# Patient Record
Sex: FEMALE | Race: WHITE | Employment: OTHER | URBAN - METROPOLITAN AREA
[De-identification: names, ages, dates, MRNs, and addresses within clinical notes are randomized per-mention and may not be internally consistent; named-entity substitution may affect disease eponyms.]

---

## 2017-05-08 ENCOUNTER — HOSPITAL ENCOUNTER (EMERGENCY)
Facility: HOSPITAL | Age: 67
Discharge: HOME | End: 2017-05-08
Attending: EMERGENCY MEDICINE
Payer: MEDICARE

## 2017-05-08 ENCOUNTER — APPOINTMENT (OUTPATIENT)
Dept: CT IMAGING | Facility: HOSPITAL | Age: 67
End: 2017-05-08
Attending: EMERGENCY MEDICINE
Payer: MEDICARE

## 2017-05-08 VITALS
TEMPERATURE: 98 F | BODY MASS INDEX: 24.27 KG/M2 | HEIGHT: 63 IN | WEIGHT: 137 LBS | HEART RATE: 71 BPM | SYSTOLIC BLOOD PRESSURE: 138 MMHG | OXYGEN SATURATION: 97 % | DIASTOLIC BLOOD PRESSURE: 79 MMHG | RESPIRATION RATE: 20 BRPM

## 2017-05-08 DIAGNOSIS — R10.9 RIGHT FLANK PAIN: ICD-10-CM

## 2017-05-08 DIAGNOSIS — N83.8 OVARIAN MASS, LEFT: Primary | ICD-10-CM

## 2017-05-08 PROCEDURE — 96375 TX/PRO/DX INJ NEW DRUG ADDON: CPT

## 2017-05-08 PROCEDURE — 96374 THER/PROPH/DIAG INJ IV PUSH: CPT

## 2017-05-08 PROCEDURE — 74176 CT ABD & PELVIS W/O CONTRAST: CPT | Performed by: EMERGENCY MEDICINE

## 2017-05-08 PROCEDURE — 99285 EMERGENCY DEPT VISIT HI MDM: CPT

## 2017-05-08 PROCEDURE — 80053 COMPREHEN METABOLIC PANEL: CPT | Performed by: EMERGENCY MEDICINE

## 2017-05-08 PROCEDURE — 81001 URINALYSIS AUTO W/SCOPE: CPT | Performed by: EMERGENCY MEDICINE

## 2017-05-08 PROCEDURE — 99284 EMERGENCY DEPT VISIT MOD MDM: CPT

## 2017-05-08 PROCEDURE — 83690 ASSAY OF LIPASE: CPT | Performed by: EMERGENCY MEDICINE

## 2017-05-08 PROCEDURE — 85025 COMPLETE CBC W/AUTO DIFF WBC: CPT | Performed by: EMERGENCY MEDICINE

## 2017-05-08 PROCEDURE — 96361 HYDRATE IV INFUSION ADD-ON: CPT

## 2017-05-08 RX ORDER — HYDROCODONE BITARTRATE AND ACETAMINOPHEN 5; 325 MG/1; MG/1
1-2 TABLET ORAL EVERY 4 HOURS PRN
Qty: 20 TABLET | Refills: 0 | Status: SHIPPED | OUTPATIENT
Start: 2017-05-08 | End: 2017-05-12 | Stop reason: ALTCHOICE

## 2017-05-08 RX ORDER — HYDROMORPHONE HYDROCHLORIDE 1 MG/ML
0.5 INJECTION, SOLUTION INTRAMUSCULAR; INTRAVENOUS; SUBCUTANEOUS ONCE
Status: COMPLETED | OUTPATIENT
Start: 2017-05-08 | End: 2017-05-08

## 2017-05-08 RX ORDER — SODIUM CHLORIDE 9 MG/ML
INJECTION, SOLUTION INTRAVENOUS CONTINUOUS
Status: DISCONTINUED | OUTPATIENT
Start: 2017-05-08 | End: 2017-05-08

## 2017-05-08 RX ORDER — ONDANSETRON 2 MG/ML
4 INJECTION INTRAMUSCULAR; INTRAVENOUS ONCE
Status: COMPLETED | OUTPATIENT
Start: 2017-05-08 | End: 2017-05-08

## 2017-05-08 NOTE — ED INITIAL ASSESSMENT (HPI)
Pt reports RUQ pain that wraps around rib cage that started today. No n/v/d. No abdominal surgeries. Took khari aspirin PTA.

## 2017-05-08 NOTE — ED PROVIDER NOTES
Patient Seen in: [facility] Emergency Department    History   Patient presents with:  Abdomen/Flank Pain (GI/)    Stated Complaint: right flank pain    HPI  Patient is a 40-year-old female who states that an hour ago she had acute onset of right f (36.9 °C)   Temp src 05/08/17 1635 Temporal   SpO2 05/08/17 1635 94 %   O2 Device 05/08/17 1732 None (Room air)       Current:/79 mmHg  Pulse 71  Temp(Src) 98.4 °F (36.9 °C) (Temporal)  Resp 20  Ht 160 cm (5' 3\")  Wt 62.143 kg  BMI 24.27 kg/m2  SpO2 LAVENDER   RAINBOW DRAW LIGHT GREEN    CT abdomen pelvisCONCLUSION:  6.1 x 6.2 x 6.1 cm left adnexal mass possibly of ovarian origin measuring slightly above fluid attenuation. Pelvic ultrasound is recommended for further evaluation.      MDM   Patient was

## 2017-05-09 NOTE — ED NOTES
Pt asleep, rouses easy. Family at bedside. Pt denies need at this time, call light in reach.  States pain \"OK\"

## 2017-05-10 ENCOUNTER — TELEPHONE (OUTPATIENT)
Dept: INTERNAL MEDICINE CLINIC | Facility: CLINIC | Age: 67
End: 2017-05-10

## 2017-05-12 ENCOUNTER — OFFICE VISIT (OUTPATIENT)
Dept: INTERNAL MEDICINE CLINIC | Facility: CLINIC | Age: 67
End: 2017-05-12

## 2017-05-12 VITALS
DIASTOLIC BLOOD PRESSURE: 84 MMHG | HEIGHT: 63 IN | SYSTOLIC BLOOD PRESSURE: 132 MMHG | WEIGHT: 126 LBS | RESPIRATION RATE: 16 BRPM | HEART RATE: 82 BPM | TEMPERATURE: 98 F | OXYGEN SATURATION: 98 % | BODY MASS INDEX: 22.32 KG/M2

## 2017-05-12 DIAGNOSIS — I77.811 ABDOMINAL AORTIC ECTASIA (HCC): ICD-10-CM

## 2017-05-12 DIAGNOSIS — J84.10 PULMONARY FIBROSIS (HCC): ICD-10-CM

## 2017-05-12 DIAGNOSIS — N83.8 OVARIAN MASS, LEFT: ICD-10-CM

## 2017-05-12 DIAGNOSIS — F32.9 REACTIVE DEPRESSION: ICD-10-CM

## 2017-05-12 DIAGNOSIS — D17.9 LIPOMA, UNSPECIFIED SITE: ICD-10-CM

## 2017-05-12 DIAGNOSIS — R10.9 FLANK PAIN: Primary | ICD-10-CM

## 2017-05-12 DIAGNOSIS — G25.0 BENIGN ESSENTIAL TREMOR: ICD-10-CM

## 2017-05-12 PROCEDURE — 99214 OFFICE O/P EST MOD 30 MIN: CPT | Performed by: FAMILY MEDICINE

## 2017-05-12 RX ORDER — PRIMIDONE 50 MG/1
50 TABLET ORAL NIGHTLY
Qty: 90 TABLET | Refills: 0 | Status: SHIPPED | OUTPATIENT
Start: 2017-05-12 | End: 2017-08-08

## 2017-05-12 RX ORDER — ESCITALOPRAM OXALATE 10 MG/1
10 TABLET ORAL DAILY
Qty: 30 TABLET | Refills: 1 | Status: SHIPPED | OUTPATIENT
Start: 2017-05-12 | End: 2017-07-13

## 2017-05-12 NOTE — PROGRESS NOTES
HPI:    Patient ID: Bay Valentino is a 74year old female.     HPI  4d ago seen in ER for R flank pain   Has been moving things lately that may have caused it   Had a CT and not stones but saw mass in the L ovary   She does feel better but now worried abo (M67.166) Abdominal aortic ectasia (HCC)  Plan: noted on CT   D/w pt     (F32.9) Reactive depression  Plan: discussed options  Will treat w Lexapro   Call w update      (G25.0) Benign essential tremor  Plan: mysoline refilled          Flank pain  (prim

## 2017-05-19 ENCOUNTER — HOSPITAL ENCOUNTER (OUTPATIENT)
Dept: ULTRASOUND IMAGING | Age: 67
Discharge: HOME | End: 2017-05-19
Attending: FAMILY MEDICINE
Payer: MEDICARE

## 2017-05-19 DIAGNOSIS — N83.8 OVARIAN MASS, LEFT: ICD-10-CM

## 2017-05-19 PROCEDURE — 76856 US EXAM PELVIC COMPLETE: CPT | Performed by: FAMILY MEDICINE

## 2017-05-19 PROCEDURE — 76830 TRANSVAGINAL US NON-OB: CPT | Performed by: FAMILY MEDICINE

## 2017-05-23 PROCEDURE — 88175 CYTOPATH C/V AUTO FLUID REDO: CPT | Performed by: OBSTETRICS & GYNECOLOGY

## 2017-05-23 PROCEDURE — 87624 HPV HI-RISK TYP POOLED RSLT: CPT | Performed by: OBSTETRICS & GYNECOLOGY

## 2017-05-23 PROCEDURE — 88305 TISSUE EXAM BY PATHOLOGIST: CPT | Performed by: OBSTETRICS & GYNECOLOGY

## 2017-06-19 RX ORDER — ASPIRIN 81 MG/1
81 TABLET, CHEWABLE ORAL DAILY
COMMUNITY

## 2017-06-22 ENCOUNTER — LAB ENCOUNTER (OUTPATIENT)
Dept: LAB | Facility: HOSPITAL | Age: 67
End: 2017-06-22
Attending: OBSTETRICS & GYNECOLOGY
Payer: MEDICARE

## 2017-06-22 DIAGNOSIS — N83.8 OVARIAN MASS, LEFT: ICD-10-CM

## 2017-06-22 DIAGNOSIS — R93.89 THICKENED ENDOMETRIUM: ICD-10-CM

## 2017-06-22 PROCEDURE — 86850 RBC ANTIBODY SCREEN: CPT

## 2017-06-22 PROCEDURE — 86900 BLOOD TYPING SEROLOGIC ABO: CPT

## 2017-06-22 PROCEDURE — 86901 BLOOD TYPING SEROLOGIC RH(D): CPT

## 2017-06-22 PROCEDURE — 36415 COLL VENOUS BLD VENIPUNCTURE: CPT

## 2017-07-10 NOTE — H&P
[facility]    PATIENT'S NAME: Tip Pires   ATTENDING PHYSICIAN: Willam Hu M.D.    PATIENT ACCOUNT#:   [de-identified]    LOCATION:  OR   [facility]  MEDICAL RECORD #:   [id number]       YOB: 1950  ADMISSION DATE:       07/11/2017    H

## 2017-07-11 ENCOUNTER — SURGERY (OUTPATIENT)
Age: 67
End: 2017-07-11

## 2017-07-11 ENCOUNTER — HOSPITAL ENCOUNTER (OUTPATIENT)
Facility: HOSPITAL | Age: 67
Discharge: HOME | End: 2017-07-12
Attending: OBSTETRICS & GYNECOLOGY | Admitting: OBSTETRICS & GYNECOLOGY
Payer: MEDICARE

## 2017-07-11 DIAGNOSIS — R93.89 THICKENED ENDOMETRIUM: ICD-10-CM

## 2017-07-11 DIAGNOSIS — N83.8 OVARIAN MASS, LEFT: Primary | ICD-10-CM

## 2017-07-11 LAB
ERYTHROCYTE [DISTWIDTH] IN BLOOD BY AUTOMATED COUNT: 14.8 % (ref 11.5–16)
HCT VFR BLD AUTO: 40.8 % (ref 34–50)
HGB BLD-MCNC: 13.1 G/DL (ref 12–16)
MCH RBC QN AUTO: 26.6 PG (ref 27–33.2)
MCHC RBC AUTO-ENTMCNC: 32.1 G/DL (ref 31–37)
MCV RBC AUTO: 82.8 FL (ref 81–100)
PLATELET # BLD AUTO: 219 10(3)UL (ref 150–450)
RBC # BLD AUTO: 4.93 X10(6)UL (ref 3.8–5.1)
RED CELL DISTRIBUTION WIDTH-SD: 45 FL (ref 35.1–46.3)
WBC # BLD AUTO: 5.8 X10(3) UL (ref 4–13)

## 2017-07-11 PROCEDURE — 0UT64ZZ RESECTION OF LEFT FALLOPIAN TUBE, PERCUTANEOUS ENDOSCOPIC APPROACH: ICD-10-PCS | Performed by: OBSTETRICS & GYNECOLOGY

## 2017-07-11 PROCEDURE — 0UT14ZZ RESECTION OF LEFT OVARY, PERCUTANEOUS ENDOSCOPIC APPROACH: ICD-10-PCS | Performed by: OBSTETRICS & GYNECOLOGY

## 2017-07-11 PROCEDURE — 88307 TISSUE EXAM BY PATHOLOGIST: CPT | Performed by: OBSTETRICS & GYNECOLOGY

## 2017-07-11 PROCEDURE — 0UTC4ZZ RESECTION OF CERVIX, PERCUTANEOUS ENDOSCOPIC APPROACH: ICD-10-PCS | Performed by: OBSTETRICS & GYNECOLOGY

## 2017-07-11 PROCEDURE — 0UT94ZZ RESECTION OF UTERUS, PERCUTANEOUS ENDOSCOPIC APPROACH: ICD-10-PCS | Performed by: OBSTETRICS & GYNECOLOGY

## 2017-07-11 PROCEDURE — 88108 CYTOPATH CONCENTRATE TECH: CPT | Performed by: OBSTETRICS & GYNECOLOGY

## 2017-07-11 PROCEDURE — 88332 PATH CONSLTJ SURG EA ADD BLK: CPT | Performed by: OBSTETRICS & GYNECOLOGY

## 2017-07-11 PROCEDURE — 8E0W4CZ ROBOTIC ASSISTED PROCEDURE OF TRUNK REGION, PERCUTANEOUS ENDOSCOPIC APPROACH: ICD-10-PCS | Performed by: OBSTETRICS & GYNECOLOGY

## 2017-07-11 PROCEDURE — 85027 COMPLETE CBC AUTOMATED: CPT

## 2017-07-11 PROCEDURE — 88331 PATH CONSLTJ SURG 1 BLK 1SPC: CPT | Performed by: OBSTETRICS & GYNECOLOGY

## 2017-07-11 RX ORDER — ENOXAPARIN SODIUM 100 MG/ML
40 INJECTION SUBCUTANEOUS DAILY
Status: DISCONTINUED | OUTPATIENT
Start: 2017-07-11 | End: 2017-07-12

## 2017-07-11 RX ORDER — MORPHINE SULFATE 4 MG/ML
1 INJECTION, SOLUTION INTRAMUSCULAR; INTRAVENOUS EVERY 2 HOUR PRN
Status: DISCONTINUED | OUTPATIENT
Start: 2017-07-11 | End: 2017-07-12

## 2017-07-11 RX ORDER — HEPARIN SODIUM 5000 [USP'U]/ML
5000 INJECTION, SOLUTION INTRAVENOUS; SUBCUTANEOUS ONCE
Status: COMPLETED | OUTPATIENT
Start: 2017-07-11 | End: 2017-07-11

## 2017-07-11 RX ORDER — ACETAMINOPHEN 500 MG
1000 TABLET ORAL ONCE AS NEEDED
Status: DISCONTINUED | OUTPATIENT
Start: 2017-07-11 | End: 2017-07-11 | Stop reason: HOSPADM

## 2017-07-11 RX ORDER — DEXTROSE, SODIUM CHLORIDE, AND POTASSIUM CHLORIDE 5; .9; .15 G/100ML; G/100ML; G/100ML
INJECTION INTRAVENOUS CONTINUOUS
Status: DISCONTINUED | OUTPATIENT
Start: 2017-07-11 | End: 2017-07-12

## 2017-07-11 RX ORDER — HYDROCODONE BITARTRATE AND ACETAMINOPHEN 5; 325 MG/1; MG/1
1 TABLET ORAL AS NEEDED
Status: DISCONTINUED | OUTPATIENT
Start: 2017-07-11 | End: 2017-07-11 | Stop reason: HOSPADM

## 2017-07-11 RX ORDER — ONDANSETRON 2 MG/ML
4 INJECTION INTRAMUSCULAR; INTRAVENOUS EVERY 8 HOURS PRN
Status: DISCONTINUED | OUTPATIENT
Start: 2017-07-11 | End: 2017-07-12

## 2017-07-11 RX ORDER — ZOLPIDEM TARTRATE 5 MG/1
5 TABLET ORAL NIGHTLY PRN
Status: DISCONTINUED | OUTPATIENT
Start: 2017-07-11 | End: 2017-07-12

## 2017-07-11 RX ORDER — LABETALOL HYDROCHLORIDE 5 MG/ML
5 INJECTION, SOLUTION INTRAVENOUS EVERY 5 MIN PRN
Status: DISCONTINUED | OUTPATIENT
Start: 2017-07-11 | End: 2017-07-11 | Stop reason: HOSPADM

## 2017-07-11 RX ORDER — HYDROCODONE BITARTRATE AND ACETAMINOPHEN 5; 325 MG/1; MG/1
2 TABLET ORAL EVERY 4 HOURS PRN
Status: DISCONTINUED | OUTPATIENT
Start: 2017-07-11 | End: 2017-07-12

## 2017-07-11 RX ORDER — ACETAMINOPHEN 325 MG/1
650 TABLET ORAL EVERY 4 HOURS PRN
Status: DISCONTINUED | OUTPATIENT
Start: 2017-07-11 | End: 2017-07-12

## 2017-07-11 RX ORDER — CEFAZOLIN SODIUM 1 G/3ML
INJECTION, POWDER, FOR SOLUTION INTRAMUSCULAR; INTRAVENOUS
Status: DISCONTINUED | OUTPATIENT
Start: 2017-07-11 | End: 2017-07-11 | Stop reason: HOSPADM

## 2017-07-11 RX ORDER — HYDROMORPHONE HYDROCHLORIDE 1 MG/ML
0.4 INJECTION, SOLUTION INTRAMUSCULAR; INTRAVENOUS; SUBCUTANEOUS EVERY 5 MIN PRN
Status: DISCONTINUED | OUTPATIENT
Start: 2017-07-11 | End: 2017-07-11 | Stop reason: HOSPADM

## 2017-07-11 RX ORDER — NALOXONE HYDROCHLORIDE 0.4 MG/ML
80 INJECTION, SOLUTION INTRAMUSCULAR; INTRAVENOUS; SUBCUTANEOUS AS NEEDED
Status: DISCONTINUED | OUTPATIENT
Start: 2017-07-11 | End: 2017-07-11 | Stop reason: HOSPADM

## 2017-07-11 RX ORDER — DEXAMETHASONE SODIUM PHOSPHATE 4 MG/ML
4 VIAL (ML) INJECTION AS NEEDED
Status: DISCONTINUED | OUTPATIENT
Start: 2017-07-11 | End: 2017-07-11 | Stop reason: HOSPADM

## 2017-07-11 RX ORDER — MIDAZOLAM HYDROCHLORIDE 1 MG/ML
1 INJECTION INTRAMUSCULAR; INTRAVENOUS EVERY 5 MIN PRN
Status: DISCONTINUED | OUTPATIENT
Start: 2017-07-11 | End: 2017-07-11 | Stop reason: HOSPADM

## 2017-07-11 RX ORDER — DIPHENHYDRAMINE HYDROCHLORIDE 50 MG/ML
12.5 INJECTION INTRAMUSCULAR; INTRAVENOUS EVERY 4 HOURS PRN
Status: DISCONTINUED | OUTPATIENT
Start: 2017-07-11 | End: 2017-07-12

## 2017-07-11 RX ORDER — SODIUM CHLORIDE, SODIUM LACTATE, POTASSIUM CHLORIDE, CALCIUM CHLORIDE 600; 310; 30; 20 MG/100ML; MG/100ML; MG/100ML; MG/100ML
INJECTION, SOLUTION INTRAVENOUS CONTINUOUS
Status: DISCONTINUED | OUTPATIENT
Start: 2017-07-11 | End: 2017-07-12

## 2017-07-11 RX ORDER — PROCHLORPERAZINE 25 MG
25 SUPPOSITORY, RECTAL RECTAL EVERY 12 HOURS PRN
Status: DISCONTINUED | OUTPATIENT
Start: 2017-07-11 | End: 2017-07-12

## 2017-07-11 RX ORDER — HEPARIN SODIUM 5000 [USP'U]/ML
INJECTION, SOLUTION INTRAVENOUS; SUBCUTANEOUS
Status: DISPENSED
Start: 2017-07-11 | End: 2017-07-11

## 2017-07-11 RX ORDER — PROCHLORPERAZINE MALEATE 10 MG
10 TABLET ORAL EVERY 12 HOURS PRN
Status: DISCONTINUED | OUTPATIENT
Start: 2017-07-11 | End: 2017-07-12

## 2017-07-11 RX ORDER — ONDANSETRON 4 MG/1
4 TABLET, FILM COATED ORAL EVERY 8 HOURS PRN
Status: DISCONTINUED | OUTPATIENT
Start: 2017-07-11 | End: 2017-07-12

## 2017-07-11 RX ORDER — ONDANSETRON 2 MG/ML
4 INJECTION INTRAMUSCULAR; INTRAVENOUS AS NEEDED
Status: DISCONTINUED | OUTPATIENT
Start: 2017-07-11 | End: 2017-07-11 | Stop reason: HOSPADM

## 2017-07-11 RX ORDER — MEPERIDINE HYDROCHLORIDE 25 MG/ML
12.5 INJECTION INTRAMUSCULAR; INTRAVENOUS; SUBCUTANEOUS AS NEEDED
Status: DISCONTINUED | OUTPATIENT
Start: 2017-07-11 | End: 2017-07-11 | Stop reason: HOSPADM

## 2017-07-11 RX ORDER — BUPIVACAINE HYDROCHLORIDE 5 MG/ML
INJECTION, SOLUTION EPIDURAL; INTRACAUDAL AS NEEDED
Status: DISCONTINUED | OUTPATIENT
Start: 2017-07-11 | End: 2017-07-11 | Stop reason: HOSPADM

## 2017-07-11 RX ORDER — MORPHINE SULFATE 4 MG/ML
4 INJECTION, SOLUTION INTRAMUSCULAR; INTRAVENOUS EVERY 2 HOUR PRN
Status: DISCONTINUED | OUTPATIENT
Start: 2017-07-11 | End: 2017-07-12

## 2017-07-11 RX ORDER — MORPHINE SULFATE 4 MG/ML
2 INJECTION, SOLUTION INTRAMUSCULAR; INTRAVENOUS EVERY 2 HOUR PRN
Status: DISCONTINUED | OUTPATIENT
Start: 2017-07-11 | End: 2017-07-12

## 2017-07-11 RX ORDER — METOCLOPRAMIDE HYDROCHLORIDE 5 MG/ML
10 INJECTION INTRAMUSCULAR; INTRAVENOUS AS NEEDED
Status: DISCONTINUED | OUTPATIENT
Start: 2017-07-11 | End: 2017-07-11 | Stop reason: HOSPADM

## 2017-07-11 RX ORDER — HEPARIN SODIUM 5000 [USP'U]/ML
5000 INJECTION, SOLUTION INTRAVENOUS; SUBCUTANEOUS EVERY 8 HOURS SCHEDULED
Status: DISCONTINUED | OUTPATIENT
Start: 2017-07-11 | End: 2017-07-11

## 2017-07-11 RX ORDER — KETOROLAC TROMETHAMINE 30 MG/ML
30 INJECTION, SOLUTION INTRAMUSCULAR; INTRAVENOUS EVERY 6 HOURS
Status: DISCONTINUED | OUTPATIENT
Start: 2017-07-11 | End: 2017-07-12

## 2017-07-11 RX ORDER — HYDROCODONE BITARTRATE AND ACETAMINOPHEN 5; 325 MG/1; MG/1
2 TABLET ORAL AS NEEDED
Status: DISCONTINUED | OUTPATIENT
Start: 2017-07-11 | End: 2017-07-11 | Stop reason: HOSPADM

## 2017-07-11 RX ORDER — HYDROCODONE BITARTRATE AND ACETAMINOPHEN 5; 325 MG/1; MG/1
1 TABLET ORAL EVERY 4 HOURS PRN
Status: DISCONTINUED | OUTPATIENT
Start: 2017-07-11 | End: 2017-07-12

## 2017-07-11 NOTE — OPERATIVE REPORT
[facility]    PATIENT'S NAME: Lottie Mcgarry SHANELL   ATTENDING PHYSICIAN: Hayde Grimaldo M.D. OPERATING PHYSICIAN: Hayde Grimaldo M.D.    PATIENT ACCOUNT#:   [de-identified]    LOCATION:  [address] OR [facility] 7 EDWP 10  MEDICAL RECORD #:   [id number] performed of the retroperitoneum to expose the pelvic vessels and the ureter. The infundibulopelvic ligament was isolated and coagulated and cut.   Then, a similar dissection was performed on the left side around the right side by clamping and coagulating

## 2017-07-11 NOTE — PROGRESS NOTES
[facility]  Brief Op Note    Geeta Mathias Location: OR   [facility] [id number] MRN [id number]   Admission Date 7/11/2017 Operation Date 7/11/2017   Attending Physician Luis Tsai MD Operating Physician Saad Barnett MD     Pre-Operative Sruthi Medina

## 2017-07-12 VITALS
TEMPERATURE: 98 F | RESPIRATION RATE: 18 BRPM | HEART RATE: 59 BPM | HEIGHT: 63 IN | DIASTOLIC BLOOD PRESSURE: 67 MMHG | SYSTOLIC BLOOD PRESSURE: 126 MMHG | BODY MASS INDEX: 21.79 KG/M2 | WEIGHT: 123 LBS | OXYGEN SATURATION: 100 %

## 2017-07-12 LAB
ERYTHROCYTE [DISTWIDTH] IN BLOOD BY AUTOMATED COUNT: 14.9 % (ref 11.5–16)
HCT VFR BLD AUTO: 34.8 % (ref 34–50)
HGB BLD-MCNC: 10.8 G/DL (ref 12–16)
MCH RBC QN AUTO: 26.5 PG (ref 27–33.2)
MCHC RBC AUTO-ENTMCNC: 31 G/DL (ref 31–37)
MCV RBC AUTO: 85.5 FL (ref 81–100)
NON GYNE INTERPRETATION: NEGATIVE
PLATELET # BLD AUTO: 190 10(3)UL (ref 150–450)
RBC # BLD AUTO: 4.07 X10(6)UL (ref 3.8–5.1)
RED CELL DISTRIBUTION WIDTH-SD: 46.5 FL (ref 35.1–46.3)
WBC # BLD AUTO: 8.3 X10(3) UL (ref 4–13)

## 2017-07-12 PROCEDURE — 90471 IMMUNIZATION ADMIN: CPT

## 2017-07-12 PROCEDURE — 96375 TX/PRO/DX INJ NEW DRUG ADDON: CPT

## 2017-07-12 PROCEDURE — 36415 COLL VENOUS BLD VENIPUNCTURE: CPT | Performed by: OBSTETRICS & GYNECOLOGY

## 2017-07-12 PROCEDURE — 85027 COMPLETE CBC AUTOMATED: CPT | Performed by: OBSTETRICS & GYNECOLOGY

## 2017-07-12 PROCEDURE — 96376 TX/PRO/DX INJ SAME DRUG ADON: CPT

## 2017-07-12 NOTE — PROGRESS NOTES
NURSING DISCHARGE NOTE    Discharged Home via Wheelchair. Accompanied by Spouse and Support staff  Belongings Taken by patient/family. Instructions given to patietn and patient verbalizes understanding. IV discontinued. Catheter intact.   No redness

## 2017-07-12 NOTE — PAYOR COMM NOTE
--------------  ADMISSION REVIEW     Payor: Elizabeth Reyes  Subscriber #:  [place] Jorge  Authorization Number: N/A    Admit date: 7/11/2017  8:43 AM       Admitting Physician: Lnyne David MD    Primary Care Physician: Pawel Bradshaw MD           H&P - H spleen not palpable. There are no palpable hernias or masses. PELVIC:  External genitalia, vagina, and cervix are clean. There is a palpable mass on the left side. EXTREMITIES:  No edema of feet. No DVT. IMPRESSION:  Left adnexal mass.     PLAN: Intravenous Fara Tejeda RN    7/11/2017 1610 Given 30 mg Intravenous Tracey Aguiar RN      Pneumococcal Vac Polyvalent (PNEUMOVAX) injection 0.5 mL     Date Action Dose Route User    Discharged on 7/12/2017 7/12/2017 1113 Given 0.5 mL

## 2017-07-12 NOTE — CM/SW NOTE
07/12/17 1100   CM/SW Screening   Referral Source Social Work (self-referral)   Information Source Chart review;Nursing rounds   Patient's Mental Status Alert;Oriented   Chart reviewed, and patient dicussed in care rounds. No orders at this time.  MSW to

## 2017-07-12 NOTE — PLAN OF CARE
Ang discontinued per MD's orders, tip intact, patient tolerated well. Writing RN explained to patient to notify staff when she feels the urge to void and the importance of measuring urine output s/p ang removal, patient verbalizes understanding.  Patien

## 2017-07-12 NOTE — DISCHARGE SUMMARY
[facility]  Progress Note    Harriet Waters Patient Status:  Outpatient in a Bed    1950 MRN [id number]   [facility] 3NW-A Attending Jocelin Jones MD   Hosp Day # 0 PCP Ermias Villareal MD       SUBJECTIVE:  Comfortable, min (TORADOL) 30 MG/ML injection 30 mg 30 mg Intravenous Q6H   acetaminophen (TYLENOL) tab 650 mg 650 mg Oral Q4H PRN   Or      HYDROcodone-acetaminophen (NORCO) 5-325 MG per tab 1 tablet 1 tablet Oral Q4H PRN   Or      HYDROcodone-acetaminophen (NORCO) 5-325

## 2017-07-12 NOTE — PLAN OF CARE
GASTROINTESTINAL - ADULT    • Minimal or absence of nausea and vomiting Progressing    • Maintains or returns to baseline bowel function Progressing        GENITOURINARY - ADULT    • Absence of urinary retention Progressing        PAIN - ADULT    • Paulino Al

## 2017-07-13 RX ORDER — ESCITALOPRAM OXALATE 10 MG/1
10 TABLET ORAL DAILY
Qty: 30 TABLET | Refills: 0 | Status: SHIPPED | OUTPATIENT
Start: 2017-07-13 | End: 2017-08-08

## 2017-08-09 RX ORDER — ESCITALOPRAM OXALATE 10 MG/1
TABLET ORAL
Qty: 30 TABLET | Refills: 0 | Status: SHIPPED | OUTPATIENT
Start: 2017-08-09 | End: 2017-09-14

## 2017-08-09 RX ORDER — PRIMIDONE 50 MG/1
50 TABLET ORAL NIGHTLY
Qty: 90 TABLET | Refills: 0 | Status: SHIPPED | OUTPATIENT
Start: 2017-08-09 | End: 2017-10-18

## 2017-09-18 RX ORDER — ESCITALOPRAM OXALATE 10 MG/1
TABLET ORAL
Qty: 30 TABLET | Refills: 2 | Status: SHIPPED | OUTPATIENT
Start: 2017-09-18 | End: 2017-12-11

## 2017-10-18 RX ORDER — PRIMIDONE 50 MG/1
50 TABLET ORAL NIGHTLY
Qty: 90 TABLET | Refills: 1 | Status: SHIPPED | OUTPATIENT
Start: 2017-10-18 | End: 2018-03-07

## 2017-12-11 RX ORDER — ESCITALOPRAM OXALATE 10 MG/1
10 TABLET ORAL
Qty: 30 TABLET | Refills: 0 | Status: SHIPPED | OUTPATIENT
Start: 2017-12-11 | End: 2018-03-07

## 2018-03-07 ENCOUNTER — OFFICE VISIT (OUTPATIENT)
Dept: INTERNAL MEDICINE CLINIC | Facility: CLINIC | Age: 68
End: 2018-03-07

## 2018-03-07 VITALS
WEIGHT: 137 LBS | HEART RATE: 76 BPM | SYSTOLIC BLOOD PRESSURE: 116 MMHG | RESPIRATION RATE: 16 BRPM | BODY MASS INDEX: 25.53 KG/M2 | OXYGEN SATURATION: 98 % | HEIGHT: 61.5 IN | DIASTOLIC BLOOD PRESSURE: 84 MMHG | TEMPERATURE: 98 F

## 2018-03-07 DIAGNOSIS — Z12.31 VISIT FOR SCREENING MAMMOGRAM: ICD-10-CM

## 2018-03-07 DIAGNOSIS — Z13.6 SCREENING FOR CARDIOVASCULAR CONDITION: ICD-10-CM

## 2018-03-07 DIAGNOSIS — E78.00 PURE HYPERCHOLESTEROLEMIA: ICD-10-CM

## 2018-03-07 DIAGNOSIS — I77.811 ABDOMINAL AORTIC ECTASIA (HCC): ICD-10-CM

## 2018-03-07 DIAGNOSIS — J84.10 PULMONARY FIBROSIS (HCC): ICD-10-CM

## 2018-03-07 DIAGNOSIS — I10 ESSENTIAL HYPERTENSION: ICD-10-CM

## 2018-03-07 DIAGNOSIS — Z13.31 DEPRESSION SCREENING: ICD-10-CM

## 2018-03-07 DIAGNOSIS — F41.1 ANXIETY STATE: ICD-10-CM

## 2018-03-07 DIAGNOSIS — Z11.59 NEED FOR HEPATITIS C SCREENING TEST: ICD-10-CM

## 2018-03-07 DIAGNOSIS — Z12.11 COLON CANCER SCREENING: ICD-10-CM

## 2018-03-07 DIAGNOSIS — Z00.00 MEDICARE ANNUAL WELLNESS VISIT, INITIAL: Primary | ICD-10-CM

## 2018-03-07 DIAGNOSIS — G25.0 BENIGN ESSENTIAL TREMOR: ICD-10-CM

## 2018-03-07 DIAGNOSIS — K20.90 ESOPHAGITIS, UNSPECIFIED: ICD-10-CM

## 2018-03-07 DIAGNOSIS — F32.9 REACTIVE DEPRESSION: ICD-10-CM

## 2018-03-07 DIAGNOSIS — Z00.00 ENCOUNTER FOR ANNUAL HEALTH EXAMINATION: ICD-10-CM

## 2018-03-07 DIAGNOSIS — I25.10 ATHEROSCLEROSIS OF NATIVE CORONARY ARTERY OF NATIVE HEART WITHOUT ANGINA PECTORIS: ICD-10-CM

## 2018-03-07 PROCEDURE — 96160 PT-FOCUSED HLTH RISK ASSMT: CPT | Performed by: FAMILY MEDICINE

## 2018-03-07 PROCEDURE — G0439 PPPS, SUBSEQ VISIT: HCPCS | Performed by: FAMILY MEDICINE

## 2018-03-07 RX ORDER — PRIMIDONE 50 MG/1
50 TABLET ORAL 2 TIMES DAILY
Qty: 180 TABLET | Refills: 1 | Status: SHIPPED | OUTPATIENT
Start: 2018-03-07 | End: 2019-11-25

## 2018-03-07 RX ORDER — ESCITALOPRAM OXALATE 10 MG/1
10 TABLET ORAL
Qty: 90 TABLET | Refills: 1 | Status: SHIPPED | OUTPATIENT
Start: 2018-03-07 | End: 2019-11-25

## 2018-03-07 NOTE — PATIENT INSTRUCTIONS
Dillan Casillas's SCREENING SCHEDULE   Tests on this list are recommended by your physician but may not be covered, or covered at this frequency, by your insurer. Please check with your insurance carrier before scheduling to verify coverage.    Patrick Kawasaki if abnormal Colonoscopy,10 Years due on 12/21/1998 Update [facility] if applicable    Flex Sigmoidoscopy Screen  Covered every 5 years No results found for this or any previous visit. No flowsheet data found.      Fecal Occult Blood   Covered Annual orders found for this or any previous visit. Please get once after your 65th birthday    Hepatitis B for Moderate/High Risk       No orders found for this or any previous visit.  Medium/high risk factors:   End-stage renal disease   Hemophiliacs who receive

## 2018-03-07 NOTE — PROGRESS NOTES
HPI:   eKll Quick is a 69year old female who presents for a Medicare Initial Annual Wellness visit (Once after 12 month Medicare anniversary) .       Her last annual assessment has been over 1 year: Annual Physical due on 12/21/1952         Fall/Ris patient and Family/surrogate (if present), and forms available to patient in AVS       She does NOT have a Power of  for [employer] on file in [facility]    Advance care planning including the explanation and discussion of advance directives standard fo (Office Visit) with Aishwarya Washington MD:  escitalopram 10 MG Oral Tab Take 1 tablet (10 mg total) by mouth once daily. PT DUE FOR AN APPT! primidone 50 MG Oral Tab Take 1 tablet (50 mg total) by mouth nightly.    aspirin 81 MG Oral Chew Tab Chew 81 mg by mo following:    Height as of this encounter: 61.5\". Weight as of this encounter: 137 lb.     Medicare Hearing Assessment  (Required for AWV/SWV)    Whispered Voice       Visual Acuity                           General Appearance:  Alert, cooperative, no d Abdominal aortic ectasia (HCC)  Plan: noted on scan    Follow       (J84.10) Pulmonary fibrosis (HCC)  Plan: stable s/s       (I25.10) Atherosclerosis of native coronary artery of native heart without angina pectoris  Plan: no active issues       (G25.0) B Density Screening      Dexascan Every two years No results found for this or any previous visit. No flowsheet data found.     Pap and Pelvic      Pap: Every 3 yrs age 21-65 or Pap+HPV every 5 yrs age 33-67, age 72 and older at high risk There are no prevent Date Value   05/08/2017 0.80    No flowsheet data found. Drug Serum Conc  Annually No results found for: DIGOXIN, DIG, VALP No flowsheet data found.                Template: MIRA BARRIOS MEDICARE ANNUAL ASSESSMENT FEMALE [[id number]]

## 2019-11-25 ENCOUNTER — OFFICE VISIT (OUTPATIENT)
Dept: FAMILY MEDICINE CLINIC | Facility: CLINIC | Age: 69
End: 2019-11-25
Payer: MEDICARE

## 2019-11-25 VITALS
DIASTOLIC BLOOD PRESSURE: 88 MMHG | HEIGHT: 61.5 IN | BODY MASS INDEX: 23.55 KG/M2 | WEIGHT: 126.38 LBS | SYSTOLIC BLOOD PRESSURE: 130 MMHG | RESPIRATION RATE: 12 BRPM | HEART RATE: 80 BPM | TEMPERATURE: 98 F

## 2019-11-25 DIAGNOSIS — E78.00 PURE HYPERCHOLESTEROLEMIA: ICD-10-CM

## 2019-11-25 DIAGNOSIS — Z12.31 VISIT FOR SCREENING MAMMOGRAM: Primary | ICD-10-CM

## 2019-11-25 DIAGNOSIS — Z23 NEED FOR INFLUENZA VACCINATION: ICD-10-CM

## 2019-11-25 DIAGNOSIS — I25.10 ATHEROSCLEROSIS OF NATIVE CORONARY ARTERY OF NATIVE HEART WITHOUT ANGINA PECTORIS: ICD-10-CM

## 2019-11-25 PROCEDURE — G0008 ADMIN INFLUENZA VIRUS VAC: HCPCS | Performed by: FAMILY MEDICINE

## 2019-11-25 PROCEDURE — 90662 IIV NO PRSV INCREASED AG IM: CPT | Performed by: FAMILY MEDICINE

## 2019-11-25 PROCEDURE — 99214 OFFICE O/P EST MOD 30 MIN: CPT | Performed by: FAMILY MEDICINE

## 2019-11-25 RX ORDER — ESCITALOPRAM OXALATE 10 MG/1
10 TABLET ORAL
Qty: 90 TABLET | Refills: 1 | Status: SHIPPED | OUTPATIENT
Start: 2019-11-25 | End: 2020-06-03

## 2019-11-25 RX ORDER — PRIMIDONE 50 MG/1
50 TABLET ORAL 2 TIMES DAILY
Qty: 180 TABLET | Refills: 1 | Status: SHIPPED | OUTPATIENT
Start: 2019-11-25 | End: 2020-06-03

## 2019-11-25 NOTE — PROGRESS NOTES
Marisabel Rodgers is a 77year old female.   Patient presents with:  Medication Follow-Up: inrm5      Chief Complaint Reviewed and Verified  Nursing Notes Reviewed and   Verified  Tobacco Reviewed  Allergies Reviewed  Medications Reviewed    Problem List Re HEALTH: feels well no complaints  SKIN: denies any unusual skin lesions or rashes  RESPIRATORY: denies shortness of breath with exertion  CARDIOVASCULAR: denies chest pain on exertion  GI: denies abdominal pain and denies heartburn  NEURO: denies headaches mg total) by mouth once daily. Fercho Mckay M.D., [facility]      The patient indicates understanding of these issues and agrees to the plan.

## 2020-05-31 NOTE — TELEPHONE ENCOUNTER
Last Office Visit: 11/25/19  Last Refill: 11/25/19  Last Labs: 2017    Call patient Monday, due for follow up and labs.

## 2020-06-01 NOTE — TELEPHONE ENCOUNTER
Future Appointments   Date Time Provider Esme Troy   6/3/2020 10:00 AM Janak Vargas MD [facility] [place]

## 2020-06-03 ENCOUNTER — OFFICE VISIT (OUTPATIENT)
Dept: FAMILY MEDICINE CLINIC | Facility: CLINIC | Age: 70
End: 2020-06-03
Payer: MEDICARE

## 2020-06-03 ENCOUNTER — APPOINTMENT (OUTPATIENT)
Dept: LAB | Age: 70
End: 2020-06-03
Attending: FAMILY MEDICINE
Payer: MEDICARE

## 2020-06-03 VITALS
OXYGEN SATURATION: 98 % | HEART RATE: 73 BPM | WEIGHT: 134 LBS | DIASTOLIC BLOOD PRESSURE: 88 MMHG | TEMPERATURE: 98 F | BODY MASS INDEX: 24.98 KG/M2 | HEIGHT: 61.5 IN | SYSTOLIC BLOOD PRESSURE: 136 MMHG

## 2020-06-03 DIAGNOSIS — Z13.220 SCREENING, LIPID: ICD-10-CM

## 2020-06-03 DIAGNOSIS — Z11.59 NEED FOR HEPATITIS C SCREENING TEST: ICD-10-CM

## 2020-06-03 DIAGNOSIS — Z13.1 SCREENING FOR DIABETES MELLITUS: ICD-10-CM

## 2020-06-03 DIAGNOSIS — E78.00 PURE HYPERCHOLESTEROLEMIA: ICD-10-CM

## 2020-06-03 DIAGNOSIS — Z00.00 ENCOUNTER FOR ANNUAL HEALTH EXAMINATION: Primary | ICD-10-CM

## 2020-06-03 DIAGNOSIS — Z12.11 SCREENING FOR COLON CANCER: ICD-10-CM

## 2020-06-03 DIAGNOSIS — K63.5 POLYP OF COLON, UNSPECIFIED PART OF COLON, UNSPECIFIED TYPE: ICD-10-CM

## 2020-06-03 DIAGNOSIS — Z00.00 LABORATORY EXAMINATION ORDERED AS PART OF A ROUTINE GENERAL MEDICAL EXAMINATION: ICD-10-CM

## 2020-06-03 DIAGNOSIS — I77.811 ABDOMINAL AORTIC ECTASIA (HCC): ICD-10-CM

## 2020-06-03 DIAGNOSIS — F41.1 ANXIETY STATE: ICD-10-CM

## 2020-06-03 DIAGNOSIS — Z12.31 VISIT FOR SCREENING MAMMOGRAM: ICD-10-CM

## 2020-06-03 DIAGNOSIS — Z13.6 SCREENING FOR CARDIOVASCULAR CONDITION: ICD-10-CM

## 2020-06-03 DIAGNOSIS — I25.10 ATHEROSCLEROSIS OF NATIVE CORONARY ARTERY OF NATIVE HEART WITHOUT ANGINA PECTORIS: ICD-10-CM

## 2020-06-03 DIAGNOSIS — G25.0 BENIGN ESSENTIAL TREMOR: ICD-10-CM

## 2020-06-03 DIAGNOSIS — J84.10 PULMONARY FIBROSIS (HCC): ICD-10-CM

## 2020-06-03 PROCEDURE — 86803 HEPATITIS C AB TEST: CPT

## 2020-06-03 PROCEDURE — 99213 OFFICE O/P EST LOW 20 MIN: CPT | Performed by: FAMILY MEDICINE

## 2020-06-03 PROCEDURE — 80053 COMPREHEN METABOLIC PANEL: CPT

## 2020-06-03 PROCEDURE — 36415 COLL VENOUS BLD VENIPUNCTURE: CPT

## 2020-06-03 PROCEDURE — 80061 LIPID PANEL: CPT

## 2020-06-03 PROCEDURE — G0439 PPPS, SUBSEQ VISIT: HCPCS | Performed by: FAMILY MEDICINE

## 2020-06-03 RX ORDER — PRIMIDONE 50 MG/1
TABLET ORAL
Qty: 270 TABLET | Refills: 1 | Status: SHIPPED | OUTPATIENT
Start: 2020-06-03 | End: 2020-09-18

## 2020-06-03 RX ORDER — ESCITALOPRAM OXALATE 10 MG/1
TABLET ORAL
Qty: 90 TABLET | Refills: 0 | OUTPATIENT
Start: 2020-06-03

## 2020-06-03 RX ORDER — GARLIC EXTRACT 500 MG
1 CAPSULE ORAL DAILY
COMMUNITY

## 2020-06-03 RX ORDER — ESCITALOPRAM OXALATE 10 MG/1
10 TABLET ORAL
Qty: 90 TABLET | Refills: 1 | Status: SHIPPED | OUTPATIENT
Start: 2020-06-03 | End: 2020-12-22

## 2020-06-03 RX ORDER — PRIMIDONE 50 MG/1
TABLET ORAL
Qty: 180 TABLET | Refills: 0 | OUTPATIENT
Start: 2020-06-03

## 2020-06-03 NOTE — PROGRESS NOTES
HPI:   Diego Hidalgo is a 68year old female who presents for a Medicare Subsequent Annual Wellness visit (Pt already had Initial Annual Wellness).     Feels well  But also thinks the tremors sl worse and wishes increase medications    See visit below Problem List:     Coronary atherosclerosis     Esophagitis, unspecified     Anxiety state     Pure hypercholesterolemia     Hypertension     Pulmonary fibrosis (HCC)     Abdominal aortic ectasia (HCC)     Reactive depression     Benign essential tremor alcohol or use drugs.      REVIEW OF SYSTEMS:        EXAM:   /88   Pulse 73   Temp 98.2 °F (36.8 °C) (Oral)   Ht 61.5\"   Wt 134 lb (60.8 kg)   SpO2 98%   BMI 24.91 kg/m²  Estimated body mass index is 24.91 kg/m² as calculated from the following:    H for screening mammogram        Relevant Orders    AYLA SCREENING BILAT (CPT=77067)    Need for hepatitis C screening test        Relevant Orders    HCV ANTIBODY    Screening for colon cancer        Relevant Orders    GASTRO - EXTERNAL    Laboratory examinat every 10 years Colonoscopy due on 12/01/2013 Update [facility] if applicable    Flex Sigmoidoscopy Screen every 10 years No results found for this or any previous visit. No flowsheet data found.      Fecal Occult Blood Annually No results found for: Medicare Part B No vaccine history found This may be covered with your pharmacy  prescription benefits      SPECIFIC DISEASE MONITORING Internal Lab or Procedure External Lab or Procedure      Annual Monitoring of Persistent     Medications (ACE/ARB, digox 136/88  11/25/19 : 130/88  03/07/18 : 116/84  07/12/17 : 126/67  05/23/17 : 140/80  05/12/17 : 132/84      Wt Readings from Last 6 Encounters:  06/03/20 : 134 lb (60.8 kg)  11/25/19 : 126 lb 6 oz (57.3 kg)  03/07/18 : 137 lb (62.1 kg)  07/11/17 : 123 lb (5 PANEL (14)    Screening for cardiovascular condition        Relevant Orders    LIPID PANEL          Return in about 3 weeks (around 6/24/2020), or if symptoms worsen or fail to improve, for medication review.       Meds & Refills for this Visit:  Requested

## 2020-06-03 NOTE — PATIENT INSTRUCTIONS
Dillan Casillas's SCREENING SCHEDULE   Tests on this list are recommended by your physician but may not be covered, or covered at this frequency, by your insurer. Please check with your insurance carrier before scheduling to verify coverage.    Lenka Venegas Colonoscopy due on 12/01/2013 Update [facility] if applicable    Flex Sigmoidoscopy Screen  Covered every 5 years No results found for this or any previous visit. No flowsheet data found.      Fecal Occult Blood   Covered Annually No results found f 65 No orders found for this or any previous visit. Please get once after your 65th birthday    Hepatitis B for Moderate/High Risk       No orders found for this or any previous visit.  Medium/high risk factors:   End-stage renal disease   Hemophiliacs who r

## 2020-06-05 DIAGNOSIS — E78.00 ELEVATED CHOLESTEROL: Primary | ICD-10-CM

## 2020-06-05 DIAGNOSIS — Z79.899 ENCOUNTER FOR LONG-TERM (CURRENT) DRUG USE: ICD-10-CM

## 2020-06-05 RX ORDER — ATORVASTATIN CALCIUM 10 MG/1
10 TABLET, FILM COATED ORAL NIGHTLY
Qty: 90 TABLET | Refills: 0 | Status: SHIPPED | OUTPATIENT
Start: 2020-06-05 | End: 2020-08-27

## 2020-08-03 ENCOUNTER — HOSPITAL ENCOUNTER (OUTPATIENT)
Dept: MAMMOGRAPHY | Age: 70
Discharge: HOME | End: 2020-08-03
Attending: FAMILY MEDICINE
Payer: MEDICARE

## 2020-08-03 DIAGNOSIS — Z12.31 VISIT FOR SCREENING MAMMOGRAM: ICD-10-CM

## 2020-08-03 PROCEDURE — 77067 SCR MAMMO BI INCL CAD: CPT | Performed by: FAMILY MEDICINE

## 2020-08-05 ENCOUNTER — HOSPITAL ENCOUNTER (OUTPATIENT)
Dept: MAMMOGRAPHY | Facility: HOSPITAL | Age: 70
Discharge: HOME | End: 2020-08-05
Attending: FAMILY MEDICINE
Payer: MEDICARE

## 2020-08-05 ENCOUNTER — TELEPHONE (OUTPATIENT)
Dept: FAMILY MEDICINE CLINIC | Facility: CLINIC | Age: 70
End: 2020-08-05

## 2020-08-05 DIAGNOSIS — R92.2 INCONCLUSIVE MAMMOGRAM: ICD-10-CM

## 2020-08-05 DIAGNOSIS — R92.8 ABNORMAL MAMMOGRAM OF LEFT BREAST: Primary | ICD-10-CM

## 2020-08-05 PROCEDURE — 77066 DX MAMMO INCL CAD BI: CPT | Performed by: FAMILY MEDICINE

## 2020-08-05 PROCEDURE — 77062 BREAST TOMOSYNTHESIS BI: CPT | Performed by: FAMILY MEDICINE

## 2020-08-05 NOTE — IMAGING NOTE
Assisted Dr Alejandro Rivera with left breast biopsy recommendation, BIRADS 4b. Explained procedure and written instructions given. Pt screened and denies blood thinners, bleeding issues, chemo or liver disease.  Reviewed to eat, take 1000 mg of acetaminophen, and br

## 2020-08-14 ENCOUNTER — HOSPITAL ENCOUNTER (OUTPATIENT)
Dept: MAMMOGRAPHY | Facility: HOSPITAL | Age: 70
Discharge: HOME | End: 2020-08-14
Attending: FAMILY MEDICINE
Payer: MEDICARE

## 2020-08-14 DIAGNOSIS — R92.8 ABNORMAL MAMMOGRAM OF LEFT BREAST: ICD-10-CM

## 2020-08-14 PROCEDURE — 19081 BX BREAST 1ST LESION STRTCTC: CPT | Performed by: FAMILY MEDICINE

## 2020-08-14 PROCEDURE — 88305 TISSUE EXAM BY PATHOLOGIST: CPT | Performed by: FAMILY MEDICINE

## 2020-08-18 ENCOUNTER — TELEPHONE (OUTPATIENT)
Dept: MAMMOGRAPHY | Facility: HOSPITAL | Age: 70
End: 2020-08-18

## 2020-08-18 ENCOUNTER — TELEPHONE (OUTPATIENT)
Dept: FAMILY MEDICINE CLINIC | Facility: CLINIC | Age: 70
End: 2020-08-18

## 2020-08-18 PROBLEM — N60.82 FLAT EPITHELIAL ATYPIA (FEA) OF LEFT BREAST: Status: ACTIVE | Noted: 2020-08-18

## 2020-08-18 PROBLEM — N60.99 DUCTAL HYPERPLASIA, ATYPICAL, BREAST: Status: ACTIVE | Noted: 2020-08-18

## 2020-08-18 NOTE — TELEPHONE ENCOUNTER
I spoke with Charley Oliveira at Dr Matteo Bell office and provided her with the pt's recent breast biopsy results. I explained that she has FEA and ADH which are atypia's that are surgically removed.  She states the pt should f/u with Dr Laly Malcolm for surgical consult and M

## 2020-08-18 NOTE — TELEPHONE ENCOUNTER
I left a msg with Bryn Arrington office to have the provider or RN call back to discuss the pt's recent breast biopsy results.

## 2020-08-18 NOTE — TELEPHONE ENCOUNTER
Pathology report reviewed by Dr Antonio Aguirre and concordant with imaging. Pt notified that breast biopsy results were negative for malignancy however showed atypia (FEA and ADH) and she would need to f/u with a surgeon.  Per Dr Irina Jones she is to see D

## 2020-08-18 NOTE — TELEPHONE ENCOUNTER
Atypical ductal hyperplasia, flat epithelial atypia noted on the biopsy. Yoan Last states this generally requires a surgical consult. Advised Dr. Bucky Astorga, surgeon of choice. She also asks if the breast imaging staff can notify the patient, advised yes. NILO Larson

## 2020-08-27 RX ORDER — ATORVASTATIN CALCIUM 10 MG/1
TABLET, FILM COATED ORAL
Qty: 90 TABLET | Refills: 0 | Status: SHIPPED | OUTPATIENT
Start: 2020-08-27 | End: 2020-11-18

## 2020-08-27 NOTE — TELEPHONE ENCOUNTER
LOV: 6-3-2020    LAST LAB: 6-3-2020    LAST RX:    atorvastatin 10 MG Oral Tab 90 tablet 0refill 6/5/2020    Sig:   Take 1 tablet (10 mg total) by mouth nightly.      Route:   Oral         Next OV:   Future Appointments   Date Time Provider Department Annabelle

## 2020-09-01 ENCOUNTER — OFFICE VISIT (OUTPATIENT)
Dept: SURGERY | Facility: CLINIC | Age: 70
End: 2020-09-01
Payer: MEDICARE

## 2020-09-01 VITALS — WEIGHT: 134 LBS | BODY MASS INDEX: 24.98 KG/M2 | TEMPERATURE: 98 F | HEIGHT: 61.5 IN

## 2020-09-01 DIAGNOSIS — N60.92 ATYPICAL DUCTAL HYPERPLASIA OF LEFT BREAST: Primary | ICD-10-CM

## 2020-09-01 PROCEDURE — 99204 OFFICE O/P NEW MOD 45 MIN: CPT | Performed by: SURGERY

## 2020-09-01 NOTE — H&P
Dillan Rosales is a 68year old female  Patient presents with:  Breast Problem: New patient referred by Dr. Connie Macedo for breast consult. pt had left breast bx done at [facility]. c/o left breast tenderness.        REFERRED BY    Patient presents with mammographic Disp: , Rfl:     No current facility-administered medications on file prior to visit.      Ike  Family History   Problem Relation Age of Onset   • High Cholesterol Other         siblings   • Heart Disease Father         Heart disease   • High Blood Pressu wheezing  CARDIOVASCULAR: RRR, murmur negative  ABDOMEN: normal active BS, soft, nondistended  no HSM, no masses or hernias  LYMPHATIC: no axillary , supraclavicular or inguinal lymphadenopathy  EXTREMITIES: no cyanosis, clubbing or edema, no atrophy, full of not obtaining the clip and necessity to return for repeat needle localization also discussed risks of bleeding and infection.       Meds & Refills for this Visit:  Requested Prescriptions     Pending Prescriptions Disp Refills   • Na Sulfate-K Sulfate-Mg

## 2020-09-14 ENCOUNTER — MED REC SCAN ONLY (OUTPATIENT)
Dept: FAMILY MEDICINE CLINIC | Facility: CLINIC | Age: 70
End: 2020-09-14

## 2020-09-18 RX ORDER — MULTIVITAMIN
1 TABLET ORAL DAILY
COMMUNITY

## 2020-09-18 RX ORDER — PRIMIDONE 50 MG/1
50 TABLET ORAL 2 TIMES DAILY
COMMUNITY
End: 2020-12-22

## 2020-09-23 NOTE — IMAGING NOTE
Spoke with pt and explanation given re wire loc done prior to surgery of left lumpectomy. Told pt will start in preop and will travel to [facility] via wheelchair for procedure to be done by radiologist. Will return to preop after loc.  Verbalized understanding of

## 2020-09-25 ENCOUNTER — LAB ENCOUNTER (OUTPATIENT)
Dept: LAB | Facility: HOSPITAL | Age: 70
End: 2020-09-25
Attending: SURGERY
Payer: MEDICARE

## 2020-09-25 DIAGNOSIS — N60.99 DUCTAL HYPERPLASIA, ATYPICAL, BREAST: ICD-10-CM

## 2020-09-25 DIAGNOSIS — Z79.899 ENCOUNTER FOR LONG-TERM (CURRENT) DRUG USE: ICD-10-CM

## 2020-09-25 DIAGNOSIS — E78.00 ELEVATED CHOLESTEROL: ICD-10-CM

## 2020-09-25 LAB
ALBUMIN SERPL-MCNC: 3.7 G/DL (ref 3.4–5)
ALBUMIN/GLOB SERPL: 0.9 {RATIO} (ref 1–2)
ALP LIVER SERPL-CCNC: 77 U/L
ALT SERPL-CCNC: 24 U/L
ANION GAP SERPL CALC-SCNC: 4 MMOL/L (ref 0–18)
AST SERPL-CCNC: 17 U/L (ref 15–37)
BILIRUB SERPL-MCNC: 0.4 MG/DL (ref 0.1–2)
BUN BLD-MCNC: 10 MG/DL (ref 7–18)
BUN/CREAT SERPL: 10.9 (ref 10–20)
CALCIUM BLD-MCNC: 9.1 MG/DL (ref 8.5–10.1)
CHLORIDE SERPL-SCNC: 107 MMOL/L (ref 98–112)
CHOLEST SMN-MCNC: 214 MG/DL (ref ?–200)
CO2 SERPL-SCNC: 29 MMOL/L (ref 21–32)
CREAT BLD-MCNC: 0.92 MG/DL
GLOBULIN PLAS-MCNC: 4.3 G/DL (ref 2.8–4.4)
GLUCOSE BLD-MCNC: 90 MG/DL (ref 70–99)
HDLC SERPL-MCNC: 66 MG/DL (ref 40–59)
LDLC SERPL CALC-MCNC: 101 MG/DL (ref ?–100)
M PROTEIN MFR SERPL ELPH: 8 G/DL (ref 6.4–8.2)
NONHDLC SERPL-MCNC: 148 MG/DL (ref ?–130)
OSMOLALITY SERPL CALC.SUM OF ELEC: 289 MOSM/KG (ref 275–295)
PATIENT FASTING Y/N/NP: NO
PATIENT FASTING Y/N/NP: NO
POTASSIUM SERPL-SCNC: 4 MMOL/L (ref 3.5–5.1)
SODIUM SERPL-SCNC: 140 MMOL/L (ref 136–145)
TRIGL SERPL-MCNC: 234 MG/DL (ref 30–149)
VLDLC SERPL CALC-MCNC: 47 MG/DL (ref 0–30)

## 2020-09-25 PROCEDURE — 80053 COMPREHEN METABOLIC PANEL: CPT

## 2020-09-25 PROCEDURE — 36415 COLL VENOUS BLD VENIPUNCTURE: CPT

## 2020-09-25 PROCEDURE — 80061 LIPID PANEL: CPT

## 2020-09-26 LAB — SARS-COV-2 RNA RESP QL NAA+PROBE: NOT DETECTED

## 2020-09-27 ENCOUNTER — ANESTHESIA EVENT (OUTPATIENT)
Dept: SURGERY | Facility: HOSPITAL | Age: 70
End: 2020-09-27
Payer: MEDICARE

## 2020-09-28 ENCOUNTER — HOSPITAL ENCOUNTER (OUTPATIENT)
Facility: HOSPITAL | Age: 70
Setting detail: HOSPITAL OUTPATIENT SURGERY
Discharge: HOME | End: 2020-09-28
Attending: SURGERY | Admitting: SURGERY
Payer: MEDICARE

## 2020-09-28 ENCOUNTER — HOSPITAL ENCOUNTER (OUTPATIENT)
Dept: MAMMOGRAPHY | Facility: HOSPITAL | Age: 70
Discharge: HOME | End: 2020-09-28
Attending: SURGERY
Payer: MEDICARE

## 2020-09-28 ENCOUNTER — ANESTHESIA (OUTPATIENT)
Dept: SURGERY | Facility: HOSPITAL | Age: 70
End: 2020-09-28
Payer: MEDICARE

## 2020-09-28 VITALS
RESPIRATION RATE: 18 BRPM | SYSTOLIC BLOOD PRESSURE: 148 MMHG | OXYGEN SATURATION: 94 % | HEIGHT: 61.5 IN | HEART RATE: 72 BPM | DIASTOLIC BLOOD PRESSURE: 68 MMHG | BODY MASS INDEX: 25.02 KG/M2 | TEMPERATURE: 98 F | WEIGHT: 134.25 LBS

## 2020-09-28 DIAGNOSIS — N60.92 ATYPICAL DUCTAL HYPERPLASIA OF LEFT BREAST: ICD-10-CM

## 2020-09-28 DIAGNOSIS — N60.99 DUCTAL HYPERPLASIA, ATYPICAL, BREAST: Primary | ICD-10-CM

## 2020-09-28 PROCEDURE — 88305 TISSUE EXAM BY PATHOLOGIST: CPT | Performed by: SURGERY

## 2020-09-28 PROCEDURE — 76098 X-RAY EXAM SURGICAL SPECIMEN: CPT | Performed by: SURGERY

## 2020-09-28 PROCEDURE — 0HBU0ZX EXCISION OF LEFT BREAST, OPEN APPROACH, DIAGNOSTIC: ICD-10-PCS | Performed by: SURGERY

## 2020-09-28 PROCEDURE — 19281 PERQ DEVICE BREAST 1ST IMAG: CPT | Performed by: SURGERY

## 2020-09-28 RX ORDER — LIDOCAINE HYDROCHLORIDE 10 MG/ML
INJECTION, SOLUTION EPIDURAL; INFILTRATION; INTRACAUDAL; PERINEURAL AS NEEDED
Status: DISCONTINUED | OUTPATIENT
Start: 2020-09-28 | End: 2020-09-28 | Stop reason: SURG

## 2020-09-28 RX ORDER — DEXAMETHASONE SODIUM PHOSPHATE 4 MG/ML
VIAL (ML) INJECTION AS NEEDED
Status: DISCONTINUED | OUTPATIENT
Start: 2020-09-28 | End: 2020-09-28 | Stop reason: SURG

## 2020-09-28 RX ORDER — BUPIVACAINE HYDROCHLORIDE 5 MG/ML
INJECTION, SOLUTION EPIDURAL; INTRACAUDAL AS NEEDED
Status: DISCONTINUED | OUTPATIENT
Start: 2020-09-28 | End: 2020-09-28 | Stop reason: HOSPADM

## 2020-09-28 RX ORDER — EPHEDRINE SULFATE 50 MG/ML
INJECTION, SOLUTION INTRAVENOUS AS NEEDED
Status: DISCONTINUED | OUTPATIENT
Start: 2020-09-28 | End: 2020-09-28 | Stop reason: SURG

## 2020-09-28 RX ORDER — DEXAMETHASONE SODIUM PHOSPHATE 4 MG/ML
8 VIAL (ML) INJECTION AS NEEDED
Status: CANCELLED | OUTPATIENT
Start: 2020-09-28 | End: 2020-09-28

## 2020-09-28 RX ORDER — HYDROCODONE BITARTRATE AND ACETAMINOPHEN 5; 325 MG/1; MG/1
1-2 TABLET ORAL EVERY 4 HOURS PRN
Qty: 8 TABLET | Refills: 0 | Status: SHIPPED | OUTPATIENT
Start: 2020-09-28 | End: 2020-10-13

## 2020-09-28 RX ORDER — NALOXONE HYDROCHLORIDE 0.4 MG/ML
80 INJECTION, SOLUTION INTRAMUSCULAR; INTRAVENOUS; SUBCUTANEOUS AS NEEDED
Status: CANCELLED | OUTPATIENT
Start: 2020-09-28 | End: 2020-09-28

## 2020-09-28 RX ORDER — METOCLOPRAMIDE HYDROCHLORIDE 5 MG/ML
10 INJECTION INTRAMUSCULAR; INTRAVENOUS AS NEEDED
Status: CANCELLED | OUTPATIENT
Start: 2020-09-28 | End: 2020-09-28

## 2020-09-28 RX ORDER — ONDANSETRON 2 MG/ML
INJECTION INTRAMUSCULAR; INTRAVENOUS AS NEEDED
Status: DISCONTINUED | OUTPATIENT
Start: 2020-09-28 | End: 2020-09-28 | Stop reason: SURG

## 2020-09-28 RX ORDER — SODIUM CHLORIDE, SODIUM LACTATE, POTASSIUM CHLORIDE, CALCIUM CHLORIDE 600; 310; 30; 20 MG/100ML; MG/100ML; MG/100ML; MG/100ML
INJECTION, SOLUTION INTRAVENOUS CONTINUOUS
Status: CANCELLED | OUTPATIENT
Start: 2020-09-28

## 2020-09-28 RX ORDER — CEFAZOLIN SODIUM/WATER 2 G/20 ML
SYRINGE (ML) INTRAVENOUS
Status: DISCONTINUED
Start: 2020-09-28 | End: 2020-09-28

## 2020-09-28 RX ORDER — MIDAZOLAM HYDROCHLORIDE 1 MG/ML
1 INJECTION INTRAMUSCULAR; INTRAVENOUS EVERY 5 MIN PRN
Status: CANCELLED | OUTPATIENT
Start: 2020-09-28 | End: 2020-09-28

## 2020-09-28 RX ORDER — CEFAZOLIN SODIUM/WATER 2 G/20 ML
2 SYRINGE (ML) INTRAVENOUS ONCE
Status: COMPLETED | OUTPATIENT
Start: 2020-09-28 | End: 2020-09-28

## 2020-09-28 RX ORDER — MEPERIDINE HYDROCHLORIDE 25 MG/ML
12.5 INJECTION INTRAMUSCULAR; INTRAVENOUS; SUBCUTANEOUS AS NEEDED
Status: CANCELLED | OUTPATIENT
Start: 2020-09-28

## 2020-09-28 RX ORDER — SODIUM CHLORIDE, SODIUM LACTATE, POTASSIUM CHLORIDE, CALCIUM CHLORIDE 600; 310; 30; 20 MG/100ML; MG/100ML; MG/100ML; MG/100ML
INJECTION, SOLUTION INTRAVENOUS CONTINUOUS
Status: DISCONTINUED | OUTPATIENT
Start: 2020-09-28 | End: 2020-09-28

## 2020-09-28 RX ORDER — DIAZEPAM 5 MG/1
5 TABLET ORAL AS NEEDED
Status: DISCONTINUED | OUTPATIENT
Start: 2020-09-28 | End: 2020-09-28 | Stop reason: HOSPADM

## 2020-09-28 RX ORDER — HYDROMORPHONE HYDROCHLORIDE 1 MG/ML
0.4 INJECTION, SOLUTION INTRAMUSCULAR; INTRAVENOUS; SUBCUTANEOUS EVERY 5 MIN PRN
Status: CANCELLED | OUTPATIENT
Start: 2020-09-28 | End: 2020-09-28

## 2020-09-28 RX ORDER — ACETAMINOPHEN 500 MG
1000 TABLET ORAL ONCE
Status: DISCONTINUED | OUTPATIENT
Start: 2020-09-28 | End: 2020-09-28

## 2020-09-28 RX ORDER — HYDROCODONE BITARTRATE AND ACETAMINOPHEN 5; 325 MG/1; MG/1
2 TABLET ORAL AS NEEDED
Status: CANCELLED | OUTPATIENT
Start: 2020-09-28

## 2020-09-28 RX ORDER — ONDANSETRON 2 MG/ML
4 INJECTION INTRAMUSCULAR; INTRAVENOUS AS NEEDED
Status: CANCELLED | OUTPATIENT
Start: 2020-09-28 | End: 2020-09-28

## 2020-09-28 RX ORDER — HYDROCODONE BITARTRATE AND ACETAMINOPHEN 5; 325 MG/1; MG/1
1 TABLET ORAL AS NEEDED
Status: CANCELLED | OUTPATIENT
Start: 2020-09-28

## 2020-09-28 RX ADMIN — CEFAZOLIN SODIUM/WATER 2 G: 2 G/20 ML SYRINGE (ML) INTRAVENOUS at 10:13:00

## 2020-09-28 RX ADMIN — SODIUM CHLORIDE, SODIUM LACTATE, POTASSIUM CHLORIDE, CALCIUM CHLORIDE: 600; 310; 30; 20 INJECTION, SOLUTION INTRAVENOUS at 10:55:00

## 2020-09-28 RX ADMIN — DEXAMETHASONE SODIUM PHOSPHATE 8 MG: 4 MG/ML VIAL (ML) INJECTION at 10:25:00

## 2020-09-28 RX ADMIN — EPHEDRINE SULFATE 10 MG: 50 INJECTION, SOLUTION INTRAVENOUS at 10:20:00

## 2020-09-28 RX ADMIN — ONDANSETRON 4 MG: 2 INJECTION INTRAMUSCULAR; INTRAVENOUS at 10:05:00

## 2020-09-28 RX ADMIN — LIDOCAINE HYDROCHLORIDE 50 MG: 10 INJECTION, SOLUTION EPIDURAL; INFILTRATION; INTRACAUDAL; PERINEURAL at 10:08:00

## 2020-09-28 NOTE — ANESTHESIA PROCEDURE NOTES
Airway  Urgency: elective    Airway not difficult    General Information and Staff    Patient location during procedure: OR  Anesthesiologist: Jermaine Strauss MD  Performed: anesthesiologist     Indications and Patient Condition  Indications for airway manage

## 2020-09-28 NOTE — ANESTHESIA PREPROCEDURE EVALUATION
PRE-OP EVALUATION    Patient Name: Shasta Gloria    Pre-op Diagnosis: Atypical ductal hyperplasia of left breast [N60.92]    Procedure(s):  LEFT BREAST BIOPSY WITH X-RAY LOCALIZATION    Surgeon(s) and Role:     Scotty Conn, DO - Primary    Pre-op left breast   • COLONOSCOPY & POLYPECTOMY  2003   • HYSTERECTOMY  2017   • AYLA BIOPSY STEREO NODULE 1 SITE LEFT (CPT=19081)      benign    •       x2   • OTHER SURGICAL HISTORY  10/1/2008    cardiac cath post-procedure date (mild LAD)

## 2020-09-28 NOTE — IMAGING NOTE
Pt arrived from [facility] in [place] [address]. Assisted Dr. Srini Malone with left breast wire loc. Questions answered and emotional support given. Wire secure with dressing. Pt assisted to [place] [address] and awaits transport back to [facility].

## 2020-09-28 NOTE — H&P
Patient presents with mammographic abnormality with subsequent core needle biopsy demonstrating atypical ductal hyperplasia of the left breast.  Patient denies family history of breast cancer  Denies family history of ovarian cancer  Denies personal family siblings   • Heart Disease Father           Heart disease   • High Blood Pressure Father     • Cancer Sister           kidney   • Cancer Sister           leukemia   • Other (als) Sister        Social History    Tobacco Use      Smoking status: Former [facility] inguinal lymphadenopathy  EXTREMITIES: no cyanosis, clubbing or edema, no atrophy, full ROM  Breast exam demonstrates significant ecchymosis to the left breast.  No masses however biopsy site trauma obscures examination on the left breast.  Right breast no

## 2020-09-28 NOTE — ANESTHESIA POSTPROCEDURE EVALUATION
[address] Patient Status:  Hospital Outpatient Surgery   Age/Gender 68year old female MRN [id number]   [facility] SURGERY Attending Lucia Vu, [address] [facility] Day # 0 PCP Marjorie Jacobson MD       Anesthesia Post-op Not to transport cart without assistance. Wide awake. Report to Phase II RN Alon Edmondson.     Dental Exam: Unchanged from Preop

## 2020-09-29 NOTE — OPERATIVE REPORT
[address]    PATIENT'S NAME: Toby Orlando SHANELL   ATTENDING PHYSICIAN: Jennifer Choi D.O.   OPERATING PHYSICIAN: Jennifer Choi D.O.   PATIENT ACCOUNT#:   [de-identified]    LOCATION:  [address] [place] [address] [id number] #:   [id number] along and around the shaft of the needle with a 1.5 cm radius around the needle. Dissection was carried down for a distance of approximately 5 cm, and dissection continued. The prior biopsy cavity was identified and encountered.   This was included with t

## 2020-10-01 DIAGNOSIS — Z79.899 ENCOUNTER FOR LONG-TERM (CURRENT) DRUG USE: ICD-10-CM

## 2020-10-01 DIAGNOSIS — I10 ESSENTIAL HYPERTENSION: ICD-10-CM

## 2020-10-01 DIAGNOSIS — E78.00 PURE HYPERCHOLESTEROLEMIA: Primary | ICD-10-CM

## 2020-10-13 ENCOUNTER — OFFICE VISIT (OUTPATIENT)
Dept: SURGERY | Facility: CLINIC | Age: 70
End: 2020-10-13

## 2020-10-13 VITALS — HEIGHT: 61.5 IN | WEIGHT: 134 LBS | TEMPERATURE: 98 F | BODY MASS INDEX: 24.98 KG/M2

## 2020-10-13 DIAGNOSIS — N60.92 ATYPICAL DUCTAL HYPERPLASIA OF LEFT BREAST: Primary | ICD-10-CM

## 2020-10-13 PROCEDURE — 99024 POSTOP FOLLOW-UP VISIT: CPT | Performed by: SURGERY

## 2020-10-13 NOTE — PROGRESS NOTES
Patient status post left breast biopsy for atypical ductal hyperplasia. Wound is healing nicely. Patient still has a spot on the right breast for which six-month follow-up is recommended.   She was advised of this follow-up recommendation states she will

## 2020-10-23 ENCOUNTER — IMMUNIZATION (OUTPATIENT)
Dept: FAMILY MEDICINE CLINIC | Facility: CLINIC | Age: 70
End: 2020-10-23
Payer: MEDICARE

## 2020-10-23 DIAGNOSIS — Z23 NEED FOR VACCINATION: ICD-10-CM

## 2020-10-23 PROCEDURE — 90662 IIV NO PRSV INCREASED AG IM: CPT | Performed by: FAMILY MEDICINE

## 2020-10-23 PROCEDURE — G0008 ADMIN INFLUENZA VIRUS VAC: HCPCS | Performed by: FAMILY MEDICINE

## 2020-11-18 RX ORDER — ATORVASTATIN CALCIUM 10 MG/1
TABLET, FILM COATED ORAL
Qty: 90 TABLET | Refills: 1 | Status: SHIPPED | OUTPATIENT
Start: 2020-11-18 | End: 2021-05-11

## 2020-11-18 NOTE — TELEPHONE ENCOUNTER
Last refill #90 on 8/27/2020  Last office visit on 6/3/2020  No future appointments.   Labs current until 4/1/2021  Refilled per protocol

## 2020-12-22 DIAGNOSIS — F41.1 ANXIETY STATE: ICD-10-CM

## 2020-12-22 DIAGNOSIS — G25.0 ESSENTIAL TREMOR: ICD-10-CM

## 2020-12-22 RX ORDER — ESCITALOPRAM OXALATE 10 MG/1
TABLET ORAL
Qty: 90 TABLET | Refills: 1 | Status: SHIPPED | OUTPATIENT
Start: 2020-12-22 | End: 2021-06-14

## 2020-12-22 RX ORDER — PRIMIDONE 50 MG/1
TABLET ORAL
Qty: 270 TABLET | Refills: 1 | Status: SHIPPED | OUTPATIENT
Start: 2020-12-22 | End: 2021-06-16

## 2020-12-22 NOTE — TELEPHONE ENCOUNTER
LOV: 10-    LAST LAB: 9-    LAST RX:     Medication Quantity Refills Start End   escitalopram 10 MG Oral Tab 90 tablet 1 6/3/2020    Sig:   Take 1 tablet (10 mg total) by mouth once daily       Medication Quantity Refills Start End   primidon

## 2021-01-20 ENCOUNTER — PATIENT MESSAGE (OUTPATIENT)
Dept: FAMILY MEDICINE CLINIC | Facility: CLINIC | Age: 71
End: 2021-01-20

## 2021-01-21 NOTE — TELEPHONE ENCOUNTER
From: Denia Pierce  To: Shirlene Sam MD  Sent: 1/20/2021 7:10 PM CST  Subject: Other    Hi Dr. oSsa Ring,    I am 65years old and wondering when and where I can get my Covid vaccine? Appreciate any info you can give me.     Thanks, Candace Aleman

## 2021-03-01 DIAGNOSIS — R92.8 ABNORMAL MAMMOGRAM: Primary | ICD-10-CM

## 2021-03-01 DIAGNOSIS — N60.92 ATYPICAL DUCTAL HYPERPLASIA OF LEFT BREAST: ICD-10-CM

## 2021-03-05 DIAGNOSIS — Z23 NEED FOR VACCINATION: ICD-10-CM

## 2021-03-09 ENCOUNTER — IMMUNIZATION (OUTPATIENT)
Dept: LAB | Age: 71
End: 2021-03-09
Attending: HOSPITALIST
Payer: MEDICARE

## 2021-03-09 DIAGNOSIS — Z23 NEED FOR VACCINATION: Primary | ICD-10-CM

## 2021-03-09 PROCEDURE — 0001A SARSCOV2 VAC 30MCG/0.3ML IM: CPT

## 2021-03-12 ENCOUNTER — TELEPHONE (OUTPATIENT)
Dept: FAMILY MEDICINE CLINIC | Facility: CLINIC | Age: 71
End: 2021-03-12

## 2021-03-12 NOTE — TELEPHONE ENCOUNTER
Called and spoke with Savana Brizuela about setting up her [phone] she is due anytime after 6/1/2021, Savana Brizuela said that she will call back and set this up when it comes closer to that time.

## 2021-03-18 ENCOUNTER — TELEPHONE (OUTPATIENT)
Dept: INTERNAL MEDICINE CLINIC | Facility: CLINIC | Age: 71
End: 2021-03-18

## 2021-03-30 ENCOUNTER — IMMUNIZATION (OUTPATIENT)
Dept: LAB | Age: 71
End: 2021-03-30
Attending: HOSPITALIST
Payer: MEDICARE

## 2021-03-30 DIAGNOSIS — Z23 NEED FOR VACCINATION: Primary | ICD-10-CM

## 2021-03-30 PROCEDURE — 0002A SARSCOV2 VAC 30MCG/0.3ML IM: CPT

## 2021-04-08 ENCOUNTER — TELEPHONE (OUTPATIENT)
Dept: FAMILY MEDICINE CLINIC | Facility: CLINIC | Age: 71
End: 2021-04-08

## 2021-04-08 NOTE — TELEPHONE ENCOUNTER
CALLED TO SCHEDULE PTS. MAW DUE IN 6-2021. VERIFY WITH PT. HER INSURANCE AS SHE IS COMING UP ON SUPERVISIT LIST [employer] [place] PLAN BUT HER REG. IS VERIFYING STRAIGHT MEDICARE.

## 2021-05-11 RX ORDER — ATORVASTATIN CALCIUM 10 MG/1
TABLET, FILM COATED ORAL
Qty: 90 TABLET | Refills: 1 | Status: SHIPPED | OUTPATIENT
Start: 2021-05-11 | End: 2021-11-01

## 2021-05-11 NOTE — TELEPHONE ENCOUNTER
LOV: 6-3-2020    LAST LAB: 9-  LAST RX:  Medication Quantity Refills Start End   ATORVASTATIN 10 MG Oral Tab 90 tablet 1 11/18/2020    Sig:   TAKE 1 TABLET BY MOUTH EVERY DAY AT NIGHT           Next OV:   Future Appointments   Date Time Provider Dep

## 2021-06-14 DIAGNOSIS — F41.1 ANXIETY STATE: ICD-10-CM

## 2021-06-14 RX ORDER — ESCITALOPRAM OXALATE 10 MG/1
TABLET ORAL
Qty: 90 TABLET | Refills: 1 | Status: SHIPPED | OUTPATIENT
Start: 2021-06-14 | End: 2021-12-07

## 2021-06-14 NOTE — TELEPHONE ENCOUNTER
Last refill #90 x 1 on 12/22/2020  Last office visit pertaining to refill on 6/3/2020 -  cpx  Future Appointments   Date Time Provider Esme Troy   6/15/2021  1:30 PM Catherine Parada MD [facility] [place]

## 2021-06-15 ENCOUNTER — OFFICE VISIT (OUTPATIENT)
Dept: FAMILY MEDICINE CLINIC | Facility: CLINIC | Age: 71
End: 2021-06-15
Payer: MEDICARE

## 2021-06-15 VITALS
BODY MASS INDEX: 25.05 KG/M2 | HEIGHT: 61.5 IN | OXYGEN SATURATION: 97 % | RESPIRATION RATE: 18 BRPM | SYSTOLIC BLOOD PRESSURE: 120 MMHG | TEMPERATURE: 100 F | DIASTOLIC BLOOD PRESSURE: 82 MMHG | WEIGHT: 134.38 LBS | HEART RATE: 67 BPM

## 2021-06-15 DIAGNOSIS — G25.0 BENIGN ESSENTIAL TREMOR: ICD-10-CM

## 2021-06-15 DIAGNOSIS — N60.99 DUCTAL HYPERPLASIA, ATYPICAL, BREAST: ICD-10-CM

## 2021-06-15 DIAGNOSIS — F41.1 ANXIETY STATE: ICD-10-CM

## 2021-06-15 DIAGNOSIS — J84.10 PULMONARY FIBROSIS (HCC): ICD-10-CM

## 2021-06-15 DIAGNOSIS — E78.00 PURE HYPERCHOLESTEROLEMIA: ICD-10-CM

## 2021-06-15 DIAGNOSIS — I25.10 ATHEROSCLEROSIS OF NATIVE CORONARY ARTERY OF NATIVE HEART WITHOUT ANGINA PECTORIS: ICD-10-CM

## 2021-06-15 DIAGNOSIS — F32.9 REACTIVE DEPRESSION: ICD-10-CM

## 2021-06-15 DIAGNOSIS — I10 ESSENTIAL HYPERTENSION: ICD-10-CM

## 2021-06-15 DIAGNOSIS — Z12.11 SCREENING FOR COLON CANCER: ICD-10-CM

## 2021-06-15 DIAGNOSIS — I77.811 ABDOMINAL AORTIC ECTASIA (HCC): ICD-10-CM

## 2021-06-15 DIAGNOSIS — N60.82 FLAT EPITHELIAL ATYPIA (FEA) OF LEFT BREAST: ICD-10-CM

## 2021-06-15 DIAGNOSIS — Z00.00 ENCOUNTER FOR MEDICARE ANNUAL WELLNESS EXAM: Primary | ICD-10-CM

## 2021-06-15 PROCEDURE — G0439 PPPS, SUBSEQ VISIT: HCPCS | Performed by: FAMILY MEDICINE

## 2021-06-15 NOTE — PROGRESS NOTES
HPI:   Marj Fulton is a 65year old female who presents for a Medicare Subsequent Annual Wellness visit (Pt already had Initial Annual Wellness).     Feels well  Seeing specialist for macular degeneration  No topic due editable text        Fall/Risk List:     Coronary atherosclerosis     Esophagitis, unspecified     Anxiety state     Pure hypercholesterolemia     Hypertension     Pulmonary fibrosis (HCC)     Abdominal aortic ectasia (HCC)     Reactive depression     Benign essential tremor     Flat ep family history includes Cancer in her sister and sister; Heart Disease in her father; High Blood Pressure in her father; High Cholesterol in an other family member; Neurological Disorder in her sister; als in her sister.    SOCIAL HISTORY:   She  reports th COMP METABOLIC PANEL (14);  Future    Anxiety state    Essential hypertension    Abdominal aortic ectasia (HCC)    Flat epithelial atypia (FEA) of left breast         Diet assessment: good     PLAN:  The patient indicates understanding of these issues an Screening    Fasting Blood Sugar /  Glucose    One screening every 12 months if never tested or if previously tested but not diagnosed with pre-diabetes   One screening every 6 months if diagnosed with pre-diabetes Lab Results   Component Value Date    GLU mammogram covered for patients aged 35-39 09/28/2020    Mammogram due on 09/28/2021    Immunizations    Influenza Covered once per flu season  Please get every year 10/23/2020  No recommendations at this time    Pneumococcal Each vaccine ([id number] & Pneum

## 2021-06-15 NOTE — PATIENT INSTRUCTIONS
Cecelia Casillas's SCREENING SCHEDULE   Tests on this list are recommended by your physician but may not be covered, or covered at this frequency, by your insurer. Please check with your insurance carrier before scheduling to verify coverage.    PREVEN Never done   Pap and Pelvic    Pap   Covered every 2 years for women at normal risk;  Annually if at high risk -  No recommendations at this time    Chlamydia Annually if high risk -  No recommendations at this time   Screening Mammogram    Mammogram     Re This site has a lot of good information including definitions of the different types of Advance Directives.  It also has the State forms available on it's website for anyone to review and print using their home computer and printer. (the forms are also avai

## 2021-06-16 DIAGNOSIS — G25.0 ESSENTIAL TREMOR: ICD-10-CM

## 2021-06-16 RX ORDER — PRIMIDONE 50 MG/1
TABLET ORAL
Qty: 270 TABLET | Refills: 1 | Status: SHIPPED | OUTPATIENT
Start: 2021-06-16 | End: 2021-12-07

## 2021-06-16 NOTE — TELEPHONE ENCOUNTER
Last office visit: 6/15/21  Last refill: 12/22/20  Labs Due: 6/15/21  No future appointments.    Name from pharmacy: PRIMIDONE 50 [address] [facility] [place]         Will file in chart as: PRIMIDONE 50 MG Oral Tab    Sig: TAKE 2 TABLETS (100 MG TOTAL) BY MOUTH EVERY MORNING

## 2021-09-30 ENCOUNTER — PATIENT MESSAGE (OUTPATIENT)
Dept: FAMILY MEDICINE CLINIC | Facility: CLINIC | Age: 71
End: 2021-09-30

## 2021-09-30 DIAGNOSIS — Z12.31 VISIT FOR SCREENING MAMMOGRAM: Primary | ICD-10-CM

## 2021-10-01 NOTE — TELEPHONE ENCOUNTER
From: Vaishali Welch  To: Viet Dave MD  Sent: 9/30/2021 4:56 PM CDT  Subject: mammogram referral    Hi Dr. Maricel Swanson, I just called [facility] to make an appointment for my mammogram and was told I need to get a referral from you.  Could you pl

## 2021-10-04 ENCOUNTER — IMMUNIZATION (OUTPATIENT)
Dept: LAB | Facility: HOSPITAL | Age: 71
End: 2021-10-04
Attending: EMERGENCY MEDICINE
Payer: MEDICARE

## 2021-10-04 DIAGNOSIS — Z23 NEED FOR VACCINATION: Primary | ICD-10-CM

## 2021-10-04 PROCEDURE — 0003A SARSCOV2 VAC 30MCG/0.3ML IM: CPT

## 2021-11-01 RX ORDER — ATORVASTATIN CALCIUM 10 MG/1
TABLET, FILM COATED ORAL
Qty: 90 TABLET | Refills: 1 | Status: ON HOLD | OUTPATIENT
Start: 2021-11-01 | End: 2022-01-21

## 2021-11-01 NOTE — TELEPHONE ENCOUNTER
Last refill: 05/11/21  Qty: 90  W/ 1 refills  Last ov: 06/15/21    Requested Prescriptions     Pending Prescriptions Disp Refills   • ATORVASTATIN 10 MG Oral Tab [Pharmacy Med Name: ATORVASTATIN 10 MG TABLET] 90 tablet 1     Sig: TAKE 1 TABLET BY MOUTH DEBBIE

## 2021-11-08 ENCOUNTER — HOSPITAL ENCOUNTER (OUTPATIENT)
Dept: MAMMOGRAPHY | Facility: HOSPITAL | Age: 71
Discharge: HOME | End: 2021-11-08
Attending: FAMILY MEDICINE
Payer: MEDICARE

## 2021-11-08 ENCOUNTER — HOSPITAL ENCOUNTER (OUTPATIENT)
Dept: MAMMOGRAPHY | Facility: HOSPITAL | Age: 71
Discharge: HOME | End: 2021-11-08
Attending: SURGERY
Payer: MEDICARE

## 2021-11-08 DIAGNOSIS — N60.92 ATYPICAL DUCTAL HYPERPLASIA OF LEFT BREAST: ICD-10-CM

## 2021-11-08 DIAGNOSIS — R92.8 ABNORMAL MAMMOGRAM: ICD-10-CM

## 2021-11-08 DIAGNOSIS — Z12.31 VISIT FOR SCREENING MAMMOGRAM: ICD-10-CM

## 2021-11-08 PROCEDURE — 77062 BREAST TOMOSYNTHESIS BI: CPT | Performed by: SURGERY

## 2021-11-08 PROCEDURE — 77066 DX MAMMO INCL CAD BI: CPT | Performed by: SURGERY

## 2021-11-08 PROCEDURE — 76642 ULTRASOUND BREAST LIMITED: CPT | Performed by: SURGERY

## 2021-11-08 NOTE — IMAGING NOTE
Etta Franco is recommended for an ultrasound guided biopsy of the right breast x 1 site by .      History obtained:  Atypical ductal hyperplasia of left breast R92.8 Abnormal mammogram     FINDINGS:         Mammography:  Focal asymmetry within kizzy Georges provided with written educational material.     [facility] instructed to take 1000 mg of acetaminophen on the day of the biopsy, eat a light meal, and bring or wear a sport bra.   Post biopsy care and instruction reviewed: includ

## 2021-11-17 ENCOUNTER — HOSPITAL ENCOUNTER (OUTPATIENT)
Dept: MAMMOGRAPHY | Age: 71
Discharge: HOME | End: 2021-11-17
Attending: SURGERY
Payer: MEDICARE

## 2021-11-17 ENCOUNTER — HOSPITAL ENCOUNTER (OUTPATIENT)
Dept: ULTRASOUND IMAGING | Age: 71
Discharge: HOME | End: 2021-11-17
Attending: SURGERY
Payer: MEDICARE

## 2021-11-17 DIAGNOSIS — R92.8 ABNORMAL MAMMOGRAM: ICD-10-CM

## 2021-11-17 PROCEDURE — 88342 IMHCHEM/IMCYTCHM 1ST ANTB: CPT | Performed by: SURGERY

## 2021-11-17 PROCEDURE — 19083 BX BREAST 1ST LESION US IMAG: CPT | Performed by: SURGERY

## 2021-11-17 PROCEDURE — 88360 TUMOR IMMUNOHISTOCHEM/MANUAL: CPT | Performed by: SURGERY

## 2021-11-17 PROCEDURE — 88305 TISSUE EXAM BY PATHOLOGIST: CPT | Performed by: SURGERY

## 2021-11-17 PROCEDURE — 77065 DX MAMMO INCL CAD UNI: CPT | Performed by: SURGERY

## 2021-11-17 NOTE — IMAGING NOTE
1015: Zara Gillespie arrived in the Radiology Department. Identified with spelling of name and date of birth. Medications and allergies reviewed. NKDA reported.     History[de-identified]   INDICATIONS:  Atypical ductal hyperplasia of left breast  Abnormal mammogr Dr. Maryann Avelar with ultrasound guided biopsy of the right breast.  9347: Right breast core specimens obtained x 4   1041: Biopsy site marker-coil- placed-right breast.  Zia Gatito Casillas tolerated procedure well.     [id number]: Pressure applied to biopsy site x

## 2021-11-24 ENCOUNTER — TELEPHONE (OUTPATIENT)
Dept: SURGERY | Facility: CLINIC | Age: 71
End: 2021-11-24

## 2021-11-24 DIAGNOSIS — C50.211 MALIGNANT NEOPLASM OF UPPER-INNER QUADRANT OF RIGHT FEMALE BREAST, UNSPECIFIED ESTROGEN RECEPTOR STATUS (HCC): Primary | ICD-10-CM

## 2021-11-24 NOTE — TELEPHONE ENCOUNTER
MICHELLE called and spoke with writer. States pt needs breast MRI ordered, an office appt with Dr Cj Live (or earliest appt), and an appt with MICHELLE on 12/7/2021 in [place] for new dx breast CA.      Orders Placed This Encounter      MRI BREAST (W+WO) W/CAD BILAT (C

## 2021-11-24 NOTE — TELEPHONE ENCOUNTER
Page sent to Dr. Laly Malcolm. He will contact patient today with results. Called patient and informed. She v/u.

## 2021-11-30 ENCOUNTER — OFFICE VISIT (OUTPATIENT)
Dept: SURGERY | Facility: CLINIC | Age: 71
End: 2021-11-30
Payer: MEDICARE

## 2021-11-30 VITALS — HEIGHT: 61.5 IN | BODY MASS INDEX: 24.6 KG/M2 | HEART RATE: 99 BPM | TEMPERATURE: 99 F | WEIGHT: 132 LBS

## 2021-11-30 DIAGNOSIS — C50.211 MALIGNANT NEOPLASM OF UPPER-INNER QUADRANT OF RIGHT FEMALE BREAST, UNSPECIFIED ESTROGEN RECEPTOR STATUS (HCC): Primary | ICD-10-CM

## 2021-11-30 PROCEDURE — 99214 OFFICE O/P EST MOD 30 MIN: CPT | Performed by: SURGERY

## 2021-11-30 NOTE — H&P
Julio Felipe is a 65year old female  Patient presents with:  Breast Problem: follow up after right breast biopsy. pt scheduled for Breast MRI on 12/5/2021.        REFERRED BY    Patient presents with biopsy positive invasive lobular carcinoma of the r aspirin 81 MG Oral Chew Tab, Chew 81 mg by mouth daily. , Disp: , Rfl:     No current facility-administered medications on file prior to visit.     @ALL@  Family History   Problem Relation Age of Onset   • High Cholesterol Other         siblings   • Heart moist, pharynx w/o erythema  NECK: supple, no JVD, no adenopathy, no thyromegaly  RESPIRATORY: clear to auscultation, no rales , rhonchi or wheezing  CARDIOVASCULAR: RRR, murmur negative  ABDOMEN: normal active BS, soft, nondistended  no HSM, no masses or ASSESSMENT   Imp: Infiltrating lobular carcinoma of the right breast HER-2 negative no clinical lymphadenopathy  PLan: Lengthy discussion with patient offered patient lumpectomy with sentinel lymph node biopsy possible axillary lymph node dissection

## 2021-12-05 ENCOUNTER — HOSPITAL ENCOUNTER (OUTPATIENT)
Dept: MRI IMAGING | Age: 71
Discharge: HOME | End: 2021-12-05
Attending: SURGERY
Payer: MEDICARE

## 2021-12-05 DIAGNOSIS — C50.211 MALIGNANT NEOPLASM OF UPPER-INNER QUADRANT OF RIGHT FEMALE BREAST, UNSPECIFIED ESTROGEN RECEPTOR STATUS (HCC): ICD-10-CM

## 2021-12-05 PROCEDURE — A9575 INJ GADOTERATE MEGLUMI 0.1ML: HCPCS | Performed by: SURGERY

## 2021-12-05 PROCEDURE — 77049 MRI BREAST C-+ W/CAD BI: CPT | Performed by: SURGERY

## 2021-12-07 ENCOUNTER — OFFICE VISIT (OUTPATIENT)
Dept: HEMATOLOGY/ONCOLOGY | Facility: HOSPITAL | Age: 71
End: 2021-12-07
Attending: INTERNAL MEDICINE
Payer: MEDICARE

## 2021-12-07 ENCOUNTER — NURSE NAVIGATOR ENCOUNTER (OUTPATIENT)
Dept: HEMATOLOGY/ONCOLOGY | Facility: HOSPITAL | Age: 71
End: 2021-12-07

## 2021-12-07 ENCOUNTER — TELEPHONE (OUTPATIENT)
Dept: GENERAL RADIOLOGY | Facility: HOSPITAL | Age: 71
End: 2021-12-07

## 2021-12-07 VITALS
DIASTOLIC BLOOD PRESSURE: 69 MMHG | SYSTOLIC BLOOD PRESSURE: 187 MMHG | BODY MASS INDEX: 25.18 KG/M2 | TEMPERATURE: 98 F | HEART RATE: 84 BPM | OXYGEN SATURATION: 93 % | RESPIRATION RATE: 16 BRPM | HEIGHT: 60.63 IN | WEIGHT: 131.63 LBS

## 2021-12-07 DIAGNOSIS — F41.1 ANXIETY STATE: ICD-10-CM

## 2021-12-07 DIAGNOSIS — Z17.0 MALIGNANT NEOPLASM OF OVERLAPPING SITES OF RIGHT BREAST IN FEMALE, ESTROGEN RECEPTOR POSITIVE (HCC): Primary | ICD-10-CM

## 2021-12-07 DIAGNOSIS — N60.99 DUCTAL HYPERPLASIA, ATYPICAL, BREAST: ICD-10-CM

## 2021-12-07 DIAGNOSIS — R92.8 ABNORMAL FINDINGS ON DIAGNOSTIC IMAGING OF BREAST: Primary | ICD-10-CM

## 2021-12-07 DIAGNOSIS — C50.811 MALIGNANT NEOPLASM OF OVERLAPPING SITES OF RIGHT BREAST IN FEMALE, ESTROGEN RECEPTOR POSITIVE (HCC): Primary | ICD-10-CM

## 2021-12-07 DIAGNOSIS — G25.0 ESSENTIAL TREMOR: ICD-10-CM

## 2021-12-07 PROCEDURE — 99205 OFFICE O/P NEW HI 60 MIN: CPT | Performed by: INTERNAL MEDICINE

## 2021-12-07 RX ORDER — PRIMIDONE 50 MG/1
TABLET ORAL
Qty: 270 TABLET | Refills: 0 | Status: SHIPPED | OUTPATIENT
Start: 2021-12-07

## 2021-12-07 RX ORDER — ESCITALOPRAM OXALATE 10 MG/1
TABLET ORAL
Qty: 90 TABLET | Refills: 0 | Status: ON HOLD | OUTPATIENT
Start: 2021-12-07 | End: 2022-01-21

## 2021-12-07 NOTE — PROGRESS NOTES
Met with patient and her  in clinic. Introduced myself as the breast navigator nurse and explained the role of the breast nurse navigator and coordination of care.  Explained the role of all of the physicians involved in her care including the matias

## 2021-12-07 NOTE — IMAGING NOTE
Maira Barksdale is recommended for a magnetic resonance image guided biopsy of bilateral breasts by - see below report. Magnetic resonance image guided left breast biopsy x 2 sites ordered by Dr. Laly Malcolm.   1620: Spoke with Ms. Son Ramires at this tiara enhancing foci may represent background parenchymal enhancement. There is no axillary or internal mammary lymphadenopathy.         Dictated by (CST): Lizy Tang MD on 12/06/2021 at 4:31 PM       Finalized by (CST): Lizy Tang MD on 12/06/2021 at Biopsy-proven invasive lobular carcinoma of the right breast at 12:30 5 cm from the nipple, measuring 2.8 x 2.0 x 2.2 cm on MRI. Tilmon Sandeep are 2 suspected satellite masses located directly anterior and posterior to the dominant mass.  If the suspected   sat MG TOTAL) BY MOUTH EVERY MORNING AND 1 TABLET (50 MG TOTAL) NIGHTLY. 270 tablet 0   • ATORVASTATIN 10 MG Oral Tab TAKE 1 TABLET BY MOUTH EVERY DAY AT NIGHT 90 tablet 1   • Multiple Vitamin Oral Tab Take 1 tablet by mouth daily.      • Acidophilus/Pectin [place]

## 2021-12-07 NOTE — CONSULTS
Cancer Center Report of Consultation    Patient Name: Denia Pierce   YOB: 1950   Medical Record Number: [id number]   CSN: [id number]   Consulting Physician: Wolf Pacheco MD  Referring Physician(s): No ref.  provider found  Date of Consu kidney   • Cancer Sister 34        leukemia   • Neurological Disorder Sister    • Other (als) Sister    • Cancer Paternal Uncle 79        breast       Gyne History:  OB History     T2    L2    SAB0  IAB0  Worthy Hurry  Live Births Medications:   •  ESCITALOPRAM 10 MG Oral Tab, TAKE 1 TABLET BY MOUTH EVERY DAY, Disp: 90 tablet, Rfl: 0  •  PRIMIDONE 50 MG Oral Tab, TAKE 2 TABLETS (100 MG TOTAL) BY MOUTH EVERY MORNING AND 1 TABLET (50 MG TOTAL) NIGHTLY., Disp: 270 tablet, Rfl: 0  •  AT distress. HEENT:  Oropharynx is clear. Neck is supple. Eyes: Anicteric sclera. Pink conjunctiva. Respiratory: Clear to auscultation and percussion. No rales. No wheezes. Cardiovascular: Regular rate and rhythm.    Breast: The right breast has a superio to the nipple, spanning a distance up to 2.3 cm (series 1207/399, series 1215/366).   The area of non mass enhancement is    associated with moderate initial and delayed plateau kinetics.  An MRI guided biopsy is recommended of the most suspicious accessibl measures up to 5.2 cm in AP dimension.       There is a 0.4 cm irregular enhancing focus in the lower inner breast at 5 o'clock located 2.1 cm posterior and 3.1 cm inferior to the nipple (series 1207/403, series 1214/238).   It demonstrates moderate initial indicated.       The findings and recommendations for biopsy were communicated to the referring provider Dr. Rosita Kwong at 10:06 AM on 12/06/2021.       BI-RADS CATEGORY:   DIAGNOSTIC CATEGORY 4c-HIGH SUSPICION, BUT NOT CLASSIC FOR MALIGNANCY:         RECOMMEND and MRI results. She has additional areas of concern on the MRI. We reviewed her case this morning at breast conference with radiologist, surgery (dr. Shelagh Bernheim), radiation oncology, pathology.  She will discuss with Dr. Leticia Solano about either additional biopsy if

## 2021-12-07 NOTE — PROGRESS NOTES
Patient is here today for Consult with Dr. Olive Dunlap  for Breast Cancer. Patient denies pain. Medication list and medical history were reviewed and updated.      Education Record    Learner:  Patient and spouse    Disease / Diagnosis: Consult / Breast Clinic

## 2021-12-07 NOTE — TELEPHONE ENCOUNTER
LOV 06/15/2021    LAST LAB 09/25/2020    LAST RX  Primidone #270 R1 06/16/2021  escitalopram #90 R1 06/14/2021    Next OV   Future Appointments   Date Time Provider Esme Troy   12/7/2021  9:00 AM Jacklyn Garsia  [facility] [place]

## 2021-12-08 ENCOUNTER — NURSE NAVIGATOR ENCOUNTER (OUTPATIENT)
Dept: HEMATOLOGY/ONCOLOGY | Facility: HOSPITAL | Age: 71
End: 2021-12-08

## 2021-12-08 NOTE — PROGRESS NOTES
Spoke with patient regarding genetic counseling appointment, scheduled for 12/15 at [facility]. Pt will be scheduled for 2 site MRI guided biopsy and we will determine surgical plan following the results.

## 2021-12-09 ENCOUNTER — LAB ENCOUNTER (OUTPATIENT)
Dept: LAB | Age: 71
End: 2021-12-09
Attending: INTERNAL MEDICINE
Payer: MEDICARE

## 2021-12-09 ENCOUNTER — HOSPITAL ENCOUNTER (OUTPATIENT)
Dept: GENERAL RADIOLOGY | Age: 71
Discharge: HOME | End: 2021-12-09
Attending: INTERNAL MEDICINE
Payer: MEDICARE

## 2021-12-09 DIAGNOSIS — C50.811 MALIGNANT NEOPLASM OF OVERLAPPING SITES OF RIGHT BREAST IN FEMALE, ESTROGEN RECEPTOR POSITIVE (HCC): ICD-10-CM

## 2021-12-09 DIAGNOSIS — Z17.0 MALIGNANT NEOPLASM OF OVERLAPPING SITES OF RIGHT BREAST IN FEMALE, ESTROGEN RECEPTOR POSITIVE (HCC): ICD-10-CM

## 2021-12-09 PROCEDURE — 85025 COMPLETE CBC W/AUTO DIFF WBC: CPT

## 2021-12-09 PROCEDURE — 36415 COLL VENOUS BLD VENIPUNCTURE: CPT

## 2021-12-09 PROCEDURE — 80053 COMPREHEN METABOLIC PANEL: CPT

## 2021-12-09 PROCEDURE — 71046 X-RAY EXAM CHEST 2 VIEWS: CPT | Performed by: INTERNAL MEDICINE

## 2021-12-10 ENCOUNTER — TELEPHONE (OUTPATIENT)
Dept: SURGERY | Facility: CLINIC | Age: 71
End: 2021-12-10

## 2021-12-10 NOTE — TELEPHONE ENCOUNTER
Discussed with patient. She is opting for bilateral mastectomy without reconstruction. She will need sentinel lymph node biopsy on the right with possible axillary lymph node dissection.   She has 2 suspicious lesions at the lower inner aspect of the left

## 2021-12-15 ENCOUNTER — TELEPHONE (OUTPATIENT)
Dept: SURGERY | Facility: CLINIC | Age: 71
End: 2021-12-15

## 2021-12-15 ENCOUNTER — GENETICS ENCOUNTER (OUTPATIENT)
Dept: GENETICS | Age: 71
End: 2021-12-15
Attending: GENETIC COUNSELOR, MS
Payer: MEDICARE

## 2021-12-15 DIAGNOSIS — Z17.0 MALIGNANT NEOPLASM OF RIGHT BREAST IN FEMALE, ESTROGEN RECEPTOR POSITIVE, UNSPECIFIED SITE OF BREAST (HCC): Primary | ICD-10-CM

## 2021-12-15 DIAGNOSIS — C50.911 MALIGNANT NEOPLASM OF RIGHT FEMALE BREAST, UNSPECIFIED ESTROGEN RECEPTOR STATUS, UNSPECIFIED SITE OF BREAST (HCC): Primary | ICD-10-CM

## 2021-12-15 DIAGNOSIS — N60.99 DUCTAL HYPERPLASIA, ATYPICAL, BREAST: ICD-10-CM

## 2021-12-15 DIAGNOSIS — C50.911 MALIGNANT NEOPLASM OF RIGHT BREAST IN FEMALE, ESTROGEN RECEPTOR POSITIVE, UNSPECIFIED SITE OF BREAST (HCC): Primary | ICD-10-CM

## 2021-12-15 PROCEDURE — 36415 COLL VENOUS BLD VENIPUNCTURE: CPT

## 2021-12-15 PROCEDURE — 96040 HC GENETIC COUNSELING EA 30 MIN: CPT | Performed by: GENETIC COUNSELOR, MS

## 2021-12-15 NOTE — PROGRESS NOTES
Referring Provider:  Sariah Mendez MD    Additional Provider(s):  Coley Eisenmenger, DO John Gleason, MD    Reason for Referral:  Kemal Limon Lashonda Mckeon was referred for genetic counseling because of a new diagnosis of breast cancer.   Ms. Maikol Juarez is a 65 year see the pedigree for additional family history information. Counseling: The following information was discussed with Ms. Apolinar Hoover and her . Genetics of Cancer:   The majority of cancers are sporadic whereas approximately 10-30% of cases of ca was not detected by the genetic test, or that the family is dealing with a hereditary cancer syndrome involving a different gene. It is also possible that Ms. Casillas’s relatives have a pathogenic variant in one of these genes that Ms. Anila Yee did not inhe lifetime risk for a contralateral breast cancer after an initial breast cancer diagnosis. There is also up to a 20-50% lifetime risk of ovarian cancer in BRCA1/2 mutation carriers.   Other cancer risks in carriers include an increased risk for prostate can

## 2021-12-27 ENCOUNTER — NURSE NAVIGATOR ENCOUNTER (OUTPATIENT)
Dept: HEMATOLOGY/ONCOLOGY | Facility: HOSPITAL | Age: 71
End: 2021-12-27

## 2021-12-27 NOTE — PROGRESS NOTES
Received phone call from patient in regards to her questions about her upcoming MRI-guided breast biopsy. Confirmed with patient upon reading Dr. Max Maharaj notes that patient will proceed with the biopsy.  Patient is scheduled for bilateral mastectomy surgery

## 2021-12-30 ENCOUNTER — HOSPITAL ENCOUNTER (OUTPATIENT)
Dept: MRI IMAGING | Facility: HOSPITAL | Age: 71
Discharge: HOME | End: 2021-12-30
Attending: SURGERY
Payer: MEDICARE

## 2021-12-30 ENCOUNTER — HOSPITAL ENCOUNTER (OUTPATIENT)
Dept: MAMMOGRAPHY | Facility: HOSPITAL | Age: 71
Discharge: HOME | End: 2021-12-30
Attending: SURGERY
Payer: MEDICARE

## 2021-12-30 DIAGNOSIS — R92.8 ABNORMAL FINDINGS ON DIAGNOSTIC IMAGING OF BREAST: ICD-10-CM

## 2021-12-30 PROCEDURE — 88342 IMHCHEM/IMCYTCHM 1ST ANTB: CPT | Performed by: SURGERY

## 2021-12-30 PROCEDURE — 19085 BX BREAST 1ST LESION MR IMAG: CPT | Performed by: SURGERY

## 2021-12-30 PROCEDURE — 88305 TISSUE EXAM BY PATHOLOGIST: CPT | Performed by: SURGERY

## 2021-12-30 PROCEDURE — 77065 DX MAMMO INCL CAD UNI: CPT | Performed by: SURGERY

## 2021-12-30 PROCEDURE — 19086 BX BREAST ADD LESION MR IMAG: CPT | Performed by: SURGERY

## 2021-12-30 PROCEDURE — A9575 INJ GADOTERATE MEGLUMI 0.1ML: HCPCS | Performed by: SURGERY

## 2021-12-30 PROCEDURE — 88360 TUMOR IMMUNOHISTOCHEM/MANUAL: CPT | Performed by: SURGERY

## 2021-12-30 PROCEDURE — 88341 IMHCHEM/IMCYTCHM EA ADD ANTB: CPT | Performed by: SURGERY

## 2021-12-30 NOTE — IMAGING NOTE
Procedure explained throughout and all questions answered. Consent and discharge paperwork signed by Talita Yoon. Left  breast positioned in compression. Assisted Dr. Sunny Saldaña  with MRI guided biopsy. Patient tolerated well.  Pressure held on biopsy

## 2022-01-04 ENCOUNTER — NURSE NAVIGATOR ENCOUNTER (OUTPATIENT)
Dept: HEMATOLOGY/ONCOLOGY | Facility: HOSPITAL | Age: 72
End: 2022-01-04

## 2022-01-04 ENCOUNTER — TELEPHONE (OUTPATIENT)
Dept: SURGERY | Facility: CLINIC | Age: 72
End: 2022-01-04

## 2022-01-04 DIAGNOSIS — C50.911 MALIGNANT NEOPLASM OF RIGHT FEMALE BREAST, UNSPECIFIED ESTROGEN RECEPTOR STATUS, UNSPECIFIED SITE OF BREAST (HCC): Primary | ICD-10-CM

## 2022-01-04 NOTE — PROGRESS NOTES
Called patient back in regards to her VM she left. Discussed with her about the pre-op mastectomy class and what it entails. Scheduled patient for tomorrow, Wednesday January 5, 2022 at 1100 in the [facility].  Patient thanked me for the phone

## 2022-01-04 NOTE — PROGRESS NOTES
LMOVMTCB in regards to offering the pre-op mastectomy class that we offer on Wednesday by the breast nurse navigators at the [facility] in [facility]. Awaiting phone call back from the patient.

## 2022-01-05 ENCOUNTER — APPOINTMENT (OUTPATIENT)
Dept: HEMATOLOGY/ONCOLOGY | Facility: HOSPITAL | Age: 72
End: 2022-01-05
Attending: GENETIC COUNSELOR, MS
Payer: MEDICARE

## 2022-01-13 ENCOUNTER — GENETICS ENCOUNTER (OUTPATIENT)
Dept: HEMATOLOGY/ONCOLOGY | Facility: HOSPITAL | Age: 72
End: 2022-01-13

## 2022-01-13 ENCOUNTER — TELEPHONE (OUTPATIENT)
Dept: MAMMOGRAPHY | Facility: HOSPITAL | Age: 72
End: 2022-01-13

## 2022-01-13 NOTE — TELEPHONE ENCOUNTER
Attempted to call patient re: sentinel lymph node mapping education.  Message left for patient to call back

## 2022-01-13 NOTE — TELEPHONE ENCOUNTER
Spoke with Talita Yoon regarding [place] Lymph Node mapping which will be done in nuclear medicine department before mastectomy surgery scheduled for 1-17-22. Procedure explained and all questions answered. Verbal education about lymphedema given.

## 2022-01-13 NOTE — PROGRESS NOTES
Referring Provider:                    Jermaine Kilpatrick MD     Additional Provider(s):              DO Antonio Quintanilla MD    Reason for Referral:  Lakeshia Benitez had genetic testing performed on

## 2022-01-14 ENCOUNTER — LAB ENCOUNTER (OUTPATIENT)
Dept: LAB | Age: 72
End: 2022-01-14
Attending: SURGERY
Payer: MEDICARE

## 2022-01-14 DIAGNOSIS — C50.911 MALIGNANT NEOPLASM OF RIGHT FEMALE BREAST, UNSPECIFIED ESTROGEN RECEPTOR STATUS, UNSPECIFIED SITE OF BREAST (HCC): ICD-10-CM

## 2022-01-16 LAB — SARS-COV-2 RNA RESP QL NAA+PROBE: NOT DETECTED

## 2022-01-17 ENCOUNTER — TELEPHONE (OUTPATIENT)
Dept: SURGERY | Facility: CLINIC | Age: 72
End: 2022-01-17

## 2022-01-17 DIAGNOSIS — C50.911 MALIGNANT NEOPLASM OF RIGHT FEMALE BREAST, UNSPECIFIED ESTROGEN RECEPTOR STATUS, UNSPECIFIED SITE OF BREAST (HCC): Primary | ICD-10-CM

## 2022-01-17 RX ORDER — ACETAMINOPHEN 500 MG
1000 TABLET ORAL ONCE
Status: CANCELLED | OUTPATIENT
Start: 2022-01-17 | End: 2022-01-17

## 2022-01-19 ENCOUNTER — LAB ENCOUNTER (OUTPATIENT)
Dept: LAB | Age: 72
DRG: 583 | End: 2022-01-19
Attending: SURGERY
Payer: MEDICARE

## 2022-01-19 DIAGNOSIS — C50.911 MALIGNANT NEOPLASM OF RIGHT FEMALE BREAST (HCC): ICD-10-CM

## 2022-01-19 LAB — SARS-COV-2 RNA RESP QL NAA+PROBE: NOT DETECTED

## 2022-01-21 ENCOUNTER — HOSPITAL ENCOUNTER (OUTPATIENT)
Dept: NUCLEAR MEDICINE | Facility: HOSPITAL | Age: 72
Discharge: HOME | DRG: 583 | End: 2022-01-21
Attending: SURGERY
Payer: MEDICARE

## 2022-01-21 ENCOUNTER — HOSPITAL ENCOUNTER (INPATIENT)
Facility: HOSPITAL | Age: 72
LOS: 1 days | Discharge: HOME | DRG: 583 | End: 2022-01-22
Attending: SURGERY | Admitting: SURGERY
Payer: MEDICARE

## 2022-01-21 ENCOUNTER — ANESTHESIA (OUTPATIENT)
Dept: SURGERY | Facility: HOSPITAL | Age: 72
DRG: 583 | End: 2022-01-21
Payer: MEDICARE

## 2022-01-21 ENCOUNTER — ANESTHESIA EVENT (OUTPATIENT)
Dept: SURGERY | Facility: HOSPITAL | Age: 72
DRG: 583 | End: 2022-01-21
Payer: MEDICARE

## 2022-01-21 DIAGNOSIS — C50.911 MALIGNANT NEOPLASM OF RIGHT FEMALE BREAST, UNSPECIFIED ESTROGEN RECEPTOR STATUS, UNSPECIFIED SITE OF BREAST (HCC): ICD-10-CM

## 2022-01-21 DIAGNOSIS — C50.911 MALIGNANT NEOPLASM OF RIGHT FEMALE BREAST (HCC): Primary | ICD-10-CM

## 2022-01-21 PROCEDURE — 88331 PATH CONSLTJ SURG 1 BLK 1SPC: CPT | Performed by: SURGERY

## 2022-01-21 PROCEDURE — 07T60ZZ RESECTION OF LEFT AXILLARY LYMPHATIC, OPEN APPROACH: ICD-10-PCS | Performed by: SURGERY

## 2022-01-21 PROCEDURE — 88307 TISSUE EXAM BY PATHOLOGIST: CPT | Performed by: SURGERY

## 2022-01-21 PROCEDURE — 88342 IMHCHEM/IMCYTCHM 1ST ANTB: CPT | Performed by: SURGERY

## 2022-01-21 PROCEDURE — 0HTV0ZZ RESECTION OF BILATERAL BREAST, OPEN APPROACH: ICD-10-PCS | Performed by: SURGERY

## 2022-01-21 PROCEDURE — 76942 ECHO GUIDE FOR BIOPSY: CPT | Performed by: STUDENT IN AN ORGANIZED HEALTH CARE EDUCATION/TRAINING PROGRAM

## 2022-01-21 PROCEDURE — 07T50ZZ RESECTION OF RIGHT AXILLARY LYMPHATIC, OPEN APPROACH: ICD-10-PCS | Performed by: SURGERY

## 2022-01-21 PROCEDURE — 78195 LYMPH SYSTEM IMAGING: CPT | Performed by: SURGERY

## 2022-01-21 RX ORDER — ESCITALOPRAM OXALATE 10 MG/1
10 TABLET ORAL DAILY
Status: DISCONTINUED | OUTPATIENT
Start: 2022-01-21 | End: 2022-01-22

## 2022-01-21 RX ORDER — LABETALOL HYDROCHLORIDE 5 MG/ML
INJECTION, SOLUTION INTRAVENOUS AS NEEDED
Status: DISCONTINUED | OUTPATIENT
Start: 2022-01-21 | End: 2022-01-21 | Stop reason: SURG

## 2022-01-21 RX ORDER — METOCLOPRAMIDE HYDROCHLORIDE 5 MG/ML
10 INJECTION INTRAMUSCULAR; INTRAVENOUS EVERY 6 HOURS PRN
Status: DISCONTINUED | OUTPATIENT
Start: 2022-01-21 | End: 2022-01-22

## 2022-01-21 RX ORDER — GLYCOPYRROLATE 0.2 MG/ML
INJECTION, SOLUTION INTRAMUSCULAR; INTRAVENOUS AS NEEDED
Status: DISCONTINUED | OUTPATIENT
Start: 2022-01-21 | End: 2022-01-21 | Stop reason: SURG

## 2022-01-21 RX ORDER — DEXAMETHASONE SODIUM PHOSPHATE 10 MG/ML
INJECTION, SOLUTION INTRAMUSCULAR; INTRAVENOUS AS NEEDED
Status: DISCONTINUED | OUTPATIENT
Start: 2022-01-21 | End: 2022-01-21 | Stop reason: SURG

## 2022-01-21 RX ORDER — HYDROMORPHONE HYDROCHLORIDE 1 MG/ML
0.4 INJECTION, SOLUTION INTRAMUSCULAR; INTRAVENOUS; SUBCUTANEOUS EVERY 2 HOUR PRN
Status: DISCONTINUED | OUTPATIENT
Start: 2022-01-21 | End: 2022-01-22

## 2022-01-21 RX ORDER — ACETAMINOPHEN 10 MG/ML
INJECTION, SOLUTION INTRAVENOUS AS NEEDED
Status: DISCONTINUED | OUTPATIENT
Start: 2022-01-21 | End: 2022-01-21 | Stop reason: SURG

## 2022-01-21 RX ORDER — OXYCODONE HYDROCHLORIDE 5 MG/1
5 TABLET ORAL ONCE AS NEEDED
Status: DISCONTINUED | OUTPATIENT
Start: 2022-01-21 | End: 2022-01-21 | Stop reason: HOSPADM

## 2022-01-21 RX ORDER — DEXAMETHASONE SODIUM PHOSPHATE 4 MG/ML
VIAL (ML) INJECTION AS NEEDED
Status: DISCONTINUED | OUTPATIENT
Start: 2022-01-21 | End: 2022-01-21 | Stop reason: SURG

## 2022-01-21 RX ORDER — EPHEDRINE SULFATE 50 MG/ML
INJECTION INTRAVENOUS AS NEEDED
Status: DISCONTINUED | OUTPATIENT
Start: 2022-01-21 | End: 2022-01-21 | Stop reason: SURG

## 2022-01-21 RX ORDER — NALOXONE HYDROCHLORIDE 0.4 MG/ML
80 INJECTION, SOLUTION INTRAMUSCULAR; INTRAVENOUS; SUBCUTANEOUS AS NEEDED
Status: DISCONTINUED | OUTPATIENT
Start: 2022-01-21 | End: 2022-01-21 | Stop reason: HOSPADM

## 2022-01-21 RX ORDER — MIDAZOLAM HYDROCHLORIDE 1 MG/ML
1 INJECTION INTRAMUSCULAR; INTRAVENOUS EVERY 5 MIN PRN
Status: DISCONTINUED | OUTPATIENT
Start: 2022-01-21 | End: 2022-01-21 | Stop reason: HOSPADM

## 2022-01-21 RX ORDER — CEFAZOLIN SODIUM/WATER 2 G/20 ML
2 SYRINGE (ML) INTRAVENOUS ONCE
Status: COMPLETED | OUTPATIENT
Start: 2022-01-21 | End: 2022-01-21

## 2022-01-21 RX ORDER — ATORVASTATIN CALCIUM 10 MG/1
10 TABLET, FILM COATED ORAL NIGHTLY
COMMUNITY

## 2022-01-21 RX ORDER — POLYETHYLENE GLYCOL 3350 17 G/17G
17 POWDER, FOR SOLUTION ORAL DAILY PRN
Status: DISCONTINUED | OUTPATIENT
Start: 2022-01-21 | End: 2022-01-22

## 2022-01-21 RX ORDER — SODIUM PHOSPHATE, DIBASIC AND SODIUM PHOSPHATE, MONOBASIC 7; 19 G/133ML; G/133ML
1 ENEMA RECTAL ONCE AS NEEDED
Status: DISCONTINUED | OUTPATIENT
Start: 2022-01-21 | End: 2022-01-22

## 2022-01-21 RX ORDER — ONDANSETRON 2 MG/ML
4 INJECTION INTRAMUSCULAR; INTRAVENOUS EVERY 6 HOURS PRN
Status: DISCONTINUED | OUTPATIENT
Start: 2022-01-21 | End: 2022-01-22

## 2022-01-21 RX ORDER — OXYCODONE HYDROCHLORIDE 5 MG/1
2.5 TABLET ORAL EVERY 4 HOURS PRN
Status: DISCONTINUED | OUTPATIENT
Start: 2022-01-21 | End: 2022-01-22

## 2022-01-21 RX ORDER — ONDANSETRON 2 MG/ML
INJECTION INTRAMUSCULAR; INTRAVENOUS AS NEEDED
Status: DISCONTINUED | OUTPATIENT
Start: 2022-01-21 | End: 2022-01-21 | Stop reason: SURG

## 2022-01-21 RX ORDER — SODIUM CHLORIDE, SODIUM LACTATE, POTASSIUM CHLORIDE, CALCIUM CHLORIDE 600; 310; 30; 20 MG/100ML; MG/100ML; MG/100ML; MG/100ML
INJECTION, SOLUTION INTRAVENOUS CONTINUOUS
Status: DISCONTINUED | OUTPATIENT
Start: 2022-01-21 | End: 2022-01-21 | Stop reason: HOSPADM

## 2022-01-21 RX ORDER — ACETAMINOPHEN 325 MG/1
650 TABLET ORAL
Status: DISCONTINUED | OUTPATIENT
Start: 2022-01-21 | End: 2022-01-22

## 2022-01-21 RX ORDER — ESCITALOPRAM OXALATE 10 MG/1
10 TABLET ORAL DAILY
COMMUNITY

## 2022-01-21 RX ORDER — HYDROMORPHONE HYDROCHLORIDE 1 MG/ML
0.4 INJECTION, SOLUTION INTRAMUSCULAR; INTRAVENOUS; SUBCUTANEOUS EVERY 5 MIN PRN
Status: DISCONTINUED | OUTPATIENT
Start: 2022-01-21 | End: 2022-01-21 | Stop reason: HOSPADM

## 2022-01-21 RX ORDER — DIPHENHYDRAMINE HYDROCHLORIDE 50 MG/ML
12.5 INJECTION INTRAMUSCULAR; INTRAVENOUS AS NEEDED
Status: DISCONTINUED | OUTPATIENT
Start: 2022-01-21 | End: 2022-01-21 | Stop reason: HOSPADM

## 2022-01-21 RX ORDER — LIDOCAINE AND PRILOCAINE 25; 25 MG/G; MG/G
CREAM TOPICAL ONCE
Status: COMPLETED | OUTPATIENT
Start: 2022-01-21 | End: 2022-01-21

## 2022-01-21 RX ORDER — MEPERIDINE HYDROCHLORIDE 25 MG/ML
12.5 INJECTION INTRAMUSCULAR; INTRAVENOUS; SUBCUTANEOUS AS NEEDED
Status: DISCONTINUED | OUTPATIENT
Start: 2022-01-21 | End: 2022-01-21 | Stop reason: HOSPADM

## 2022-01-21 RX ORDER — LIDOCAINE HYDROCHLORIDE 10 MG/ML
INJECTION, SOLUTION EPIDURAL; INFILTRATION; INTRACAUDAL; PERINEURAL AS NEEDED
Status: DISCONTINUED | OUTPATIENT
Start: 2022-01-21 | End: 2022-01-21 | Stop reason: SURG

## 2022-01-21 RX ORDER — PHENYLEPHRINE HCL 10 MG/ML
VIAL (ML) INJECTION AS NEEDED
Status: DISCONTINUED | OUTPATIENT
Start: 2022-01-21 | End: 2022-01-21 | Stop reason: SURG

## 2022-01-21 RX ORDER — DIAZEPAM 5 MG/1
5 TABLET ORAL AS NEEDED
Status: DISCONTINUED | OUTPATIENT
Start: 2022-01-21 | End: 2022-01-21 | Stop reason: HOSPADM

## 2022-01-21 RX ORDER — SODIUM CHLORIDE 9 MG/ML
INJECTION, SOLUTION INTRAVENOUS CONTINUOUS
Status: DISCONTINUED | OUTPATIENT
Start: 2022-01-21 | End: 2022-01-22

## 2022-01-21 RX ORDER — METOCLOPRAMIDE HYDROCHLORIDE 5 MG/ML
10 INJECTION INTRAMUSCULAR; INTRAVENOUS AS NEEDED
Status: DISCONTINUED | OUTPATIENT
Start: 2022-01-21 | End: 2022-01-21 | Stop reason: HOSPADM

## 2022-01-21 RX ORDER — HYDROMORPHONE HYDROCHLORIDE 1 MG/ML
0.2 INJECTION, SOLUTION INTRAMUSCULAR; INTRAVENOUS; SUBCUTANEOUS EVERY 2 HOUR PRN
Status: DISCONTINUED | OUTPATIENT
Start: 2022-01-21 | End: 2022-01-22

## 2022-01-21 RX ORDER — SENNOSIDES 8.6 MG
17.2 TABLET ORAL NIGHTLY PRN
Status: DISCONTINUED | OUTPATIENT
Start: 2022-01-21 | End: 2022-01-22

## 2022-01-21 RX ORDER — PRIMIDONE 50 MG/1
50 TABLET ORAL NIGHTLY
Status: DISCONTINUED | OUTPATIENT
Start: 2022-01-21 | End: 2022-01-22

## 2022-01-21 RX ORDER — ATORVASTATIN CALCIUM 10 MG/1
10 TABLET, FILM COATED ORAL NIGHTLY
Status: DISCONTINUED | OUTPATIENT
Start: 2022-01-21 | End: 2022-01-22

## 2022-01-21 RX ORDER — NEOSTIGMINE METHYLSULFATE 1 MG/ML
INJECTION INTRAVENOUS AS NEEDED
Status: DISCONTINUED | OUTPATIENT
Start: 2022-01-21 | End: 2022-01-21 | Stop reason: SURG

## 2022-01-21 RX ORDER — ROCURONIUM BROMIDE 10 MG/ML
INJECTION, SOLUTION INTRAVENOUS AS NEEDED
Status: DISCONTINUED | OUTPATIENT
Start: 2022-01-21 | End: 2022-01-21 | Stop reason: SURG

## 2022-01-21 RX ORDER — SODIUM CHLORIDE, SODIUM LACTATE, POTASSIUM CHLORIDE, CALCIUM CHLORIDE 600; 310; 30; 20 MG/100ML; MG/100ML; MG/100ML; MG/100ML
INJECTION, SOLUTION INTRAVENOUS CONTINUOUS
Status: DISCONTINUED | OUTPATIENT
Start: 2022-01-21 | End: 2022-01-21

## 2022-01-21 RX ORDER — ISOSULFAN BLUE 50 MG/5ML
INJECTION, SOLUTION SUBCUTANEOUS AS NEEDED
Status: DISCONTINUED | OUTPATIENT
Start: 2022-01-21 | End: 2022-01-21 | Stop reason: HOSPADM

## 2022-01-21 RX ORDER — OXYCODONE HYDROCHLORIDE 5 MG/1
10 TABLET ORAL ONCE AS NEEDED
Status: DISCONTINUED | OUTPATIENT
Start: 2022-01-21 | End: 2022-01-21 | Stop reason: HOSPADM

## 2022-01-21 RX ORDER — LIDOCAINE HYDROCHLORIDE 40 MG/ML
INJECTION, SOLUTION RETROBULBAR; TOPICAL AS NEEDED
Status: DISCONTINUED | OUTPATIENT
Start: 2022-01-21 | End: 2022-01-21 | Stop reason: SURG

## 2022-01-21 RX ORDER — PRIMIDONE 50 MG/1
100 TABLET ORAL EVERY MORNING
Status: DISCONTINUED | OUTPATIENT
Start: 2022-01-21 | End: 2022-01-22

## 2022-01-21 RX ORDER — ONDANSETRON 2 MG/ML
4 INJECTION INTRAMUSCULAR; INTRAVENOUS AS NEEDED
Status: DISCONTINUED | OUTPATIENT
Start: 2022-01-21 | End: 2022-01-21 | Stop reason: HOSPADM

## 2022-01-21 RX ORDER — OXYCODONE HYDROCHLORIDE 5 MG/1
5 TABLET ORAL EVERY 4 HOURS PRN
Status: DISCONTINUED | OUTPATIENT
Start: 2022-01-21 | End: 2022-01-22

## 2022-01-21 RX ORDER — BISACODYL 10 MG
10 SUPPOSITORY, RECTAL RECTAL
Status: DISCONTINUED | OUTPATIENT
Start: 2022-01-21 | End: 2022-01-22

## 2022-01-21 RX ORDER — ACETAMINOPHEN 500 MG
1000 TABLET ORAL EVERY 6 HOURS PRN
COMMUNITY

## 2022-01-21 RX ORDER — BUPIVACAINE HYDROCHLORIDE 5 MG/ML
INJECTION, SOLUTION EPIDURAL; INTRACAUDAL AS NEEDED
Status: DISCONTINUED | OUTPATIENT
Start: 2022-01-21 | End: 2022-01-21 | Stop reason: HOSPADM

## 2022-01-21 RX ORDER — HEPARIN SODIUM 5000 [USP'U]/ML
5000 INJECTION, SOLUTION INTRAVENOUS; SUBCUTANEOUS EVERY 12 HOURS SCHEDULED
Status: DISCONTINUED | OUTPATIENT
Start: 2022-01-21 | End: 2022-01-22

## 2022-01-21 RX ADMIN — ACETAMINOPHEN 1000 MG: 10 INJECTION, SOLUTION INTRAVENOUS at 13:31:00

## 2022-01-21 RX ADMIN — SODIUM CHLORIDE, SODIUM LACTATE, POTASSIUM CHLORIDE, CALCIUM CHLORIDE: 600; 310; 30; 20 INJECTION, SOLUTION INTRAVENOUS at 10:53:00

## 2022-01-21 RX ADMIN — LIDOCAINE HYDROCHLORIDE 50 MG: 10 INJECTION, SOLUTION EPIDURAL; INFILTRATION; INTRACAUDAL; PERINEURAL at 10:56:00

## 2022-01-21 RX ADMIN — LIDOCAINE HYDROCHLORIDE 3 ML: 40 INJECTION, SOLUTION RETROBULBAR; TOPICAL at 10:57:00

## 2022-01-21 RX ADMIN — ONDANSETRON 4 MG: 2 INJECTION INTRAMUSCULAR; INTRAVENOUS at 13:31:00

## 2022-01-21 RX ADMIN — DEXAMETHASONE SODIUM PHOSPHATE 8 MG: 4 MG/ML VIAL (ML) INJECTION at 11:06:00

## 2022-01-21 RX ADMIN — EPHEDRINE SULFATE 10 MG: 50 INJECTION INTRAVENOUS at 11:31:00

## 2022-01-21 RX ADMIN — EPHEDRINE SULFATE 10 MG: 50 INJECTION INTRAVENOUS at 12:32:00

## 2022-01-21 RX ADMIN — PHENYLEPHRINE HCL 100 MCG: 10 MG/ML VIAL (ML) INJECTION at 11:11:00

## 2022-01-21 RX ADMIN — EPHEDRINE SULFATE 10 MG: 50 INJECTION INTRAVENOUS at 12:43:00

## 2022-01-21 RX ADMIN — GLYCOPYRROLATE 0.4 MG: 0.2 INJECTION, SOLUTION INTRAMUSCULAR; INTRAVENOUS at 13:50:00

## 2022-01-21 RX ADMIN — NEOSTIGMINE METHYLSULFATE 3.5 MG: 1 INJECTION INTRAVENOUS at 13:50:00

## 2022-01-21 RX ADMIN — ROCURONIUM BROMIDE 30 MG: 10 INJECTION, SOLUTION INTRAVENOUS at 10:56:00

## 2022-01-21 RX ADMIN — DEXAMETHASONE SODIUM PHOSPHATE 4 MG: 10 INJECTION, SOLUTION INTRAMUSCULAR; INTRAVENOUS at 11:07:00

## 2022-01-21 RX ADMIN — CEFAZOLIN SODIUM/WATER 2 G: 2 G/20 ML SYRINGE (ML) INTRAVENOUS at 11:12:00

## 2022-01-21 RX ADMIN — LABETALOL HYDROCHLORIDE 10 MG: 5 INJECTION, SOLUTION INTRAVENOUS at 12:05:00

## 2022-01-21 RX ADMIN — SODIUM CHLORIDE, SODIUM LACTATE, POTASSIUM CHLORIDE, CALCIUM CHLORIDE: 600; 310; 30; 20 INJECTION, SOLUTION INTRAVENOUS at 13:54:00

## 2022-01-21 NOTE — ANESTHESIA PROCEDURE NOTES
Airway  Date/Time: 1/21/2022 10:58 AM  Urgency: elective      General Information and Staff    Patient location during procedure: OR  Anesthesiologist: Jesika Waters MD  Resident/CRNA: Emilia Rodriguez CRNA  Performed: CRNA     Indications and Julio Setters

## 2022-01-21 NOTE — H&P
Patient presents with biopsy positive invasive lobular carcinoma of the right breast 2.3 cm radiographically ER positive HER-2 negative found on routine imaging.   Patient had a prior left breast biopsy by me in 2020 which demonstrated atypical ductal hyper visit.     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  She had a prior history of atypical ductal hyperplasia in the left breast 2 years ago.   Patient is opting for bilateral mastectomy without reconstruction this is reasonable we w

## 2022-01-21 NOTE — PLAN OF CARE
Pt is A/Ox4, in bed resting after transfer from PACU to 2SWU. Pt has 2 PALMER drains with very little in each. Pt denies pain and nausea. Pt has tolerated clear liquids so far and will continue to monintor pain and diet toleration.

## 2022-01-21 NOTE — CM/SW NOTE
Sw spoke to pt's  and [facility]- plan is for [address] at [place].     Maryann Hopper LCSW  /Discharge Planner  [phone]

## 2022-01-21 NOTE — ANESTHESIA POSTPROCEDURE EVALUATION
[address] Patient Status:  Inpatient   Age/Gender 66year old female MRN [id number]   [facility] SURGERY Attending Cathy Ca, [address] Day # 0 PCP Cornell Harris MD       Anesthesia Post-op Note    BILATERAL MAS

## 2022-01-21 NOTE — ANESTHESIA PROCEDURE NOTES
[facility]  Performed by: Timothy Pacheco CRNA  Authorized by: Anusha Zapata MD       General Information and Staff    Start Time:  1/21/2022 11:00 AM  End Time:  1/21/2022 11:08 AM  Anesthesiologist:  Anusha Zapata MD  CRNA:  Idalia Campo

## 2022-01-21 NOTE — ANESTHESIA PREPROCEDURE EVALUATION
PRE-OP EVALUATION    Patient Name: Katerine     Admit Diagnosis: Malignant neoplasm of right female breast, unspecified estrogen receptor status, unspecified site of breast [facility] [C50.911]    Pre-op Diagnosis: Malignant neoplasm of right female timothy 1/7/2022        Allergies: Patient has no known allergies. Anesthesia Evaluation    Patient summary reviewed. Anesthetic Complications  (-) history of anesthetic complications         GI/Hepatic/Renal    Negative GI/hepatic/renal ROS. opening: >3 FB  TM distance: > 6 cm  Neck ROM: full Cardiovascular    Cardiovascular exam normal.  Rhythm: regular  Rate: normal     Dental    No notable dental history.          Pulmonary    Pulmonary exam normal.  Breath sounds clear to auscultation bilat

## 2022-01-22 VITALS
WEIGHT: 126 LBS | RESPIRATION RATE: 18 BRPM | HEIGHT: 61 IN | OXYGEN SATURATION: 98 % | SYSTOLIC BLOOD PRESSURE: 120 MMHG | DIASTOLIC BLOOD PRESSURE: 60 MMHG | TEMPERATURE: 99 F | BODY MASS INDEX: 23.79 KG/M2 | HEART RATE: 88 BPM

## 2022-01-22 RX ORDER — HYDROCODONE BITARTRATE AND ACETAMINOPHEN 5; 325 MG/1; MG/1
1 TABLET ORAL EVERY 4 HOURS PRN
Qty: 15 TABLET | Refills: 0 | Status: SHIPPED | OUTPATIENT
Start: 2022-01-22 | End: 2022-02-01

## 2022-01-22 NOTE — CM/SW NOTE
Patient is anticipated to [facility] today with [address]: [phone]  F: [phone]. [place] [id number] agency was notified.   Information placed on AVS.    Demarcus Blood, [address]

## 2022-01-22 NOTE — PROGRESS NOTES
[facility]  Progress Note    [place] Patient Status:  Inpatient    1950 MRN [id number]   [facility] 2SW-S Attending Kamar Sauceda, [address] Day # 1 PCP Lakhwinder Vargas MD     Subjective:   The patient is resting comforta discussed with Dr. Mushtaq Abarca, and is in agreement with the assessment and plan. My total face time with this patient was 22 minutes. Greater than half of the visit was spent in counseling the patient on the above listed diagnoses and treatment options.

## 2022-01-22 NOTE — PLAN OF CARE
Patient alert and oriented x4. Patient reports minimal pain at this time. Ambulating to bathroom without difficulties. Advancing diet as tolerated.

## 2022-01-24 NOTE — OPERATIVE REPORT
[facility]    PATIENT'S NAME: Kayce REECE   ATTENDING PHYSICIAN: Juan Jose Falcon D.O.   OPERATING PHYSICIAN: Juan Jose Falcon D.O.   PATIENT ACCOUNT#:   [de-identified]    LOCATION:  PACU [facility] PACU 11 EDWP 10  MEDICAL RECORD #:   [id number]       DATE OF B of the lymph node. It was grasped and circumferentially dissected using handheld electrocautery. This had an in vivo 10 second count of 1100. This was sent as sentinel lymph node #1. A second hot lymph node was identified.   It was circumferentially exc incisions were made with a #10 scalpel blade, then deepened with a handheld electrocautery. One cm flaps were raised to the level of the clavicle superiorly, medially to the sternum, inferiorly to the insertion of the rectus musculature.   The lateral aspe approximated using 4-0 Monocryl in a running subdermal fashion. Overlying Mastisol and Steri-Strips were applied. The drain was sutured into place using 2-0 silk x1 at each drain location.   Kerlix dressing as well as a 6-inch Ace wrap were utilized at th

## 2022-01-26 ENCOUNTER — APPOINTMENT (OUTPATIENT)
Dept: GENETICS | Age: 72
End: 2022-01-26
Attending: GENETIC COUNSELOR, MS
Payer: MEDICARE

## 2022-01-26 ENCOUNTER — TELEPHONE (OUTPATIENT)
Dept: SURGERY | Facility: CLINIC | Age: 72
End: 2022-01-26

## 2022-01-26 NOTE — TELEPHONE ENCOUNTER
This pt had BILATERAL MASTECTOMY, BILATERAL SENTINEL LYMPH NODE BIOPSY WITH TOTAL AXILLARY LYMPH NODE DISSECTION, BILATERAL INJECTION OF BLUE DYE 1/21/22 with RWM.  Would like a refill of [place]. Pain 6/10, pt is taking about 3 times a day and alternating wi

## 2022-01-31 NOTE — H&P
Patient presents with biopsy positive invasive lobular carcinoma of the right breast 2.3 cm radiographically ER positive HER-2 negative found on routine imaging.  Patient had a prior left breast biopsy by me in 2020 which demonstrated atypical ductal hyper visit.     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  She had a prior history of atypical ductal hyperplasia in the left breast 2 years ago.   Patient is opting for bilateral mastectomy without reconstruction th

## 2022-02-01 ENCOUNTER — NURSE NAVIGATOR ENCOUNTER (OUTPATIENT)
Dept: HEMATOLOGY/ONCOLOGY | Facility: HOSPITAL | Age: 72
End: 2022-02-01

## 2022-02-01 ENCOUNTER — OFFICE VISIT (OUTPATIENT)
Dept: SURGERY | Facility: CLINIC | Age: 72
End: 2022-02-01

## 2022-02-01 VITALS — HEART RATE: 94 BPM | HEIGHT: 61 IN | TEMPERATURE: 98 F | BODY MASS INDEX: 24 KG/M2

## 2022-02-01 DIAGNOSIS — C50.911 MALIGNANT NEOPLASM OF RIGHT FEMALE BREAST, UNSPECIFIED ESTROGEN RECEPTOR STATUS, UNSPECIFIED SITE OF BREAST (HCC): Primary | ICD-10-CM

## 2022-02-01 PROCEDURE — 1111F DSCHRG MED/CURRENT MED MERGE: CPT | Performed by: SURGERY

## 2022-02-01 PROCEDURE — 99024 POSTOP FOLLOW-UP VISIT: CPT | Performed by: SURGERY

## 2022-02-01 NOTE — PROGRESS NOTES
Called patient in regards to notifying her about about ordering Oncotype test, as requested and ordered by Dr. Myriam Richards. Briefly discussed what Oncotype test entailed and that the results typically take 2 weeks to result. Answered patient's questions. Scheduled patient with Dr. Myriam Richards because patient stated she has an appointment with Dr. Wolf Reese today and stated that he recommended she followed up with Dr. Myriam Richards as well for Thursday February 17, 2022 at 1000 in the [facility]. Patient thanked me for the phone call and assistance. Pt was provided with breast nurse navigator contact information and was encouraged to phone with any other questions or concerns.

## 2022-02-02 ENCOUNTER — NURSE NAVIGATOR ENCOUNTER (OUTPATIENT)
Dept: HEMATOLOGY/ONCOLOGY | Facility: HOSPITAL | Age: 72
End: 2022-02-02

## 2022-02-02 NOTE — PROGRESS NOTES
Called patient in regards to updating her on the time change with her appointment with Dr. Jacqui Lester for February 17, 2022 at 1200 instead of 1000. Patient confirmed the date and time of her appointment and thanked me for calling her for the updated appointment time. Pt was provided with breast nurse navigator contact information and was encouraged to phone with any other questions or concerns.

## 2022-02-08 ENCOUNTER — OFFICE VISIT (OUTPATIENT)
Dept: SURGERY | Facility: CLINIC | Age: 72
End: 2022-02-08

## 2022-02-08 VITALS — TEMPERATURE: 99 F | BODY MASS INDEX: 23.79 KG/M2 | WEIGHT: 126 LBS | HEIGHT: 61 IN

## 2022-02-08 DIAGNOSIS — C50.911 MALIGNANT NEOPLASM OF RIGHT FEMALE BREAST, UNSPECIFIED ESTROGEN RECEPTOR STATUS, UNSPECIFIED SITE OF BREAST (HCC): Primary | ICD-10-CM

## 2022-02-08 PROCEDURE — 99024 POSTOP FOLLOW-UP VISIT: CPT | Performed by: SURGERY

## 2022-02-11 ENCOUNTER — NURSE NAVIGATOR ENCOUNTER (OUTPATIENT)
Dept: HEMATOLOGY/ONCOLOGY | Facility: HOSPITAL | Age: 72
End: 2022-02-11

## 2022-02-11 NOTE — PROGRESS NOTES
Patient called back and verified her full name and  and we discussed her Oncotype score of 26 and that Dr. Mary Ross will discuss her options for her adjuvant treatment with her at her upcoming appointment on  in the [facility]. Patient thanked me for the phone call and the information. Pt was provided with breast nurse navigator contact information and was encouraged to phone with any other questions or concerns.

## 2022-02-11 NOTE — PROGRESS NOTES
LMOVMTCB in regards to notifying her that her Oncotype score has resulted and that Dr. Regan Floyd will review her score in full detail at her appointment on February 17, 2022. Awaiting phone call back from the patient.

## 2022-02-13 NOTE — PROGRESS NOTES
Patient seen and examined. Drainage tube removed. Wounds are healing nicely advised to follow-up with Dr. Hailey Davis and follow-up with me in 1 years time.

## 2022-02-17 ENCOUNTER — OFFICE VISIT (OUTPATIENT)
Dept: HEMATOLOGY/ONCOLOGY | Facility: HOSPITAL | Age: 72
End: 2022-02-17
Attending: GENETIC COUNSELOR, MS
Payer: MEDICARE

## 2022-02-17 VITALS
RESPIRATION RATE: 16 BRPM | HEIGHT: 60.63 IN | DIASTOLIC BLOOD PRESSURE: 81 MMHG | SYSTOLIC BLOOD PRESSURE: 164 MMHG | OXYGEN SATURATION: 97 % | TEMPERATURE: 97 F | BODY MASS INDEX: 22.57 KG/M2 | WEIGHT: 118 LBS | HEART RATE: 86 BPM

## 2022-02-17 DIAGNOSIS — Z17.0 MALIGNANT NEOPLASM OF OVERLAPPING SITES OF RIGHT BREAST IN FEMALE, ESTROGEN RECEPTOR POSITIVE (HCC): Primary | ICD-10-CM

## 2022-02-17 DIAGNOSIS — Z78.0 ASYMPTOMATIC MENOPAUSE: ICD-10-CM

## 2022-02-17 DIAGNOSIS — C50.811 MALIGNANT NEOPLASM OF OVERLAPPING SITES OF RIGHT BREAST IN FEMALE, ESTROGEN RECEPTOR POSITIVE (HCC): Primary | ICD-10-CM

## 2022-02-17 PROCEDURE — 99215 OFFICE O/P EST HI 40 MIN: CPT | Performed by: INTERNAL MEDICINE

## 2022-02-17 RX ORDER — ANASTROZOLE 1 MG/1
1 TABLET ORAL DAILY
Qty: 90 TABLET | Refills: 3 | Status: SHIPPED | OUTPATIENT
Start: 2022-02-17

## 2022-02-17 NOTE — PROGRESS NOTES
Patient is here for follow up for breast cancer and to review oncotype and treatment plan. She has seen her surgeon and is recovering well from surgery.      Education Record    Learner:  Patient and spouse    Disease / Diagnosis: breast cancer    Barriers / Limitations:  None   Comments:    Method:  Discussion   Comments:    General Topics:  Treatment plan    Comments:    Outcome:  Shows understanding   Comments:

## 2022-02-21 ENCOUNTER — MED REC SCAN ONLY (OUTPATIENT)
Dept: FAMILY MEDICINE CLINIC | Facility: CLINIC | Age: 72
End: 2022-02-21

## 2022-03-01 RX ORDER — PRIMIDONE 50 MG/1
TABLET ORAL
Qty: 270 TABLET | Refills: 0 | Status: SHIPPED | OUTPATIENT
Start: 2022-03-01

## 2022-03-01 RX ORDER — ESCITALOPRAM OXALATE 10 MG/1
TABLET ORAL
Qty: 90 TABLET | Refills: 0 | Status: SHIPPED | OUTPATIENT
Start: 2022-03-01

## 2022-04-14 ENCOUNTER — IMMUNIZATION (OUTPATIENT)
Dept: LAB | Age: 72
End: 2022-04-14
Attending: EMERGENCY MEDICINE
Payer: MEDICARE

## 2022-04-14 DIAGNOSIS — Z23 NEED FOR VACCINATION: Primary | ICD-10-CM

## 2022-04-14 PROCEDURE — 0054A SARSCOV2 VAC 30MCG TRS SUCR: CPT

## 2022-04-27 RX ORDER — ATORVASTATIN CALCIUM 10 MG/1
TABLET, FILM COATED ORAL
Qty: 90 TABLET | Refills: 1 | Status: SHIPPED | OUTPATIENT
Start: 2022-04-27

## 2022-04-27 NOTE — TELEPHONE ENCOUNTER
LOV 06/15/2021    LAST LAB Comp 12/09/2021    LAST RX  Atorvastatin #90 R0 01/27/2022    Next OV   Future Appointments   Date Time Provider Esme Troy   5/5/2022  9:30 AM [facility] RM1 OS [facility] [place]   6/1/2022  1:00 PM Krystyna Vargas MD [facility] [place]       PROTOCOL   Cholesterol Medication Protocol Failed 04/27/2022 12:30 AM   Protocol Details  Lipid panel within past 12 months    ALT < 80    ALT resulted within past year    Appointment within past 12 or next 3 months

## 2022-05-05 ENCOUNTER — HOSPITAL ENCOUNTER (OUTPATIENT)
Dept: BONE DENSITY | Age: 72
Discharge: HOME | End: 2022-05-05
Attending: INTERNAL MEDICINE
Payer: MEDICARE

## 2022-05-05 DIAGNOSIS — Z78.0 ASYMPTOMATIC MENOPAUSE: ICD-10-CM

## 2022-05-05 PROCEDURE — 77080 DXA BONE DENSITY AXIAL: CPT | Performed by: INTERNAL MEDICINE

## 2022-05-06 ENCOUNTER — TELEPHONE (OUTPATIENT)
Dept: HEMATOLOGY/ONCOLOGY | Facility: HOSPITAL | Age: 72
End: 2022-05-06

## 2022-05-06 NOTE — TELEPHONE ENCOUNTER
Left a voicemail message for Vic Salinas with information to contact [facility] group endocrinology regarding the dexa scan results and management of ostoeporosis. Requested that she call back with any questions.

## 2022-05-23 DIAGNOSIS — G25.0 ESSENTIAL TREMOR: ICD-10-CM

## 2022-05-23 RX ORDER — PRIMIDONE 50 MG/1
TABLET ORAL
Qty: 270 TABLET | Refills: 0 | Status: SHIPPED | OUTPATIENT
Start: 2022-05-23

## 2022-05-23 NOTE — TELEPHONE ENCOUNTER
No protocol for medication.     Last office visit:  06/15/21  Last refill:  03/01/22  #270, no refills    Future Appointments   Date Time Provider Esme Sydni   5/26/2022 11:00 AM Hardik Heart MD [facility] [place]   6/1/2022  1:00 PM Rhea Roach MD [facility] [place]

## 2022-05-26 ENCOUNTER — OFFICE VISIT (OUTPATIENT)
Dept: ENDOCRINOLOGY CLINIC | Facility: CLINIC | Age: 72
End: 2022-05-26
Payer: MEDICARE

## 2022-05-26 VITALS
WEIGHT: 122.81 LBS | SYSTOLIC BLOOD PRESSURE: 162 MMHG | HEART RATE: 76 BPM | DIASTOLIC BLOOD PRESSURE: 90 MMHG | BODY MASS INDEX: 23 KG/M2

## 2022-05-26 DIAGNOSIS — M81.0 AGE-RELATED OSTEOPOROSIS WITHOUT CURRENT PATHOLOGICAL FRACTURE: Primary | ICD-10-CM

## 2022-05-26 PROCEDURE — 99205 OFFICE O/P NEW HI 60 MIN: CPT | Performed by: STUDENT IN AN ORGANIZED HEALTH CARE EDUCATION/TRAINING PROGRAM

## 2022-05-26 NOTE — PATIENT INSTRUCTIONS
Anti-resorptive medication for osteoporosis:  Reclast (annual infusion) - need dental clearance prior    Anabolic medication for osteoporosis: stronger for bone building but also more potential side effects  Forteo (daily injection) - increased risk of osteosarcoma (bone cancer)  Evenity (monthly injection) - black box warning for increased risk of heart attack and stroke    Labs to be done 1 month prior to starting treatment    I would recommend starting with an anabolic agent first to build up some bone before switching over to anti-resorptive medication

## 2022-05-27 ENCOUNTER — TELEPHONE (OUTPATIENT)
Dept: ENDOCRINOLOGY CLINIC | Facility: CLINIC | Age: 72
End: 2022-05-27

## 2022-05-27 DIAGNOSIS — F41.1 GENERALIZED ANXIETY DISORDER: ICD-10-CM

## 2022-05-27 RX ORDER — ESCITALOPRAM OXALATE 10 MG/1
TABLET ORAL
Qty: 90 TABLET | Refills: 0 | Status: SHIPPED | OUTPATIENT
Start: 2022-05-27

## 2022-05-27 NOTE — TELEPHONE ENCOUNTER
Last refill - 3/1/22 - #90   Last office visit - 6/15/21  [facility] sent - due for annual wellness

## 2022-05-27 NOTE — TELEPHONE ENCOUNTER
Patient was seen in office on 5/26. At that time patient had discussion with Dr. Manolo Clemons regarding different medications used for Osteoporosis. Patient was to think and decide which path she wanted to move forward with. Phoned patient to see if she has made decision. Per patient she has not yet decided. States she has an appointment with her PCP on Tuesday 5/31. Would like to discuss with PCP and have her labs drawn and then do some more research. Reviewed can f/u with our office with any more questions, let us know when she has made decision. Verbalized understanding.

## 2022-06-01 ENCOUNTER — LABORATORY ENCOUNTER (OUTPATIENT)
Dept: LAB | Age: 72
End: 2022-06-01
Attending: FAMILY MEDICINE
Payer: MEDICARE

## 2022-06-01 ENCOUNTER — OFFICE VISIT (OUTPATIENT)
Dept: FAMILY MEDICINE CLINIC | Facility: CLINIC | Age: 72
End: 2022-06-01
Payer: MEDICARE

## 2022-06-01 VITALS
SYSTOLIC BLOOD PRESSURE: 130 MMHG | BODY MASS INDEX: 23.55 KG/M2 | WEIGHT: 123.13 LBS | DIASTOLIC BLOOD PRESSURE: 82 MMHG | HEART RATE: 99 BPM | TEMPERATURE: 98 F | HEIGHT: 60.5 IN | RESPIRATION RATE: 12 BRPM | OXYGEN SATURATION: 96 %

## 2022-06-01 DIAGNOSIS — Z00.00 ENCOUNTER FOR MEDICARE ANNUAL WELLNESS EXAM: Primary | ICD-10-CM

## 2022-06-01 DIAGNOSIS — I25.10 ATHEROSCLEROSIS OF NATIVE CORONARY ARTERY OF NATIVE HEART WITHOUT ANGINA PECTORIS: ICD-10-CM

## 2022-06-01 DIAGNOSIS — C50.911: ICD-10-CM

## 2022-06-01 DIAGNOSIS — I77.811 ABDOMINAL AORTIC ECTASIA (HCC): ICD-10-CM

## 2022-06-01 DIAGNOSIS — M81.0 AGE-RELATED OSTEOPOROSIS WITHOUT CURRENT PATHOLOGICAL FRACTURE: ICD-10-CM

## 2022-06-01 DIAGNOSIS — N60.82 FLAT EPITHELIAL ATYPIA (FEA) OF LEFT BREAST: ICD-10-CM

## 2022-06-01 DIAGNOSIS — Z13.220 SCREENING, LIPID: ICD-10-CM

## 2022-06-01 DIAGNOSIS — E78.00 PURE HYPERCHOLESTEROLEMIA: ICD-10-CM

## 2022-06-01 DIAGNOSIS — F41.1 ANXIETY STATE: ICD-10-CM

## 2022-06-01 DIAGNOSIS — Z13.1 SCREENING FOR DIABETES MELLITUS: ICD-10-CM

## 2022-06-01 DIAGNOSIS — I10 PRIMARY HYPERTENSION: ICD-10-CM

## 2022-06-01 DIAGNOSIS — R73.9 HYPERGLYCEMIA: ICD-10-CM

## 2022-06-01 DIAGNOSIS — Z12.11 COLON CANCER SCREENING: ICD-10-CM

## 2022-06-01 DIAGNOSIS — Z86.010 HISTORY OF COLON POLYPS: ICD-10-CM

## 2022-06-01 DIAGNOSIS — Z90.13 STATUS POST BILATERAL MASTECTOMY: ICD-10-CM

## 2022-06-01 DIAGNOSIS — N60.99 DUCTAL HYPERPLASIA, ATYPICAL, BREAST: ICD-10-CM

## 2022-06-01 DIAGNOSIS — J84.10 PULMONARY FIBROSIS (HCC): ICD-10-CM

## 2022-06-01 DIAGNOSIS — H35.3221 EXUDATIVE AGE-RELATED MACULAR DEGENERATION OF LEFT EYE WITH ACTIVE CHOROIDAL NEOVASCULARIZATION (HCC): ICD-10-CM

## 2022-06-01 DIAGNOSIS — G25.0 BENIGN ESSENTIAL TREMOR: ICD-10-CM

## 2022-06-01 LAB
ALBUMIN SERPL-MCNC: 3.7 G/DL (ref 3.4–5)
ALBUMIN/GLOB SERPL: 0.9 {RATIO} (ref 1–2)
ALP LIVER SERPL-CCNC: 81 U/L
ALT SERPL-CCNC: 27 U/L
ANION GAP SERPL CALC-SCNC: 5 MMOL/L (ref 0–18)
AST SERPL-CCNC: 21 U/L (ref 15–37)
BILIRUB SERPL-MCNC: 0.4 MG/DL (ref 0.1–2)
BUN BLD-MCNC: 11 MG/DL (ref 7–18)
CALCIUM BLD-MCNC: 9 MG/DL (ref 8.5–10.1)
CHLORIDE SERPL-SCNC: 107 MMOL/L (ref 98–112)
CHOLEST SERPL-MCNC: 198 MG/DL (ref ?–200)
CO2 SERPL-SCNC: 27 MMOL/L (ref 21–32)
CREAT BLD-MCNC: 0.82 MG/DL
EST. AVERAGE GLUCOSE BLD GHB EST-MCNC: 117 MG/DL (ref 68–126)
FASTING PATIENT LIPID ANSWER: YES
FASTING STATUS PATIENT QL REPORTED: YES
GLOBULIN PLAS-MCNC: 4.2 G/DL (ref 2.8–4.4)
GLUCOSE BLD-MCNC: 88 MG/DL (ref 70–99)
HBA1C MFR BLD: 5.7 % (ref ?–5.7)
HDLC SERPL-MCNC: 72 MG/DL (ref 40–59)
LDLC SERPL CALC-MCNC: 96 MG/DL (ref ?–100)
NONHDLC SERPL-MCNC: 126 MG/DL (ref ?–130)
OSMOLALITY SERPL CALC.SUM OF ELEC: 287 MOSM/KG (ref 275–295)
POTASSIUM SERPL-SCNC: 3.8 MMOL/L (ref 3.5–5.1)
PROT SERPL-MCNC: 7.9 G/DL (ref 6.4–8.2)
PTH-INTACT SERPL-MCNC: 48.6 PG/ML (ref 18.5–88)
SODIUM SERPL-SCNC: 139 MMOL/L (ref 136–145)
TRIGL SERPL-MCNC: 181 MG/DL (ref 30–149)
TSI SER-ACNC: 1.66 MIU/ML (ref 0.36–3.74)
VIT D+METAB SERPL-MCNC: 35 NG/ML (ref 30–100)
VLDLC SERPL CALC-MCNC: 30 MG/DL (ref 0–30)

## 2022-06-01 PROCEDURE — 36415 COLL VENOUS BLD VENIPUNCTURE: CPT

## 2022-06-01 PROCEDURE — 80053 COMPREHEN METABOLIC PANEL: CPT

## 2022-06-01 PROCEDURE — 82523 COLLAGEN CROSSLINKS: CPT

## 2022-06-01 PROCEDURE — 84443 ASSAY THYROID STIM HORMONE: CPT

## 2022-06-01 PROCEDURE — 83036 HEMOGLOBIN GLYCOSYLATED A1C: CPT

## 2022-06-01 PROCEDURE — 84080 ASSAY ALKALINE PHOSPHATASES: CPT

## 2022-06-01 PROCEDURE — 80061 LIPID PANEL: CPT

## 2022-06-01 PROCEDURE — G0439 PPPS, SUBSEQ VISIT: HCPCS | Performed by: FAMILY MEDICINE

## 2022-06-01 PROCEDURE — 82306 VITAMIN D 25 HYDROXY: CPT

## 2022-06-01 PROCEDURE — 83970 ASSAY OF PARATHORMONE: CPT

## 2022-06-01 RX ORDER — TRIAMCINOLONE ACETONIDE 5 MG/G
CREAM TOPICAL
Qty: 30 G | Refills: 0 | Status: SHIPPED | OUTPATIENT
Start: 2022-06-01

## 2022-06-03 LAB — BONE SPECIFIC ALKALINE PHOSPHATASE: 14.3 UG/L

## 2022-06-04 LAB — C-TELOPEPTIDE, BETA-CROSS-LINK: 1010 PG/ML

## 2022-06-06 ENCOUNTER — TELEPHONE (OUTPATIENT)
Dept: ENDOCRINOLOGY CLINIC | Facility: CLINIC | Age: 72
End: 2022-06-06

## 2022-06-06 DIAGNOSIS — Z13.220 SCREENING, LIPID: Primary | ICD-10-CM

## 2022-06-06 NOTE — PROGRESS NOTES
Ca, vit D, Cr, TSH, PTH - all in the normal range. C-telopeptide (a marker of bone breakdown) is elevated, consistent with the severe osteoporosis seen on her DXA. Saw she saw PCP - did she discuss the medications with PCP? We should start on tx soon.

## 2022-06-17 ENCOUNTER — OFFICE VISIT (OUTPATIENT)
Dept: HEMATOLOGY/ONCOLOGY | Age: 72
End: 2022-06-17
Attending: INTERNAL MEDICINE
Payer: MEDICARE

## 2022-06-17 VITALS
OXYGEN SATURATION: 98 % | RESPIRATION RATE: 18 BRPM | WEIGHT: 126.81 LBS | SYSTOLIC BLOOD PRESSURE: 201 MMHG | TEMPERATURE: 99 F | BODY MASS INDEX: 24 KG/M2 | DIASTOLIC BLOOD PRESSURE: 95 MMHG | HEART RATE: 75 BPM

## 2022-06-17 DIAGNOSIS — C50.811 MALIGNANT NEOPLASM OF OVERLAPPING SITES OF RIGHT BREAST IN FEMALE, ESTROGEN RECEPTOR POSITIVE (HCC): Primary | ICD-10-CM

## 2022-06-17 DIAGNOSIS — Z17.0 MALIGNANT NEOPLASM OF OVERLAPPING SITES OF RIGHT BREAST IN FEMALE, ESTROGEN RECEPTOR POSITIVE (HCC): Primary | ICD-10-CM

## 2022-06-17 PROCEDURE — 99214 OFFICE O/P EST MOD 30 MIN: CPT | Performed by: INTERNAL MEDICINE

## 2022-06-17 NOTE — PROGRESS NOTES
Patient is here for follow up for breast cancer on anastrozole for 3 months. She reports that she is tolerating this well. She has some hot flashes but they are tolerable. They do not keep her awake at night. She denies any joint pains. Her appetite and energy are good.      Education Record    Learner:  Patient and Spouse    Disease / Diagnosis: Breast cancer    Barriers / Limitations:  None   Comments:    Method:  Discussion   Comments:    General Topics:  Side effects and symptom management   Comments:    Outcome:  Shows understanding   Comments:

## 2022-08-13 DIAGNOSIS — G25.0 ESSENTIAL TREMOR: ICD-10-CM

## 2022-08-13 RX ORDER — PRIMIDONE 50 MG/1
TABLET ORAL
Qty: 270 TABLET | Refills: 1 | Status: SHIPPED | OUTPATIENT
Start: 2022-08-13

## 2022-08-21 DIAGNOSIS — F41.1 GENERALIZED ANXIETY DISORDER: ICD-10-CM

## 2022-08-22 RX ORDER — ESCITALOPRAM OXALATE 10 MG/1
TABLET ORAL
Qty: 90 TABLET | Refills: 0 | Status: SHIPPED | OUTPATIENT
Start: 2022-08-22

## 2022-09-22 ENCOUNTER — TELEPHONE (OUTPATIENT)
Dept: FAMILY MEDICINE CLINIC | Facility: CLINIC | Age: 72
End: 2022-09-22

## 2022-10-21 RX ORDER — ATORVASTATIN CALCIUM 10 MG/1
TABLET, FILM COATED ORAL
Qty: 90 TABLET | Refills: 0 | Status: SHIPPED | OUTPATIENT
Start: 2022-10-21

## 2022-10-21 NOTE — TELEPHONE ENCOUNTER
Medication refilled per protocol.    Stas GILMORE MA, 10/21/22, 4:53 AM Consent 1/Introductory Paragraph: The rationale for Mohs was explained to the patient and consent was obtained. The risks, benefits and alternatives to therapy were discussed in detail. Specifically, the risks of infection, scarring, bleeding, prolonged wound healing, incomplete removal, allergy to anesthesia, nerve injury and recurrence were addressed. Prior to the procedure, the treatment site was clearly identified and confirmed by the patient. All components of Universal Protocol/PAUSE Rule completed.

## 2022-10-27 ENCOUNTER — TELEPHONE (OUTPATIENT)
Dept: ENDOCRINOLOGY CLINIC | Facility: CLINIC | Age: 72
End: 2022-10-27

## 2022-10-27 NOTE — TELEPHONE ENCOUNTER
10/27/22-Received via fax patient's [facility] Clearance letter from her dentist.  Clearance has been denied. Given to RN to review. Placed in RN's in basket.

## 2022-10-27 NOTE — TELEPHONE ENCOUNTER
LVM and sent [facility] message regarding dental denial and what oral option the pt would like to try in the interim until after inplant procedure and healing time of 3-4 months. Dental clearance form sent to scan.

## 2022-10-28 NOTE — TELEPHONE ENCOUNTER
Spoke with pt via phone. Verified name and . Pt has decided to try fosamax in the interim. Possible side effects were discussed. Informed pt that Dr. Elicia Elizondo is back in the office and place an order for one of the fosamx meds. Also wants to know if she should cancel her  appt. Will route to Dr. Elicia Elizondo and call pt back on Monday with the plan.

## 2022-10-31 RX ORDER — ALENDRONATE SODIUM 70 MG/1
70 TABLET ORAL
Qty: 12 TABLET | Refills: 1 | Status: SHIPPED | OUTPATIENT
Start: 2022-10-31

## 2022-10-31 NOTE — PROGRESS NOTES
Pt with initial consult for osteoporosis, also with hx of breast CA now on AI. Discussed IV Reclast as a good first-line option. Pt needs to wait 3-4 more months for dental clearance.   In this case, Ok to bridge over with fosamax 70mg PO weekl  Rx written    John Ramirez MD

## 2022-11-15 ENCOUNTER — PATIENT MESSAGE (OUTPATIENT)
Dept: ENDOCRINOLOGY CLINIC | Facility: CLINIC | Age: 72
End: 2022-11-15

## 2022-11-15 ENCOUNTER — TELEMEDICINE (OUTPATIENT)
Dept: ENDOCRINOLOGY CLINIC | Facility: CLINIC | Age: 72
End: 2022-11-15
Payer: MEDICARE

## 2022-11-15 DIAGNOSIS — M81.0 AGE-RELATED OSTEOPOROSIS WITHOUT CURRENT PATHOLOGICAL FRACTURE: Primary | ICD-10-CM

## 2022-11-15 PROCEDURE — 99214 OFFICE O/P EST MOD 30 MIN: CPT | Performed by: STUDENT IN AN ORGANIZED HEALTH CARE EDUCATION/TRAINING PROGRAM

## 2022-11-15 NOTE — TELEPHONE ENCOUNTER
From: [facility]  To: Marshal Paige MD  Sent: 11/15/2022 3:42 PM CST  Subject: Elnoria Batter [facility]Mercy consult    Hi Dr. Collette Rast, it was nice to get all of my concerns and questions answered today, thank you. My Dr. at [facility] is:     Dr. Rachael Tristan   [phone]    Thank you for taking the time to call her to get a better explanation of my ongoing treatment.  Benjamen General (and Kostas Luevano) [place]

## 2022-11-16 NOTE — TELEPHONE ENCOUNTER
Patient had not yet seen Dr. Danny Clarkt response. Phoned patient, reviewed Dr. Danny Gray message, verbalized understanding, reviewed message also available in [address]

## 2022-11-17 DIAGNOSIS — F41.1 GENERALIZED ANXIETY DISORDER: ICD-10-CM

## 2022-11-17 RX ORDER — ESCITALOPRAM OXALATE 10 MG/1
TABLET ORAL
Qty: 90 TABLET | Refills: 1 | Status: SHIPPED | OUTPATIENT
Start: 2022-11-17

## 2022-12-07 ENCOUNTER — TELEPHONE (OUTPATIENT)
Dept: FAMILY MEDICINE CLINIC | Facility: CLINIC | Age: 72
End: 2022-12-07

## 2022-12-07 DIAGNOSIS — N60.99 DUCTAL HYPERPLASIA, ATYPICAL, BREAST: Primary | ICD-10-CM

## 2022-12-07 DIAGNOSIS — C50.911: ICD-10-CM

## 2022-12-07 DIAGNOSIS — Z90.13 STATUS POST BILATERAL MASTECTOMY: ICD-10-CM

## 2022-12-07 NOTE — TELEPHONE ENCOUNTER
Need orders faxed for:   Breast prosthesis left and right bra's (plural)  NPI order has to be on the script by docs name  Dr. Donavon Jay NPI [id number]  DX should be on the script  FAX: [phone]

## 2022-12-08 ENCOUNTER — IMMUNIZATION (OUTPATIENT)
Dept: FAMILY MEDICINE CLINIC | Facility: CLINIC | Age: 72
End: 2022-12-08
Payer: MEDICARE

## 2022-12-08 DIAGNOSIS — Z23 NEED FOR VACCINATION: Primary | ICD-10-CM

## 2022-12-08 PROCEDURE — G0008 ADMIN INFLUENZA VIRUS VAC: HCPCS | Performed by: FAMILY MEDICINE

## 2022-12-08 PROCEDURE — 90662 IIV NO PRSV INCREASED AG IM: CPT | Performed by: FAMILY MEDICINE

## 2022-12-16 ENCOUNTER — OFFICE VISIT (OUTPATIENT)
Dept: HEMATOLOGY/ONCOLOGY | Age: 72
End: 2022-12-16
Attending: INTERNAL MEDICINE
Payer: MEDICARE

## 2022-12-16 VITALS
BODY MASS INDEX: 24 KG/M2 | SYSTOLIC BLOOD PRESSURE: 191 MMHG | HEART RATE: 85 BPM | WEIGHT: 126.63 LBS | OXYGEN SATURATION: 98 % | TEMPERATURE: 98 F | DIASTOLIC BLOOD PRESSURE: 108 MMHG

## 2022-12-16 DIAGNOSIS — C50.811 MALIGNANT NEOPLASM OF OVERLAPPING SITES OF RIGHT BREAST IN FEMALE, ESTROGEN RECEPTOR POSITIVE (HCC): Primary | ICD-10-CM

## 2022-12-16 DIAGNOSIS — Z17.0 MALIGNANT NEOPLASM OF OVERLAPPING SITES OF RIGHT BREAST IN FEMALE, ESTROGEN RECEPTOR POSITIVE (HCC): Primary | ICD-10-CM

## 2022-12-16 PROCEDURE — 99214 OFFICE O/P EST MOD 30 MIN: CPT | Performed by: INTERNAL MEDICINE

## 2022-12-16 NOTE — PROGRESS NOTES
Patient is here for follow up for breast cancer on anastrozole. She is feeling well. She denies hot flashes. She had to have dental implants and so she never started alendronate. She is seeing Dr. Daphney Kocher in May for follow up about osteopenia. She denies fever, cough or shortness of breath. Her blood pressure has been elevated here at the clinic but she says that it is normal when she checks it at home. She will check this again as soon as she gets home. She is eating well and her energy is good.      Education Record    Learner:  Patient and Spouse    Disease / Diagnosis: Breast cancer    Barriers / Limitations:  None   Comments:    Method:  Discussion   Comments:    General Topics:  Side effects and symptom management   Comments:    Outcome:  Shows understanding   Comments:

## 2023-01-18 RX ORDER — ATORVASTATIN CALCIUM 10 MG/1
TABLET, FILM COATED ORAL
Qty: 90 TABLET | Refills: 0 | Status: SHIPPED | OUTPATIENT
Start: 2023-01-18

## 2023-01-18 NOTE — TELEPHONE ENCOUNTER
Cholesterol Medication Protocol Passed 01/18/2023 12:32 AM   Protocol Details  ALT < 80    ALT resulted within past year    Lipid panel within past 12 months    Appointment within past 12 or next 3 months        Last office visit:  06/01/22  Last refill:  10/21/22  #90, no refills  Last lipid:  06/01/22    Future Appointments   No future office visits with pcp

## 2023-01-23 ENCOUNTER — TELEPHONE (OUTPATIENT)
Dept: ENDOCRINOLOGY CLINIC | Facility: CLINIC | Age: 73
End: 2023-01-23

## 2023-01-23 NOTE — TELEPHONE ENCOUNTER
Spoke with patient on telephone. States she just picked up rx from [facility] for Fosamax, but she thought this was discontinued. Per 11/15 patient message- patient having dental work, Dr. Caryle Jesus spoke with Dr. Yves Zimmer, patient to hold all osteoporotic medications until after seeing Dr. Yves Zimmer in February, after clearance can start with Evenity or Reclast.    Fosamax discontinued from our med list on 12/16. Reviewed this may have been mistakenly filled by pharmacy, patient should not be taking. Patient verbalized understanding- has not been taking and will continue to hold. Will close this encounter.

## 2023-01-24 ENCOUNTER — IMMUNIZATION (OUTPATIENT)
Dept: LAB | Age: 73
End: 2023-01-24
Attending: EMERGENCY MEDICINE
Payer: MEDICARE

## 2023-01-24 DIAGNOSIS — Z23 NEED FOR VACCINATION: Primary | ICD-10-CM

## 2023-01-24 PROCEDURE — 0124A SARSCOV2 VAC BVL 30MCG/0.3ML: CPT

## 2023-01-31 ENCOUNTER — TELEPHONE (OUTPATIENT)
Dept: FAMILY MEDICINE CLINIC | Facility: CLINIC | Age: 73
End: 2023-01-31

## 2023-02-04 DIAGNOSIS — G25.0 ESSENTIAL TREMOR: ICD-10-CM

## 2023-02-06 RX ORDER — PRIMIDONE 50 MG/1
TABLET ORAL
Qty: 270 TABLET | Refills: 1 | Status: SHIPPED | OUTPATIENT
Start: 2023-02-06

## 2023-03-09 ENCOUNTER — TELEPHONE (OUTPATIENT)
Dept: ENDOCRINOLOGY CLINIC | Facility: CLINIC | Age: 73
End: 2023-03-09

## 2023-03-09 NOTE — TELEPHONE ENCOUNTER
Received phone call from patient's Dentist, Dr. Bela Dozier. States she is now giving dental clearance for patient to begin biphosphonate if needed.     Best call back number if any questions:    Dr. Bela Dozier  [phone]

## 2023-03-10 NOTE — TELEPHONE ENCOUNTER
Patient returned call. States she would like to schedule video visit to discuss further with Dr. Bernie Rubio. Appointment scheduled for Monday 3/13.

## 2023-03-13 ENCOUNTER — TELEMEDICINE (OUTPATIENT)
Facility: CLINIC | Age: 73
End: 2023-03-13
Payer: MEDICARE

## 2023-03-13 DIAGNOSIS — M81.0 AGE-RELATED OSTEOPOROSIS WITHOUT CURRENT PATHOLOGICAL FRACTURE: Primary | ICD-10-CM

## 2023-04-17 RX ORDER — ATORVASTATIN CALCIUM 10 MG/1
TABLET, FILM COATED ORAL
Qty: 90 TABLET | Refills: 0 | Status: SHIPPED | OUTPATIENT
Start: 2023-04-17

## 2023-04-17 NOTE — TELEPHONE ENCOUNTER
Atorvastatin    LOV  6-1-22    LAST LAB 6-1-22 Chem Profile ALT 27                   6-1-22 Lipids    LAST RX  1-18-23  #90    Next OV   Future Appointments   Date Time Provider Esme Sydni   5/16/2023  1:00 PM Janak Soto MD [facility] [place]   6/7/2023  9:20 AM Deb Sharma MD [facility] [place]   6/16/2023 11:45 AM Lynsey Garsia MD [facility] [place]         PROTOCOL  Cholesterol Medication Protocol Passed 04/16/2023 07:55 AM   Protocol Details  ALT < 80    ALT resulted within past year    Lipid panel within past 12 months    Appointment within past 12 or next 3 months

## 2023-05-10 DIAGNOSIS — F41.1 GENERALIZED ANXIETY DISORDER: ICD-10-CM

## 2023-05-10 RX ORDER — ESCITALOPRAM OXALATE 10 MG/1
TABLET ORAL
Qty: 90 TABLET | Refills: 1 | Status: SHIPPED | OUTPATIENT
Start: 2023-05-10

## 2023-05-10 NOTE — TELEPHONE ENCOUNTER
Last office visit: 6/1/22  Last refill: 11/17/22  Labs Due: 12/6/22  Future Appointments   Date Time Provider Esme Razai   5/16/2023  1:00 PM Jacek Salmon MD [facility] [place]   6/7/2023  9:20 AM Roni Farmer MD [facility] [place]   6/16/2023 11:45 AM Karan Garsia MD [facility] [place]

## 2023-05-16 ENCOUNTER — TELEMEDICINE (OUTPATIENT)
Facility: CLINIC | Age: 73
End: 2023-05-16
Payer: MEDICARE

## 2023-05-16 DIAGNOSIS — E55.9 VITAMIN D DEFICIENCY, UNSPECIFIED: ICD-10-CM

## 2023-05-16 DIAGNOSIS — M81.0 AGE-RELATED OSTEOPOROSIS WITHOUT CURRENT PATHOLOGICAL FRACTURE: Primary | ICD-10-CM

## 2023-05-16 PROCEDURE — 99214 OFFICE O/P EST MOD 30 MIN: CPT | Performed by: STUDENT IN AN ORGANIZED HEALTH CARE EDUCATION/TRAINING PROGRAM

## 2023-05-17 ENCOUNTER — TELEPHONE (OUTPATIENT)
Facility: CLINIC | Age: 73
End: 2023-05-17

## 2023-05-17 NOTE — TELEPHONE ENCOUNTER
Per Dr. Dong Navarro, patient moving forward with Reclast treatment. Phoned patient's insurance, Medicare, at [phone], to discuss need for PA. Instructed to call: [phone]. Attempt, long hold times, will need to try again. Reclast form printed and filled out, placed in Dr. Dong Navarro in-box for signature, then to be faxed.

## 2023-05-17 NOTE — TELEPHONE ENCOUNTER
Reclast order form signed off, but per Dr. Jenifer Espinoza, patient also in need of Dental Clearance form. Will wait to send signed order until Dental Clearance received. Faxed to [address] [place] [address], Attention: Dr. Zenaida Barnard, at [phone]. Fax confirmation received.

## 2023-05-18 NOTE — TELEPHONE ENCOUNTER
Received fax failure. Fax resent to [facility] at [phone]. Waiting for confirmation.

## 2023-05-18 NOTE — TELEPHONE ENCOUNTER
Received call that Dr. Robert Pena is out of town until 06/05/23. Informed that Dr Kimberly Gates is covering for Dr. Robert Pena so information will be forwarded for review.

## 2023-05-18 NOTE — TELEPHONE ENCOUNTER
Received call from Dr. Florinda Crawley office. Spoke with Sosa Sommers in regards to pts clearance letter that we requested. She stated that the dr [place] Officer Dr. Yves Muro will not fill out the dental clearance form since he has never seen pt and that we would have to wait until Dr. Yves Muro is back from vacation on June 5th. Call back number [phone].

## 2023-05-19 NOTE — TELEPHONE ENCOUNTER
Pt called and stated she was calling to set up reclast.  Informed pt that we need dental clearance and we faxed to the office but Dr is on vacation and the covering dr would not sign off the form. So we must wait at this time. Pt verbalized understanding and had no further questions at this time. Routing to Dr Hermelinda Cook as a FYI to inform of status/delay.

## 2023-06-01 NOTE — TELEPHONE ENCOUNTER
Phoned Medicare at [phone] spoke with [address] [place] [address] code: . No prior authorization is required. Were not able to give call reference number. Said documentation would be under date/time of call with patient account.

## 2023-06-02 NOTE — TELEPHONE ENCOUNTER
Pt (IDx2) phoned clinic requesting to speak to [facility]. Informed pt that RN was not in the office today. Pt said that she was calling to get an update on her Reclast and wanted to schedule her appointment. I informed pt that I would relay the message to RN on Monday when she is in the clinic. Pt verbalized understanding and stated that she will call back on Monday as well. Will route to RN.

## 2023-06-06 NOTE — TELEPHONE ENCOUNTER
Received signed dental clearance from Dr. Kristopher Mcginnis office. Sent to scanning. Faxed signed Reclast order to [facility] at [phone]. Phoned patient and updated, verbalized understanding. States she is going to have labs drawn for another provider tomorrow, asking about getting pre-Reclast labs done at same time, reviewed she can do- just need to schedule within 1 month. Verbalized understanding.

## 2023-06-06 NOTE — TELEPHONE ENCOUNTER
Have still not received dental clearance. Phoned Dr. Isis Mayfield office. She is back in town and in the office today, they will try to have her fill out the form today and fax to our office. Phoned patient to update on status, no answer, left detailed message per YAW.

## 2023-06-07 ENCOUNTER — LABORATORY ENCOUNTER (OUTPATIENT)
Dept: LAB | Age: 73
End: 2023-06-07
Attending: FAMILY MEDICINE
Payer: MEDICARE

## 2023-06-07 ENCOUNTER — OFFICE VISIT (OUTPATIENT)
Dept: FAMILY MEDICINE CLINIC | Facility: CLINIC | Age: 73
End: 2023-06-07
Payer: MEDICARE

## 2023-06-07 ENCOUNTER — MED REC SCAN ONLY (OUTPATIENT)
Facility: CLINIC | Age: 73
End: 2023-06-07

## 2023-06-07 VITALS
DIASTOLIC BLOOD PRESSURE: 80 MMHG | BODY MASS INDEX: 25.16 KG/M2 | RESPIRATION RATE: 12 BRPM | HEIGHT: 61 IN | HEART RATE: 105 BPM | SYSTOLIC BLOOD PRESSURE: 132 MMHG | TEMPERATURE: 98 F | WEIGHT: 133.25 LBS

## 2023-06-07 DIAGNOSIS — J84.10 PULMONARY FIBROSIS (HCC): ICD-10-CM

## 2023-06-07 DIAGNOSIS — Z12.11 COLON CANCER SCREENING: ICD-10-CM

## 2023-06-07 DIAGNOSIS — Z90.13 STATUS POST BILATERAL MASTECTOMY: ICD-10-CM

## 2023-06-07 DIAGNOSIS — Z79.899 ENCOUNTER FOR LONG-TERM (CURRENT) USE OF MEDICATIONS: Primary | ICD-10-CM

## 2023-06-07 DIAGNOSIS — E55.9 VITAMIN D DEFICIENCY, UNSPECIFIED: ICD-10-CM

## 2023-06-07 DIAGNOSIS — F41.1 ANXIETY STATE: ICD-10-CM

## 2023-06-07 DIAGNOSIS — M81.0 AGE-RELATED OSTEOPOROSIS WITHOUT CURRENT PATHOLOGICAL FRACTURE: ICD-10-CM

## 2023-06-07 DIAGNOSIS — H35.3221 EXUDATIVE AGE-RELATED MACULAR DEGENERATION OF LEFT EYE WITH ACTIVE CHOROIDAL NEOVASCULARIZATION (HCC): ICD-10-CM

## 2023-06-07 DIAGNOSIS — C50.911: ICD-10-CM

## 2023-06-07 DIAGNOSIS — Z00.00 MEDICARE ANNUAL WELLNESS VISIT, SUBSEQUENT: ICD-10-CM

## 2023-06-07 DIAGNOSIS — E78.00 PURE HYPERCHOLESTEROLEMIA: ICD-10-CM

## 2023-06-07 DIAGNOSIS — I25.10 ATHEROSCLEROSIS OF NATIVE CORONARY ARTERY OF NATIVE HEART WITHOUT ANGINA PECTORIS: ICD-10-CM

## 2023-06-07 DIAGNOSIS — I10 PRIMARY HYPERTENSION: ICD-10-CM

## 2023-06-07 DIAGNOSIS — G25.0 BENIGN ESSENTIAL TREMOR: ICD-10-CM

## 2023-06-07 DIAGNOSIS — I77.811 ABDOMINAL AORTIC ECTASIA (HCC): ICD-10-CM

## 2023-06-07 PROBLEM — M85.80 OSTEOPENIA, SENILE: Status: ACTIVE | Noted: 2023-06-07

## 2023-06-07 LAB
ALBUMIN SERPL-MCNC: 3.7 G/DL (ref 3.4–5)
ALP LIVER SERPL-CCNC: 84 U/L
ALT SERPL-CCNC: 23 U/L
ANION GAP SERPL CALC-SCNC: 3 MMOL/L (ref 0–18)
AST SERPL-CCNC: 22 U/L (ref 15–37)
BILIRUB DIRECT SERPL-MCNC: <0.1 MG/DL (ref 0–0.2)
BILIRUB SERPL-MCNC: 0.3 MG/DL (ref 0.1–2)
BUN BLD-MCNC: 10 MG/DL (ref 7–18)
CALCIUM BLD-MCNC: 9.7 MG/DL (ref 8.5–10.1)
CHLORIDE SERPL-SCNC: 107 MMOL/L (ref 98–112)
CHOLEST SERPL-MCNC: 216 MG/DL (ref ?–200)
CO2 SERPL-SCNC: 27 MMOL/L (ref 21–32)
CREAT BLD-MCNC: 0.98 MG/DL
FASTING PATIENT LIPID ANSWER: YES
FASTING STATUS PATIENT QL REPORTED: YES
GFR SERPLBLD BASED ON 1.73 SQ M-ARVRAT: 61 ML/MIN/1.73M2 (ref 60–?)
GLUCOSE BLD-MCNC: 104 MG/DL (ref 70–99)
HDLC SERPL-MCNC: 65 MG/DL (ref 40–59)
LDLC SERPL CALC-MCNC: 113 MG/DL (ref ?–100)
NONHDLC SERPL-MCNC: 151 MG/DL (ref ?–130)
OSMOLALITY SERPL CALC.SUM OF ELEC: 283 MOSM/KG (ref 275–295)
POTASSIUM SERPL-SCNC: 4.2 MMOL/L (ref 3.5–5.1)
PROT SERPL-MCNC: 8.2 G/DL (ref 6.4–8.2)
SODIUM SERPL-SCNC: 137 MMOL/L (ref 136–145)
TRIGL SERPL-MCNC: 224 MG/DL (ref 30–149)
VIT D+METAB SERPL-MCNC: 35.3 NG/ML (ref 30–100)
VLDLC SERPL CALC-MCNC: 39 MG/DL (ref 0–30)

## 2023-06-07 PROCEDURE — G0439 PPPS, SUBSEQ VISIT: HCPCS | Performed by: FAMILY MEDICINE

## 2023-06-07 PROCEDURE — 82306 VITAMIN D 25 HYDROXY: CPT

## 2023-06-07 PROCEDURE — 36415 COLL VENOUS BLD VENIPUNCTURE: CPT

## 2023-06-07 PROCEDURE — G0009 ADMIN PNEUMOCOCCAL VACCINE: HCPCS | Performed by: FAMILY MEDICINE

## 2023-06-07 PROCEDURE — 99214 OFFICE O/P EST MOD 30 MIN: CPT | Performed by: FAMILY MEDICINE

## 2023-06-07 PROCEDURE — 80076 HEPATIC FUNCTION PANEL: CPT

## 2023-06-07 PROCEDURE — 80048 BASIC METABOLIC PNL TOTAL CA: CPT

## 2023-06-07 PROCEDURE — 90677 PCV20 VACCINE IM: CPT | Performed by: FAMILY MEDICINE

## 2023-06-07 PROCEDURE — 80061 LIPID PANEL: CPT

## 2023-06-07 PROCEDURE — 1126F AMNT PAIN NOTED NONE PRSNT: CPT | Performed by: FAMILY MEDICINE

## 2023-06-09 DIAGNOSIS — E78.00 PURE HYPERCHOLESTEROLEMIA: Primary | ICD-10-CM

## 2023-06-16 ENCOUNTER — OFFICE VISIT (OUTPATIENT)
Dept: HEMATOLOGY/ONCOLOGY | Age: 73
End: 2023-06-16
Attending: INTERNAL MEDICINE
Payer: MEDICARE

## 2023-06-16 ENCOUNTER — OFFICE VISIT (OUTPATIENT)
Dept: HEMATOLOGY/ONCOLOGY | Age: 73
End: 2023-06-16
Attending: STUDENT IN AN ORGANIZED HEALTH CARE EDUCATION/TRAINING PROGRAM
Payer: MEDICARE

## 2023-06-16 VITALS
SYSTOLIC BLOOD PRESSURE: 190 MMHG | RESPIRATION RATE: 18 BRPM | BODY MASS INDEX: 25.24 KG/M2 | DIASTOLIC BLOOD PRESSURE: 87 MMHG | OXYGEN SATURATION: 96 % | TEMPERATURE: 99 F | HEIGHT: 60.98 IN | HEART RATE: 72 BPM | WEIGHT: 133.69 LBS

## 2023-06-16 DIAGNOSIS — M85.80 OSTEOPENIA, SENILE: ICD-10-CM

## 2023-06-16 DIAGNOSIS — C50.811 MALIGNANT NEOPLASM OF OVERLAPPING SITES OF RIGHT BREAST IN FEMALE, ESTROGEN RECEPTOR POSITIVE (HCC): Primary | ICD-10-CM

## 2023-06-16 DIAGNOSIS — M85.80 OSTEOPENIA, SENILE: Primary | ICD-10-CM

## 2023-06-16 DIAGNOSIS — Z17.0 MALIGNANT NEOPLASM OF OVERLAPPING SITES OF RIGHT BREAST IN FEMALE, ESTROGEN RECEPTOR POSITIVE (HCC): Primary | ICD-10-CM

## 2023-06-16 PROCEDURE — 99214 OFFICE O/P EST MOD 30 MIN: CPT | Performed by: INTERNAL MEDICINE

## 2023-06-16 PROCEDURE — 96365 THER/PROPH/DIAG IV INF INIT: CPT

## 2023-06-16 RX ORDER — DIPHENHYDRAMINE HYDROCHLORIDE 50 MG/ML
25 INJECTION INTRAMUSCULAR; INTRAVENOUS ONCE
OUTPATIENT
Start: 2023-06-16

## 2023-06-16 RX ORDER — METHYLPREDNISOLONE SODIUM SUCCINATE 125 MG/2ML
125 INJECTION, POWDER, LYOPHILIZED, FOR SOLUTION INTRAMUSCULAR; INTRAVENOUS ONCE
OUTPATIENT
Start: 2023-06-16

## 2023-06-16 RX ORDER — ZOLEDRONIC ACID 5 MG/100ML
5 INJECTION, SOLUTION INTRAVENOUS ONCE
Status: COMPLETED | OUTPATIENT
Start: 2023-06-16 | End: 2023-06-16

## 2023-06-16 RX ORDER — FAMOTIDINE 10 MG/ML
20 INJECTION, SOLUTION INTRAVENOUS ONCE
OUTPATIENT
Start: 2023-06-16

## 2023-06-16 RX ORDER — ZOLEDRONIC ACID 5 MG/100ML
5 INJECTION, SOLUTION INTRAVENOUS ONCE
Status: CANCELLED | OUTPATIENT
Start: 2023-06-16

## 2023-06-16 RX ORDER — METHYLPREDNISOLONE SODIUM SUCCINATE 40 MG/ML
40 INJECTION, POWDER, LYOPHILIZED, FOR SOLUTION INTRAMUSCULAR; INTRAVENOUS ONCE
OUTPATIENT
Start: 2023-06-16

## 2023-06-16 RX ORDER — MEPERIDINE HYDROCHLORIDE 25 MG/ML
25 INJECTION INTRAMUSCULAR; INTRAVENOUS; SUBCUTANEOUS ONCE
OUTPATIENT
Start: 2023-06-16

## 2023-06-16 RX ADMIN — ZOLEDRONIC ACID 5 MG: 5 INJECTION, SOLUTION INTRAVENOUS at 13:10:00

## 2023-06-16 NOTE — PROGRESS NOTES
Patient is here for follow up for breast cancer on anastrozole. She denies any hot flashes or joint pains. She is eating well and says that her energy is good. She denies any fever, cough or shortness of breath. She is scheduled to begin reclast today.       Education Record    Learner:  Patient and Spouse    Disease / Diagnosis: Breast cancer    Barriers / Limitations:  None   Comments:    Method:  Discussion   Comments:    General Topics:  Side effects and symptom management   Comments:    Outcome:  Shows understanding   Comments:

## 2023-06-16 NOTE — PROGRESS NOTES
Pt tolerated reclast infusion well today without incident or complaint.      Education Record    Learner:  Patient    Disease / Diagnosis: senile osteopenia    Barriers / Limitations:  None   Comments:    Method:  Discussion   Comments:    General Topics:  Plan of care reviewed   Comments:    Outcome:  Shows understanding   Comments:

## 2023-07-14 RX ORDER — ATORVASTATIN CALCIUM 10 MG/1
TABLET, FILM COATED ORAL
Qty: 90 TABLET | Refills: 0 | Status: SHIPPED | OUTPATIENT
Start: 2023-07-14

## 2023-07-14 NOTE — TELEPHONE ENCOUNTER
ATORVASTATIN 10 MG Oral Tab     LOV  6-7-23    LAST LAB  6-7-23 Hepatic Function ALT 23                      6-7-23 Lipids    LAST RX  4-17-23 #90    Next OV    Future Appointments   Date Time Provider Esme Troy   5/16/2024  2:20 PM Jaki Gómez MD [facility] [place]         PROTOCOL  Cholesterol Medication Protocol Fuflte1807/14/2023 01:04 AM   Protocol Details ALT < 80    ALT resulted within past year    Lipid panel within past 12 months    Appointment within past 12 or next 3 months

## 2023-08-02 DIAGNOSIS — G25.0 ESSENTIAL TREMOR: ICD-10-CM

## 2023-08-02 RX ORDER — PRIMIDONE 50 MG/1
50 TABLET ORAL 2 TIMES DAILY
Qty: 180 TABLET | Refills: 1 | Status: SHIPPED | OUTPATIENT
Start: 2023-08-02 | End: 2024-01-29

## 2023-08-02 NOTE — TELEPHONE ENCOUNTER
Last OV 06/07  Last refill 02/06 #270 +1  Requested Prescriptions     Pending Prescriptions Disp Refills    PRIMIDONE 50 MG Oral Tab [Pharmacy Med Name: PRIMIDONE 50 MG TABLET] 270 tablet 1     Sig: TAKE 2 TABLETS (100 MG TOTAL) BY MOUTH EVERY MORNING AND 1 TABLET (50 MG TOTAL) NIGHTLY.      Future Appointments   Date Time Provider Esme Troy   5/16/2024  2:20 PM Teri Diaz MD [facility] ELISSA Saleh

## 2023-09-19 ENCOUNTER — OFFICE VISIT (OUTPATIENT)
Dept: FAMILY MEDICINE CLINIC | Facility: CLINIC | Age: 73
End: 2023-09-19
Payer: MEDICARE

## 2023-09-19 VITALS
WEIGHT: 129.13 LBS | SYSTOLIC BLOOD PRESSURE: 150 MMHG | RESPIRATION RATE: 12 BRPM | HEIGHT: 61 IN | BODY MASS INDEX: 24.38 KG/M2 | TEMPERATURE: 97 F | HEART RATE: 73 BPM | OXYGEN SATURATION: 98 % | DIASTOLIC BLOOD PRESSURE: 82 MMHG

## 2023-09-19 DIAGNOSIS — L98.9 SKIN LESION OF LEFT ARM: Primary | ICD-10-CM

## 2023-09-19 PROCEDURE — 99213 OFFICE O/P EST LOW 20 MIN: CPT | Performed by: FAMILY MEDICINE

## 2023-09-19 PROCEDURE — 1126F AMNT PAIN NOTED NONE PRSNT: CPT | Performed by: FAMILY MEDICINE

## 2023-10-09 RX ORDER — ATORVASTATIN CALCIUM 10 MG/1
TABLET, FILM COATED ORAL
Qty: 90 TABLET | Refills: 0 | Status: SHIPPED | OUTPATIENT
Start: 2023-10-09

## 2023-10-09 NOTE — TELEPHONE ENCOUNTER
Atorvastatin 10 MG oral tab  Cholesterol Medication Protocol Tofurl41/08/2023 08:00 AM   Protocol Details ALT < 80    ALT resulted within past year    Lipid panel within past 12 months    Appointment within past 12 or next 3 months     Last office visit:  6/7/23     Future Appointments   Date Time Provider Esme Troy   10/18/2023  2:15 PM Cathy Ca [facility]   5/16/2024  2:20 PM Hayde Link MD [facility] [place]   Last filled:  7/14/23  #90 with 0 refills   Last labs:  6/7/23  ALT:  22

## 2023-10-18 ENCOUNTER — OFFICE VISIT (OUTPATIENT)
Facility: LOCATION | Age: 73
End: 2023-10-18
Payer: MEDICARE

## 2023-10-18 VITALS — TEMPERATURE: 97 F

## 2023-10-18 DIAGNOSIS — R22.32 MASS OF ARM, LEFT: Primary | ICD-10-CM

## 2023-10-18 PROCEDURE — 99213 OFFICE O/P EST LOW 20 MIN: CPT | Performed by: SURGERY

## 2023-11-01 DIAGNOSIS — F41.1 GENERALIZED ANXIETY DISORDER: ICD-10-CM

## 2023-11-01 RX ORDER — ESCITALOPRAM OXALATE 10 MG/1
10 TABLET ORAL DAILY
Qty: 90 TABLET | Refills: 1 | Status: SHIPPED | OUTPATIENT
Start: 2023-11-01

## 2023-11-01 NOTE — TELEPHONE ENCOUNTER
OV 09/19  REFILL 05/10 #90 +1RF    Requested Prescriptions     Pending Prescriptions Disp Refills    ESCITALOPRAM 10 MG Oral Tab [Pharmacy Med Name: ESCITALOPRAM 10 MG TABLET] 90 tablet 1     Sig: TAKE 1 TABLET BY MOUTH EVERY DAY     Future Appointments   Date Time Provider Esme Troy   5/16/2024  2:20 PM Misbah Morales MD [facility] ELISSA Saleh

## 2023-11-13 ENCOUNTER — ANESTHESIA (OUTPATIENT)
Dept: SURGERY | Facility: HOSPITAL | Age: 73
End: 2023-11-13
Payer: MEDICARE

## 2023-11-13 ENCOUNTER — ANESTHESIA EVENT (OUTPATIENT)
Dept: SURGERY | Facility: HOSPITAL | Age: 73
End: 2023-11-13
Payer: MEDICARE

## 2023-11-13 ENCOUNTER — HOSPITAL ENCOUNTER (OUTPATIENT)
Facility: HOSPITAL | Age: 73
Setting detail: HOSPITAL OUTPATIENT SURGERY
Discharge: HOME | End: 2023-11-13
Attending: SURGERY | Admitting: SURGERY
Payer: MEDICARE

## 2023-11-13 VITALS
TEMPERATURE: 98 F | OXYGEN SATURATION: 94 % | RESPIRATION RATE: 16 BRPM | DIASTOLIC BLOOD PRESSURE: 66 MMHG | BODY MASS INDEX: 23.07 KG/M2 | HEIGHT: 61 IN | SYSTOLIC BLOOD PRESSURE: 129 MMHG | WEIGHT: 122.19 LBS | HEART RATE: 65 BPM

## 2023-11-13 DIAGNOSIS — R22.32 MASS OF ARM, LEFT: ICD-10-CM

## 2023-11-13 PROCEDURE — 88342 IMHCHEM/IMCYTCHM 1ST ANTB: CPT | Performed by: SURGERY

## 2023-11-13 PROCEDURE — 88304 TISSUE EXAM BY PATHOLOGIST: CPT | Performed by: SURGERY

## 2023-11-13 PROCEDURE — 88341 IMHCHEM/IMCYTCHM EA ADD ANTB: CPT | Performed by: SURGERY

## 2023-11-13 PROCEDURE — 0JBF0ZX EXCISION OF LEFT UPPER ARM SUBCUTANEOUS TISSUE AND FASCIA, OPEN APPROACH, DIAGNOSTIC: ICD-10-PCS | Performed by: SURGERY

## 2023-11-13 RX ORDER — HYDROMORPHONE HYDROCHLORIDE 1 MG/ML
0.4 INJECTION, SOLUTION INTRAMUSCULAR; INTRAVENOUS; SUBCUTANEOUS EVERY 5 MIN PRN
Status: DISCONTINUED | OUTPATIENT
Start: 2023-11-13 | End: 2023-11-13

## 2023-11-13 RX ORDER — HYDROCODONE BITARTRATE AND ACETAMINOPHEN 5; 325 MG/1; MG/1
1 TABLET ORAL EVERY 6 HOURS PRN
Qty: 5 TABLET | Refills: 0 | Status: SHIPPED | OUTPATIENT
Start: 2023-11-13

## 2023-11-13 RX ORDER — HEPARIN SODIUM 5000 [USP'U]/ML
5000 INJECTION, SOLUTION INTRAVENOUS; SUBCUTANEOUS ONCE
Status: COMPLETED | OUTPATIENT
Start: 2023-11-13 | End: 2023-11-13

## 2023-11-13 RX ORDER — CEFAZOLIN SODIUM/WATER 2 G/20 ML
2 SYRINGE (ML) INTRAVENOUS ONCE
Status: COMPLETED | OUTPATIENT
Start: 2023-11-13 | End: 2023-11-13

## 2023-11-13 RX ORDER — LIDOCAINE HYDROCHLORIDE AND EPINEPHRINE 10; 10 MG/ML; UG/ML
INJECTION, SOLUTION INFILTRATION; PERINEURAL AS NEEDED
Status: DISCONTINUED | OUTPATIENT
Start: 2023-11-13 | End: 2023-11-13 | Stop reason: HOSPADM

## 2023-11-13 RX ORDER — PHENYLEPHRINE HCL 10 MG/ML
VIAL (ML) INJECTION AS NEEDED
Status: DISCONTINUED | OUTPATIENT
Start: 2023-11-13 | End: 2023-11-13 | Stop reason: SURG

## 2023-11-13 RX ORDER — HYDROCODONE BITARTRATE AND ACETAMINOPHEN 5; 325 MG/1; MG/1
1 TABLET ORAL ONCE AS NEEDED
Status: DISCONTINUED | OUTPATIENT
Start: 2023-11-13 | End: 2023-11-13

## 2023-11-13 RX ORDER — ASPIRIN 81 MG/1
81 TABLET, CHEWABLE ORAL DAILY
Status: SHIPPED | COMMUNITY
Start: 2023-11-14

## 2023-11-13 RX ORDER — DEXAMETHASONE SODIUM PHOSPHATE 4 MG/ML
VIAL (ML) INJECTION AS NEEDED
Status: DISCONTINUED | OUTPATIENT
Start: 2023-11-13 | End: 2023-11-13 | Stop reason: SURG

## 2023-11-13 RX ORDER — SODIUM CHLORIDE, SODIUM LACTATE, POTASSIUM CHLORIDE, CALCIUM CHLORIDE 600; 310; 30; 20 MG/100ML; MG/100ML; MG/100ML; MG/100ML
INJECTION, SOLUTION INTRAVENOUS CONTINUOUS
Status: DISCONTINUED | OUTPATIENT
Start: 2023-11-13 | End: 2023-11-13

## 2023-11-13 RX ORDER — KETOROLAC TROMETHAMINE 30 MG/ML
INJECTION, SOLUTION INTRAMUSCULAR; INTRAVENOUS AS NEEDED
Status: DISCONTINUED | OUTPATIENT
Start: 2023-11-13 | End: 2023-11-13 | Stop reason: SURG

## 2023-11-13 RX ORDER — METOCLOPRAMIDE HYDROCHLORIDE 5 MG/ML
10 INJECTION INTRAMUSCULAR; INTRAVENOUS EVERY 8 HOURS PRN
Status: DISCONTINUED | OUTPATIENT
Start: 2023-11-13 | End: 2023-11-13

## 2023-11-13 RX ORDER — HYDROMORPHONE HYDROCHLORIDE 1 MG/ML
0.2 INJECTION, SOLUTION INTRAMUSCULAR; INTRAVENOUS; SUBCUTANEOUS EVERY 5 MIN PRN
Status: DISCONTINUED | OUTPATIENT
Start: 2023-11-13 | End: 2023-11-13

## 2023-11-13 RX ORDER — GLYCOPYRROLATE 0.2 MG/ML
INJECTION, SOLUTION INTRAMUSCULAR; INTRAVENOUS AS NEEDED
Status: DISCONTINUED | OUTPATIENT
Start: 2023-11-13 | End: 2023-11-13 | Stop reason: SURG

## 2023-11-13 RX ORDER — ONDANSETRON 2 MG/ML
INJECTION INTRAMUSCULAR; INTRAVENOUS AS NEEDED
Status: DISCONTINUED | OUTPATIENT
Start: 2023-11-13 | End: 2023-11-13 | Stop reason: SURG

## 2023-11-13 RX ORDER — ACETAMINOPHEN 500 MG
1000 TABLET ORAL ONCE AS NEEDED
Status: DISCONTINUED | OUTPATIENT
Start: 2023-11-13 | End: 2023-11-13

## 2023-11-13 RX ORDER — NALOXONE HYDROCHLORIDE 0.4 MG/ML
0.08 INJECTION, SOLUTION INTRAMUSCULAR; INTRAVENOUS; SUBCUTANEOUS AS NEEDED
Status: DISCONTINUED | OUTPATIENT
Start: 2023-11-13 | End: 2023-11-13

## 2023-11-13 RX ORDER — ACETAMINOPHEN 500 MG
1000 TABLET ORAL ONCE
Status: DISCONTINUED | OUTPATIENT
Start: 2023-11-13 | End: 2023-11-13 | Stop reason: HOSPADM

## 2023-11-13 RX ORDER — HYDROMORPHONE HYDROCHLORIDE 1 MG/ML
0.6 INJECTION, SOLUTION INTRAMUSCULAR; INTRAVENOUS; SUBCUTANEOUS EVERY 5 MIN PRN
Status: DISCONTINUED | OUTPATIENT
Start: 2023-11-13 | End: 2023-11-13

## 2023-11-13 RX ORDER — ONDANSETRON 2 MG/ML
4 INJECTION INTRAMUSCULAR; INTRAVENOUS EVERY 6 HOURS PRN
Status: DISCONTINUED | OUTPATIENT
Start: 2023-11-13 | End: 2023-11-13

## 2023-11-13 RX ORDER — HYDROCODONE BITARTRATE AND ACETAMINOPHEN 5; 325 MG/1; MG/1
2 TABLET ORAL ONCE AS NEEDED
Status: DISCONTINUED | OUTPATIENT
Start: 2023-11-13 | End: 2023-11-13

## 2023-11-13 RX ADMIN — PHENYLEPHRINE HCL 150 MCG: 10 MG/ML VIAL (ML) INJECTION at 15:43:00

## 2023-11-13 RX ADMIN — PHENYLEPHRINE HCL 150 MCG: 10 MG/ML VIAL (ML) INJECTION at 15:55:00

## 2023-11-13 RX ADMIN — CEFAZOLIN SODIUM/WATER 2 G: 2 G/20 ML SYRINGE (ML) INTRAVENOUS at 15:39:00

## 2023-11-13 RX ADMIN — SODIUM CHLORIDE, SODIUM LACTATE, POTASSIUM CHLORIDE, CALCIUM CHLORIDE: 600; 310; 30; 20 INJECTION, SOLUTION INTRAVENOUS at 15:18:00

## 2023-11-13 RX ADMIN — PHENYLEPHRINE HCL 150 MCG: 10 MG/ML VIAL (ML) INJECTION at 16:02:00

## 2023-11-13 RX ADMIN — DEXAMETHASONE SODIUM PHOSPHATE 4 MG: 4 MG/ML VIAL (ML) INJECTION at 15:35:00

## 2023-11-13 RX ADMIN — GLYCOPYRROLATE 0.2 MG: 0.2 INJECTION, SOLUTION INTRAMUSCULAR; INTRAVENOUS at 15:25:00

## 2023-11-13 RX ADMIN — ONDANSETRON 4 MG: 2 INJECTION INTRAMUSCULAR; INTRAVENOUS at 15:35:00

## 2023-11-13 RX ADMIN — KETOROLAC TROMETHAMINE 15 MG: 30 INJECTION, SOLUTION INTRAMUSCULAR; INTRAVENOUS at 15:54:00

## 2023-11-13 RX ADMIN — PHENYLEPHRINE HCL 200 MCG: 10 MG/ML VIAL (ML) INJECTION at 15:48:00

## 2023-11-13 RX ADMIN — SODIUM CHLORIDE, SODIUM LACTATE, POTASSIUM CHLORIDE, CALCIUM CHLORIDE: 600; 310; 30; 20 INJECTION, SOLUTION INTRAVENOUS at 16:19:00

## 2023-11-13 NOTE — BRIEF OP NOTE
Pre-Operative Diagnosis: Mass of arm, left [R22.32]     Post-Operative Diagnosis: Mass of arm, left [R22.32]      Procedure Performed:   EXCISION MASS OF LEFT ARM    Surgeon(s) and Role:     Letty Vidal DO - Primary    Assistant(s):        Surgical Findings:      Specimen:      Estimated Blood Loss: Blood Output: 2 mL (11/13/2023  4:07 PM)      Dictation Number:      Rebecca Palomino DO  11/13/2023  4:10 PM

## 2023-11-13 NOTE — ANESTHESIA POSTPROCEDURE EVALUATION
[address] Patient Status:  Hospital Outpatient Surgery   Age/Gender 74year old female MRN [id number]   [facility] SURGERY Attending Debbie Galvan, [facility] Day # 0 PCP Rj Vargas MD       Anesthesia Post-op Note    EXCISION MASS OF LEFT ARM    Procedure Summary       Date: 11/13/23 Room / Location: [facility] MAIN OR 03 / [facility] OR    Anesthesia Start: 8748 Anesthesia Stop: 2276    Procedure: EXCISION MASS OF LEFT ARM (Left: Upper Arm) Diagnosis:       Mass of arm, left      (Mass of arm, left [R22.32])    Surgeons: Dbebie Galvan DO Anesthesiologist: Abraham Merritt MD    Anesthesia Type: MAC ASA Status: 2            Anesthesia Type: MAC    Vitals Value Taken Time   /70 11/13/23 1619   Temp 97 11/13/23 1619   Pulse 85 11/13/23 1619   Resp 14 11/13/23 1619   SpO2 97 11/13/23 1619       Patient Location: Same Day Surgery    Anesthesia Type: MAC    Airway Patency: patent    Postop Pain Control: adequate    Mental Status: preanesthetic baseline    Nausea/Vomiting: none    Cardiopulmonary/Hydration status: stable euvolemic    Complications: no apparent anesthesia related complications    Postop vital signs: stable    Dental Exam: Unchanged from Preop    Patient to be discharged from PACU when criteria met.

## 2023-11-13 NOTE — DISCHARGE INSTRUCTIONS
Home Care Instructions  Minor Surgery  Dr. La Lower    MEDICATIONS  For post-operative pain control the mediations are usually over the counter preparations such as Advil and Tylenol. For severe pain the patient may overlap Advil with Tylenol. The patient can do this by taking two Tylenol, then three hours later taking two Advil, then three hours later taking Tylenol again. Next Advil dose on or after 10pm. For breakthrough pain, the paint may use Norco, a narcotic medication. These are best taken by starting with one tablet and repeating every four to six hours as needed. If the patient feels they do not need the narcotics they should not take them. If a minor supplement is necessary in addition to the narcotics do not overlap with Tylenol (any product with acetaminophen) as both Norco contains tylenol. Please supplement with ibuprofen. Please ask your surgeon before resuming blood thinners such as aspirin, Plavix or Coumadin. All other home medications may be resumed as scheduled. Generally aspirin is avoided for ten days. DIET  The patient may resume a general diet immediately. There should be no alcohol consumption in the immediate recover time period. If the patient was sedated for the procedure the first meal should be light. WOUND CARE  The top dressing may be removed the day after surgery. This includes the gauze, tape and band-aids if they are present. Wash incision daily with soap and water and place overlying clean dry gauze. The doctor or nurse will remove any visible sutures, or staples, in the office in 10 days to 2 weeks call for appointment    ACTIVITY  The patient may ride in a car but should not drive the car for at least overnight if sedation was used. If no sedation was used the patient may drive immediately. The patient may return to work the next day. Avoid any activity that could lead to the wound getting elbowed or bumped.   Avoid bending, pushing, pulling and lifting anything heavier than 25 pounds for two weeks. Patients should seek further activity limits at the time of their appointment. No extreme sports for two weeks. APPOINTMENT  Please call our office today for an appointment within five to ten days of discharge Any fever greater than 100.5, chills, nausea, vomiting, or severe diarrhea please call our office. If the wound turns red, hot, swollen, becomes increasingly painful, or drains pus call us immediately at [phone]. For life threatening emergencies call 911. For non-emergent care please call our office after 8:30 a.m. Monday through Friday. The number listed above is our office number. Our phone automatically switches to out answering service if we are not there. Please do not use the emergency room for non-urgent care. Thank you for entrusting us with your care.   [facility]--General Surgery

## 2023-11-13 NOTE — BRIEF OP NOTE
Pre-Operative Diagnosis: Mass of arm, left [R22.32]     Post-Operative Diagnosis: Mass of arm, left [R22.32]      Procedure Performed:   EXCISION MASS OF LEFT ARM    Surgeon(s) and Role:     Onalee Lesch, DO - Primary    Assistant(s):        Surgical Findings:      Specimen:      Estimated Blood Loss: Blood Output: 2 mL (11/13/2023  4:07 PM)      Dictation Number:      Chandan Mora DO  11/13/2023  4:20 PM

## 2023-11-14 NOTE — OPERATIVE REPORT
[facility]    PATIENT'S NAME: Mariana REECE   ATTENDING PHYSICIAN: Ernesto Tavarez D.O.   OPERATING PHYSICIAN: Ernesto Tavarez D.O.   PATIENT ACCOUNT#:   [de-identified]    LOCATION:  [address] [place] [address] [address] #:   [id number]       YOB: 1950  ADMISSION DATE:       11/13/2023      OPERATION DATE:  11/13/2023    OPERATIVE REPORT      PREOPERATIVE DIAGNOSIS:  Exophytic soft tissue mass, left upper arm. POSTOPERATIVE DIAGNOSIS:  Exophytic soft tissue mass, left upper arm. PROCEDURE:  Excision of exophytic soft tissue mass, with a 3.1 cm width excision and an 8.5 cm length excision, with intermediate layered closure. ASSISTANT:  MORGAN Marshall.    ANESTHESIA:  MAC. OPERATIVE TECHNIQUE:  An informed consent was previously obtained. The patient was taken to the operative suite and placed in the supine position. IV sedation administered by the anesthesia department. The left arm was prepped and draped in usual sterile fashion. A bi-elliptical skin incision was made directly over the exophytic soft tissue mass. The subcutaneous tissue was excised using handheld electrocautery and the lesion was excised intact for pathological evaluation. Copious irrigation was carried out within the wound. Hemostasis secured using handheld electrocautery. Subcutaneous tissues were approximated using interrupted 3-0 Vicryl suture, and skin edges approximated using 3-0 nylon in a running locking fashion. Overlying 4 x 4 gauze and Kerlix wrap were applied to the upper arm. It should be mentioned that the area was infiltrated with 1% Xylocaine with epinephrine. The patient was transported from the operative suite to day surgery in stable condition.     Dictated By Ernesto Tavarez D.O.  d: 11/13/2023 16:18:09  t: 11/13/2023 19:30:58  Job [id number]  /    cc: Jose Angel Nugent D.O.

## 2023-11-17 ENCOUNTER — MOBILE ENCOUNTER (OUTPATIENT)
Facility: LOCATION | Age: 73
End: 2023-11-17

## 2023-11-17 PROCEDURE — 99024 POSTOP FOLLOW-UP VISIT: CPT | Performed by: SURGERY

## 2023-11-17 NOTE — PROGRESS NOTES
Called path results regarding melanoma to patient will see her in the office this week. Will require further excision.

## 2023-11-21 ENCOUNTER — OFFICE VISIT (OUTPATIENT)
Facility: LOCATION | Age: 73
End: 2023-11-21
Payer: MEDICARE

## 2023-11-21 DIAGNOSIS — C43.59 MALIGNANT MELANOMA OF TORSO EXCLUDING BREAST (HCC): Primary | ICD-10-CM

## 2023-11-21 PROCEDURE — 99024 POSTOP FOLLOW-UP VISIT: CPT | Performed by: SURGERY

## 2023-11-21 NOTE — PROGRESS NOTES
[facility]  Progress Note    [facility] Patient Status:  No patient class for patient encounter    1950 MRN [id number]   Location [facility], [address] Attending No att. providers found   [facility] Day # 0 PCP Jose Vargas MD     Subjective:  Patient is status post excision of a malignant melanoma of the left upper arm. Patient denies complaints    Objective/Physical Exam:    [unfilled]    General: Alert, orientated x3. Cooperative. No apparent distress. Vital Signs: There were no vitals taken for this visit. HEENT: Exam is unremarkable. Without scleral icterus. Mucous membranes are moist. Oropharynx is clear. Neck: No tenderness to palpitation. No JVD. Supple. Lungs: Clear to auscultation bilaterally. Cardiac: Regular rate and rhythm. No murmur. Abdomen:  Soft, non-distended, non-tender, with no rebound or guarding. No peritoneal signs. No ascites. Liver is within normal limits. Spleen is not palpable   Extremities:  No lower extremity edema noted. Without clubbing or cyanosis. Healing incision left upper arm melanoma site. Labs:             Assessment/Plan:  Patient Active Problem List   Diagnosis    Coronary atherosclerosis    Anxiety state    Pure hypercholesterolemia    Hypertension    Pulmonary fibrosis (HCC)    Abdominal aortic ectasia (HCC)    Reactive depression    Benign essential tremor    Flat epithelial atypia (FEA) of left breast    Ductal hyperplasia, atypical, breast    Exudative age-related macular degeneration of left eye with active choroidal neovascularization (HCC)    Status post bilateral mastectomy    Invasive lobular carcinoma of breast, stage 2, right (HCC)    Osteopenia, senile       Impression: 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 Patient will require excision with wide margins and sentinel lymph node biopsy Pillard will refer to surgical oncology. Sutures removed Steri-Strips applied.   I spent 33  minutes face to face with the patient. More than 50% of that time was spent counseling the patient and/or on coordination of care. The diagnosis, prognosis, and general treatment was explained to the patient and the family.         DO ELISSA Ring General Surgery  11/21/2023  5:19 PM

## 2023-12-03 PROBLEM — C43.62 MALIGNANT MELANOMA OF ARM, LEFT (HCC): Status: ACTIVE | Noted: 2023-12-03

## 2023-12-04 ENCOUNTER — OFFICE VISIT (OUTPATIENT)
Dept: SURGERY | Facility: CLINIC | Age: 73
End: 2023-12-04
Payer: MEDICARE

## 2023-12-04 VITALS
DIASTOLIC BLOOD PRESSURE: 81 MMHG | SYSTOLIC BLOOD PRESSURE: 166 MMHG | WEIGHT: 122 LBS | BODY MASS INDEX: 22.45 KG/M2 | OXYGEN SATURATION: 94 % | HEART RATE: 80 BPM | HEIGHT: 62 IN

## 2023-12-04 DIAGNOSIS — C43.62 MALIGNANT MELANOMA OF ARM, LEFT (HCC): Primary | ICD-10-CM

## 2023-12-04 DIAGNOSIS — C50.911: ICD-10-CM

## 2023-12-04 PROCEDURE — 99205 OFFICE O/P NEW HI 60 MIN: CPT | Performed by: SURGERY

## 2023-12-06 ENCOUNTER — TELEPHONE (OUTPATIENT)
Dept: SURGERY | Facility: CLINIC | Age: 73
End: 2023-12-06

## 2023-12-06 NOTE — TELEPHONE ENCOUNTER
Called patient to update on tumor board  Informed that recommendations were to proceed with wide excision with sentinel lymph node biopsy  Patient expressed understanding  Informed her to continue with the PET scan on 12/8   Informed we will find a date for surgery once she is seen by Dr Noreen Zhao in 12/13  Patient expressed after the holidays works best for her.     WHITNEY Hernandez

## 2023-12-08 ENCOUNTER — HOSPITAL ENCOUNTER (OUTPATIENT)
Dept: NUCLEAR MEDICINE | Facility: HOSPITAL | Age: 73
Discharge: HOME | End: 2023-12-08
Payer: MEDICARE

## 2023-12-08 DIAGNOSIS — C43.62 MALIGNANT MELANOMA OF ARM, LEFT (HCC): ICD-10-CM

## 2023-12-08 LAB — GLUCOSE BLD-MCNC: 84 MG/DL (ref 70–99)

## 2023-12-08 PROCEDURE — 78816 PET IMAGE W/CT FULL BODY: CPT

## 2023-12-08 PROCEDURE — 82962 GLUCOSE BLOOD TEST: CPT

## 2023-12-11 ENCOUNTER — TELEPHONE (OUTPATIENT)
Dept: SURGERY | Facility: CLINIC | Age: 73
End: 2023-12-11

## 2023-12-11 NOTE — TELEPHONE ENCOUNTER
Reviewed PET scan with patient. Informed to follow with Dr. Jennifer Miller on 12/20 so we can establish a surgery date.      WHITNEY Santiago

## 2023-12-13 ENCOUNTER — APPOINTMENT (OUTPATIENT)
Dept: CT IMAGING | Facility: HOSPITAL | Age: 73
End: 2023-12-13
Attending: EMERGENCY MEDICINE
Payer: MEDICARE

## 2023-12-13 ENCOUNTER — HOSPITAL ENCOUNTER (INPATIENT)
Facility: HOSPITAL | Age: 73
LOS: 1 days | Discharge: HOME | End: 2023-12-15
Attending: EMERGENCY MEDICINE | Admitting: STUDENT IN AN ORGANIZED HEALTH CARE EDUCATION/TRAINING PROGRAM
Payer: MEDICARE

## 2023-12-13 ENCOUNTER — ANESTHESIA EVENT (OUTPATIENT)
Dept: SURGERY | Facility: HOSPITAL | Age: 73
End: 2023-12-13
Payer: MEDICARE

## 2023-12-13 ENCOUNTER — ANESTHESIA (OUTPATIENT)
Dept: SURGERY | Facility: HOSPITAL | Age: 73
End: 2023-12-13
Payer: MEDICARE

## 2023-12-13 DIAGNOSIS — K35.30 ACUTE APPENDICITIS WITH LOCALIZED PERITONITIS, WITHOUT PERFORATION, ABSCESS, OR GANGRENE: Primary | ICD-10-CM

## 2023-12-13 DIAGNOSIS — K37 APPENDICITIS: ICD-10-CM

## 2023-12-13 LAB
ALBUMIN SERPL-MCNC: 3.5 G/DL (ref 3.4–5)
ALBUMIN/GLOB SERPL: 0.8 {RATIO} (ref 1–2)
ALP LIVER SERPL-CCNC: 59 U/L
ALT SERPL-CCNC: 16 U/L
ANION GAP SERPL CALC-SCNC: 5 MMOL/L (ref 0–18)
AST SERPL-CCNC: 26 U/L (ref 15–37)
BASOPHILS # BLD AUTO: 0.04 X10(3) UL (ref 0–0.2)
BASOPHILS NFR BLD AUTO: 0.5 %
BILIRUB SERPL-MCNC: 0.4 MG/DL (ref 0.1–2)
BILIRUB UR QL STRIP.AUTO: NEGATIVE
BUN BLD-MCNC: 10 MG/DL (ref 9–23)
CALCIUM BLD-MCNC: 9.2 MG/DL (ref 8.5–10.1)
CHLORIDE SERPL-SCNC: 110 MMOL/L (ref 98–112)
CLARITY UR REFRACT.AUTO: CLEAR
CO2 SERPL-SCNC: 28 MMOL/L (ref 21–32)
COLOR UR AUTO: COLORLESS
CREAT BLD-MCNC: 0.95 MG/DL
EGFRCR SERPLBLD CKD-EPI 2021: 63 ML/MIN/1.73M2 (ref 60–?)
EOSINOPHIL # BLD AUTO: 0.1 X10(3) UL (ref 0–0.7)
EOSINOPHIL NFR BLD AUTO: 1.2 %
ERYTHROCYTE [DISTWIDTH] IN BLOOD BY AUTOMATED COUNT: 14.6 %
GLOBULIN PLAS-MCNC: 4.3 G/DL (ref 2.8–4.4)
GLUCOSE BLD-MCNC: 99 MG/DL (ref 70–99)
GLUCOSE UR STRIP.AUTO-MCNC: NORMAL MG/DL
HCT VFR BLD AUTO: 42 %
HGB BLD-MCNC: 13.4 G/DL
IMM GRANULOCYTES # BLD AUTO: 0.04 X10(3) UL (ref 0–1)
IMM GRANULOCYTES NFR BLD: 0.5 %
KETONES UR STRIP.AUTO-MCNC: NEGATIVE MG/DL
LEUKOCYTE ESTERASE UR QL STRIP.AUTO: NEGATIVE
LIPASE SERPL-CCNC: 21 U/L (ref 13–75)
LYMPHOCYTES # BLD AUTO: 1.29 X10(3) UL (ref 1–4)
LYMPHOCYTES NFR BLD AUTO: 15.1 %
MCH RBC QN AUTO: 27.3 PG (ref 26–34)
MCHC RBC AUTO-ENTMCNC: 31.9 G/DL (ref 31–37)
MCV RBC AUTO: 85.7 FL
MONOCYTES # BLD AUTO: 0.73 X10(3) UL (ref 0.1–1)
MONOCYTES NFR BLD AUTO: 8.5 %
NEUTROPHILS # BLD AUTO: 6.35 X10 (3) UL (ref 1.5–7.7)
NEUTROPHILS # BLD AUTO: 6.35 X10(3) UL (ref 1.5–7.7)
NEUTROPHILS NFR BLD AUTO: 74.2 %
NITRITE UR QL STRIP.AUTO: NEGATIVE
OSMOLALITY SERPL CALC.SUM OF ELEC: 295 MOSM/KG (ref 275–295)
PH UR STRIP.AUTO: 7 [PH] (ref 5–8)
PLATELET # BLD AUTO: 246 10(3)UL (ref 150–450)
POTASSIUM SERPL-SCNC: 4.3 MMOL/L (ref 3.5–5.1)
PROT SERPL-MCNC: 7.8 G/DL (ref 6.4–8.2)
PROT UR STRIP.AUTO-MCNC: NEGATIVE MG/DL
RBC # BLD AUTO: 4.9 X10(6)UL
RBC UR QL AUTO: NEGATIVE
SODIUM SERPL-SCNC: 143 MMOL/L (ref 136–145)
SP GR UR STRIP.AUTO: 1.01 (ref 1–1.03)
UROBILINOGEN UR STRIP.AUTO-MCNC: NORMAL MG/DL
WBC # BLD AUTO: 8.6 X10(3) UL (ref 4–11)

## 2023-12-13 PROCEDURE — 99222 1ST HOSP IP/OBS MODERATE 55: CPT | Performed by: STUDENT IN AN ORGANIZED HEALTH CARE EDUCATION/TRAINING PROGRAM

## 2023-12-13 PROCEDURE — 74177 CT ABD & PELVIS W/CONTRAST: CPT | Performed by: EMERGENCY MEDICINE

## 2023-12-13 RX ORDER — MORPHINE SULFATE 4 MG/ML
4 INJECTION, SOLUTION INTRAMUSCULAR; INTRAVENOUS ONCE
Status: COMPLETED | OUTPATIENT
Start: 2023-12-13 | End: 2023-12-13

## 2023-12-13 RX ORDER — ONDANSETRON 2 MG/ML
4 INJECTION INTRAMUSCULAR; INTRAVENOUS ONCE
Status: COMPLETED | OUTPATIENT
Start: 2023-12-13 | End: 2023-12-13

## 2023-12-13 RX ORDER — CEFOXITIN 1 G/1
INJECTION, POWDER, FOR SOLUTION INTRAVENOUS
Status: DISPENSED
Start: 2023-12-13 | End: 2023-12-14

## 2023-12-13 RX ORDER — SODIUM CHLORIDE 9 MG/ML
INJECTION, SOLUTION INTRAVENOUS CONTINUOUS
Status: DISCONTINUED | OUTPATIENT
Start: 2023-12-13 | End: 2023-12-15

## 2023-12-13 NOTE — ED INITIAL ASSESSMENT (HPI)
Patient here for RLQ since 0600 today. Pain is constant with jabbing pains that come and go. Pt denies n/v/d or fevers. Tender to palpation, no rebound tenderness.

## 2023-12-14 PROCEDURE — 0DTJ4ZZ RESECTION OF APPENDIX, PERCUTANEOUS ENDOSCOPIC APPROACH: ICD-10-PCS | Performed by: STUDENT IN AN ORGANIZED HEALTH CARE EDUCATION/TRAINING PROGRAM

## 2023-12-14 PROCEDURE — 44970 LAPAROSCOPY APPENDECTOMY: CPT | Performed by: STUDENT IN AN ORGANIZED HEALTH CARE EDUCATION/TRAINING PROGRAM

## 2023-12-14 RX ORDER — PANTOPRAZOLE SODIUM 40 MG/1
40 TABLET, DELAYED RELEASE ORAL
Status: DISCONTINUED | OUTPATIENT
Start: 2023-12-14 | End: 2023-12-15

## 2023-12-14 RX ORDER — ROCURONIUM BROMIDE 10 MG/ML
INJECTION, SOLUTION INTRAVENOUS AS NEEDED
Status: DISCONTINUED | OUTPATIENT
Start: 2023-12-14 | End: 2023-12-14 | Stop reason: SURG

## 2023-12-14 RX ORDER — SODIUM CHLORIDE 9 MG/ML
INJECTION, SOLUTION INTRAVENOUS CONTINUOUS
Status: DISCONTINUED | OUTPATIENT
Start: 2023-12-14 | End: 2023-12-15

## 2023-12-14 RX ORDER — ONDANSETRON 2 MG/ML
4 INJECTION INTRAMUSCULAR; INTRAVENOUS EVERY 6 HOURS PRN
Status: DISCONTINUED | OUTPATIENT
Start: 2023-12-14 | End: 2023-12-14 | Stop reason: HOSPADM

## 2023-12-14 RX ORDER — CALCIUM CARBONATE 500 MG/1
500 TABLET, CHEWABLE ORAL EVERY 6 HOURS PRN
Status: DISCONTINUED | OUTPATIENT
Start: 2023-12-14 | End: 2023-12-15

## 2023-12-14 RX ORDER — HYDROCODONE BITARTRATE AND ACETAMINOPHEN 5; 325 MG/1; MG/1
1 TABLET ORAL EVERY 4 HOURS PRN
Status: DISCONTINUED | OUTPATIENT
Start: 2023-12-14 | End: 2023-12-15

## 2023-12-14 RX ORDER — PROCHLORPERAZINE EDISYLATE 5 MG/ML
10 INJECTION INTRAMUSCULAR; INTRAVENOUS EVERY 6 HOURS PRN
Status: DISCONTINUED | OUTPATIENT
Start: 2023-12-14 | End: 2023-12-15

## 2023-12-14 RX ORDER — DEXAMETHASONE SODIUM PHOSPHATE 4 MG/ML
VIAL (ML) INJECTION AS NEEDED
Status: DISCONTINUED | OUTPATIENT
Start: 2023-12-14 | End: 2023-12-14 | Stop reason: SURG

## 2023-12-14 RX ORDER — METOCLOPRAMIDE HYDROCHLORIDE 5 MG/ML
10 INJECTION INTRAMUSCULAR; INTRAVENOUS EVERY 8 HOURS PRN
Status: DISCONTINUED | OUTPATIENT
Start: 2023-12-14 | End: 2023-12-14 | Stop reason: HOSPADM

## 2023-12-14 RX ORDER — PRIMIDONE 50 MG/1
50 TABLET ORAL 2 TIMES DAILY
Status: DISCONTINUED | OUTPATIENT
Start: 2023-12-14 | End: 2023-12-15

## 2023-12-14 RX ORDER — HYDROMORPHONE HYDROCHLORIDE 1 MG/ML
0.6 INJECTION, SOLUTION INTRAMUSCULAR; INTRAVENOUS; SUBCUTANEOUS EVERY 5 MIN PRN
Status: DISCONTINUED | OUTPATIENT
Start: 2023-12-14 | End: 2023-12-14 | Stop reason: HOSPADM

## 2023-12-14 RX ORDER — HYDROCODONE BITARTRATE AND ACETAMINOPHEN 5; 325 MG/1; MG/1
2 TABLET ORAL ONCE AS NEEDED
Status: DISCONTINUED | OUTPATIENT
Start: 2023-12-14 | End: 2023-12-14 | Stop reason: HOSPADM

## 2023-12-14 RX ORDER — NALOXONE HYDROCHLORIDE 0.4 MG/ML
0.08 INJECTION, SOLUTION INTRAMUSCULAR; INTRAVENOUS; SUBCUTANEOUS AS NEEDED
Status: DISCONTINUED | OUTPATIENT
Start: 2023-12-14 | End: 2023-12-14 | Stop reason: HOSPADM

## 2023-12-14 RX ORDER — LIDOCAINE HYDROCHLORIDE 10 MG/ML
INJECTION, SOLUTION EPIDURAL; INFILTRATION; INTRACAUDAL; PERINEURAL AS NEEDED
Status: DISCONTINUED | OUTPATIENT
Start: 2023-12-14 | End: 2023-12-14 | Stop reason: SURG

## 2023-12-14 RX ORDER — ONDANSETRON 2 MG/ML
INJECTION INTRAMUSCULAR; INTRAVENOUS AS NEEDED
Status: DISCONTINUED | OUTPATIENT
Start: 2023-12-14 | End: 2023-12-14 | Stop reason: SURG

## 2023-12-14 RX ORDER — ANASTROZOLE 1 MG/1
1 TABLET ORAL NIGHTLY
Status: DISCONTINUED | OUTPATIENT
Start: 2023-12-14 | End: 2023-12-15

## 2023-12-14 RX ORDER — HEPARIN SODIUM 5000 [USP'U]/ML
5000 INJECTION, SOLUTION INTRAVENOUS; SUBCUTANEOUS EVERY 8 HOURS SCHEDULED
Status: DISCONTINUED | OUTPATIENT
Start: 2023-12-14 | End: 2023-12-15

## 2023-12-14 RX ORDER — HYDROMORPHONE HYDROCHLORIDE 1 MG/ML
0.4 INJECTION, SOLUTION INTRAMUSCULAR; INTRAVENOUS; SUBCUTANEOUS EVERY 5 MIN PRN
Status: DISCONTINUED | OUTPATIENT
Start: 2023-12-14 | End: 2023-12-14 | Stop reason: HOSPADM

## 2023-12-14 RX ORDER — BUPIVACAINE HYDROCHLORIDE 2.5 MG/ML
INJECTION, SOLUTION EPIDURAL; INFILTRATION; INTRACAUDAL AS NEEDED
Status: DISCONTINUED | OUTPATIENT
Start: 2023-12-14 | End: 2023-12-14 | Stop reason: HOSPADM

## 2023-12-14 RX ORDER — HYDROCODONE BITARTRATE AND ACETAMINOPHEN 5; 325 MG/1; MG/1
1 TABLET ORAL ONCE AS NEEDED
Status: DISCONTINUED | OUTPATIENT
Start: 2023-12-14 | End: 2023-12-14 | Stop reason: HOSPADM

## 2023-12-14 RX ORDER — ONDANSETRON 2 MG/ML
4 INJECTION INTRAMUSCULAR; INTRAVENOUS EVERY 6 HOURS PRN
Status: DISCONTINUED | OUTPATIENT
Start: 2023-12-14 | End: 2023-12-15

## 2023-12-14 RX ORDER — HYDROMORPHONE HYDROCHLORIDE 1 MG/ML
0.2 INJECTION, SOLUTION INTRAMUSCULAR; INTRAVENOUS; SUBCUTANEOUS EVERY 5 MIN PRN
Status: DISCONTINUED | OUTPATIENT
Start: 2023-12-14 | End: 2023-12-14 | Stop reason: HOSPADM

## 2023-12-14 RX ORDER — ACETAMINOPHEN 500 MG
1000 TABLET ORAL EVERY 6 HOURS
Qty: 24 TABLET | Refills: 0 | Status: DISCONTINUED | OUTPATIENT
Start: 2023-12-14 | End: 2023-12-15

## 2023-12-14 RX ORDER — PROCHLORPERAZINE MALEATE 5 MG/1
5 TABLET ORAL EVERY 6 HOURS PRN
Status: DISCONTINUED | OUTPATIENT
Start: 2023-12-14 | End: 2023-12-14

## 2023-12-14 RX ORDER — OXYCODONE HYDROCHLORIDE 5 MG/1
5 TABLET ORAL EVERY 6 HOURS PRN
Status: DISCONTINUED | OUTPATIENT
Start: 2023-12-14 | End: 2023-12-14

## 2023-12-14 RX ORDER — ATORVASTATIN CALCIUM 10 MG/1
10 TABLET, FILM COATED ORAL NIGHTLY
Status: DISCONTINUED | OUTPATIENT
Start: 2023-12-14 | End: 2023-12-15

## 2023-12-14 RX ORDER — ACETAMINOPHEN 500 MG
1000 TABLET ORAL EVERY 6 HOURS SCHEDULED
Qty: 24 TABLET | Refills: 0 | Status: DISCONTINUED | OUTPATIENT
Start: 2023-12-14 | End: 2023-12-14

## 2023-12-14 RX ORDER — METRONIDAZOLE 500 MG/100ML
INJECTION, SOLUTION INTRAVENOUS AS NEEDED
Status: DISCONTINUED | OUTPATIENT
Start: 2023-12-14 | End: 2023-12-14 | Stop reason: SURG

## 2023-12-14 RX ORDER — HYDROMORPHONE HYDROCHLORIDE 1 MG/ML
0.2 INJECTION, SOLUTION INTRAMUSCULAR; INTRAVENOUS; SUBCUTANEOUS EVERY 2 HOUR PRN
Status: DISCONTINUED | OUTPATIENT
Start: 2023-12-14 | End: 2023-12-15

## 2023-12-14 RX ORDER — CEFAZOLIN SODIUM 1 G/3ML
INJECTION, POWDER, FOR SOLUTION INTRAMUSCULAR; INTRAVENOUS AS NEEDED
Status: DISCONTINUED | OUTPATIENT
Start: 2023-12-14 | End: 2023-12-14 | Stop reason: SURG

## 2023-12-14 RX ORDER — ACETAMINOPHEN 500 MG
1000 TABLET ORAL ONCE AS NEEDED
Status: DISCONTINUED | OUTPATIENT
Start: 2023-12-14 | End: 2023-12-14 | Stop reason: HOSPADM

## 2023-12-14 RX ORDER — SODIUM CHLORIDE, SODIUM LACTATE, POTASSIUM CHLORIDE, CALCIUM CHLORIDE 600; 310; 30; 20 MG/100ML; MG/100ML; MG/100ML; MG/100ML
INJECTION, SOLUTION INTRAVENOUS CONTINUOUS
Status: DISCONTINUED | OUTPATIENT
Start: 2023-12-14 | End: 2023-12-14 | Stop reason: HOSPADM

## 2023-12-14 RX ADMIN — ROCURONIUM BROMIDE 30 MG: 10 INJECTION, SOLUTION INTRAVENOUS at 00:37:00

## 2023-12-14 RX ADMIN — ONDANSETRON 4 MG: 2 INJECTION INTRAMUSCULAR; INTRAVENOUS at 00:43:00

## 2023-12-14 RX ADMIN — METRONIDAZOLE 500 MG: 500 INJECTION, SOLUTION INTRAVENOUS at 00:42:00

## 2023-12-14 RX ADMIN — LIDOCAINE HYDROCHLORIDE 50 MG: 10 INJECTION, SOLUTION EPIDURAL; INFILTRATION; INTRACAUDAL; PERINEURAL at 00:37:00

## 2023-12-14 RX ADMIN — SODIUM CHLORIDE: 9 INJECTION, SOLUTION INTRAVENOUS at 01:33:00

## 2023-12-14 RX ADMIN — CEFAZOLIN SODIUM 2 G: 1 INJECTION, POWDER, FOR SOLUTION INTRAMUSCULAR; INTRAVENOUS at 00:53:00

## 2023-12-14 RX ADMIN — DEXAMETHASONE SODIUM PHOSPHATE 4 MG: 4 MG/ML VIAL (ML) INJECTION at 00:43:00

## 2023-12-14 NOTE — ANESTHESIA POSTPROCEDURE EVALUATION
[address] Patient Status:  Emergency   Age/Gender 74year old female MRN [id number]   Location [facility] Attending Julieta Xiong MD   [facility] Day # 0 PCP Markos Todd MD       Anesthesia Post-op Note    LAPAROSCOPIC APPENDECTOMY    Procedure Summary       Date: 12/14/23 Room / Location: [facility] MAIN OR 08 / [facility] MAIN OR    Anesthesia Start: [address] Anesthesia Stop: 4239    Procedure: LAPAROSCOPIC APPENDECTOMY (Abdomen) Diagnosis:       Appendicitis      (Appendicitis Sophy Plump)    Surgeons: Julieta Xiong MD Anesthesiologist: Pedro Carr DO    Anesthesia Type: general ASA Status: 2 - Emergent            Anesthesia Type: general    Vitals Value Taken Time   /74 12/14/23 0133   Temp 98.2 12/14/23 0133   Pulse 76 12/14/23 0133   Resp 14 12/14/23 0133   SpO2 95 12/14/23 0133       Patient Location: PACU    Anesthesia Type: general    Airway Patency: patent and extubated    Postop Pain Control: adequate    Mental Status: preanesthetic baseline    Nausea/Vomiting: none    Cardiopulmonary/Hydration status: stable euvolemic    Complications: no apparent anesthesia related complications    Postop vital signs: stable    Dental Exam: Unchanged from Preop    Patient to be discharged from PACU when criteria met.

## 2023-12-14 NOTE — ED QUICK NOTES
Rounding Completed    Plan of Care reviewed. Waiting for OR to take patient. Spoke with OR charge who is putting in for transport. Elimination needs assessed. Patient ambulatory to bathroom with steady gait. Patient has been NPO since yesterday. Provided warm blankets. Bed is locked and in lowest position. Call light within reach.

## 2023-12-14 NOTE — PROGRESS NOTES
Pt vomitted again. Dr. Shaista Markham notified. Zofran order given, RN to try norco instead of oxy.   DC delayed pending pt tolerance of food and norco.

## 2023-12-14 NOTE — ANESTHESIA PROCEDURE NOTES
Airway  Date/Time: 12/14/2023 12:39 AM  Urgency: elective    Airway not difficult    General Information and Staff    Patient location during procedure: OR  Anesthesiologist: Shayan Miller DO  Performed: anesthesiologist   Performed by: Shayan Miller DO  Authorized by: Shayan Miller DO      Indications and Patient Condition  Indications for airway management: anesthesia  Sedation level: deep  Preoxygenated: yes  Patient position: sniffing  Mask difficulty assessment: 1 - vent by mask    Final Airway Details  Final airway type: endotracheal airway      Successful airway: ETT  Cuffed: yes   Successful intubation technique: direct laryngoscopy  Endotracheal tube insertion site: oral  Blade: Scarlet  Blade size: #3  ETT size (mm): 7.0    Cormack-Lehane Classification: grade I - full view of glottis  Placement verified by: capnometry   Measured from: lips  ETT to lips (cm): 20  Number of attempts at approach: 1  Number of other approaches attempted: 0

## 2023-12-14 NOTE — OPERATIVE REPORT
[facility]  Operative Note    Abi Maloney Location: OR   [facility] [id number] MRN [id number]    1950 Age 74year old   Admission Date 2023 Operation Date 2023   Attending Physician Rohith Kelley MD Operating Physician Tyra Cain MD   PCP Paula Vagras MD        Preoperative Diagnosis: Appendicitis [K37]  Postoperative Diagnosis: Same as pre-op diagnosis. Procedure: Laparoscopic appendectomy,     Anesthesia: General    Anesthesiologist: Anesthesiologist.: Gera Walters DO  Primary Surgeon: Tyra Cain MD  Assistant: none   Specimen: Appendix  Estimated Blood Loss: 5 mL  Complications: None  Drains: None  Operative Findings: Acutely inflamed none perforated appendix  Indication for Surgery:  Abi Maloney is a 74year old female who presents with 1 day of generalized abdominal pain that is now localized to the right lower quadrant. The patient is tender to palpation over McBurney's point. Diagnostic imaging is consistent with acute appendicitis without perforation or abscess. Description of Procedure: The patient was transported to the operating room and transferred to the OR table and placed in supine position. General endotracheal anesthesia was administered. A Milner catheter was placed. The abdomen was prepped and draped in sterile fashion. Pre-operative antibiotics were given. A time-out was performed. A stab incision was made in the left upper quadrant at Hall's point. A Veress needle was passed into the peritoneal cavity and pneumoperitoneum was established to a pressure of 15 mmHg. A supraumbilical incision was made. A 5-mm trocar was placed through the incision and a laparoscope was inserted into the abdomen. Initial diagnostic survey revealed no injury secondary to our entry nor unexpected intraabdominal abnormality. Under direct visualization a 5-mm trocar was placed in the suprapubic area and a 12-mm trocar was placed in the left lower quadrant.  Patient is positioned in trendelenburg and right side up. Attention was turned to the right lower quadrant. The intestines and omentum were gently manipulated to expose the appendix. The appendix was grasped, elevated and retracted. A combination of  blunt dissection and cautery were used to separate the appendix from its adhesions to the pelvic sidewall. The LigaSure device was used to divide the mesoappendix and clear the base of the appendix. An EndoGIA stapler with a blue cartridge was used to divide the base of the appendix. The appendix was placed into a retrival sac and removed from the left lower quadrant port site under direct visualization. The staple line and mesoappendix were inspected. There was no bleeding or drainage. Hemostasis was excellent. The right lower quadrant was judiciously irrigated until the effluent fluid was clear. The pelvis was also irrigated. All fluid was suctioned from the abdomen. The 12-mm trocar was removed the incision was reapproximated using 1 PDS suture with an Endo Close device. The 5-mm trocar was removed under direct visualization. Pneumoperitoneum was aspirated and released. The 5-mm camera and trocar were removed from the umbilical position. All wounds were cleansed and irrigated and injected with local anesthetic. All skin incisions were reapproximated using  4-0 Monocryl suture. Skin glue was applied to all incisions. The drapes were removed, the ang catheter was removed, and the patient was awakened from anesthesia and brought to the recovery room in good condition. The patient tolerated the procedure without apparent complication. Needle, sponge, and instrument counts were correct at the end of the procedure.      Fabian Loredo MD  12/14/2023  1:26 AM

## 2023-12-14 NOTE — PLAN OF CARE
Received patient from pacu A/O VSS on room air. Abdominal op sites x3 with derma-bond c/d/I. Up with SB assist. Voiding without difficulty. Oxy given for pain with no relief. Notified Dr Ramona Thomas and order received for dilaudid IV. Gave 0.2mg with good relief per patient. Plan of care reviewed. Call light within reach.

## 2023-12-14 NOTE — ED QUICK NOTES
Orders for admission, patient is aware of plan and ready to go upstairs. Any questions, please call ED RN Jhon Smith at extension [phone]. Patient Covid vaccination status: Fully vaccinated and immunocompromised     COVID Test Ordered in ED: None    COVID Suspicion at Admission: N/A    Running Infusions:  abx    Mental Status/LOC at time of transport: x4    Other pertinent information: Pt's CT scan showed acute appendicitis with peritonitis, no perforation abscess or gangrene. Surgery checklist performed, pt started on abx in ED, given morphine and zofran for pain management.    CIWA score: N/A   NIH score:  N/A

## 2023-12-14 NOTE — PLAN OF CARE
Pt A & O x4, oN RA.  /IS. SCDs. Heparin subcutaneous. Pt vomited this morning after clear liquids, c/o heartburn, tums given. Last BM 12/12. Pt states she feels better now. Lap sites x 3 glued, CDI. Pt on oxy PRN. IVF.  at bedside.

## 2023-12-14 NOTE — PROGRESS NOTES
Pt vomited again, notified Dr. Mary Sebastian, awaiting response. Requesting other antiemetics. Pt not discharging home tonight. Encouraged just clear liquids for tonight.

## 2023-12-14 NOTE — ED QUICK NOTES
Rounding Completed    Plan of Care reviewed. Waiting for surgery or inpatient room. Pt updated the surgery ETA is closer to 2300, per EDMD and OR charge, pt can go to inpatient room if ready before surgery. Elimination needs assessed. Provided repositioning. Pt states pain to now 1/10, denies any other needs at this time.  at bedside. Bed is locked and in lowest position. Call light within reach.

## 2023-12-15 VITALS
OXYGEN SATURATION: 93 % | HEART RATE: 63 BPM | SYSTOLIC BLOOD PRESSURE: 131 MMHG | BODY MASS INDEX: 22.45 KG/M2 | RESPIRATION RATE: 18 BRPM | WEIGHT: 122 LBS | HEIGHT: 62 IN | DIASTOLIC BLOOD PRESSURE: 61 MMHG | TEMPERATURE: 98 F

## 2023-12-15 LAB
ANION GAP SERPL CALC-SCNC: 5 MMOL/L (ref 0–18)
BUN BLD-MCNC: 12 MG/DL (ref 9–23)
CALCIUM BLD-MCNC: 8 MG/DL (ref 8.5–10.1)
CHLORIDE SERPL-SCNC: 114 MMOL/L (ref 98–112)
CO2 SERPL-SCNC: 25 MMOL/L (ref 21–32)
CREAT BLD-MCNC: 0.61 MG/DL
EGFRCR SERPLBLD CKD-EPI 2021: 94 ML/MIN/1.73M2 (ref 60–?)
ERYTHROCYTE [DISTWIDTH] IN BLOOD BY AUTOMATED COUNT: 14.6 %
GLUCOSE BLD-MCNC: 91 MG/DL (ref 70–99)
HCT VFR BLD AUTO: 34.7 %
HGB BLD-MCNC: 10.8 G/DL
MCH RBC QN AUTO: 27.3 PG (ref 26–34)
MCHC RBC AUTO-ENTMCNC: 31.1 G/DL (ref 31–37)
MCV RBC AUTO: 87.8 FL
OSMOLALITY SERPL CALC.SUM OF ELEC: 297 MOSM/KG (ref 275–295)
PLATELET # BLD AUTO: 188 10(3)UL (ref 150–450)
POTASSIUM SERPL-SCNC: 3.5 MMOL/L (ref 3.5–5.1)
RBC # BLD AUTO: 3.95 X10(6)UL
SODIUM SERPL-SCNC: 144 MMOL/L (ref 136–145)
WBC # BLD AUTO: 7.2 X10(3) UL (ref 4–11)

## 2023-12-15 RX ORDER — HYDROCODONE BITARTRATE AND ACETAMINOPHEN 5; 325 MG/1; MG/1
1 TABLET ORAL EVERY 6 HOURS PRN
Qty: 8 TABLET | Refills: 0 | Status: SHIPPED | OUTPATIENT
Start: 2023-12-15

## 2023-12-15 NOTE — PLAN OF CARE
Pt a&O. On room air. Tolerating diet. Nausea resolved. Pt belching but not passing flatus yet. Encouraged ambulation. Last BM 12/13/23. Lap sites x3 C/D/I. Pt ready for discharge home. Pt's spouse, Nikos Adams, at bedside and will be taking pt home.

## 2023-12-15 NOTE — DISCHARGE SUMMARY
[facility]  Discharge Summary    Harman Mccartney Patient Status:  Inpatient    1950 MRN [id number]   [facility] 3SW-A Attending No att. providers found   [facility] Day # 1 PCP iGsela Evans MD     Date of Admission: 2023    Date of Discharge: 12/15/2023    Admitting Diagnosis: Acute appendicitis with localized peritonitis, without perforation, abscess, or gangrene [K35.30]    Discharge Diagnosis: acute appendicitis       Patient Active Problem List   Diagnosis    Coronary atherosclerosis    Anxiety state    Pure hypercholesterolemia    Hypertension    Pulmonary fibrosis ([facility] [address]    Abdominal aortic ectasia (HCC)    Reactive depression    Benign essential tremor    Flat epithelial atypia (FEA) of left breast    Ductal hyperplasia, atypical, breast    Exudative age-related macular degeneration of left eye with active choroidal neovascularization ([facility] [address]    Status post bilateral mastectomy    Invasive lobular carcinoma of breast, stage 2, right (HCC)    Osteopenia, senile    Malignant melanoma of arm, left ([address]    Acute appendicitis with localized peritonitis, without perforation, abscess, or gangrene       Reason for Admission:  Acute appendicitis with localized peritonitis, without perforation, abscess, or gangrene [K35.30]     Procedures: LAPAROSCOPIC APPENDECTOMY    History of Present Illness: Acute appendicitis with localized peritonitis, without perforation, abscess, or gangrene [K35.30]    Harman Mccartney is a 74year old female who presents with one day history of abdominal pain. Pain started this morning in the right lower quadrant. She reports nausea and vomiting. Denies fevers or chills. She denies history of melena or blood per rectum. She had a colonoscopy 15 years ago and has not had a recent evaluation. Hospital Course: The patient tolerated the above listed procedure without complication. She was nauseas after surgery.  The patient had relief from her nausea and her diet was advanced. The patient is being discharged with the following physical exam.      Physical Exam: Abdomen soft, non-tender, good bowel sounds. Incisions clean, dry, intact, no erythema. Consultations: none    Complications: none     Disposition: Home to self care   Discharge Condition: Good    Discharge Medications:   Discharge Medication List as of 12/15/2023 10:19 AM        CONTINUE these medications which have CHANGED    Details   HYDROcodone-acetaminophen (NORCO) 5-325 MG Oral Tab Take 1 tablet by mouth every 6 (six) hours as needed for Pain., Normal, Disp-8 tablet, R-0           CONTINUE these medications which have NOT CHANGED    Details   aspirin 81 MG Oral Chew Tab Chew 1 tablet (81 mg total) by mouth daily. , Historical      escitalopram 10 MG Oral Tab Take 1 tablet (10 mg total) by mouth daily. , Normal, Disp-90 tablet, R-1      ATORVASTATIN 10 MG Oral Tab TAKE 1 TABLET BY MOUTH EVERY DAY AT NIGHT, Normal, Disp-90 tablet, R-0      primidone 50 MG Oral Tab Take 1 tablet (50 mg total) by mouth in the morning and 1 tablet (50 mg total) before bedtime., Normal, Disp-180 tablet, R-1      ANASTROZOLE 1 MG Oral Tab tab TAKE 1 TABLET BY MOUTH EVERY DAY, Normal, Disp-90 tablet, R-3      Multiple Vitamin Oral Tab Take 1 tablet by mouth daily. , Historical      Acidophilus/Pectin Oral Cap Take 1 capsule by mouth daily. , Historical             Follow up Visits: Follow-up with [facility] in approximately the next 14 days.      Other Discharge Instructions:    General diet  No lifting, no driving, the patient may shower  Oxycodone for pain    Debra Pierre PA-C  12/15/2023  4:16 PM

## 2023-12-15 NOTE — PLAN OF CARE
Patient A/O x4, VSS on RA, c/o mild-mod pain. , SCDs. Voiding freely via toilet, last BM 12/12. Nausea improving, advanced back to CLD. Plan to manage nausea and dc home tomorrow. Safety measures in place.

## 2023-12-18 ENCOUNTER — PATIENT OUTREACH (OUTPATIENT)
Dept: CASE MANAGEMENT | Age: 73
End: 2023-12-18

## 2023-12-20 ENCOUNTER — OFFICE VISIT (OUTPATIENT)
Dept: SURGERY | Facility: CLINIC | Age: 73
End: 2023-12-20
Payer: MEDICARE

## 2023-12-20 DIAGNOSIS — C43.62 MALIGNANT MELANOMA OF ARM, LEFT (HCC): Primary | ICD-10-CM

## 2023-12-20 PROCEDURE — 99203 OFFICE O/P NEW LOW 30 MIN: CPT | Performed by: SURGERY

## 2023-12-20 PROCEDURE — 1111F DSCHRG MED/CURRENT MED MERGE: CPT | Performed by: SURGERY

## 2023-12-20 NOTE — PATIENT INSTRUCTIONS
Surgeon:              Dr. Pipo Naqvi, PhD                                          Tel:         [phone]                                  Fax:        [phone]      Surgery/Procedure: Left upper arm reconstruction with local flap, possible skin graft, possible integra  1.5 hours for Dr. Edgardo Sol portion, general anesthesia, outpatient, joint with Dr. Ronnie Belcher  Left upper arm reconstruction with skin graft  1.25 hours, general anesthesia, outpatient, 3-4 weeks after first procedure    Dx Code:  C43.62    Hospital:  [facility]: [address] [place] [address], [place], [address]           [phone]  [facility]: [address], [facility], [facility]               [phone]    1. Someone will need to drive you to and from the hospital if your procedure is outpatient. 2.Do not drink alcohol or smoke 24 hours prior to your procedure. 3. Bring a picture ID and your insurance card. 4. You will be contacted by the hospital the day before to confirm the procedure time and location. 5. Do not take any herbal supplements or blood thinners at least one week before your procedure/surgery. This includes NSAID's (aspirin, baby aspirin, Motrin, Ibuprofen, Aleve, Advil, Naproxen, etc), Plavix, fish oil, vitamin E, turmeric, CoQ10, or green tea supplements, etc. *TYLENOL or acetaminophen is ok to take*    6. PRE-OPERATIVE TESTING: History and physical with medical clearance is REQUIRED within 30 days of the surgery date and is mandatory per Dr. Dale Naqvi. *If this is not done, your surgery will be postponed*  MEDICAL CLEARANCE WITH DR. Vargas  CBC  CMP  EKG (within 90 days)    7. Please inform us if you start or change any medications at least one week before surgery (ex: blood thinners, weight loss medications, diabetic medications, herbal supplements, etc)    8. Does patient have diagnosis of sleep apnea?     [   ] Yes     [  X ]  No    Consent obtained   Photos taken on 12/20/2023

## 2023-12-20 NOTE — CONSULTS
New Patient Consultation    Chief Complaint: malignant melanoma left upper arm    History of Present Illness:   Austin Duong is a 74year old female referred by Dr. Jacques Goel for a malignant melanoma left upper arm. Patient saw Dr. Aparna Medina for a mass of the left arm on 10/18/2023. The lesion had been present for a year and reportedly increasing in size. Dr. Aparna Medina was concern for pyogenic granuloma. She underwent excision mass left arm on 2023. Pathology revealed invasive nodular melanoma with ulceration (10 mm thickness). She met with Dr. Jacques Goel who recommended wide excision with 2 cm margin and sentinel lymph node biopsy. She had a PET scan on 2023. She has medical history significant for invasive lobular carcinoma of the right breast and DCIS of the left breast.  She is status post bilateral mastectomy with bilateral sentinel lymph node biopsy with Dr. Aparna Medina. She is here today to discuss reconstructive options following wide excision.     Past Medical History:      Past Medical History:   Diagnosis Date    Anxiety     Depression     Ductal hyperplasia, atypical, breast 2020    High blood pressure     High cholesterol     Personal history of colonic polyps     Tremors of nervous system     Visual impairment     glasses         Past Surgical History:  Past Surgical History:   Procedure Laterality Date    COLONOSCOPY & POLYPECTOMY  2003    HYSTERECTOMY  2017    and oophorectomy    AYLA BIOPSY STEREO NODULE 1 SITE LEFT (CPT=19081)  2020    ADH/FEA    AYLA LOCALIZATION WIRE 1 SITE LEFT (CPT=19281)  2020    Benign (core was ADH/FEA)    NEEDLE BIOPSY LEFT      Benign          x2    OTHER SURGICAL HISTORY  10/1/2008    cardiac cath post-procedure date (mild LAD)                         Medications:    No outpatient medications have been marked as taking for the 23 encounter (Office Visit) with Eliazar Sanders MD.         Allergies:    No Known Allergies      Family History: Family History   Problem Relation Age of Onset    High Cholesterol Other         siblings    Heart Disease Father         Heart disease    High Blood Pressure Father     Cancer Sister 59        kidney    Cancer Sister 34        leukemia    Neurological Disorder Sister     Other (als) Sister     Cancer Paternal Uncle 79        breast         Social History:  History   Alcohol Use No       History   Smoking Status    Former    Packs/day: 1.00    Years: 40.00    Types: Cigarettes    Quit date: 5/1/2013   Smokeless Tobacco    Never       History   Drug Use No       Review of Systems:    A 13 point review of systems was performed on the intake sheet. The patient reports   General:   The patient denies, fever, chills, night sweats, fatigue, generalized weakness, change in appetite, weight loss, or weight gain. Endocrine: There is no history of poor/slow wound healing, weight loss/gain, fertility or hormone problems, cold intolerance, heat intolerance, excessive thirst, or thyroid disease. Allergic/Immunologic:  There is no history of hives, hay fever, angioedema or anaphylaxis. HEENT:    The patient denies ear pain, ear drainage, hearing loss, change in vision, double vision, cataracts, glaucoma, nasal congestion, nosebleed, hoarseness, sore throat, or swollen glands  Respiratory:   The patient denies shortness of breath, cough, bloody cough, phlegm, asthma, or wheezing  Cardiovascular: The patient denies chest pain/pressure, palpitations, irregular heartbeat, high blood pressure, stroke, or leg swelling  Breasts:  Patient denies breast masses, pain, change in the breast skin, skin dimpling, nipple discharge, or rash  Gastrointestinal:   There is no history of difficulty or pain with swallowing, reflux symptoms, nausea, vomiting, dark/ bloody stools, diarrhea, constipation,  change in bowel habits, or abdominal pain.    Genitourinary:  The patient denies frequent urination, needing to get up at night to urinate, urinary hesitancy or retaining urine, painful urination, urinary incontinence, decreased urine stream, blood in the urine, or vaginal/penile discharge. Skin:   The patient denies rash, itching, skin lesions, dry skin, change in skin color or change in moles, sunburns, or sunburns with blistering. Hematologic/Lymphatic:  The patient denies easily bruising or bleeding, persistent swollen glands or lymph nodes, bleeding disorders, blood clots, or pulmonary embolism. Gynecologic:  The patient denies irregular menses, pelvic pain, pain with intercourse, painful menses, or pregnancy  Musculoskeletal:  The patient denies muscle aches/pain, joint pain, stiff joints, neck pain, back pain or bone pain. Neurologic:  There is no history of migraines or severe headaches, seizure/epilepsy, speech problems, coordination problems, trembling/tremors, fainting/black outs, dizziness, memory problems, loss of sensation/numbness, problems walking, weakness, tingling or burning in hands/feet. Psychiatric:  There is no history of abusive relationship, bipolar disorder, sleep disturbance, anxiety, depression or feeling of despair. Physical Exam:    There were no vitals taken for this visit. Constitutional: The patient is an alert, oriented and well-developed. Neurologic: Speech patterns and movements are normal.     Psychiatric: Affect is appropriate. Eyes: Conjunctiva are clear, non-icteric. PERRL    ENT: no obvious abnormality, no ear drainage, mucous membranes moist and pink    Integument/Skin: left upper arm with 6 cm well healed transverse scar    Respiratory: Normal respiratory effort. Cardiovascular: no cyanosis, no edema    Musculoskeletal: see skin exam    Impression:   Carlotta Michelle  is a 74year old with invasive melanoma left upper arm    Discussion and Plan:  The patient was counseled on the different treatment options. We reviewed options for reconstruction following wide excision.   We we will plan for left upper arm reconstruction with local flap, possible skin graft, possible Integra. We discussed the possible role for second stage reconstruction. We discussed the procedure and expected postoperative course in detail today. The different treatment options were discussed with the patient. The procedures and the postoperative care was discussed in detail. Potential risks complications benefits and alternatives were discussed. Risks and complications including but not limited to infection, bleeding, scarring, damage to surrounding structures, such as nerves, blood vessels, muscles,  tendons, risk of hematoma, seroma, foreign body reaction, allergic reaction, wound dehiscences, delayed wound healing, flap failure, graft failure, need for further surgery. Patient understands and wishes to proceed with the procedure, questions were answered. 45 minutes were spent with the patient, from which 35 minutes were spent counseling the patient and coordinating care.

## 2023-12-22 ENCOUNTER — TELEPHONE (OUTPATIENT)
Dept: SURGERY | Facility: CLINIC | Age: 73
End: 2023-12-22

## 2023-12-22 NOTE — TELEPHONE ENCOUNTER
LVM letting the patient know that I have a surgery date for her with Dr. Terri Guerrero and Dr. Eid July and to please give me a call back.

## 2023-12-27 ENCOUNTER — LABORATORY ENCOUNTER (OUTPATIENT)
Dept: LAB | Age: 73
End: 2023-12-27
Attending: SURGERY
Payer: MEDICARE

## 2023-12-27 DIAGNOSIS — Z01.818 PRE-OP TESTING: ICD-10-CM

## 2023-12-27 DIAGNOSIS — E78.00 PURE HYPERCHOLESTEROLEMIA: ICD-10-CM

## 2023-12-27 LAB
ALBUMIN SERPL-MCNC: 3.3 G/DL (ref 3.4–5)
ALBUMIN/GLOB SERPL: 0.8 {RATIO} (ref 1–2)
ALP LIVER SERPL-CCNC: 55 U/L
ALT SERPL-CCNC: 12 U/L
ANION GAP SERPL CALC-SCNC: 3 MMOL/L (ref 0–18)
AST SERPL-CCNC: 15 U/L (ref 15–37)
BASOPHILS # BLD AUTO: 0.08 X10(3) UL (ref 0–0.2)
BASOPHILS NFR BLD AUTO: 1.3 %
BILIRUB SERPL-MCNC: 0.3 MG/DL (ref 0.1–2)
BUN BLD-MCNC: 8 MG/DL (ref 9–23)
CALCIUM BLD-MCNC: 8.5 MG/DL (ref 8.5–10.1)
CHLORIDE SERPL-SCNC: 109 MMOL/L (ref 98–112)
CHOLEST SERPL-MCNC: 184 MG/DL (ref ?–200)
CO2 SERPL-SCNC: 29 MMOL/L (ref 21–32)
CREAT BLD-MCNC: 1.32 MG/DL
EGFRCR SERPLBLD CKD-EPI 2021: 42 ML/MIN/1.73M2 (ref 60–?)
EOSINOPHIL # BLD AUTO: 0.19 X10(3) UL (ref 0–0.7)
EOSINOPHIL NFR BLD AUTO: 3 %
ERYTHROCYTE [DISTWIDTH] IN BLOOD BY AUTOMATED COUNT: 14.9 %
FASTING PATIENT LIPID ANSWER: YES
FASTING STATUS PATIENT QL REPORTED: YES
GLOBULIN PLAS-MCNC: 4.1 G/DL (ref 2.8–4.4)
GLUCOSE BLD-MCNC: 101 MG/DL (ref 70–99)
HCT VFR BLD AUTO: 40.1 %
HDLC SERPL-MCNC: 64 MG/DL (ref 40–59)
HGB BLD-MCNC: 12.8 G/DL
IMM GRANULOCYTES # BLD AUTO: 0.03 X10(3) UL (ref 0–1)
IMM GRANULOCYTES NFR BLD: 0.5 %
LDLC SERPL CALC-MCNC: 84 MG/DL (ref ?–100)
LYMPHOCYTES # BLD AUTO: 1.97 X10(3) UL (ref 1–4)
LYMPHOCYTES NFR BLD AUTO: 30.9 %
MCH RBC QN AUTO: 27.3 PG (ref 26–34)
MCHC RBC AUTO-ENTMCNC: 31.9 G/DL (ref 31–37)
MCV RBC AUTO: 85.5 FL
MONOCYTES # BLD AUTO: 0.6 X10(3) UL (ref 0.1–1)
MONOCYTES NFR BLD AUTO: 9.4 %
NEUTROPHILS # BLD AUTO: 3.5 X10 (3) UL (ref 1.5–7.7)
NEUTROPHILS # BLD AUTO: 3.5 X10(3) UL (ref 1.5–7.7)
NEUTROPHILS NFR BLD AUTO: 54.9 %
NONHDLC SERPL-MCNC: 120 MG/DL (ref ?–130)
OSMOLALITY SERPL CALC.SUM OF ELEC: 290 MOSM/KG (ref 275–295)
PLATELET # BLD AUTO: 322 10(3)UL (ref 150–450)
POTASSIUM SERPL-SCNC: 3.6 MMOL/L (ref 3.5–5.1)
PROT SERPL-MCNC: 7.4 G/DL (ref 6.4–8.2)
RBC # BLD AUTO: 4.69 X10(6)UL
SODIUM SERPL-SCNC: 141 MMOL/L (ref 136–145)
TRIGL SERPL-MCNC: 221 MG/DL (ref 30–149)
VLDLC SERPL CALC-MCNC: 35 MG/DL (ref 0–30)
WBC # BLD AUTO: 6.4 X10(3) UL (ref 4–11)

## 2023-12-27 PROCEDURE — 80061 LIPID PANEL: CPT

## 2023-12-27 PROCEDURE — 36415 COLL VENOUS BLD VENIPUNCTURE: CPT

## 2023-12-27 PROCEDURE — 80053 COMPREHEN METABOLIC PANEL: CPT

## 2023-12-27 PROCEDURE — 85025 COMPLETE CBC W/AUTO DIFF WBC: CPT

## 2023-12-28 ENCOUNTER — TELEPHONE (OUTPATIENT)
Dept: SURGERY | Facility: CLINIC | Age: 73
End: 2023-12-28

## 2023-12-28 DIAGNOSIS — C43.62 MALIGNANT MELANOMA OF ARM, LEFT (HCC): Primary | ICD-10-CM

## 2023-12-28 NOTE — TELEPHONE ENCOUNTER
Calling pt in regards to scheduling surgery. Informed pt that I have 1/9/24 available at [facility] with Dr. Pushpa Kumari and Dr. Roslyn Reed. Pt verbalized understanding and in agreement with date and location. All questions answered. Encouraged pt to call or [facility] message office with any other questions or concerns.

## 2023-12-29 ENCOUNTER — EKG ENCOUNTER (OUTPATIENT)
Dept: LAB | Facility: HOSPITAL | Age: 73
End: 2023-12-29
Attending: SURGERY
Payer: MEDICARE

## 2023-12-29 ENCOUNTER — OFFICE VISIT (OUTPATIENT)
Facility: LOCATION | Age: 73
End: 2023-12-29
Payer: MEDICARE

## 2023-12-29 VITALS — TEMPERATURE: 98 F | HEART RATE: 80 BPM

## 2023-12-29 DIAGNOSIS — Z01.818 PRE-OP TESTING: ICD-10-CM

## 2023-12-29 DIAGNOSIS — C43.62 MALIGNANT MELANOMA OF ARM, LEFT (HCC): Primary | ICD-10-CM

## 2023-12-29 DIAGNOSIS — Z98.890 POST-OPERATIVE STATE: Primary | ICD-10-CM

## 2023-12-29 LAB
ATRIAL RATE: 68 BPM
P AXIS: 61 DEGREES
P-R INTERVAL: 148 MS
Q-T INTERVAL: 416 MS
QRS DURATION: 86 MS
QTC CALCULATION (BEZET): 442 MS
R AXIS: 15 DEGREES
T AXIS: 50 DEGREES
VENTRICULAR RATE: 68 BPM

## 2023-12-29 PROCEDURE — 93005 ELECTROCARDIOGRAM TRACING: CPT

## 2023-12-29 PROCEDURE — 93010 ELECTROCARDIOGRAM REPORT: CPT | Performed by: INTERNAL MEDICINE

## 2024-01-02 DIAGNOSIS — E78.00 PURE HYPERCHOLESTEROLEMIA: Primary | ICD-10-CM

## 2024-01-02 NOTE — PROGRESS NOTES
Multiple attempts to reach pt and messages left with no return call.  Past TCM timeframe.  Encounter closing.

## 2024-01-03 RX ORDER — ATORVASTATIN CALCIUM 10 MG/1
TABLET, FILM COATED ORAL
Qty: 90 TABLET | Refills: 0 | Status: SHIPPED | OUTPATIENT
Start: 2024-01-03

## 2024-01-03 NOTE — TELEPHONE ENCOUNTER
Cholesterol Medication Protocol Ntxxgh0101/03/2024 12:33 AM   Protocol Details ALT < 80    ALT resulted within past year    Lipid panel within past 12 months    Appointment within past 12 or next 3 months     Last refill - 10/9/23 - #90   Last ALT - 12/27/23 - 12  Last lipid panel - 12/27/23  Last office visit - 9/19/23   Refilled per protocol

## 2024-01-04 ENCOUNTER — TELEPHONE (OUTPATIENT)
Dept: SURGERY | Facility: CLINIC | Age: 74
End: 2024-01-04

## 2024-01-04 ENCOUNTER — TELEPHONE (OUTPATIENT)
Dept: FAMILY MEDICINE CLINIC | Facility: CLINIC | Age: 74
End: 2024-01-04

## 2024-01-04 NOTE — TELEPHONE ENCOUNTER
Received a call from faviola at Dr. Piedra's office stating that patient is scheduled for surgery on 1/9/23 and needs an H&P done. She has already had labs and EKG done and they have those results. Dr. Vargas states to work patient in with him on Friday at 11:50. This nurse spoke with patient and she verbalized understanding. Dr. Vargas will need to document that he reviewed labs and EKG and she is medically cleared for surgery. Results need to be faxed to [phone]. If any questions, call faviola at [phone].

## 2024-01-04 NOTE — TELEPHONE ENCOUNTER
Called and spoke to AIDA Arroyo at Dr. Vargas's office regarding medical clearance for patient.  I informed Dina that patient already had her EKG and Labs drawn now we just need Dr. Vargas to review and provide us a letter that the patient is medically cleared for her upcoming surgery on 1/9/24.  AIDA Arroyo stated that Dr. Vargas would need to see Roxanne in the office in order to provide us clearance and stated that she would call patient to get her in tomorrow.  Patient confirmed appointment with Dr. Vargas for Friday 1/5/24 at 11:50.  AIDA Arroyo was provided with our fax number to send over the clearance after patients appointment.

## 2024-01-05 ENCOUNTER — OFFICE VISIT (OUTPATIENT)
Dept: FAMILY MEDICINE CLINIC | Facility: CLINIC | Age: 74
End: 2024-01-05
Payer: MEDICARE

## 2024-01-05 VITALS
HEIGHT: 62 IN | BODY MASS INDEX: 22.63 KG/M2 | OXYGEN SATURATION: 98 % | WEIGHT: 123 LBS | TEMPERATURE: 98 F | HEART RATE: 78 BPM | DIASTOLIC BLOOD PRESSURE: 68 MMHG | RESPIRATION RATE: 19 BRPM | SYSTOLIC BLOOD PRESSURE: 120 MMHG

## 2024-01-05 DIAGNOSIS — N60.99 DUCTAL HYPERPLASIA, ATYPICAL, BREAST: ICD-10-CM

## 2024-01-05 DIAGNOSIS — Z90.13 STATUS POST BILATERAL MASTECTOMY: ICD-10-CM

## 2024-01-05 DIAGNOSIS — C50.911: ICD-10-CM

## 2024-01-05 DIAGNOSIS — Z01.818 PRE-PROCEDURAL EXAMINATION: Primary | ICD-10-CM

## 2024-01-05 DIAGNOSIS — C43.62 MALIGNANT MELANOMA OF ARM, LEFT (HCC): ICD-10-CM

## 2024-01-05 PROCEDURE — 1111F DSCHRG MED/CURRENT MED MERGE: CPT | Performed by: FAMILY MEDICINE

## 2024-01-05 PROCEDURE — 99215 OFFICE O/P EST HI 40 MIN: CPT | Performed by: FAMILY MEDICINE

## 2024-01-05 NOTE — TELEPHONE ENCOUNTER
Pre op paperwork faxed as requested. Message left for Kae on established voicemail that H&P was faxed and to contact office if any further questions or concerns.

## 2024-01-05 NOTE — H&P
Dillan Casillas is a 73 year old female.  Chief Complaint   Patient presents with    Pre-Op     Here for pre op left upper arm with sentinel lymph node biopsy on 24       HPI:   PREOP EXAM      PRE-OP Physical  What is the full name of procedure/ surgery?  Wide excision melanoma left upper arm with sentinel lymph node biopsy.  Date being surgery or procedure is being done?  2024  What is the doctor’s full name  that is doing the surgery?  Dr. Zurdo Montesinos  What hospital or facility will it  be done at?  CBC, CMP, EKG, and medical clearance.  (Testing is already completed.)         Patient has had melanoma removed  Healing well  Needs further wide excision  Notes a suture protruding and I cut the end  Also recovering from laparoscopic appendectomy  Doing very well post op and eating  No complications  Laparoscopic access areas healing well    MEDICALLY CLEAR FOR SURGERY      ALLERGIES:  No Known Allergies       has a past surgical history that includes colonoscopy & polypectomy (2003); other surgical history (10/01/2008); ; soniya localization wire 1 site left (cpt=19281) (2020); soniya biopsy stereo nodule 1 site left (cpt=19081) (2020); needle biopsy left (); hysterectomy (); mastectomy left; mastectomy right; appendectomy (2023); angioplasty (coronary); colonoscopy; and tubal ligation.   has a past medical history of Anxiety, Cancer (HCC) (), Cancer (HCC) (), Depression, Ductal hyperplasia, atypical, breast (2020), High blood pressure, High cholesterol, Hyperlipidemia, Personal history of colonic polyps, PONV (postoperative nausea and vomiting), Tremors of nervous system, and Visual impairment.    She has no past medical history of Anesthesia complication, Difficult intubation, Family history of malignant hyperthermia, Family history of pseudocholinesterase deficiency, History of adverse reaction to anesthesia, motion sickness, Malignant hyperthermia, or  Pseudocholinesterase deficiency.  family history includes Cancer (age of onset: 29) in her sister; Cancer (age of onset: 64) in her sister; Cancer (age of onset: 70) in her paternal uncle; Heart Disease in her father; High Blood Pressure in her father; High Cholesterol in an other family member; Neurological Disorder in her sister; als in her sister.     has a current medication list which includes the following prescription(s): atorvastatin, aspirin, escitalopram, primidone, anastrozole, multiple vitamin, and acidophilus-pectin.        Social History:  Social History     Socioeconomic History    Marital status:     Number of children: 2   Tobacco Use    Smoking status: Former     Packs/day: 1.00     Years: 40.00     Additional pack years: 0.00     Total pack years: 40.00     Types: Cigarettes     Quit date: 5/1/2013     Years since quitting: 10.6    Smokeless tobacco: Never    Tobacco comments:     PPD   Vaping Use    Vaping Use: Never used   Substance and Sexual Activity    Alcohol use: No    Drug use: No    Sexual activity: Yes     Partners: Male   Other Topics Concern    Caffeine Concern No    Stress Concern Yes    Weight Concern No    Special Diet No    Exercise Yes    Seat Belt Yes     Social Determinants of Health     Food Insecurity: No Food Insecurity (12/14/2023)    Food Insecurity     Food Insecurity: Never true   Transportation Needs: No Transportation Needs (12/14/2023)    Transportation Needs     Lack of Transportation: No   Housing Stability: Low Risk  (12/14/2023)    Housing Stability     Housing Instability: No          REVIEW OF SYSTEMS:   GENERAL HEALTH: feels well no complaints  Recovering from recent appendectomy.  SKIN: denies any unusual skin lesions or rashes  Healing well from prior melanoma excision, has suture that is protruding.    RESPIRATORY: denies shortness of breath with exertion  CARDIOVASCULAR: denies chest pain on exertion  GI: denies abdominal pain and denies  heartburn  NEURO: denies headaches    EXAM:   /68   Pulse 78   Temp 98.2 °F (36.8 °C) (Temporal)   Resp 19   Ht 5' 2\" (1.575 m)   Wt 123 lb (55.8 kg)   SpO2 98%   BMI 22.50 kg/m²  Body mass index is 22.5 kg/m².      GENERAL: well developed, well nourished,in no apparent distress  SKIN: no rashes,no suspicious lesions  No signs symptoms of cellulitis  Small protrusion of end of suture in the wound to the line.  There is no sign or symptoms of cellulitis, this is removed easily with iris scissors.  HEENT: atraumatic, normocephalic,ears and throat are clear  NECK: supple,no adenopathy,no bruits  LUNGS: clear to auscultation  CARDIO: RRR without murmur  GI: good BS's,no masses, HSM or tenderness  3 small laparoscopic access wounds are noted and they are very well-healed without signs of infection  EXTREMITIES: no cyanosis, clubbing or edema  NEURO  mild tremor   noted     ASSESSMENT AND PLAN:     1. Pre-procedural examination (Primary)  Comments:  medically clear for surgery  2. Malignant melanoma of arm, left (HCC)  Comments:  this is the indicated diagnosis for 1/9/2024:  for wide excision and node bx w dr rothman      Hypertension well-controlled.         Other diagnoses not related to January 9 surgery.  3. Ductal hyperplasia, atypical, breast---history of /s/p mastectomy  Comments:  needs prostheses and bra ordered  Orders:  -     DME - External  4. Invasive lobular carcinoma of breast, stage 2, right (HCC)--hx of s/p mastectomy  Comments:  needs prostheses and bra ordered  Overview:  Sees Dr Garsia onco  On anastrazole    Orders:  -     DME - External  5. Status post bilateral mastectomy  Comments:  needs prostheses and bra ordered  Orders:  -     DME - External          EKG  INTERPRETED BY DR MCKENZIE       EKG 12 Lead    Dx: Pre-op testing    0 Result Notes      Component  Ref Range & Units 7 d ago   Ventricular rate  BPM 68   Atrial rate  BPM 68   P-R Interval  ms 148   QRS Duration  ms 86   Q-T  Interval  ms 416   QTC Calculation (Bezet)  ms 442   P Axis  degrees 61   R Axis  degrees 15   T Axis  degrees 50   Resulting Agency [facility]              Narrative  Performed by: [facility]  Normal sinus rhythm  Marked baseline artifact  Otherwise normal ECG  When compared with ECG of 23-DEC-2008 16:03,  No significant change was found  Confirmed by KEVIN KENNEDY ULRICH ([id number]) on 12/29/2023 8:47:24 PM      Specimen Collected: 12/29/23  1:49 PM Last Resulted: 12/29/23  8:47 PM             Reviewed and appropriate    Reviewed cbc and chem profile  And these are appropriate for surgery   no contraindication  MEDICALLY CLEAR FOR SURGERY

## 2024-01-08 ENCOUNTER — ANESTHESIA EVENT (OUTPATIENT)
Dept: SURGERY | Facility: HOSPITAL | Age: 74
End: 2024-01-08
Payer: MEDICARE

## 2024-01-08 ENCOUNTER — HOSPITAL ENCOUNTER (OUTPATIENT)
Dept: NUCLEAR MEDICINE | Facility: HOSPITAL | Age: 74
Discharge: HOME | End: 2024-01-08
Attending: SURGERY
Payer: MEDICARE

## 2024-01-08 DIAGNOSIS — C43.62 MALIGNANT MELANOMA OF ARM, LEFT (HCC): ICD-10-CM

## 2024-01-08 PROCEDURE — 78195 LYMPH SYSTEM IMAGING: CPT | Performed by: SURGERY

## 2024-01-08 RX ORDER — LIDOCAINE AND PRILOCAINE 25; 25 MG/G; MG/G
CREAM TOPICAL
Status: DISPENSED
Start: 2024-01-08 | End: 2024-01-08

## 2024-01-09 ENCOUNTER — ANESTHESIA (OUTPATIENT)
Dept: SURGERY | Facility: HOSPITAL | Age: 74
End: 2024-01-09
Payer: MEDICARE

## 2024-01-09 ENCOUNTER — HOSPITAL ENCOUNTER (OUTPATIENT)
Facility: HOSPITAL | Age: 74
Setting detail: HOSPITAL OUTPATIENT SURGERY
Discharge: HOME | End: 2024-01-09
Attending: SURGERY | Admitting: SURGERY
Payer: MEDICARE

## 2024-01-09 VITALS
RESPIRATION RATE: 16 BRPM | TEMPERATURE: 98 F | SYSTOLIC BLOOD PRESSURE: 157 MMHG | OXYGEN SATURATION: 96 % | DIASTOLIC BLOOD PRESSURE: 86 MMHG | HEART RATE: 72 BPM | BODY MASS INDEX: 22.08 KG/M2 | HEIGHT: 62 IN | WEIGHT: 120 LBS

## 2024-01-09 DIAGNOSIS — C43.62 MALIGNANT MELANOMA OF ARM, LEFT (HCC): ICD-10-CM

## 2024-01-09 PROCEDURE — 07B60ZZ EXCISION OF LEFT AXILLARY LYMPHATIC, OPEN APPROACH: ICD-10-PCS | Performed by: SURGERY

## 2024-01-09 PROCEDURE — 88305 TISSUE EXAM BY PATHOLOGIST: CPT | Performed by: SURGERY

## 2024-01-09 PROCEDURE — 0HBCXZZ EXCISION OF LEFT UPPER ARM SKIN, EXTERNAL APPROACH: ICD-10-PCS | Performed by: SURGERY

## 2024-01-09 PROCEDURE — 88341 IMHCHEM/IMCYTCHM EA ADD ANTB: CPT | Performed by: SURGERY

## 2024-01-09 PROCEDURE — 88342 IMHCHEM/IMCYTCHM 1ST ANTB: CPT | Performed by: SURGERY

## 2024-01-09 PROCEDURE — 88381 MICRODISSECTION MANUAL: CPT | Performed by: SURGERY

## 2024-01-09 PROCEDURE — 88307 TISSUE EXAM BY PATHOLOGIST: CPT | Performed by: SURGERY

## 2024-01-09 PROCEDURE — 0JUF37Z SUPPLEMENT OF LEFT UPPER ARM SUBCUTANEOUS TISSUE AND FASCIA WITH AUTOLOGOUS TISSUE SUBSTITUTE, PERCUTANEOUS APPROACH: ICD-10-PCS | Performed by: SURGERY

## 2024-01-09 PROCEDURE — S0028 INJECTION, FAMOTIDINE, 20 MG: HCPCS | Performed by: NURSE ANESTHETIST, CERTIFIED REGISTERED

## 2024-01-09 PROCEDURE — 0HXCXZZ TRANSFER LEFT UPPER ARM SKIN, EXTERNAL APPROACH: ICD-10-PCS | Performed by: SURGERY

## 2024-01-09 PROCEDURE — 81210 BRAF GENE: CPT | Performed by: SURGERY

## 2024-01-09 RX ORDER — SODIUM CHLORIDE, SODIUM LACTATE, POTASSIUM CHLORIDE, CALCIUM CHLORIDE 600; 310; 30; 20 MG/100ML; MG/100ML; MG/100ML; MG/100ML
INJECTION, SOLUTION INTRAVENOUS CONTINUOUS
Status: DISCONTINUED | OUTPATIENT
Start: 2024-01-09 | End: 2024-01-09

## 2024-01-09 RX ORDER — NEOSTIGMINE METHYLSULFATE 1 MG/ML
INJECTION, SOLUTION INTRAVENOUS AS NEEDED
Status: DISCONTINUED | OUTPATIENT
Start: 2024-01-09 | End: 2024-01-09 | Stop reason: SURG

## 2024-01-09 RX ORDER — LIDOCAINE HYDROCHLORIDE 40 MG/ML
SOLUTION TOPICAL AS NEEDED
Status: DISCONTINUED | OUTPATIENT
Start: 2024-01-09 | End: 2024-01-09 | Stop reason: SURG

## 2024-01-09 RX ORDER — ACETAMINOPHEN 500 MG
1000 TABLET ORAL ONCE AS NEEDED
Status: COMPLETED | OUTPATIENT
Start: 2024-01-09 | End: 2024-01-09

## 2024-01-09 RX ORDER — ONDANSETRON 2 MG/ML
4 INJECTION INTRAMUSCULAR; INTRAVENOUS EVERY 6 HOURS PRN
Status: DISCONTINUED | OUTPATIENT
Start: 2024-01-09 | End: 2024-01-09

## 2024-01-09 RX ORDER — DEXAMETHASONE SODIUM PHOSPHATE 4 MG/ML
VIAL (ML) INJECTION AS NEEDED
Status: DISCONTINUED | OUTPATIENT
Start: 2024-01-09 | End: 2024-01-09 | Stop reason: SURG

## 2024-01-09 RX ORDER — HYDROMORPHONE HYDROCHLORIDE 1 MG/ML
0.2 INJECTION, SOLUTION INTRAMUSCULAR; INTRAVENOUS; SUBCUTANEOUS EVERY 5 MIN PRN
Status: DISCONTINUED | OUTPATIENT
Start: 2024-01-09 | End: 2024-01-09

## 2024-01-09 RX ORDER — METOCLOPRAMIDE HYDROCHLORIDE 5 MG/ML
10 INJECTION INTRAMUSCULAR; INTRAVENOUS EVERY 8 HOURS PRN
Status: DISCONTINUED | OUTPATIENT
Start: 2024-01-09 | End: 2024-01-09

## 2024-01-09 RX ORDER — HYDROCODONE BITARTRATE AND ACETAMINOPHEN 5; 325 MG/1; MG/1
1 TABLET ORAL EVERY 6 HOURS PRN
Qty: 20 TABLET | Refills: 0 | Status: SHIPPED | OUTPATIENT
Start: 2024-01-09

## 2024-01-09 RX ORDER — FAMOTIDINE 10 MG/ML
INJECTION, SOLUTION INTRAVENOUS AS NEEDED
Status: DISCONTINUED | OUTPATIENT
Start: 2024-01-09 | End: 2024-01-09 | Stop reason: SURG

## 2024-01-09 RX ORDER — ACETAMINOPHEN 500 MG
1000 TABLET ORAL ONCE
Status: DISCONTINUED | OUTPATIENT
Start: 2024-01-09 | End: 2024-01-09 | Stop reason: HOSPADM

## 2024-01-09 RX ORDER — METOCLOPRAMIDE HYDROCHLORIDE 5 MG/ML
INJECTION INTRAMUSCULAR; INTRAVENOUS AS NEEDED
Status: DISCONTINUED | OUTPATIENT
Start: 2024-01-09 | End: 2024-01-09 | Stop reason: SURG

## 2024-01-09 RX ORDER — GLYCOPYRROLATE 0.2 MG/ML
INJECTION, SOLUTION INTRAMUSCULAR; INTRAVENOUS AS NEEDED
Status: DISCONTINUED | OUTPATIENT
Start: 2024-01-09 | End: 2024-01-09 | Stop reason: SURG

## 2024-01-09 RX ORDER — PHENYLEPHRINE HCL 10 MG/ML
VIAL (ML) INJECTION AS NEEDED
Status: DISCONTINUED | OUTPATIENT
Start: 2024-01-09 | End: 2024-01-09 | Stop reason: SURG

## 2024-01-09 RX ORDER — CEFAZOLIN SODIUM/WATER 2 G/20 ML
2 SYRINGE (ML) INTRAVENOUS ONCE
Status: COMPLETED | OUTPATIENT
Start: 2024-01-09 | End: 2024-01-09

## 2024-01-09 RX ORDER — LIDOCAINE HYDROCHLORIDE 10 MG/ML
INJECTION, SOLUTION EPIDURAL; INFILTRATION; INTRACAUDAL; PERINEURAL AS NEEDED
Status: DISCONTINUED | OUTPATIENT
Start: 2024-01-09 | End: 2024-01-09 | Stop reason: SURG

## 2024-01-09 RX ORDER — ONDANSETRON 2 MG/ML
INJECTION INTRAMUSCULAR; INTRAVENOUS AS NEEDED
Status: DISCONTINUED | OUTPATIENT
Start: 2024-01-09 | End: 2024-01-09 | Stop reason: SURG

## 2024-01-09 RX ORDER — ROCURONIUM BROMIDE 10 MG/ML
INJECTION, SOLUTION INTRAVENOUS AS NEEDED
Status: DISCONTINUED | OUTPATIENT
Start: 2024-01-09 | End: 2024-01-09 | Stop reason: SURG

## 2024-01-09 RX ORDER — NALOXONE HYDROCHLORIDE 0.4 MG/ML
0.08 INJECTION, SOLUTION INTRAMUSCULAR; INTRAVENOUS; SUBCUTANEOUS AS NEEDED
Status: DISCONTINUED | OUTPATIENT
Start: 2024-01-09 | End: 2024-01-09

## 2024-01-09 RX ORDER — BUPIVACAINE HYDROCHLORIDE 2.5 MG/ML
INJECTION, SOLUTION EPIDURAL; INFILTRATION; INTRACAUDAL AS NEEDED
Status: DISCONTINUED | OUTPATIENT
Start: 2024-01-09 | End: 2024-01-09 | Stop reason: HOSPADM

## 2024-01-09 RX ORDER — HYDROCODONE BITARTRATE AND ACETAMINOPHEN 5; 325 MG/1; MG/1
1 TABLET ORAL ONCE AS NEEDED
Status: COMPLETED | OUTPATIENT
Start: 2024-01-09 | End: 2024-01-09

## 2024-01-09 RX ORDER — HYDROMORPHONE HYDROCHLORIDE 1 MG/ML
INJECTION, SOLUTION INTRAMUSCULAR; INTRAVENOUS; SUBCUTANEOUS
Status: COMPLETED
Start: 2024-01-09 | End: 2024-01-09

## 2024-01-09 RX ORDER — HYDROCODONE BITARTRATE AND ACETAMINOPHEN 5; 325 MG/1; MG/1
2 TABLET ORAL ONCE AS NEEDED
Status: COMPLETED | OUTPATIENT
Start: 2024-01-09 | End: 2024-01-09

## 2024-01-09 RX ORDER — HYDROMORPHONE HYDROCHLORIDE 1 MG/ML
0.4 INJECTION, SOLUTION INTRAMUSCULAR; INTRAVENOUS; SUBCUTANEOUS EVERY 5 MIN PRN
Status: DISCONTINUED | OUTPATIENT
Start: 2024-01-09 | End: 2024-01-09

## 2024-01-09 RX ORDER — HYDROMORPHONE HYDROCHLORIDE 1 MG/ML
0.6 INJECTION, SOLUTION INTRAMUSCULAR; INTRAVENOUS; SUBCUTANEOUS EVERY 5 MIN PRN
Status: DISCONTINUED | OUTPATIENT
Start: 2024-01-09 | End: 2024-01-09

## 2024-01-09 RX ADMIN — SODIUM CHLORIDE, SODIUM LACTATE, POTASSIUM CHLORIDE, CALCIUM CHLORIDE: 600; 310; 30; 20 INJECTION, SOLUTION INTRAVENOUS at 08:25:00

## 2024-01-09 RX ADMIN — PHENYLEPHRINE HCL 100 MCG: 10 MG/ML VIAL (ML) INJECTION at 08:08:00

## 2024-01-09 RX ADMIN — CEFAZOLIN SODIUM/WATER 2 G: 2 G/20 ML SYRINGE (ML) INTRAVENOUS at 07:40:00

## 2024-01-09 RX ADMIN — DEXAMETHASONE SODIUM PHOSPHATE 8 MG: 4 MG/ML VIAL (ML) INJECTION at 07:45:00

## 2024-01-09 RX ADMIN — PHENYLEPHRINE HCL 100 MCG: 10 MG/ML VIAL (ML) INJECTION at 07:47:00

## 2024-01-09 RX ADMIN — SODIUM CHLORIDE, SODIUM LACTATE, POTASSIUM CHLORIDE, CALCIUM CHLORIDE: 600; 310; 30; 20 INJECTION, SOLUTION INTRAVENOUS at 10:30:00

## 2024-01-09 RX ADMIN — GLYCOPYRROLATE 0.4 MG: 0.2 INJECTION, SOLUTION INTRAMUSCULAR; INTRAVENOUS at 10:28:00

## 2024-01-09 RX ADMIN — ONDANSETRON 4 MG: 2 INJECTION INTRAMUSCULAR; INTRAVENOUS at 10:26:00

## 2024-01-09 RX ADMIN — NEOSTIGMINE METHYLSULFATE 3 MG: 1 INJECTION, SOLUTION INTRAVENOUS at 10:28:00

## 2024-01-09 RX ADMIN — ROCURONIUM BROMIDE 50 MG: 10 INJECTION, SOLUTION INTRAVENOUS at 07:38:00

## 2024-01-09 RX ADMIN — FAMOTIDINE 20 MG: 10 INJECTION, SOLUTION INTRAVENOUS at 07:45:00

## 2024-01-09 RX ADMIN — METOCLOPRAMIDE HYDROCHLORIDE 10 MG: 5 INJECTION INTRAMUSCULAR; INTRAVENOUS at 07:45:00

## 2024-01-09 RX ADMIN — LIDOCAINE HYDROCHLORIDE 4 ML: 40 SOLUTION TOPICAL at 07:40:00

## 2024-01-09 RX ADMIN — SODIUM CHLORIDE, SODIUM LACTATE, POTASSIUM CHLORIDE, CALCIUM CHLORIDE: 600; 310; 30; 20 INJECTION, SOLUTION INTRAVENOUS at 07:30:00

## 2024-01-09 RX ADMIN — ROCURONIUM BROMIDE 20 MG: 10 INJECTION, SOLUTION INTRAVENOUS at 09:33:00

## 2024-01-09 RX ADMIN — LIDOCAINE HYDROCHLORIDE 50 MG: 10 INJECTION, SOLUTION EPIDURAL; INFILTRATION; INTRACAUDAL; PERINEURAL at 07:38:00

## 2024-01-09 RX ADMIN — PHENYLEPHRINE HCL 100 MCG: 10 MG/ML VIAL (ML) INJECTION at 07:54:00

## 2024-01-09 NOTE — ANESTHESIA PREPROCEDURE EVALUATION
PRE-OP EVALUATION    Patient Name: Dillan Casillas    Admit Diagnosis: Malignant melanoma of arm, left (HCC) [C43.62]    Pre-op Diagnosis: Malignant melanoma of arm, left (HCC) [C43.62]    Wide excision melanoma left upper arm with sentinel lymph node biopsy (Salti) Left upper arm reconstruction with local flap, possible skin graft, possible integra (Dorothea)    Anesthesia Procedure: Wide excision melanoma left upper arm with sentinel lymph node biopsy (Salti) Left upper arm reconstruction with local flap, possible skin graft, possible integra (Dorothea) (Left)  . (Left)    Surgeon(s) and Role:  Panel 1:     * Zurdo Montesinos MD - Primary  Panel 2:     * Anila Piedra MD - Primary    Pre-op vitals reviewed.        Body mass index is 21.95 kg/m².    Current medications reviewed.  Hospital Medications:   acetaminophen (Tylenol Extra Strength) tab 1,000 mg  1,000 mg Oral Once    lactated ringers infusion   Intravenous Continuous    ceFAZolin (Ancef) 2 g in 20mL IV syringe premix  2 g Intravenous Once       Outpatient Medications:     Medications Prior to Admission   Medication Sig Dispense Refill Last Dose    aspirin 81 MG Oral Chew Tab Chew 1 tablet (81 mg total) by mouth daily.       escitalopram 10 MG Oral Tab Take 1 tablet (10 mg total) by mouth daily. 90 tablet 1     primidone 50 MG Oral Tab Take 1 tablet (50 mg total) by mouth in the morning and 1 tablet (50 mg total) before bedtime. 180 tablet 1     ANASTROZOLE 1 MG Oral Tab tab TAKE 1 TABLET BY MOUTH EVERY DAY (Patient taking differently: at bedtime.) 90 tablet 3     Multiple Vitamin Oral Tab Take 1 tablet by mouth daily.       Acidophilus/Pectin Oral Cap Take 1 capsule by mouth daily.       ATORVASTATIN 10 MG Oral Tab TAKE 1 TABLET BY MOUTH EVERY DAY AT NIGHT 90 tablet 0        Allergies: Patient has no known allergies.      Anesthesia Evaluation    Patient summary reviewed.    Anesthetic Complications  (+) history of anesthetic complications  History of:  PONV       GI/Hepatic/Renal    Negative GI/hepatic/renal ROS.                             Cardiovascular  Comment: CAD h/o PCI                (+) hypertension     (+) CAD    (+) CABG/stent                            Endo/Other  Comment: Left upper arm melanoma for wide excision, sentinel node biopsy and reconstruction  H/o bilateral mastectomy                                Pulmonary    Negative pulmonary ROS.                       Neuro/Psych      (+) depression                              Past Surgical History:   Procedure Laterality Date    ANGIOPLASTY (CORONARY)      APPENDECTOMY  2023    COLONOSCOPY      COLONOSCOPY & POLYPECTOMY  2003    HYSTERECTOMY  2017    and oophorectomy    AYLA BIOPSY STEREO NODULE 1 SITE LEFT (CPT=19081)  2020    ADH/FEA    AYLA LOCALIZATION WIRE 1 SITE LEFT (CPT=19281)  2020    Benign (core was ADH/FEA)    MASTECTOMY LEFT      MASTECTOMY RIGHT      NEEDLE BIOPSY LEFT      Benign          x2    OTHER SURGICAL HISTORY  10/01/2008    cardiac cath post-procedure date (mild LAD)                    TUBAL LIGATION       Social History     Socioeconomic History    Marital status:     Number of children: 2   Tobacco Use    Smoking status: Former     Packs/day: 1.00     Years: 40.00     Additional pack years: 0.00     Total pack years: 40.00     Types: Cigarettes     Quit date: 2013     Years since quitting: 10.6    Smokeless tobacco: Never    Tobacco comments:     PPD   Vaping Use    Vaping Use: Never used   Substance and Sexual Activity    Alcohol use: No    Drug use: No    Sexual activity: Yes     Partners: Male   Other Topics Concern    Caffeine Concern No    Stress Concern Yes    Weight Concern No    Special Diet No    Exercise Yes    Seat Belt Yes     History   Drug Use No     Available pre-op labs reviewed.  Lab Results   Component Value Date    WBC 6.4 2023    RBC 4.69 2023    HGB 12.8 2023    HCT 40.1 2023    MCV 85.5  12/27/2023    MCH 27.3 12/27/2023    MCHC 31.9 12/27/2023    RDW 14.9 12/27/2023    .0 12/27/2023     Lab Results   Component Value Date     12/27/2023    K 3.6 12/27/2023     12/27/2023    CO2 29.0 12/27/2023    BUN 8 (L) 12/27/2023    CREATSERUM 1.32 (H) 12/27/2023     (H) 12/27/2023    CA 8.5 12/27/2023            Airway      Mallampati: II  Mouth opening: >3 FB  TM distance: > 6 cm  Neck ROM: full Cardiovascular    Cardiovascular exam normal.  Rhythm: regular  Rate: normal  (-) murmur   Dental  Comment: Full upper and lower implants    Dental appliance(s): lower dentures and upper dentures       Pulmonary    Pulmonary exam normal.  Breath sounds clear to auscultation bilaterally.               Other findings        ASA: 2   Plan: general  NPO status verified and patient meets guidelines.        Comment: GA with OETT/LMA explained to patient. Risks/benefits explained including but not limited to sore throat, hoarse voice, nausea, dental trauma, eye injury,pulmonary complication and other complications as discussed in anesthesia consent form. Patient agrees to proceed. Anesthesia consent signed.     Plan/risks discussed with: patient              Present on Admission:  **None**

## 2024-01-09 NOTE — BRIEF OP NOTE
Pre-Operative Diagnosis: Malignant melanoma of arm, left (HCC) [C43.62]     Post-Operative Diagnosis: * No post-op diagnosis entered *      Procedure Performed:   Left upper arm reconstruction with local flap and dermal fat graft (Dorothea)    Surgeon(s) and Role:     * Anila Piedra MD - Primary    Assistant(s):  PA: Ailin Shaikh PA; Charu Stern PA     Surgical Findings: see dictation     Specimen: see pathology     Estimated Blood Loss: Blood Output: 10 mL (1/9/2024  9:00 AM)    WHITNEY Rizzo  1/9/2024  10:45 AM

## 2024-01-09 NOTE — H&P
[facility]        Patient Name:  Dillan Casillas   YOB: 1950   Gender:  Female   Appt Date:  1/9/2024   Provider:  WHITNEY Foley     PATIENT PROVIDERS  Referring Provider: Pierce Uribe DO    Primary Care Provider:Antonio Vargas MD   Address: [unfilled]   Phone #: [phone]       CHIEF COMPLAINT  Surgical Management       PROBLEMS  Reviewed   Patient Active Problem List   Diagnosis    Coronary atherosclerosis    Anxiety state    Pure hypercholesterolemia    Hypertension    Pulmonary fibrosis (HCC)    Abdominal aortic ectasia (HCC)    Reactive depression    Benign essential tremor    Flat epithelial atypia (FEA) of left breast    Ductal hyperplasia, atypical, breast    Exudative age-related macular degeneration of left eye with active choroidal neovascularization (HCC)    Status post bilateral mastectomy    Invasive lobular carcinoma of breast, stage 2, right (HCC)    Osteopenia, senile    Malignant melanoma of arm, left (HCC)    Acute appendicitis with localized peritonitis, without perforation, abscess, or gangrene        History of Present Illness:  Patient is a 72 year old woman who is currently being referred to our clinic for surgical oncology recommendations in regard to a newly diagnosed melanoma of the left arm. Patient has history significant for invasive lobular carcinoma of right breast and DCIS left breast. She is s/p bilateral mastectomy with bilateral SLNB.      7/11/2017: robotic assisted hysterectomy, left salpingo-oophorectomy and frozen section. (Javi) --> benign serous cystadenofibroma, 7.5 cm     9/28/2020: needle localized left breast lumpectomy for atypical ductal hyperplasia (Rony)     1/21/2022: s/p bilateral mastectomy with SLNB for invasive lobular carcinoma of right breast and DCIS of left breast. Single lymph node with rare isolated tumor cells (N0). (Rony)     10/18/2023: Initially the lesion appeared to just be a mound, no color or  pigment, some flaking noted. Seen by Dr. Uribe for mass of the left arm that had been present for year. Per chart review, appeared to be increasing in size over the past 2 months. Confirmed easy bleeding once scraped. Dr Uribe concerned for pyogenic granuloma.      11/13/2023: s/p excision mass of left arm --> invasive nodular melanoma with ulceration (10 mm thickness)     No changes since last visit. She is here for surgical management.         Vital Signs:  /79 (BP Location: Left arm)   Pulse 68   Temp 98.6 °F (37 °C) (Oral)   Resp 16   Ht 1.575 m (5' 2\")   Wt 54.4 kg (120 lb)   SpO2 96%   BMI 21.95 kg/m²      Medications Reviewed:  No current facility-administered medications on file prior to encounter.     Current Outpatient Medications on File Prior to Encounter   Medication Sig Dispense Refill    aspirin 81 MG Oral Chew Tab Chew 1 tablet (81 mg total) by mouth daily.      escitalopram 10 MG Oral Tab Take 1 tablet (10 mg total) by mouth daily. 90 tablet 1    primidone 50 MG Oral Tab Take 1 tablet (50 mg total) by mouth in the morning and 1 tablet (50 mg total) before bedtime. 180 tablet 1    ANASTROZOLE 1 MG Oral Tab tab TAKE 1 TABLET BY MOUTH EVERY DAY (Patient taking differently: at bedtime.) 90 tablet 3    Multiple Vitamin Oral Tab Take 1 tablet by mouth daily.      Acidophilus/Pectin Oral Cap Take 1 capsule by mouth daily.            Allergies Reviewed:  No Known Allergies     History:  Reviewed:  Past Medical History:   Diagnosis Date    Anxiety     Cancer (HCC) 2022    breast cancer- right breast    Cancer (HCC) 2023    left arm melanoma    Depression     Ductal hyperplasia, atypical, breast 08/18/2020    High blood pressure     not on any medication    High cholesterol     Hyperlipidemia     Personal history of colonic polyps     PONV (postoperative nausea and vomiting)     Tremors of nervous system     Visual impairment     glasses      Reviewed:  Past Surgical History:   Procedure  Laterality Date    Angioplasty (coronary)      Appendectomy  2023    Colonoscopy      Colonoscopy & polypectomy  2003    Hysterectomy  2017    and oophorectomy    Ajit biopsy stereo nodule 1 site left (cpt=19081)  2020    ADH/FEA    Aijt localization wire 1 site left (cpt=19281)  2020    Benign (core was ADH/FEA)    Mastectomy left      Mastectomy right      Needle biopsy left      Benign          x2    Other surgical history  10/01/2008    cardiac cath post-procedure date (mild LAD)                    Tubal ligation        Reviewed Social History:  Social History     Socioeconomic History    Marital status:     Number of children: 2   Tobacco Use    Smoking status: Former     Packs/day: 1.00     Years: 40.00     Additional pack years: 0.00     Total pack years: 40.00     Types: Cigarettes     Quit date: 2013     Years since quitting: 10.6    Smokeless tobacco: Never    Tobacco comments:     PPD   Vaping Use    Vaping Use: Never used   Substance and Sexual Activity    Alcohol use: No    Drug use: No    Sexual activity: Yes     Partners: Male   Other Topics Concern    Caffeine Concern No    Stress Concern Yes    Weight Concern No    Special Diet No    Exercise Yes    Seat Belt Yes     Social Determinants of Health     Food Insecurity: No Food Insecurity (2023)    Food Insecurity     Food Insecurity: Never true   Transportation Needs: No Transportation Needs (2023)    Transportation Needs     Lack of Transportation: No   Housing Stability: Low Risk  (2023)    Housing Stability     Housing Instability: No      Reviewed:  Family History   Problem Relation Age of Onset    High Cholesterol Other         siblings    Heart Disease Father         Heart disease    High Blood Pressure Father     Cancer Sister 64        leukemia    Cancer Sister 29        Kidney cancer    Neurological Disorder Sister     Other (als) Sister     Cancer Paternal Uncle 70        breast       Review of Systems:  GENERAL HEALTH: feels well, no fatigue.   SKIN: no change in mole.   HEENT: denies pain  RESPIRATORY: denies shortness of breath, wheezing or cough   CARDIOVASCULAR: denies chest pain, SOB, edema,orthopnea, no palpitations   GI: denies nausea, vomiting, constipation, diarrhea; no rectal bleeding  GENITAL/: no blood in urine  MUSCULOSKELETAL: no joint complaints, no back pain  NEURO: no tingling, numbness, weakness  ENDOCRINE: denies weight loss/gain  PSYCH: no mood changes       Physical Examination:  Constitutional: NAD.   Eyes: Sclera: non-icteric.   Neck: Neck: supple.   Lymph Nodes: Lymph Nodes no cervical LAD, supraclavicular LAD, axillary LAD, or inguinal LAD.   Lungs: Auscultation: breath sounds normal.   Cardiovascular: Heart Auscultation: RRR.   Abdomen: Inspection and Palpation: no masses, tenderness (no guarding, no rebound), or CVA tenderness and soft and non-distended. Liver: no hepatomegaly. Spleen: no splenomegaly. Hernia: none palpable.   Musculoskeletal: Extremities: no edema.   Skin: Biopsy site left upper arm oriented transversely measuring about 6 cm is well-healed.  No satellite lesions.  No in-transit metastasis.  Examination of the scalp and remainder of the skin does not show any evidence for suspicious lesions.     Assessment / Plan:  Malignant melanoma of arm, left   (pT4b cN0)    A/P  Diagnosis: Malignant melanoma of left arm  Surgical Procedure: Wide excision melanoma left upper arm with sentinel lymph node biopsy      There has been no significant change since Dr Montesinos saw patient as documented in [facility].   Surgery revisted.   To proceed as planned.       Patient seen and examined with Dr Montesinos  Electronically Signed by: WHITNEY Foley

## 2024-01-09 NOTE — ANESTHESIA POSTPROCEDURE EVALUATION
[facility]    Dillan Casillas Patient Status:  Hospital Outpatient Surgery   Age/Gender 73 year old female MRN [id number]   Location [facility] SURGERY Attending Zurdo Montesinos MD   Hosp Day # 0 PCP Antonio Vargas MD       Anesthesia Post-op Note    Wide excision melanoma left upper arm with sentinel lymph node biopsy (Salti) Left upper arm reconstruction with local flap (Dorothea)    Procedure Summary       Date: 01/09/24 Room / Location:  MAIN OR 10 / [facility] MAIN OR    Anesthesia Start: 0730 Anesthesia Stop: 1040    Procedures:       Wide excision melanoma left upper arm with sentinel lymph node biopsy (Salti) Left upper arm reconstruction with local flap (Dorothea) (Left)      . (Left) Diagnosis:       Malignant melanoma of arm, left (HCC)      (Malignant melanoma of arm, left (HCC) [C43.62])    Surgeons: Zurdo Montesinos MD; Anila Piedra MD Anesthesiologist: Sebas Reynolds MD    Anesthesia Type: general ASA Status: 2            Anesthesia Type: general    Vitals Value Taken Time   /68 01/09/24 1040   Temp 97.1 01/09/24 1040   Pulse 78 01/09/24 1040   Resp 12 01/09/24 1040   SpO2 98 % 01/09/24 1039   Vitals shown include unfiled device data.    Patient Location: PACU    Anesthesia Type: general    Airway Patency: patent and extubated    Postop Pain Control: adequate    Mental Status: preanesthetic baseline    Nausea/Vomiting: none    Cardiopulmonary/Hydration status: stable euvolemic    Complications: no apparent anesthesia related complications    Postop vital signs: stable    Dental Exam: Unchanged from Preop    Patient to be discharged from PACU when criteria met.

## 2024-01-09 NOTE — DISCHARGE INSTRUCTIONS
Wide Excision of Melanoma Discharge Instructions  Thank you for choosing the Surgical Oncology team at [facility].        Managing Your Pain  Follow the guidelines below to help manage your pain at home:  Take your medications as directed and as needed. You may also take 1000 mg of acetaminophen (Tylenol®) every 6 hours as needed for pain. There is also 325 mg of acetaminophen in Norco. Do not take more than 4,000 mg of acetaminophen in a 24 hour period.   Call our office if the medication prescribed for you does not ease your pain.  Don't drive or drink alcohol while you're taking prescription pain medications.  Keep track of when you take your pain medication. It works best 30 to 45 minutes after you take it.   Caring for Your Incision  It's normal for the skin below your incision to feel numb. This happens because some of the nerves were cut during your surgery. The numbness will go away over time.  Wear ace wrap at all times as tolerated. You can adjust this as needed.    Your surgeon used dissolvable sutures (stitches) underneath your skin, and they will not need to be removed.   The Steri-Strips (small white adhesive strips) on your incision will loosen and fall off by themselves. If they haven't fallen off within 14 days, you can take them off.  If the area around your incision is red, puffy, or if you have any drainage from your incision, contact our office.  Leave drain dressings in place. If water gets underneath these, change them with a new drain dressing.  Wear the sling for comfort. You do not need to wear this at all times.   Showering  You may shower 48 hours after surgery. Before shower, remove ace wrap and gauze wrap. Leave steri-strips in place. After you shower, pat the area dry with a clean towel. Don't rub over your incision. Replace gauze roll and ace wrap after shower.   Avoid tub baths or submerging underwater until your surgeon says it's okay.  Activity  After the procedure,  take it easy for the rest of the day.  If you had general anesthesia, don't use machinery or power tools, drink alcohol, or make any major decisions for at least the first 24 hours.  Avoid lifting more than 5 pounds with your affected arm for at least 4 weeks  Avoid rigorous movement with L arm for at least 4 weeks     When to Call:  Contact our office if you experience the following symptoms:  Fever of 100.4° F (38°C) or higher  Cloudy or smelly drainage from the incision site  The skin around your incision is warm/red/swollen  Sudden increase in pain or new pain  Shaking chills  Fast pulse  Shortness of breath or chest pain  Nausea or vomiting   Diarrhea  Constipation that isn't relieved in 2 days  Any new or unexplained symptoms    We will call you to set up a follow-up appointment.    Please arrive at the following location:  [facility]: [address] [address]  [facility]: [address] [place] [address]  Please call us at [phone] if you are unable to make it to your appointment or if you have any questions.               HOW TO EMPTY YOUR DRAIN    Wash hands with soap and water  Hold drain so plug is upright and release it  Turn drain upside down into measuring container and squeeze drain until all the liquid is removed  While squeezing the drain, replace the plug into drain  Measure the liquid and record the amount in your diary twice a day  Be sure to bring your diary to your next visit with the doctor        You have been given a prescription for Norco 5/325  Norco was Given to you at: 12:15 PM  Next dose due: 6:15 PM    Take this medication as directed  This medication contains Tylenol (acetaminophen)  Do not take additional Tylenol while taking Norco    Norco is a Narcotic and can be constipating or upset your stomach  Don't take Norco on an empty stomach  Drink plenty of water  Alcoholic beverages should be avoided  while taking narcotics

## 2024-01-09 NOTE — BRIEF OP NOTE
Pre-Operative Diagnosis: Malignant melanoma of arm, left (HCC) [C43.62]     Post-Operative Diagnosis: Malignant melanoma of arm, left (HCC) [C43.62]     Procedure Performed:   Wide excision melanoma left upper arm with sentinel lymph node biopsy, left axillary superficial node dissection    Surgeon(s) and Role:  Panel 1:     * Zurdo Montesinos MD - Primary    Assistant(s):  PA: Ailin Shaikh PA     Surgical Findings: See full operative note     Specimen: Yes     Estimated Blood Loss: Blood Output: 10 mL (1/9/2024  9:00 AM)    WHITNEY Foley  1/9/2024  10:09 AM

## 2024-01-10 NOTE — OPERATIVE REPORT
[facility]    PATIENT'S NAME: LUCINDA KAN   ATTENDING PHYSICIAN: Zurdo Montesinos MD   OPERATING PHYSICIAN: Zurdo Montesinos MD   PATIENT ACCOUNT#:   [id number]    LOCATION:  [facility] 23 [facility] 10  MEDICAL RECORD #:   [id number]       YOB: 1950  ADMISSION DATE:       01/09/2024      OPERATION DATE:  01/09/2024    OPERATIVE REPORT      PREOPERATIVE DIAGNOSIS:  Malignant melanoma, left upper arm.    POSTOPERATIVE DIAGNOSIS:  Malignant melanoma, left upper arm.    PROCEDURE:    1.   Wide excision malignant melanoma, left upper arm (4.2 x 10 cm).  2.   [place] lymph node biopsy, left axilla.    3.   Superficial lymph node dissection, left axilla.     ASSISTANT:  Ailin Shaikh PA-C.    ANESTHESIA:  General.    INDICATIONS:  Patient is a 73-year-old woman who was diagnosed with a 10 mm ulcerated melanoma of the left upper arm via an excision.  She presents today for surgical management which had been discussed with her including risks at length.    OPERATIVE TECHNIQUE:  On the day prior to surgery, patient underwent injection of radiolabeled colloid, followed by lymphoscintigraphy showing uptake within the left axilla.  Patient is clinically node negative.    Patient was brought to the operating room and was placed in supine position.  She was given general anesthesia by the anesthesiology service.  Sequential compression boots were placed.  The patient received preoperative intravenous antibiotic prophylaxis.  She was prepped and draped in normal sterile fashion.  A left axillary incision overlying hot transcutaneous site was made and deepened.  We encountered 5 sentinel lymph nodes with increased uptake by the gamma probe.  These were dissected free of their surrounding tissue and sent separately to Pathology for permanent section evaluation.  In the process and in the vicinity, multiple hard but less than 1 cm lymph nodes were encountered, and these were resected as  superficial nodes and sent separately to Pathology for permanent section evaluation.  [place] lymph node #1 had an in vivo count of 613/10 seconds with an ex vivo count of 70/10 seconds.  [place] lymph node #2 had an in vivo count of 1498/10 seconds with an ex vivo count of 1402/10 seconds.  [place] lymph node #3 had an in vivo count of 2910/10 seconds with an ex vivo count of 574/10 seconds.  [place] lymph node #4 had an ex vivo count of 4199/10 seconds.  [place] lymph node #5 had an in vivo count of 678/10 seconds and an ex vivo count of 1749/10 seconds.  Given the dissection, a round small drain was placed within the left axillary space and stitched in place using 3-0 nylon suture.  The deep subcutaneous tissue was reapproximated using 3-0 Vicryl sutures.  Subcutaneous tissue was reapproximated using 3-0 Vicryl suture.  The skin was reapproximated using 4-0 Vicryl suture.  Steri-Strips with Telfa and Tegaderm dressings applied.    Next, a 2 cm margin was marked around the transversely oriented biopsy site of the left upper arm, and an incision measuring 4.2 x 10 cm was made and deepened to underlying muscle fascia.  The specimen was excised in toto, oriented, and sent to Pathology for permanent section evaluation.  Hemostasis was achieved and maintained.    At this point, Dr. Piedra presented for wound closure, and her portion of the dictation should be noted elsewhere.    Patient tolerated my portion of the procedure well without immediate complications.    Wide Local Excision for Primary Cutaneous Melanoma - Excision 1 (Upper Arm - Left)  Operation performed with curative intent Yes   Original [place] thickness of the lesion  10 mm (to the tenth of a millimeter)   Clinical margin width  (measured from the edge of the lesion or the prior excision scar) 2 cm   Depth of excision Full-thickness skin/subcutaneous tissue down to fascia (melanoma)       Dictated By Zurdo Montesinos MD  d: 01/09/2024  17:12:39  t: 01/10/2024 00:29:28  Job [id number]  [facility]/

## 2024-01-10 NOTE — OPERATIVE REPORT
[facility]    PATIENT'S NAME: LUCINDA KAN   ATTENDING PHYSICIAN: Zurdo Montesinos MD   OPERATING PHYSICIAN: Anila Piedra MD   PATIENT ACCOUNT#:   [id number]    LOCATION:  [facility] 23 [facility] 10  MEDICAL RECORD #:   [id number]       YOB: 1950  ADMISSION DATE:       01/09/2024      OPERATION DATE:  01/09/2024    OPERATIVE REPORT    PREOPERATIVE DIAGNOSIS:  Melanoma, left upper arm; open wound, left upper arm, 7 x 9.5 cm.    POSTOPERATIVE DIAGNOSIS:  Melanoma, left upper arm; open wound, left upper arm, 7 x 9.5 cm.   PROCEDURE:  Left upper arm reconstruction with local rotation advancement flap (160 sq cm), dermal fat graft.    ASSISTANT:  Charu Stern PA-C.    ANESTHESIA:  General endotracheal anesthesia.      COMPLICATIONS:  None.    BLOOD LOSS:  Minimal.    INDICATIONS:  This is a 73-year-old female with a left upper arm melanoma who was seen in the office by Dr. Montesinos to discuss wide local excision and sentinel lymph node biopsy and saw me to discuss reconstructive options.  We discussed local flap options as well as Integra skin graft options or combination of all.  Potential risks, complications, benefits, and alternatives were discussed.  Risks and complications including but not limited to infection, bleeding, scarring, damage to surrounding structures, flap failure, graft failure, numbness of the area, need for further surgery.  The patient understands and wishes to proceed.  Questions were answered.    OPERATIVE TECHNIQUE:  The patient was identified in the preoperative area by Dr. Montesinos and taken to the operating room.  I was called into the room after he had completed his portion.  At that time, the patient was under general anesthesia, in a stable condition, sterilely prepped and draped, and the left upper arm showed a defect on the lateral aspect of 9.5 x 7 cm.  Then, a rotation advancement flap (160 sq cm) was designed in the posterolateral area, was marked,  and 0.25% Sensorcaine was injected surrounding the incision site, a total of 10 mL.  Then, a 15-blade was used to make the incision, and the incision was curved along the posterior aspect towards the posterior axillary area.  Then, the back-cut was performed, and then the flap was elevated in the subcutaneous plane above the muscle fascia.  Small perforators were preserved.  The flap was then inset into the defect over a 15-round PALMER suction drain.  The inset was performed using 3-0 Vicryl sutures for the deep dermal layer and 4-0 Monocryl subcuticular sutures for the skin.  Excess skin was excised from the posterior rotation advancement area, and subcutaneous dermal fat graft from that area was used for the central indentation at the flap inset.  This was placed underneath to improve the indentation.  In addition to that, excess skin from the 3 o'clock tip as well as the 9 o'clock position were excised and sent for permanent evaluation.  Steri-Strips were applied.  Kerlix and an Ace bandage were applied.  The patient tolerated the procedure well and awoke from anesthesia without difficulty.  All sponge, instrument, and needle counts were correct at the end of the case.    Dictated By Anila Piedra MD  d: 01/09/2024 17:31:10  t: 01/10/2024 07:58:37  Job [id number]  [facility]/

## 2024-01-11 ENCOUNTER — TELEPHONE (OUTPATIENT)
Dept: SURGERY | Facility: CLINIC | Age: 74
End: 2024-01-11

## 2024-01-11 NOTE — TELEPHONE ENCOUNTER
Called to check on patient after hospital procedure.  Pt stated she is doing well with no concerns.  No fevers, nausea or vomiting.  Surgical dressings intact with no drainage.  Confirmed post op appointments for Monday.

## 2024-01-12 ENCOUNTER — TELEPHONE (OUTPATIENT)
Dept: SURGERY | Facility: CLINIC | Age: 74
End: 2024-01-12

## 2024-01-12 NOTE — TELEPHONE ENCOUNTER
Addendum reviewed.  2 positive sentinel lymph nodes.  Discussed with patient over the phone.  We are seeing her next week in surgical follow-up.

## 2024-01-15 ENCOUNTER — OFFICE VISIT (OUTPATIENT)
Dept: SURGERY | Facility: CLINIC | Age: 74
End: 2024-01-15
Payer: MEDICARE

## 2024-01-15 DIAGNOSIS — C77.9 MALIGNANT MELANOMA METASTATIC TO LYMPH NODE (HCC): ICD-10-CM

## 2024-01-15 DIAGNOSIS — C43.62 MALIGNANT MELANOMA OF ARM, LEFT (HCC): Primary | ICD-10-CM

## 2024-01-15 PROCEDURE — 99024 POSTOP FOLLOW-UP VISIT: CPT

## 2024-01-15 NOTE — PROGRESS NOTES
Dillan Casillas is a 73 year old female who presents today for a follow-up after wide excision malignant melanoma, left upper arm (4.2 x 10 cm), sentinel lymph node biopsy, left axilla, superficial lymph node dissection, left axilla with Dr. Montesinos and Left upper arm reconstruction with local rotation advancement flap (160 sq cm), dermal fat graft with Dr. Piedra on 1/9/2024.    She denies fever and chills. She denies nausea, vomiting, diarrhea or constipation.   Her pain is controlled.      Physical Exam     Left Upper Arm: Incision is clean, dry, intact.  There is no evidence of hematoma or seroma.  Flap appears to be well-perfused.  Flap site is without erythema.  Drain site is clean, dry, intact.  Serosanguineous fluid present.    There were no vitals filed for this visit.      Assessment and Plan     Dillan Casillas is doing well s/p wide excision malignant melanoma, left upper arm (4.2 x 10 cm), sentinel lymph node biopsy, left axilla, superficial lymph node dissection, left axilla with Dr. Montesinos and Left upper arm reconstruction with local rotation advancement flap (160 sq cm), dermal fat graft with Dr. Piedra on 1/9/2024.    Patient is here today for wound check.  The left upper arm incision was redressed with new Steri-Strips.  The left upper arm drain was left in place today.  A new dressing of a Biopatch and Tegaderm was placed. Drain care and parameters were reviewed with the patient and her .  They were instructed to contact the office when the drain is ready for removal.    The left upper extremity was wrapped in an Ace bandage from the elbow to the shoulder.  Compression and activity guidelines were reviewed with the patient and her .  She is following up with Dr. Piedra on 1-24.  She was encouraged to contact the office with any questions or concerns.    Questions were answered. Patient understands.     ARLIN Archuleta  1/15/2024  1:32 PM

## 2024-01-15 NOTE — PROGRESS NOTES
[facility]        Patient Name:  Dillan Casillas   YOB: 1950   Gender:  Female   Appt Date:  1/15/2024   Provider:  WHITNEY Foley     PATIENT PROVIDERS  Referring Provider: Pierce Uribe DO    Primary Care Provider:Antonio Vargas MD   Address: [address] [place] [id number]   Phone #: [phone]       CHIEF COMPLAINT  Chief Complaint   Patient presents with    Post-Op     Wide excision melanoma left arm with SLNB        PROBLEMS  Reviewed   Patient Active Problem List   Diagnosis    Coronary atherosclerosis    Anxiety state    Pure hypercholesterolemia    Hypertension    Pulmonary fibrosis (HCC)    Abdominal aortic ectasia (HCC)    Reactive depression    Benign essential tremor    Flat epithelial atypia (FEA) of left breast    Ductal hyperplasia, atypical, breast    Exudative age-related macular degeneration of left eye with active choroidal neovascularization (HCC)    Status post bilateral mastectomy    Invasive lobular carcinoma of breast, stage 2, right (HCC)    Osteopenia, senile    Malignant melanoma of arm, left (HCC)    Acute appendicitis with localized peritonitis, without perforation, abscess, or gangrene        History of Present Illness:  Patient is a 72 year old woman who was referred to our clinic for surgical oncology recommendations in regard to a newly diagnosed melanoma of the left arm. Patient has history significant for invasive lobular carcinoma of right breast and DCIS left breast. She is s/p bilateral mastectomy with bilateral SLNB.     7/11/2017: robotic assisted hysterectomy, left salpingo-oophorectomy and frozen section. ([place]) --> benign serous cystadenofibroma, 7.5 cm     9/28/2020: needle localized left breast lumpectomy for atypical ductal hyperplasia ([place])     1/21/2022: s/p bilateral mastectomy with SLNB for invasive lobular carcinoma of right breast and DCIS of left breast. Single lymph node with rare isolated tumor  cells (N0). ([place])     10/18/2023: Initially the lesion appeared to just be a mound, no color or pigment, some flaking noted. Seen by Dr. Uribe for mass of the left arm that had been present for year. Per chart review, appeared to be increasing in size over the past 2 months. Confirmed easy bleeding once scraped. Dr Uribe concerned for pyogenic granuloma.      11/13/2023: s/p excision mass of left arm --> invasive nodular melanoma with ulceration (10 mm thickness)    1/9/2024: s/p wide excision melanoma left upper arm with left SLNB and reconstruction by Dr Piedra --> margins negative, no residual melanoma, 2/6 LN positive for metastatic melanoma.     No redness or drainage from incision site. Her pain is controlled on tylenol. No fevers or chills. Drain output has been serous and < 1 mL for 3 days.        Medications Reviewed:    Current Outpatient Medications:     HYDROcodone-acetaminophen 5-325 MG Oral Tab, Take 1 tablet by mouth every 6 (six) hours as needed for Pain., Disp: 20 tablet, Rfl: 0    ATORVASTATIN 10 MG Oral Tab, TAKE 1 TABLET BY MOUTH EVERY DAY AT NIGHT, Disp: 90 tablet, Rfl: 0    aspirin 81 MG Oral Chew Tab, Chew 1 tablet (81 mg total) by mouth daily., Disp: , Rfl:     escitalopram 10 MG Oral Tab, Take 1 tablet (10 mg total) by mouth daily., Disp: 90 tablet, Rfl: 1    primidone 50 MG Oral Tab, Take 1 tablet (50 mg total) by mouth in the morning and 1 tablet (50 mg total) before bedtime., Disp: 180 tablet, Rfl: 1    ANASTROZOLE 1 MG Oral Tab tab, TAKE 1 TABLET BY MOUTH EVERY DAY (Patient taking differently: at bedtime.), Disp: 90 tablet, Rfl: 3    Multiple Vitamin Oral Tab, Take 1 tablet by mouth daily., Disp: , Rfl:     Acidophilus/Pectin Oral Cap, Take 1 capsule by mouth daily., Disp: , Rfl:      Allergies Reviewed:  No Known Allergies     History:  Reviewed:  Past Medical History:   Diagnosis Date    Anxiety     Cancer (HCC) 2022    breast cancer- right breast    Cancer (HCC) 2023    left arm  melanoma    Depression     Ductal hyperplasia, atypical, breast 2020    High blood pressure     not on any medication    High cholesterol     Hyperlipidemia     Personal history of colonic polyps     PONV (postoperative nausea and vomiting)     Tremors of nervous system     Visual impairment     glasses      Reviewed:  Past Surgical History:   Procedure Laterality Date    Angioplasty (coronary)      Appendectomy  2023    Colonoscopy      Colonoscopy & polypectomy  2003    Hysterectomy  2017    and oophorectomy    Ajit biopsy stereo nodule 1 site left (cpt=19081)  2020    ADH/FEA    Ajit localization wire 1 site left (cpt=19281)  2020    Benign (core was ADH/FEA)    Mastectomy left      Mastectomy right      Needle biopsy left      Benign          x2    Other surgical history  10/01/2008    cardiac cath post-procedure date (mild LAD)                    Tubal ligation        Reviewed Social History:  Social History     Socioeconomic History    Marital status:     Number of children: 2   Tobacco Use    Smoking status: Former     Packs/day: 1.00     Years: 40.00     Additional pack years: 0.00     Total pack years: 40.00     Types: Cigarettes     Quit date: 2013     Years since quitting: 10.7    Smokeless tobacco: Never    Tobacco comments:     PPD   Vaping Use    Vaping Use: Never used   Substance and Sexual Activity    Alcohol use: No    Drug use: No    Sexual activity: Yes     Partners: Male   Other Topics Concern    Caffeine Concern No    Stress Concern Yes    Weight Concern No    Special Diet No    Exercise Yes    Seat Belt Yes     Social Determinants of Health     Food Insecurity: No Food Insecurity (2023)    Food Insecurity     Food Insecurity: Never true   Transportation Needs: No Transportation Needs (2023)    Transportation Needs     Lack of Transportation: No   Housing Stability: Low Risk  (2023)    Housing Stability     Housing Instability: No       Reviewed:  Family History   Problem Relation Age of Onset    High Cholesterol Other         siblings    Heart Disease Father         Heart disease    High Blood Pressure Father     Cancer Sister 64        leukemia    Cancer Sister 29        Kidney cancer    Neurological Disorder Sister     Other (als) Sister     Cancer Paternal Uncle 70        breast      Review of Systems:  Doing well post-operatively  Denies fever or chills  Denies chest pain or SOB  Denies abdominal pain or N/V  Denies dysuria or hematuria  Denies warmth, erythema, or drainage at incision       Physical Examination:  Constitutional: NAD  Eyes: Sclera: non-icteric.   Neck: Neck: supple.   Lymph Nodes: Drain in place with serous output. Incision site healing well with no signs of erythema or drainage.   Lungs: No increased work of breathing.   Abdomen: Non-distended  Skin: Left arm incision site healing well with no erythema or drainage. Drain in place with SS output.      Document Review:  Final Diagnosis:   A.  Left axilla sentinel lymph node #1, excision:  -One lymph node, negative for metastatic melanoma (0/1).  -See comment.     B.  Left axilla sentinel lymph node #2, excision:  -Metastatic melanoma involving 1 lymph node (1/1).  -Largest metastatic deposit measures 1.5 mm in greatest dimension.  -No evidence of extracapsular extension.  -See comment.     C.  Left axilla sentinel lymph node #3, excision:  -One lymph node, negative for metastatic melanoma (0/1).  -See comment.     D.  Left axilla sentinel lymph node #4, excision:  -Metastatic melanoma involving 1 lymph node (1/1).  -Largest metastatic deposit measures 3 mm in greatest dimension.  -No evidence of extracapsular extension.  -See comment.     E.  Left axilla sentinel lymph node #5, excision:  -One lymph node, negative for metastatic melanoma (0/1).  -See comment.     F.  Left axilla non-sentinel lymph node superficial, excision:  -One lymph node, negative for metastatic melanoma  (0/1).     G.  Skin, left upper arm, re-excision:  -Scar, consistent with prior procedure, completely excised.  -No residual malignancy.     H.  Skin, left upper arm 3:00, excision:  -Skin with no evidence of malignancy.     I.  Skin, left upper arm 8:00-10:00, excision:  -Skin with no evidence of malignancy.        Procedure(s):  Left Axillary Drain Removal     Assessment / Plan:    ICD-10-CM    1. Malignant melanoma of arm, left (HCC)  C43.62       - Patient doing well post operatively  - No acute surgical issues  - Pathology reviewed with patient and . She expressed understanding. Informed that her case would be presented at our tumor board this Wednesday. Informed her that we will be reaching out to our medical oncology colleagues for consideration of immunotherapy. She agreed. Informed that final recommendations will be made on Wednesday.   - PET scan performed pre operatively with no signs of disease spread beyond axilla. Order placed for MRI of brain.   - Left axillary drain removed. No issues.  - Informed of signs and symptoms to call the office including redness or drainage from incision sites.       Follow Up:  With medical oncology for consideration of immunotherapy       Electronically Signed by: WHITNEY Foley

## 2024-01-18 ENCOUNTER — NURSE ONLY (OUTPATIENT)
Dept: SURGERY | Facility: CLINIC | Age: 74
End: 2024-01-18
Payer: MEDICARE

## 2024-01-18 ENCOUNTER — TELEPHONE (OUTPATIENT)
Dept: SURGERY | Facility: CLINIC | Age: 74
End: 2024-01-18

## 2024-01-18 ENCOUNTER — HOSPITAL ENCOUNTER (OUTPATIENT)
Dept: MRI IMAGING | Facility: HOSPITAL | Age: 74
Discharge: HOME | End: 2024-01-18
Payer: MEDICARE

## 2024-01-18 DIAGNOSIS — C43.62 MALIGNANT MELANOMA OF ARM, LEFT (HCC): ICD-10-CM

## 2024-01-18 DIAGNOSIS — C76.42: ICD-10-CM

## 2024-01-18 DIAGNOSIS — C77.9 MALIGNANT MELANOMA METASTATIC TO LYMPH NODE (HCC): Primary | ICD-10-CM

## 2024-01-18 DIAGNOSIS — C77.9 MALIGNANT MELANOMA METASTATIC TO LYMPH NODE (HCC): ICD-10-CM

## 2024-01-18 PROCEDURE — 99024 POSTOP FOLLOW-UP VISIT: CPT | Performed by: SURGERY

## 2024-01-18 PROCEDURE — A9575 INJ GADOTERATE MEGLUMI 0.1ML: HCPCS

## 2024-01-18 PROCEDURE — 70553 MRI BRAIN STEM W/O & W/DYE: CPT

## 2024-01-18 RX ORDER — GADOTERATE MEGLUMINE 376.9 MG/ML
15 INJECTION INTRAVENOUS
Status: COMPLETED | OUTPATIENT
Start: 2024-01-18 | End: 2024-01-18

## 2024-01-18 RX ADMIN — GADOTERATE MEGLUMINE 15 ML: 376.9 INJECTION INTRAVENOUS at 10:59:00

## 2024-01-18 NOTE — PROGRESS NOTES
Dillan Casillas is a 73 year old female who presents today for a follow-up after wide excision malignant melanoma, left upper arm (4.2 x 10 cm), sentinel lymph node biopsy, left axilla, superficial lymph node dissection, left axilla with Dr. Montesinos and Left upper arm reconstruction with local rotation advancement flap (160 sq cm), dermal fat graft with Dr. Piedra on 1/9/2024.     She denies fever and chills. She denies nausea, vomiting, diarrhea or constipation.   Her pain is controlled.          Physical Exam      Left Upper Arm: Incision is clean, dry, intact.  There is no evidence of hematoma or seroma. Flap is viable.  Flap site is without erythema.  Drain site is clean, dry, intact.  Serosanguineous fluid present.     There were no vitals filed for this visit.        Assessment and Plan      Dillan Casillas is doing well s/p wide excision malignant melanoma, left upper arm (4.2 x 10 cm), sentinel lymph node biopsy, left axilla, superficial lymph node dissection, left axilla with Dr. Montesinos and Left upper arm reconstruction with local rotation advancement flap (160 sq cm), dermal fat graft with Dr. Piedra on 1/9/2024.     Patient is here today for wound check and drain check    The left upper arm drain was removed due to low output.  A drain site dressing of neosporin, 2x2 gauze and Tegaderm was placed. She and her  were instructed to change drain site if wet or soiled until healed.     The inferior central aspect of the incision had minimal superficial wound healing delay. The area was dressed with neosporin and telfa. She will change this dressing daily.  The left upper arm incision was redressed with new Steri-Strips. ACE  wrap applied from elbow to shoulder. She will continue with compression.     She will be meeting with Dr. Garsia on 1/30/24 to discuss her treatment options.     Compression and activity guidelines were reviewed with the patient and her .  She is following up with Dr. Piedra on  1/224 for scar check.  She was encouraged to contact the office with any questions or concerns.     Questions were answered. Patient understands.    Antonia Galeas RN  1/18/24  2:50pm

## 2024-01-18 NOTE — TELEPHONE ENCOUNTER
Called patient to inform her of tumor board recommendations. Discussed that since she had positive sentinel lymph node, we will monitor for progression with ultrasounds of the left axilla every 4 months per MSLT2 trial. Patient expressed understanding. She is scheduled with Dr Garsia to discuss immunotherapy on 1/30/2024. Patient getting brain MRI today. Will call once results are finalized.       WHITNEY Foley

## 2024-01-19 NOTE — PROGRESS NOTES
Dillan Casillas is a 73 year old female who presents today for a follow-up after wide excision malignant melanoma, left upper arm (4.2 x 10 cm), sentinel lymph node biopsy, left axilla, superficial lymph node dissection, left axilla with Dr. Montesinos and Left upper arm reconstruction with local rotation advancement flap (160 sq cm), dermal fat graft on 1/9/2024. She denies fever and chills. She denies nausea, vomiting, diarrhea or constipation. Her pain is controlled.      Physical Exam     Left upper arm incisions clean, dry and intact without wound drainage or wound dehiscence.  Flap viable.  No evidence of hematoma or seroma.  No erythema.     There were no vitals filed for this visit.      Assessment and Plan     Dillan Casillas is doing well s/p wide excision malignant melanoma, left upper arm (4.2 x 10 cm), sentinel lymph node biopsy, left axilla, superficial lymph node dissection, left axilla with Dr. Montesinos and Left upper arm reconstruction with local rotation advancement flap (160 sq cm), dermal fat graft on 1/9/2024.    She can begin scar management. We reviewed options for scar management including vitamin E oil, silicone products, scar massage and sun protection/sun block. She will follow up as needed    Questions were answered. Patient understands.

## 2024-01-24 ENCOUNTER — OFFICE VISIT (OUTPATIENT)
Dept: SURGERY | Facility: CLINIC | Age: 74
End: 2024-01-24
Payer: MEDICARE

## 2024-01-24 DIAGNOSIS — G25.0 ESSENTIAL TREMOR: ICD-10-CM

## 2024-01-24 DIAGNOSIS — C43.62 MALIGNANT MELANOMA OF ARM, LEFT (HCC): Primary | ICD-10-CM

## 2024-01-24 PROCEDURE — 99024 POSTOP FOLLOW-UP VISIT: CPT | Performed by: SURGERY

## 2024-01-24 RX ORDER — PRIMIDONE 50 MG/1
50 TABLET ORAL 2 TIMES DAILY
Qty: 180 TABLET | Refills: 1 | Status: SHIPPED | OUTPATIENT
Start: 2024-01-24 | End: 2024-07-22

## 2024-01-24 NOTE — TELEPHONE ENCOUNTER
No protocol     OV 01/05/24  REFILL 08/02/23 #180 +1 RF    Future Appointments   Date Time Provider Department Center   1/30/2024  1:00 PM Ben Garsia MD [facility]   5/16/2024  2:00 PM Elan Short MD [facility]   5/22/2024 11:30 AM Anila Piedra MD [id number]

## 2024-01-25 DIAGNOSIS — C43.62 MALIGNANT MELANOMA OF ARM, LEFT (HCC): Primary | ICD-10-CM

## 2024-01-30 ENCOUNTER — OFFICE VISIT (OUTPATIENT)
Dept: HEMATOLOGY/ONCOLOGY | Facility: HOSPITAL | Age: 74
End: 2024-01-30
Attending: INTERNAL MEDICINE
Payer: MEDICARE

## 2024-01-30 VITALS
HEART RATE: 101 BPM | OXYGEN SATURATION: 97 % | DIASTOLIC BLOOD PRESSURE: 74 MMHG | SYSTOLIC BLOOD PRESSURE: 119 MMHG | BODY MASS INDEX: 22 KG/M2 | WEIGHT: 119.38 LBS | TEMPERATURE: 98 F | RESPIRATION RATE: 16 BRPM

## 2024-01-30 DIAGNOSIS — C50.811 MALIGNANT NEOPLASM OF OVERLAPPING SITES OF RIGHT BREAST IN FEMALE, ESTROGEN RECEPTOR POSITIVE  (HCC): ICD-10-CM

## 2024-01-30 DIAGNOSIS — M85.80 OSTEOPENIA, SENILE: ICD-10-CM

## 2024-01-30 DIAGNOSIS — Z17.0 MALIGNANT NEOPLASM OF OVERLAPPING SITES OF RIGHT BREAST IN FEMALE, ESTROGEN RECEPTOR POSITIVE  (HCC): ICD-10-CM

## 2024-01-30 DIAGNOSIS — C43.62 MALIGNANT MELANOMA OF ARM, LEFT (HCC): Primary | ICD-10-CM

## 2024-01-30 PROCEDURE — 99215 OFFICE O/P EST HI 40 MIN: CPT | Performed by: INTERNAL MEDICINE

## 2024-01-30 NOTE — PROGRESS NOTES
Cancer Center Progress Note    Problem List:      Patient Active Problem List   Diagnosis    Coronary atherosclerosis    Anxiety state    Pure hypercholesterolemia    Hypertension    Pulmonary fibrosis (HCC)    Abdominal aortic ectasia (HCC)    Reactive depression    Benign essential tremor    Flat epithelial atypia (FEA) of left breast    Ductal hyperplasia, atypical, breast    Exudative age-related macular degeneration of left eye with active choroidal neovascularization (HCC)    Status post bilateral mastectomy    Invasive lobular carcinoma of breast, stage 2, right (HCC)    Osteopenia, senile    Malignant melanoma of arm, left (HCC)    Acute appendicitis with localized peritonitis, without perforation, abscess, or gangrene       Interim History:    Dillan Casillas presents today for evaluation and management of a diagnosis of right breast cancer.    She has been on Anastrozole 1 mg daily since 3/2022. She has no significant hot flashes. She has no pain. She has no dyspnea or cough. She has no fever. She has a good energy level and appetite.     She had bilateral mastectomy on 1/21/2022. The left breast had DCIS on her biopsy on 12/30/2021 but there was no residual cancer in the left mastectomy. The left SLN was negative. The right breast had an invasive lobular carcinoma, grade II, measuring 2.2 cm. The margins were negative. The right SLN procedure had one node with isolated tumor cells and another node that was negative. She had Oncotype Dx testing with a RS of 26.     Review of Systems:   Constitutional: Negative for anorexia, fatigue, fevers, chills, night sweats and weight loss.  Psychiatric: The patient's mood was calm and appropriate for this visit.  The pertinent positives and negatives were described. All other systems were negative.    PMH/PSH:  Past Medical History:   Diagnosis Date    Anxiety     Cancer (HCC) 2022    breast cancer- right breast    Cancer (HCC) 2023    left arm melanoma     Depression     Ductal hyperplasia, atypical, breast 2020    High blood pressure     not on any medication    High cholesterol     Hyperlipidemia     Personal history of colonic polyps     PONV (postoperative nausea and vomiting)     Tremors of nervous system     Visual impairment     glasses       Past Surgical History:   Procedure Laterality Date    ANGIOPLASTY (CORONARY)      APPENDECTOMY  2023    COLONOSCOPY      COLONOSCOPY & POLYPECTOMY  2003    HYSTERECTOMY  2017    and oophorectomy    AYLA BIOPSY STEREO NODULE 1 SITE LEFT (CPT=19081)  2020    ADH/FEA    AYLA LOCALIZATION WIRE 1 SITE LEFT (CPT=19281)  2020    Benign (core was ADH/FEA)    MASTECTOMY LEFT      MASTECTOMY RIGHT      NEEDLE BIOPSY LEFT      Benign          x2    OTHER SURGICAL HISTORY  10/01/2008    cardiac cath post-procedure date (mild LAD)                    TUBAL LIGATION         Family History Reviewed:  Family History   Problem Relation Age of Onset    High Cholesterol Other         siblings    Heart Disease Father         Heart disease    High Blood Pressure Father     Cancer Sister 64        leukemia    Cancer Sister 29        Kidney cancer    Neurological Disorder Sister     Other (als) Sister     Cancer Paternal Uncle 70        breast       Allergies:     No Known Allergies    Medications:   anastrozole 1 MG Oral Tab tab Take 1 tablet (1 mg total) by mouth daily. 90 tablet 3    primidone 50 MG Oral Tab Take 1 tablet (50 mg total) by mouth in the morning and 1 tablet (50 mg total) before bedtime. 180 tablet 1    ATORVASTATIN 10 MG Oral Tab TAKE 1 TABLET BY MOUTH EVERY DAY AT NIGHT 90 tablet 0    aspirin 81 MG Oral Chew Tab Chew 1 tablet (81 mg total) by mouth daily.      escitalopram 10 MG Oral Tab Take 1 tablet (10 mg total) by mouth daily. 90 tablet 1    Multiple Vitamin Oral Tab Take 1 tablet by mouth daily.      Acidophilus/Pectin Oral Cap Take 1 capsule by mouth daily.           Vital Signs:      Height:  --  Weight: 54.2 kg (119 lb 6.4 oz) (01/30 1253)  BSA (Calculated - sq m): --  Pulse: 101 (01/30 1253)  BP: 119/74 (01/30 1253)  Temp: 97.5 °F (36.4 °C) (01/30 1253)  Do Not Use - Resp Rate: --  SpO2: 97 % (01/30 1253)      Performance Status:  ECOG 0: Fully active, able to carry on all pre-disease performance without restriction     Physical Examination:    Constitutional: Patient is alert and oriented x 3, not in acute distress.  Eyes: Anicteric sclera, pink conjunctiva.  HEENT:  Oropharynx is clear. Neck is supple.  Respiratory: Clear to auscultation and percussion. No rales.  No wheezes.  Cardiovascular: Regular rate and rhythm. No murmurs.  Gastrointestinal: Soft, non tender with good bowel sounds.  Musculoskeletal: No edema. No calf tenderness.  Neurological: Grossly intact without focal motor or sensory deficit.  Skin: No suspicious skin lesion, no rash, no ulceration.  Lymphatics: There is no palpable lymphadenopathy throughout in the cervical, supraclavicular, or axillary regions.  Psychiatric: The patient's mood is calm and appropriate for this visit.       Labs reviewed at this visit:     Lab Results   Component Value Date    WBC 6.4 12/27/2023    RBC 4.69 12/27/2023    HGB 12.8 12/27/2023    HCT 40.1 12/27/2023    MCV 85.5 12/27/2023    MCH 27.3 12/27/2023    MCHC 31.9 12/27/2023    RDW 14.9 12/27/2023    .0 12/27/2023     Lab Results   Component Value Date     12/27/2023    K 3.6 12/27/2023     12/27/2023    CO2 29.0 12/27/2023    BUN 8 (L) 12/27/2023    CREATSERUM 1.32 (H) 12/27/2023     (H) 12/27/2023    CA 8.5 12/27/2023    ALKPHO 55 12/27/2023    ALT 12 (L) 12/27/2023    AST 15 12/27/2023    BILT 0.3 12/27/2023    ALB 3.3 (L) 12/27/2023    TP 7.4 12/27/2023       Radiologic imaging reviewed at this visit:    Bone Dexa Scan on 5/5/2022:  LUMBAR SPINE ANALYSIS RESULTS:       Bone mineral density (BMD) (g/cm2):  0.487     Lumbar T-Score:  -5.1       % young normals:  46        % age matched controls:  61       Change from prior spine examination:  n/a                TOTAL HIP ANALYSIS RESULTS:         Bone mineral density (BMD) (g/cm2):  0.446       Total Hip T-Score:  -4.1       % young normals:  47       % age matched controls:  61       Change from prior hip examination:  n/a                FEMORAL NECK ANALYSIS RESULTS:         Bone mineral density (BMD) (g/cm2):  0.388       Femoral neck T-Score:  -4.2       % young normals:  46       % age matched controls:  62       Change from prior hip examination:  n/a           ADDITIONAL FINDINGS:  No significant additional findings.        Impression   CONCLUSION:  Bone mineral density values are compatible with the diagnosis of osteoporosis by WHO definition (T score less than -2.5). If therapy is initiated, a follow-up study in 1 year may aid in evaluation of therapeutic efficacy.        MRI of breast on 12/30/2021:  DESCRIPTION:  Witnessed verbal and written informed consent was obtained.  Time out was performed by the staff.  Discussed benefits and risks of MR guided core needle biopsy including, but not limited to, bleeding, infection, and/or failure to obtain   adequate sample. The patient was placed prone on the MRI Scanner.  The left breast was positioned in a dedicated breast coil and MR guidance grid device.   A fiducial was placed on the grid.       The skin was prepped and after local anesthesia was achieved, an introducer sheath and a localizing obturator were placed using a lateral approach at both sites.  The location of the obturator sheaths was confirmed with imaging.       The patient was then taken out of the bore of the magnet and samples were obtained using an MR compatible vacuum assisted core biopsy device at both sites.  A regular size needle was used for site 1 and a petite sized needle was used for site 2. Six   samples were taken at site 1, and 12 samples were taken at site 2. A tissue marker was placed at both  sites.  Post biopsy images were obtained which confirm pot biopsy changes at the targets of the biopsy.         Post procedural mammogram performed on separate digital equipment show the clips in appropriate position.       The patient tolerated the procedure well and left the department in stable condition.       BIOPSY NEEDLE:            9-gauge SurMatter and Form biopsy device   MEDICATION:               1% lidocaine and 1% lidocaine containing epinephrine     SITE MARKER COORDINATES:     SITE 1:  Non-mass enhancement 7 o'clock  left breast   SITE 2:  0800 hours left breast   CLIP TYPE:                SITE 1:  M  SITE 2:  X   PHYSICIANS PRESENT:       Jaquelin Cole M.D.         PATHOLOGY:   A.  Left breast, 7:00, MRI guided biopsy:   -Breast tissue with nodular adenosis, stromal fibrosis, secretory change, apocrine metaplasia and microcalcifications.   -See comment.       B.  Left breast, 8:00, MRI guided biopsy:   -Ductal carcinoma in situ, nuclear grade 2, solid and cribriform types.   -Largest focus of DCIS measures 5 mm (0.5 cm) in greatest dimension.   -Background fibroadenomatous change, apocrine metaplasia, sclerosing adenosis and microcalcifications.   -See comment.      Impression   CONCLUSION:     MRI-guided biopsy of two areas in the left breast.       Pathology from the 1st site at the 7 o'clock position is reported as benign.  This is likely concordant with the imaging assessment and is marked with an M clip.       Pathology from the 2nd site at the 8 o'clock position shows the presence of DCIS.  This is concordant with the imaging assessment and is marked with an X clip.       Surgical consultation is recommended for the bilateral biopsy proven malignancies.  Per the patient's electronic medical record, she is planning on a bilateral mastectomy.         Assessment/Plan:     Invasive lobular carcinoma of the right breast 12 o'clock (overlapping quadrant):  Biopsy on 11/17/2021  ER 90%  MN <1%  K--67 5%  Her2  1+  Bilateral mastectomy on 1/21/2022:  Right Breast with Invasive lobular carcinoma  Measuring 2.2 cm  SLN 0/2 (one with ITC)  Negative margins  Left breast with no residual DCIS  0.5 cm DCIS biopsy on 12/30/2021  ER/AL positive  Right breast Oncotype Dx RS 26    She has started anastrozole. She does have severe osteoporosis. She continues to follow with endocrine. She has OPAL on the AI. I will see her in six months. She has no side effects from the AI. She is comfortable with this plan.    Severe Osteoporosis:    She is following with endocrinology. She has had dental work with teeth extractions. The plan is to start treatment today with Reclast.    Ben Garsia MD

## 2024-01-30 NOTE — PROGRESS NOTES
Patient is here for follow up with a new diagnosis of melanoma to her left upper arm.  She has had a full excision of the area and pathology reports are available.  She is no longer having pain post procedure.    She is feeling well overall.  She denies any fever, cough or shortness of breath.  She is not having pain anywhere else.   She is here to discuss any recommendations for treatment.     Education Record    Learner:  Patient and Spouse    Disease / Diagnosis: melanoma    Barriers / Limitations:  None   Comments:    Method:  Discussion   Comments:    General Topics:  Side effects and symptom management   Comments:    Outcome:  Shows understanding   Comments:

## 2024-01-30 NOTE — CONSULTS
[facility] Report of Consultation    Patient Name: Dillan Casillas   YOB: 1950   Medical Record Number: [id number]   CSN: [id number]   Consulting Physician: Ben Garsia MD  Referring Physician(s): No ref. provider found  Date of Consultation: 1/30/2024     Reason for Consultation:  Dillan Casillas was seen today in the [facility] for evaluation and management of left upper arm melanoma.    History of Present Illness:     73 year old woman has a prior h/o right breast cancer. She has been on anastrozole. She now presents with a left upper arm lesion. She was evaluated by Dr. Pierce Uribe. He did an excision biopsy on 11/13/2023 that showed invasive nodular melanoma with ulceration (tumor thickness 10 mm). She proceeded to have WLE and SLN procedure on 1/9/2024 by Dr. Zurdo Montesinos. There was no residual disease in the WLE. She had 2 of 6 nodes positive. One SLN had a 3 mm focus of metastatic melanoma with no extracapsular extension. A second SLN had a 1.5 mm of focus of metastatic melanoma with no extracapsular extension.     She has been otherwise well. She has no bone pain. She has no dyspnea or cough. She has no fever or sweats.    She had a PET scan on 12/8/2023 that was negative for metastatic disease. She had a MRI of the brain that was negative for brain metastases.    She had an acute appendicitis on 12/13/2023 s/p appendectomy. She has had some fatigue since that surgery and the recent arm surgery.    Past Medical History:  Past Medical History:   Diagnosis Date    Anxiety     Cancer (HCC) 2022    breast cancer- right breast    Cancer (HCC) 2023    left arm melanoma    Depression     Ductal hyperplasia, atypical, breast 08/18/2020    High blood pressure     not on any medication    High cholesterol     Hyperlipidemia     Personal history of colonic polyps     PONV (postoperative nausea and vomiting)     Tremors of nervous system     Visual impairment     glasses       Past Surgical  History:  Past Surgical History:   Procedure Laterality Date    ANGIOPLASTY (CORONARY)      APPENDECTOMY  2023    COLONOSCOPY      COLONOSCOPY & POLYPECTOMY  2003    HYSTERECTOMY  2017    and oophorectomy    AYLA BIOPSY STEREO NODULE 1 SITE LEFT (CPT=19081)  2020    ADH/FEA    AYLA LOCALIZATION WIRE 1 SITE LEFT (CPT=19281)  2020    Benign (core was ADH/FEA)    MASTECTOMY LEFT      MASTECTOMY RIGHT      NEEDLE BIOPSY LEFT      Benign          x2    OTHER SURGICAL HISTORY  10/01/2008    cardiac cath post-procedure date (mild LAD)                    TUBAL LIGATION         Family Medical History:  Family History   Problem Relation Age of Onset    High Cholesterol Other         siblings    Heart Disease Father         Heart disease    High Blood Pressure Father     Cancer Sister 64        leukemia    Cancer Sister 29        Kidney cancer    Neurological Disorder Sister     Other (als) Sister     Cancer Paternal Uncle 70        breast       Gyne History:  OB History    Para Term  AB Living   2 2 2 0 0 2   SAB IAB Ectopic Multiple Live Births   0 0 0 0 2   Obstetric Comments   Menarche at age 11   Age of first pregnancy 22   Menopause at age 48       Psychosocial History:  Social History     Socioeconomic History    Marital status:      Spouse name: Not on file    Number of children: 2    Years of education: Not on file    Highest education level: Not on file   Occupational History    Not on file   Tobacco Use    Smoking status: Former     Packs/day: 1.00     Years: 40.00     Additional pack years: 0.00     Total pack years: 40.00     Types: Cigarettes     Quit date: 2013     Years since quitting: 10.7    Smokeless tobacco: Never    Tobacco comments:     PPD   Vaping Use    Vaping Use: Never used   Substance and Sexual Activity    Alcohol use: No    Drug use: No    Sexual activity: Yes     Partners: Male   Other Topics Concern     Service Not Asked    Blood  Transfusions Not Asked    Caffeine Concern No    Occupational Exposure Not Asked    Hobby Hazards Not Asked    Sleep Concern Not Asked    Stress Concern Yes    Weight Concern No    Special Diet No    Back Care Not Asked    Exercise Yes    Bike Helmet Not Asked    Seat Belt Yes    Self-Exams Not Asked   Social History Narrative    Not on file     Social Determinants of Health     Financial Resource Strain: Not on file   Food Insecurity: No Food Insecurity (12/14/2023)    Food Insecurity     Food Insecurity: Never true   Transportation Needs: No Transportation Needs (12/14/2023)    Transportation Needs     Lack of Transportation: No   Physical Activity: Not on file   Stress: Not on file   Social Connections: Not on file   Housing Stability: Low Risk  (12/14/2023)    Housing Stability     Housing Instability: No     Housing Instability Emergency: Not on file       Allergies:   No Known Allergies    Current Medications:    Current Outpatient Medications:     anastrozole 1 MG Oral Tab tab, Take 1 tablet (1 mg total) by mouth daily., Disp: 90 tablet, Rfl: 3    primidone 50 MG Oral Tab, Take 1 tablet (50 mg total) by mouth in the morning and 1 tablet (50 mg total) before bedtime., Disp: 180 tablet, Rfl: 1    ATORVASTATIN 10 MG Oral Tab, TAKE 1 TABLET BY MOUTH EVERY DAY AT NIGHT, Disp: 90 tablet, Rfl: 0    aspirin 81 MG Oral Chew Tab, Chew 1 tablet (81 mg total) by mouth daily., Disp: , Rfl:     escitalopram 10 MG Oral Tab, Take 1 tablet (10 mg total) by mouth daily., Disp: 90 tablet, Rfl: 1    Multiple Vitamin Oral Tab, Take 1 tablet by mouth daily., Disp: , Rfl:     Acidophilus/Pectin Oral Cap, Take 1 capsule by mouth daily., Disp: , Rfl:     Review of Systems:    Constitutional: Negative for anorexia, fatigue, fevers, chills, night sweats and weight loss.  Eyes: Negative for visual disturbance, irritation and redness.  Respiratory: Negative for cough, hemoptysis, chest pain, or dyspnea.  Cardiovascular: Negative for  angina, orthopnea or palpitations.  Gastrointestinal: Negative for nausea, vomiting, change in bowel habits, diarrhea, constipation and abdominal pain.  Integument/breast: Negative for rash, skin lesions, and pruritus.  Hematologic/lymphatic: Negative for easy bruising, bleeding, and lymphadenopathy.  Musculoskeletal: Negative for myalgias, arthralgias, muscle weakness.  Genitourinary: Negative for dysuria or hematuria  Neurological: Negative for headaches, dizziness, speech problems, gait problems and focal weakness.  Psychiatric: The patient's mood was calm and appropriate for this visit.    The pertinent positives and negatives were described in the HPI and above. All other systems were negative.      Vital Signs:  Height: --  Weight: 54.2 kg (119 lb 6.4 oz) (01/30 1253)  BSA (Calculated - sq m): --  Pulse: 101 (01/30 1253)  BP: 119/74 (01/30 1253)  Temp: 97.5 °F (36.4 °C) (01/30 1253)  Do Not Use - Resp Rate: --  SpO2: 97 % (01/30 1253)    Physical Examination:    Constitutional: Patient is alert and oriented x 3, not in acute distress.  HEENT:  Oropharynx is clear. Neck is supple.  Eyes: Anicteric sclera. Pink conjunctiva.  Respiratory: Clear to auscultation and percussion. No rales.  No wheezes.  Cardiovascular: Regular rate and rhythm.   Gastrointestinal: Soft, non tender with good bowel sounds.  Extremities: No edema. No calf tenderness. The left upper arm excision site is well healed with skin flap.  Neurological: Grossly intact without focal motor or sensory deficit.  Lymphatics: There is no palpable lymphadenopathy throughout in the cervical, supraclavicular, or axillary regions.    Labs reviewed at this visit:  Lab Results   Component Value Date    WBC 6.4 12/27/2023    RBC 4.69 12/27/2023    HGB 12.8 12/27/2023    HCT 40.1 12/27/2023    MCV 85.5 12/27/2023    MCH 27.3 12/27/2023    MCHC 31.9 12/27/2023    RDW 14.9 12/27/2023    .0 12/27/2023     Lab Results   Component Value Date      2023    K 3.6 2023     2023    CO2 29.0 2023    BUN 8 (L) 2023    CREATSERUM 1.32 (H) 2023     (H) 2023    CA 8.5 2023    ALKPHO 55 2023    ALT 12 (L) 2023    AST 15 2023    BILT 0.3 2023    ALB 3.3 (L) 2023    TP 7.4 2023        Radiologic imaging reviewed at this visit:    PET scan on 2023:  FINDINGS:    MEAN SUV RIGHT UPPER LOBE OF LUN.28  MEAN SUV RIGHT HEPATIC LOBE:  2.29  Standard uptake values reported below are the maximum for the lesion unless otherwise specified.  HEAD AND NECK:  There is physiologic uptake in parapharyngeal tonsillar tissue.    CHEST:  Left axillary lymph node series 3, image 172 measures 0.7 x 0.4 cm and has a maximum SUV of 1.5.  Left axillary lymph node series 6, image 179 and series 3, image 190 measures 0.8 x 0.4 cm and has maximum SUV of 1.2.  These are most likely  physiologic lymph nodes.  ABDOMEN:  There is physiologic uptake in kidneys, bowel and bladder.  PELVIS:  There is physiologic uptake in bowel and bladder.  SPINE AND EXTREMITIES:  There is a focal mild uptake along the skin surface left mid humerus with maximum SUV of 2.5.  CT FINDINGS:  There is ectasia of the infrarenal abdominal aorta which measures up to 2.9 cm series 3, image 272.  There is diffuse aortic atherosclerosis.  There is coronary artery atherosclerosis.  There is a small sliding-type hiatal hernia.  There  have been bilateral mastectomies.     Impression   CONCLUSION:    1. There is no specific evidence of recurrent or metastatic malignancy.  There is low level activity skin surface left mid humerus with maximum SUV of 2.5.  There is essentially normal or very low level activity in left axillary lymph nodes which are not   pathologically enlarged and maintain normal lymph node architecture.  2. There is diffuse aortic and coronary artery atherosclerosis.  There is ectasia of the infrarenal abdominal  aorta which measures up to 2.9 cm.         Assessment/Plan:    Malignant melanoma of the left upper arm:  Nodular melanoma 10 mm thickness  2 of 6 left axillary nodes positive  Microscopic metastases  No extranodal extension  BRAF negative    The patient has a pT4b,pN2a (Stage IIIc) melanoma of the left upper arm and axilla. She has had definitive surgery. She has BRAF negative disease. She has good performance status (PS 0). I discussed the prognosis. She has high risk disease with greater than 50% risk of distant metastatic disease. I discussed the benefit of immunotherapy. We discussed the option of pembrolizumab. For the entire study population, pembrolizumab improved RFS (60 versus 41 percent at 3.5 years) and DMFS (65 versus 49 percent at 3.5 years). I discussed how this is given and the potential immune related side effects. We discussed the risk of endocrine, pulmonary, gastrointestinal, skin and neurologic side effects. She wanted to proceed. We will give the pembro at 200 mg every 3 weeks with the option to double the dose and give every 6 weeks for a full year. She will need a portacath. We will arrange to have this place.    Invasive lobular carcinoma of the right breast 12 o'clock (overlapping quadrant):  Biopsy on 11/17/2021  ER 90%  NH <1%  K--67 5%  Her2 1+  Bilateral mastectomy on 1/21/2022:  Right Breast with Invasive lobular carcinoma  Measuring 2.2 cm  SLN 0/2 (one with ITC)  Negative margins  Left breast with no residual DCIS  0.5 cm DCIS biopsy on 12/30/2021  ER/NH positive  Right breast Oncotype Dx RS 26     She has started anastrozole. She does have severe osteoporosis. She continues to follow with endocrine. She has OPAL on the AI. She has no side effects from the AI. She is comfortable with this plan.     Severe Osteoporosis:     She is following with endocrinology. She has had dental work with teeth extractions. She had Reclast on 6/16/2023. She will get this yearly.      Ben Garsia MD

## 2024-01-31 ENCOUNTER — TELEPHONE (OUTPATIENT)
Facility: LOCATION | Age: 74
End: 2024-01-31

## 2024-01-31 DIAGNOSIS — Z51.11 ADMISSION FOR CHEMOTHERAPY: Primary | ICD-10-CM

## 2024-01-31 DIAGNOSIS — C43.52: Primary | ICD-10-CM

## 2024-02-07 ENCOUNTER — OFFICE VISIT (OUTPATIENT)
Dept: HEMATOLOGY/ONCOLOGY | Facility: HOSPITAL | Age: 74
End: 2024-02-07
Attending: INTERNAL MEDICINE
Payer: MEDICARE

## 2024-02-07 DIAGNOSIS — Z71.89 OTHER SPECIFIED COUNSELING: ICD-10-CM

## 2024-02-07 DIAGNOSIS — C43.62 MALIGNANT MELANOMA OF ARM, LEFT (HCC): Primary | ICD-10-CM

## 2024-02-07 PROCEDURE — 99215 OFFICE O/P EST HI 40 MIN: CPT | Performed by: CLINICAL NURSE SPECIALIST

## 2024-02-07 NOTE — PROGRESS NOTES
Immunotherapy  Education     Learner:  Patient,      Barriers / Limitations:  None     Immunotherapy  education goals:  Learn the drug names  Administration schedule  Routes of administration  Treatment setting     Drug names:  Pembrolizumab     Immunotherapy action on cancer / normal cells:       Lung: pneumonitis, cough, shortness of breath       Gastrointestinal (Colitis): Diarrhea, Mucus, blood      Liver (Hepatitis): Yellowing of skin or eyes, Nausea or vomiting, abdominal pain on right, dark urine, excessive bruising or bleeding      Hormone Gland (Thyroid, Pituitary, Adrenal and pancreas): rapid heart rate, weight loss or gain, increased sweating, feeling more hungry or thirsty, urinating more frequently, hair loss, feeling cold, constipation, deeper voice, muscle aches, dizziness or fainting, headache       Kidney Problems (Nephritis): change in amount or color of urine     Problems in other organs: Rash, change in eyesight, severe or persistent muscle or joint pains, severe muscle weakness, low red blood cell (Anemia)      Infusion Reactions: Chills, shaking, shortness of breath or wheezing, itching or rash, flushing, dizziness, fever, feeling like passing out          Teaching Materials Provided:      [employer] Chemotherapy information sheets   ACS Nutrition for the Person with Cancer during Treatment / Dietician information sheet  When to contact the Treatment Team Information Sheet  Side Effect Management Information Sheet  [facility] Support Services Sheet  National Resources Information sheet     Patient and caregiver were given ample opportunity to ask questions. All questions and concerns addressed. We discussed self care techniques, symptom management, fluid, diet, and activities. Patient verbalized understanding of above information provided.      Immunotherapy  Consent Form signed by the patient.  I spent  45 minutes counseling patient regarding the above documented side effects and management,  when to call provider and contact information.    Encounter Times  PreCharting: 10 minutes    Reviewing/Obtaining:   minutes      Medical Exam:   minutes    Plan:   minutes      Notes: 5 minutes    Counseling/Education: 45 minutes      Referring/Communicating:   minutes    Ind Interpretation:   minutes      Care Coordination:   minutes       My total time spent caring for the patient on the day of the encounter: 60 minutes.         JC Rolle  [facility]

## 2024-02-12 ENCOUNTER — APPOINTMENT (OUTPATIENT)
Dept: GENERAL RADIOLOGY | Facility: HOSPITAL | Age: 74
End: 2024-02-12
Attending: SURGERY
Payer: MEDICARE

## 2024-02-12 ENCOUNTER — ANESTHESIA EVENT (OUTPATIENT)
Dept: SURGERY | Facility: HOSPITAL | Age: 74
End: 2024-02-12
Payer: MEDICARE

## 2024-02-12 ENCOUNTER — HOSPITAL ENCOUNTER (OUTPATIENT)
Facility: HOSPITAL | Age: 74
Setting detail: HOSPITAL OUTPATIENT SURGERY
Discharge: HOME | End: 2024-02-12
Attending: SURGERY | Admitting: SURGERY
Payer: MEDICARE

## 2024-02-12 ENCOUNTER — ANESTHESIA (OUTPATIENT)
Dept: SURGERY | Facility: HOSPITAL | Age: 74
End: 2024-02-12
Payer: MEDICARE

## 2024-02-12 VITALS
RESPIRATION RATE: 16 BRPM | BODY MASS INDEX: 22.2 KG/M2 | HEART RATE: 70 BPM | SYSTOLIC BLOOD PRESSURE: 135 MMHG | TEMPERATURE: 97 F | HEIGHT: 62 IN | WEIGHT: 120.63 LBS | DIASTOLIC BLOOD PRESSURE: 69 MMHG | OXYGEN SATURATION: 95 %

## 2024-02-12 DIAGNOSIS — C50.911: ICD-10-CM

## 2024-02-12 DIAGNOSIS — N60.99 DUCTAL HYPERPLASIA, ATYPICAL, BREAST: Primary | ICD-10-CM

## 2024-02-12 PROCEDURE — 02HV33Z INSERTION OF INFUSION DEVICE INTO SUPERIOR VENA CAVA, PERCUTANEOUS APPROACH: ICD-10-PCS | Performed by: SURGERY

## 2024-02-12 PROCEDURE — 77001 FLUOROGUIDE FOR VEIN DEVICE: CPT | Performed by: SURGERY

## 2024-02-12 PROCEDURE — 0JH60WZ INSERTION OF TOTALLY IMPLANTABLE VASCULAR ACCESS DEVICE INTO CHEST SUBCUTANEOUS TISSUE AND FASCIA, OPEN APPROACH: ICD-10-PCS | Performed by: SURGERY

## 2024-02-12 PROCEDURE — 71045 X-RAY EXAM CHEST 1 VIEW: CPT | Performed by: SURGERY

## 2024-02-12 DEVICE — IMPLANTABLE DEVICE: Type: IMPLANTABLE DEVICE | Site: NECK | Status: FUNCTIONAL

## 2024-02-12 RX ORDER — SODIUM CHLORIDE, SODIUM LACTATE, POTASSIUM CHLORIDE, CALCIUM CHLORIDE 600; 310; 30; 20 MG/100ML; MG/100ML; MG/100ML; MG/100ML
INJECTION, SOLUTION INTRAVENOUS CONTINUOUS
OUTPATIENT
Start: 2024-02-12

## 2024-02-12 RX ORDER — HEPARIN SODIUM 5000 [USP'U]/ML
INJECTION, SOLUTION INTRAVENOUS; SUBCUTANEOUS
Status: COMPLETED
Start: 2024-02-12 | End: 2024-02-12

## 2024-02-12 RX ORDER — CEFAZOLIN SODIUM/WATER 2 G/20 ML
2 SYRINGE (ML) INTRAVENOUS ONCE
Status: COMPLETED | OUTPATIENT
Start: 2024-02-12 | End: 2024-02-12

## 2024-02-12 RX ORDER — DEXAMETHASONE SODIUM PHOSPHATE 4 MG/ML
VIAL (ML) INJECTION AS NEEDED
Status: DISCONTINUED | OUTPATIENT
Start: 2024-02-12 | End: 2024-02-12 | Stop reason: SURG

## 2024-02-12 RX ORDER — EPHEDRINE SULFATE 50 MG/ML
INJECTION INTRAVENOUS AS NEEDED
Status: DISCONTINUED | OUTPATIENT
Start: 2024-02-12 | End: 2024-02-12 | Stop reason: SURG

## 2024-02-12 RX ORDER — HYDROCODONE BITARTRATE AND ACETAMINOPHEN 5; 325 MG/1; MG/1
1 TABLET ORAL EVERY 6 HOURS PRN
Qty: 6 TABLET | Refills: 0 | Status: SHIPPED | OUTPATIENT
Start: 2024-02-12

## 2024-02-12 RX ORDER — LIDOCAINE HYDROCHLORIDE 10 MG/ML
INJECTION, SOLUTION EPIDURAL; INFILTRATION; INTRACAUDAL; PERINEURAL AS NEEDED
Status: DISCONTINUED | OUTPATIENT
Start: 2024-02-12 | End: 2024-02-12 | Stop reason: HOSPADM

## 2024-02-12 RX ORDER — HEPARIN SODIUM 5000 [USP'U]/ML
5000 INJECTION, SOLUTION INTRAVENOUS; SUBCUTANEOUS ONCE
Status: COMPLETED | OUTPATIENT
Start: 2024-02-12 | End: 2024-02-12

## 2024-02-12 RX ORDER — HEPARIN SODIUM 5000 [USP'U]/ML
INJECTION, SOLUTION INTRAVENOUS; SUBCUTANEOUS AS NEEDED
Status: DISCONTINUED | OUTPATIENT
Start: 2024-02-12 | End: 2024-02-12 | Stop reason: HOSPADM

## 2024-02-12 RX ORDER — LIDOCAINE HYDROCHLORIDE 10 MG/ML
INJECTION, SOLUTION EPIDURAL; INFILTRATION; INTRACAUDAL; PERINEURAL AS NEEDED
Status: DISCONTINUED | OUTPATIENT
Start: 2024-02-12 | End: 2024-02-12 | Stop reason: SURG

## 2024-02-12 RX ORDER — ACETAMINOPHEN 500 MG
1000 TABLET ORAL EVERY 6 HOURS PRN
COMMUNITY

## 2024-02-12 RX ORDER — BUPIVACAINE HYDROCHLORIDE 5 MG/ML
INJECTION, SOLUTION EPIDURAL; INTRACAUDAL AS NEEDED
Status: DISCONTINUED | OUTPATIENT
Start: 2024-02-12 | End: 2024-02-12 | Stop reason: HOSPADM

## 2024-02-12 RX ORDER — HYDROCODONE BITARTRATE AND ACETAMINOPHEN 5; 325 MG/1; MG/1
1 TABLET ORAL ONCE AS NEEDED
OUTPATIENT
Start: 2024-02-12 | End: 2024-02-12

## 2024-02-12 RX ORDER — PHENYLEPHRINE HCL 10 MG/ML
VIAL (ML) INJECTION AS NEEDED
Status: DISCONTINUED | OUTPATIENT
Start: 2024-02-12 | End: 2024-02-12 | Stop reason: SURG

## 2024-02-12 RX ORDER — ONDANSETRON 2 MG/ML
INJECTION INTRAMUSCULAR; INTRAVENOUS AS NEEDED
Status: DISCONTINUED | OUTPATIENT
Start: 2024-02-12 | End: 2024-02-12 | Stop reason: SURG

## 2024-02-12 RX ORDER — ACETAMINOPHEN 500 MG
1000 TABLET ORAL ONCE
Status: DISCONTINUED | OUTPATIENT
Start: 2024-02-12 | End: 2024-02-12 | Stop reason: HOSPADM

## 2024-02-12 RX ORDER — ASPIRIN 81 MG/1
81 TABLET, CHEWABLE ORAL DAILY
Status: SHIPPED | COMMUNITY
Start: 2024-02-15

## 2024-02-12 RX ORDER — SODIUM CHLORIDE, SODIUM LACTATE, POTASSIUM CHLORIDE, CALCIUM CHLORIDE 600; 310; 30; 20 MG/100ML; MG/100ML; MG/100ML; MG/100ML
INJECTION, SOLUTION INTRAVENOUS CONTINUOUS
Status: DISCONTINUED | OUTPATIENT
Start: 2024-02-12 | End: 2024-02-12

## 2024-02-12 RX ORDER — ACETAMINOPHEN 500 MG
1000 TABLET ORAL ONCE AS NEEDED
OUTPATIENT
Start: 2024-02-12 | End: 2024-02-12

## 2024-02-12 RX ORDER — NALOXONE HYDROCHLORIDE 0.4 MG/ML
0.08 INJECTION, SOLUTION INTRAMUSCULAR; INTRAVENOUS; SUBCUTANEOUS AS NEEDED
OUTPATIENT
Start: 2024-02-12 | End: 2024-02-13

## 2024-02-12 RX ORDER — ONDANSETRON 2 MG/ML
4 INJECTION INTRAMUSCULAR; INTRAVENOUS EVERY 6 HOURS PRN
OUTPATIENT
Start: 2024-02-12

## 2024-02-12 RX ADMIN — CEFAZOLIN SODIUM/WATER 2 G: 2 G/20 ML SYRINGE (ML) INTRAVENOUS at 15:21:00

## 2024-02-12 RX ADMIN — SODIUM CHLORIDE, SODIUM LACTATE, POTASSIUM CHLORIDE, CALCIUM CHLORIDE: 600; 310; 30; 20 INJECTION, SOLUTION INTRAVENOUS at 16:03:00

## 2024-02-12 RX ADMIN — DEXAMETHASONE SODIUM PHOSPHATE 4 MG: 4 MG/ML VIAL (ML) INJECTION at 15:21:00

## 2024-02-12 RX ADMIN — ONDANSETRON 4 MG: 2 INJECTION INTRAMUSCULAR; INTRAVENOUS at 15:21:00

## 2024-02-12 RX ADMIN — PHENYLEPHRINE HCL 100 MCG: 10 MG/ML VIAL (ML) INJECTION at 15:30:00

## 2024-02-12 RX ADMIN — EPHEDRINE SULFATE 5 MG: 50 INJECTION INTRAVENOUS at 15:30:00

## 2024-02-12 RX ADMIN — SODIUM CHLORIDE, SODIUM LACTATE, POTASSIUM CHLORIDE, CALCIUM CHLORIDE: 600; 310; 30; 20 INJECTION, SOLUTION INTRAVENOUS at 15:16:00

## 2024-02-12 RX ADMIN — LIDOCAINE HYDROCHLORIDE 50 MG: 10 INJECTION, SOLUTION EPIDURAL; INFILTRATION; INTRACAUDAL; PERINEURAL at 15:19:00

## 2024-02-12 NOTE — DISCHARGE INSTRUCTIONS
Home Care Instructions      MEDICATIONS  For post-operative pain control the mediations are usually over the counter preparations such as Advil and Tylenol.  For severe pain the patient may overlap Advil with Tylenol.  The patient can do this by taking two Tylenol, then three hours later taking two Advil, then three hours later taking Tylenol again.  Please ask your surgeon before resuming blood thinners such as aspirin, Plavix or Coumadin.  All other home medications may be resumed as scheduled.  Generally aspirin is avoided for ten days.    DIET  The patient may resume a general diet immediately.  There should be no alcohol consumption in the immediate recover time period.  If the patient was sedated for the procedure the first meal should be light.    WOUND CARE  The top dressing may be removed the day after surgery.  This includes the gauze, tape and band-aids if they are present.  Do not remove the steri-strips or butterfly tapes that are white and adherent to the skin.  The steri-strips will eventually peel up at the ends and at this point they may be removed.  This is usually seven to ten days after surgery.  The patient may shower the day after surgery.  There is no need to cover the incisions, and all top gauze type dressing should be removed prior to showering.  Soap can get on the wounds but do not scrub over the wounds.  No hair dye or chemicals of any kind should get in the wounds.  Avoid tub baths for two weeks.  Most wounds will be closed with dissolving suture underneath the skin.  These sutures will dissolve on their own.  The doctor or nurse will remove any visible sutures, or staples, in the office.    ACTIVITY  The patient may ride in a car but should not drive the car for at least overnight if sedation was used.  If no sedation was used the patient may drive immediately.  The patient may return to work the next day.  Avoid any activity that could lead to the wound getting elbowed or bumped.   Avoid bending, pushing, pulling and lifting anything heavier than 25 pounds for two weeks.  Patients should seek further activity limits at the time of their appointment.  No extreme sports for two weeks.    APPOINTMENT  Please call our office today for an appointment within five to ten days of discharge.  Any fever greater than 100.5, chills, nausea, vomiting, or severe diarrhea please call our office.  If the wound turns red, hot, swollen, becomes increasingly painful, or drains pus call us immediately at [phone].  For life threatening emergencies call 911.  For non-emergent care please call our office after 8:30 a.m. Monday through Friday.  The number listed above is our office number.  Our phone automatically switches to out answering service if we are not there.  Please do not use the emergency room for non-urgent care.    Thank you for entrusting us with your care.  [facility]General Surgery

## 2024-02-12 NOTE — BRIEF OP NOTE
Pre-Operative Diagnosis: MELANOMA     Post-Operative Diagnosis: MELANOMA      Procedure Performed:   PORT-A-CATH PLACEMENT    Surgeon(s) and Role:     * Pierce Uribe DO - Primary    Assistant(s):        Surgical Findings:      Specimen:      Estimated Blood Loss: Blood Output: 4 mL (2/12/2024  4:02 PM)      Dictation Number:      Pierce Uribe DO  2/12/2024  4:12 PM

## 2024-02-12 NOTE — ANESTHESIA PREPROCEDURE EVALUATION
PRE-OP EVALUATION    Patient Name: Dillan Casillas    Admit Diagnosis: MELANOMA    Pre-op Diagnosis: MELANOMA    PORT-A-CATH PLACEMENT    Anesthesia Procedure: PORT-A-CATH PLACEMENT    Surgeon(s) and Role:     * Pierce Uribe,  - Juan    Pre-op vitals reviewed.  Temp: 97.7 °F (36.5 °C)  Pulse: 75  Resp: 16  BP: 157/89  SpO2: 99 %  Body mass index is 22.06 kg/m².    Current medications reviewed.  Hospital Medications:   [MAR Hold] acetaminophen (Tylenol Extra Strength) tab 1,000 mg  1,000 mg Oral Once    lactated ringers infusion   Intravenous Continuous    [COMPLETED] heparin (Porcine) 5000 UNIT/ML injection 5,000 Units  5,000 Units Subcutaneous Once    ceFAZolin (Ancef) 2 g in 20mL IV syringe premix  2 g Intravenous Once       Outpatient Medications:     Medications Prior to Admission   Medication Sig Dispense Refill Last Dose    acetaminophen 500 MG Oral Tab Take 2 tablets (1,000 mg total) by mouth every 6 (six) hours as needed for Pain.   2/12/2024 at 1000    anastrozole 1 MG Oral Tab tab Take 1 tablet (1 mg total) by mouth daily. 90 tablet 3 2/11/2024    primidone 50 MG Oral Tab Take 1 tablet (50 mg total) by mouth in the morning and 1 tablet (50 mg total) before bedtime. 180 tablet 1 2/11/2024    ATORVASTATIN 10 MG Oral Tab TAKE 1 TABLET BY MOUTH EVERY DAY AT NIGHT 90 tablet 0 2/11/2024    aspirin 81 MG Oral Chew Tab Chew 1 tablet (81 mg total) by mouth daily.   2/9/2024    escitalopram 10 MG Oral Tab Take 1 tablet (10 mg total) by mouth daily. 90 tablet 1 2/11/2024    Multiple Vitamin Oral Tab Take 1 tablet by mouth daily.   Past Week    Acidophilus/Pectin Oral Cap Take 1 capsule by mouth daily.   2/11/2024       Allergies: Patient has no known allergies.      Anesthesia Evaluation    Patient summary reviewed.    Anesthetic Complications  (+) history of anesthetic complications  History of: PONV       GI/Hepatic/Renal    Negative GI/hepatic/renal ROS.                              Cardiovascular  Comment: CAD h/o PCI                (+) hypertension     (+) CAD    (+) CABG/stent                            Endo/Other  Comment: Left upper arm melanoma for wide excision, sentinel node biopsy and reconstruction  H/o bilateral mastectomy                                Pulmonary    Negative pulmonary ROS.                       Neuro/Psych      (+) depression                                Past Surgical History:   Procedure Laterality Date    ANGIOPLASTY (CORONARY)      APPENDECTOMY  2023    COLONOSCOPY      COLONOSCOPY & POLYPECTOMY  2003    HYSTERECTOMY  2017    and oophorectomy    AYLA BIOPSY STEREO NODULE 1 SITE LEFT (CPT=19081)  2020    ADH/FEA    AYLA LOCALIZATION WIRE 1 SITE LEFT (CPT=19281)  2020    Benign (core was ADH/FEA)    MASTECTOMY LEFT      MASTECTOMY RIGHT      NEEDLE BIOPSY LEFT      Benign          x2    OTHER SURGICAL HISTORY  10/01/2008    cardiac cath post-procedure date (mild LAD)                    TUBAL LIGATION       Social History     Socioeconomic History    Marital status:     Number of children: 2   Tobacco Use    Smoking status: Former     Packs/day: 1.00     Years: 40.00     Additional pack years: 0.00     Total pack years: 40.00     Types: Cigarettes     Quit date: 2013     Years since quitting: 10.7    Smokeless tobacco: Never    Tobacco comments:     PPD   Vaping Use    Vaping Use: Never used   Substance and Sexual Activity    Alcohol use: No    Drug use: No    Sexual activity: Yes     Partners: Male   Other Topics Concern    Caffeine Concern No    Stress Concern Yes    Weight Concern No    Special Diet No    Exercise Yes    Seat Belt Yes     History   Drug Use No     Available pre-op labs reviewed.  Lab Results   Component Value Date    WBC 6.4 2023    RBC 4.69 2023    HGB 12.8 2023    HCT 40.1 2023    MCV 85.5 2023    MCH 27.3 2023    MCHC 31.9 2023    RDW 14.9 2023    .0  12/27/2023     Lab Results   Component Value Date     12/27/2023    K 3.6 12/27/2023     12/27/2023    CO2 29.0 12/27/2023    BUN 8 (L) 12/27/2023    CREATSERUM 1.32 (H) 12/27/2023     (H) 12/27/2023    CA 8.5 12/27/2023            Airway      Mallampati: II  Mouth opening: >3 FB  TM distance: > 6 cm  Neck ROM: full Cardiovascular    Cardiovascular exam normal.  Rhythm: regular  Rate: normal  (-) murmur   Dental  Comment: Full upper and lower implants    Dental appliance(s): lower dentures and upper dentures       Pulmonary    Pulmonary exam normal.  Breath sounds clear to auscultation bilaterally.               Other findings            ASA: 2   Plan: MAC  NPO status verified and patient meets guidelines.        Comment: Discussed general anesthesia as a backup plan in case patient does not tolerate MAC  Plan/risks discussed with: patient              Present on Admission:  **None**

## 2024-02-12 NOTE — H&P
Dillan Casillas is a 73 year old female  No chief complaint on file.      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  Consult comes at request of Dr. Garsia       .           Past Medical History:   Diagnosis Date    Anxiety     Cancer (HCC)     breast cancer- right breast    Cancer (HCC)     left arm melanoma    Depression     Ductal hyperplasia, atypical, breast 2020    High blood pressure     not on any medication    High cholesterol     Hyperlipidemia     Macular degeneration     Osteoporosis     Personal history of colonic polyps     PONV (postoperative nausea and vomiting)     Tremors of nervous system     Visual impairment     glasses     Past Surgical History:   Procedure Laterality Date    ANGIOPLASTY (CORONARY)      APPENDECTOMY  2023    COLONOSCOPY      COLONOSCOPY & POLYPECTOMY  2003    HYSTERECTOMY  2017    and oophorectomy    AYLA BIOPSY STEREO NODULE 1 SITE LEFT (CPT=19081)  2020    ADH/FEA    AYLA LOCALIZATION WIRE 1 SITE LEFT (CPT=19281)  2020    Benign (core was ADH/FEA)    MASTECTOMY LEFT      MASTECTOMY RIGHT      NEEDLE BIOPSY LEFT      Benign          x2    OTHER SURGICAL HISTORY  10/01/2008    cardiac cath post-procedure date (mild LAD)                    TUBAL LIGATION       No current facility-administered medications on file prior to encounter.     Current Outpatient Medications on File Prior to Encounter   Medication Sig Dispense Refill    anastrozole 1 MG Oral Tab tab Take 1 tablet (1 mg total) by mouth daily. 90 tablet 3    primidone 50 MG Oral Tab Take 1 tablet (50 mg total) by mouth in the morning and 1 tablet (50 mg total) before bedtime. 180 tablet 1    ATORVASTATIN 10 MG Oral Tab TAKE 1 TABLET BY MOUTH EVERY DAY AT NIGHT 90 tablet 0    aspirin 81 MG Oral Chew Tab Chew 1 tablet (81 mg total) by mouth daily.      escitalopram 10 MG Oral Tab Take 1 tablet (10 mg  total) by mouth daily. 90 tablet 1    Multiple Vitamin Oral Tab Take 1 tablet by mouth daily.      Acidophilus/Pectin Oral Cap Take 1 capsule by mouth daily.       @ALL@  Family History   Problem Relation Age of Onset    High Cholesterol Other         siblings    Heart Disease Father         Heart disease    High Blood Pressure Father     Cancer Sister 64        leukemia    Cancer Sister 29        Kidney cancer    Neurological Disorder Sister     Other (als) Sister     Cancer Paternal Uncle 70        breast     Social History     Socioeconomic History    Marital status:     Number of children: 2   Tobacco Use    Smoking status: Former     Packs/day: 1.00     Years: 40.00     Additional pack years: 0.00     Total pack years: 40.00     Types: Cigarettes     Quit date: 5/1/2013     Years since quitting: 10.7    Smokeless tobacco: Never    Tobacco comments:     PPD   Vaping Use    Vaping Use: Never used   Substance and Sexual Activity    Alcohol use: No    Drug use: No    Sexual activity: Yes     Partners: Male   Other Topics Concern    Caffeine Concern No    Stress Concern Yes    Weight Concern No    Special Diet No    Exercise Yes    Seat Belt Yes     Social Determinants of Health     Food Insecurity: No Food Insecurity (12/14/2023)    Food Insecurity     Food Insecurity: Never true   Transportation Needs: No Transportation Needs (12/14/2023)    Transportation Needs     Lack of Transportation: No   Housing Stability: Low Risk  (12/14/2023)    Housing Stability     Housing Instability: No       ROS     GENERAL HEALTH: otherwise feels well, no weight loss, no fever or chills  SKIN: denies any unusual skin rashes or jaundice  HEENT: denies nasal congestion, sinus pain or sore throat; hearing loss negative,   EYES , no diplopia or vision changes  RESPIRATORY: denies shortness of breath, wheezing or cough   CARDIOVASCULAR: denies chest pain or AGUIRRE; no palpitations   GI: denies nausea, vomiting, constipation,  diarrhea; no rectal bleeding; no heartburn  GENITAL/: no dysuria, urgency or frequency, no tea colored urine  MUSCULOSKELETAL: no joint complaints upper or lower extremities  HEMATOLOGY: denies hx anemia; denies bruising or excessive bleeding  Endocrine: no weight gain or loss no hot or cold intolerance    EXAM     Height 62\", weight 119 lb (54 kg).  GENERAL: well developed, well nourished female, in no apparent distress.    MENTAL STATUS :Alert, oriented x 3  PSYCH: normal mood and affect  SKIN: anicteric, no rashes, no bruising  EYES: PERRLA, EOMI, sclera anicteric,  conjunctiva without pallor  HEENT: normocephalic, atraumatic, TMs clear, nares patent, mouth moist, pharynx w/o erythema  NECK: supple, no JVD, no adenopathy, no thyromegaly  RESPIRATORY: clear to auscultation, no rales , rhonchi or wheezing  CARDIOVASCULAR: RRR, murmur negative  ABDOMEN: normal active BS, soft, nondistended  no HSM, no masses or hernias  LYMPHATIC: no axillary , supraclavicular or inguinal lymphadenopathy  EXTREMITIES: no cyanosis, clubbing or edema, no atrophy, full ROM    STUDIES:     Admission on 01/09/2024, Discharged on 01/09/2024   Component Date Value Ref Range Status    Case Report 01/09/2024    Final                    Value:Surgical Pathology                                Case: [id number]                                   Authorizing Provider:  Zurdo Montesinos MD          Collected:           01/09/2024 08:15 AM          Ordering Location:     [facility]    Received:            01/09/2024 01:20 PM          Pathologist:           Jacinto Cloud MD                                                             Specimens:   A) - [place] Lymph node, left axilla sentinel lymph node #1                                        B) - [place] Lymph node, left axilla sentinel lymph node #2                                        C) - [place] Lymph node, left axilla sentinel lymph node #3                                         D) - [place] Lymph node, left axilla sentinel lymph node #4                                        E) - [place] Lymph node, left axilla sentinel lymph node #5                                        F) - Axilla left, left axilla non sentinel lymph node superficial                                                             G) - Arm, left, left upper arm melanoma green stitch marks proximal 12 o'clock                      H) - Arm, left, left upper arm melanoma extra skin 3 o'clock                                        I) - Arm, left, left upper arm melanoma extra skin 8-10 o'clock                            Final Diagnosis: 01/09/2024    Final                    Value:This result contains rich text formatting which cannot be displayed here.    Final Diagnosis Comment 01/09/2024    Final                    Value:This result contains rich text formatting which cannot be displayed here.    Synoptic Report 01/09/2024    Final                    Value:MELANOMA OF THE SKIN: Excision, Re-Excision                            MELANOMA OF THE SKIN: EXCISION, RE-EXCISION  - All Specimens                            8th Edition - Protocol posted: 11/10/2021                                                        SPECIMEN                               Procedure:    Excision                                Procedure:    [place] node(s) biopsy                                Specimen Laterality:    Left                                                         TUMOR                               Tumor Site:    Skin of upper limb and shoulder: Upper arm                                Histologic Type:    Nodular melanoma                                Maximum Tumor (Breslow) Thickness (Millimeters):    10 mm (Specimen [id number]; no residual invasive tumor on excision) mm                               Macroscopic Satellite Nodule(s):    Not identified                                Ulceration:    Present                                 Mitotic Rate:    4 mitoses per mm2                               Microsatellite(s):    Not identified                                Lymphovascular Invasion:    Not identified                                Neurotropism:    Not identified                                MARGINS:                                     Margin Status for Invasive Melanoma:    All margins negative for invasive melanoma                                    Distance from Invasive Melanoma to Peripheral Margin:    Cannot be determined: no residual invasive tumor on excision                                    Distance from Invasive Melanoma to Deep Margin:    Cannot be determined: no residual invasive tumor on excision                                REGIONAL LYMPH NODES:                                     Regional Lymph Node Status:                                       :    Tumor present in regional lymph node(s)                                      Total Number of Lymph Nodes with Tumor:    2                                      Number of [place] Lymph Nodes with Tumor:    2                                      Jazmin Site(s) with Tumor:    Subcapsular                                      Jazmin Site(s) with Tumor:    Intramedullary                                      Size of Largest [place] Node Metastatic Deposit:    3 mm                                     Size of Largest [place] Metastatic Deposit:    3 mm                                     Extranodal Extension:    Not identified                                      Matted Nodes:    Not identified                                    Total Number of Lymph Nodes Examined:    6                                    Number of [place] Nodes Examined:    5                                PATHOLOGIC STAGE CLASSIFICATION (pTNM, AJCC 8th Edition):                                     pT Category:    pT4b                                  pN Category:    pN2a       Ancillary Studies 01/09/2024    Final                     Value:This result contains rich text formatting which cannot be displayed here.    Clinical Information 01/09/2024    Final                    Value:This result contains rich text formatting which cannot be displayed here.    Gross Description 01/09/2024    Final                    Value:This result contains rich text formatting which cannot be displayed here.    Interpretation 01/09/2024 Malignant (A)    Final    Results: 01/09/2024 Comment   Final    NEGATIVE  No BRAF V600 mutation was detected in the provided specimen.  Results should be interpreted in conjunction with clinical and other  laboratory findings for the most accurate interpretation.    Director Review: 01/09/2024 Comment   Final    Katy Canada, PhD, [facility]  Director, Molecular Oncology  [facility] [place] [address]  [phone]    Microdissection Performed 01/09/2024 Completed   Final    Specimen Type: 01/09/2024 Comment   Final    Formalin-fixed, paraffin-embedded tissue block    Block #: 01/09/2024 Comment:   Final    -T3    Indications: 01/09/2024 Comment:   Final    SKIN MELANOMA    References: 01/09/2024 Comment   Final    Comment: 1.  Rip R, Nafisa G, Donnie VK. Integrating molecular      biomarkers into current clinical management in melanoma. Methods      Mol Biol. 2014;1102:27-42.  2.  Erin S, Li Pradhan F, Tory-Ginger A, Christina S, Milla      A. Resistance to anti-EGFR therapy in colorectal cancer: From      heterogeneity to convergent evolution. Cancer Discov. 2014      Nov;4(6)[phone].  3.  National Comprehensive Cancer Network (NCCN) Clinical Practice      Guidelines in Oncology: Colon Cancer v2.2015.  4.  National Comprehensive Cancer Network (NCCN) Clinical Practice      Guidelines in Oncology: Non-Hodgkin's Lymphomas v2.2015.  5.  National Comprehensive Cancer Network (NCCN) Clinical Practice      Guidelines in Oncology: Rectal Cancer  v2.2015.  6.  National Comprehensive Cancer Network (NCCN) Clinical Practice      Guidelines in Oncology: Thyroid Carcinoma v2.2014  7.  National Comprehensive Cancer Network (NCCN) Clinical Practice      Guidelines in Oncology: Non-Small Cell Lung                            Cancer v5.2015.  8.  Brian KILPATRICK, Sofia LV, Tirso ME, et al. Lung cancers with      acquired resistance to EGFR inhibitors occasionally harbor BRAF      gene mutations but lack mutations in KRAS, NRAS, or MEK1. Proc      Natl Acad Sci USA. 2012 Jul 31; 109(31):-.  9.  Savage S, Darian A, Larry RA, Chase AK. Clinicopathological      relevance of BRAF mutations in human cancer. Pathology. 2013 Jun;      45(4):346-356.  10. Cameron SG, Lilibeth JONES. BRAF mutation testing in colorectal cancer.      Arch Pathol Lab Med. 2010 Aug; 134(8):9891-1372.    Specimen Location: 01/09/2024 Comment:   Final    LEFT AXILLA SENTINEL LYMPH NODE #2    Methodology: 01/09/2024 Comment   Final    Genomic DNA was isolated from the provided tumor specimen. Exon 15 of  the BRAF gene was subjected to SNaPshot multiplex PCR and primer  extension for mutation detection. This assay is able to detect 5%  mutation in a background of wild-type DNA.  This test was developed and its performance characteristics determined  by [employer]. It has not been cleared or approved by the Food and Drug  Administration.  Table: This Test Can Detect the Following BRAF V600 Mutations  ----------------------------------------------------------------------   BRAF     Mutation  ----------------------------------------------------------------------   Exon 15 V600E, V600E2, V600K, V600D, V600R, V600A, V600G, V600M,           V600L  ----------------------------------------------------------------------    Background: 01/09/2024 Comment   Final    BRAF is an important member of the mitogen activated protein kinase  (MAPK) pathway that influences cell proliferation. This test  will  differentiate all V600 mutations of the BRAF oncogene frequently  found in human cancers, such as melanoma, colorectal cancer, lung  cancer, ovarian cancer, thyroid cancer, and hairy cell leukemia,  allowing the determination of drug response, aiding the diagnosis  and providing the prognosis information. Over 90% of the mutations  are the V600E(c.1799T>A) mutation, but other V600 mutations have  been reported.       ASSESSMENT   Imp: Melanoma with positive regional lymph node involvement  PLan: Port-A-Cath placement discussed with patient risks of procedure to include bleeding and pneumothorax and infection and DVT all questions answered.      Meds & Refills for this Visit:  Requested Prescriptions      No prescriptions requested or ordered in this encounter       Imaging & Consults:  VITAL SIGNS  VITAL SIGNS - NOTIFY PHYSICIAN  NURSING COMMUNICATION  PLACE PIV  ACTIVITY AS TOLERATED  HEIGHT AND WEIGHT  INITIATE ADULT PREOP PROPHYLACTIC ABX PROTOCOL  PLACE SEQUENTIAL COMPRESSION DEVICE  VERIFY INFORMED CONSENT  NPO

## 2024-02-12 NOTE — ANESTHESIA POSTPROCEDURE EVALUATION
[facility]    Dillan Casillas Patient Status:  Hospital Outpatient Surgery   Age/Gender 73 year old female MRN [id number]   Location [facility] SURGERY Attending Pierce Uribe DO   Hosp Day # 0 PCP Antonio Vargas MD       Anesthesia Post-op Note    PORT-A-CATH PLACEMENT    Procedure Summary       Date: 02/12/24 Room / Location:  MAIN OR 14 / [facility] MAIN OR    Anesthesia Start: 1514 Anesthesia Stop: 1614    Procedure: PORT-A-CATH PLACEMENT (Neck) Diagnosis:       Admission for chemotherapy      (MELANOMA)    Surgeons: Pierce Uribe DO Anesthesiologist: Oliver Duff MD    Anesthesia Type: MAC ASA Status: 2            Anesthesia Type: MAC    Vitals Value Taken Time   /75 02/12/24 1619   Temp 97.8 02/12/24 1619   Pulse 74 02/12/24 1619   Resp 18 02/12/24 1619   SpO2 98% 02/12/24 1619       Patient Location: Same Day Surgery    Anesthesia Type: MAC    Airway Patency: patent    Postop Pain Control: adequate    Mental Status: mildly sedated but able to meaningfully participate in the post-anesthesia evaluation    Nausea/Vomiting: none    Cardiopulmonary/Hydration status: stable euvolemic    Complications: no apparent anesthesia related complications    Postop vital signs: stable    Dental Exam: Unchanged from Preop    Patient to be discharged home when criteria met.

## 2024-02-13 NOTE — OPERATIVE REPORT
[facility]    PATIENT'S NAME: LUCINDA KAN   ATTENDING PHYSICIAN: Pierce Uribe D.O.   OPERATING PHYSICIAN: Pierce Uribe D.O.   PATIENT ACCOUNT#:   [id number]    [facility]  MEDICAL RECORD #:   [id number]       YOB: 1950  ADMISSION DATE:       02/12/2024      OPERATION DATE:  02/12/2024    OPERATIVE REPORT      PREOPERATIVE DIAGNOSIS:  Malignant melanoma.  POSTOPERATIVE DIAGNOSIS:  Malignant melanoma.  PROCEDURE:  Placement of left subclavian vein single-lumen Mediport catheter, single-lumen Port-A-Cath catheter.    ANESTHESIA:  MAC.    OPERATIVE TECHNIQUE:  An informed consent was previously obtained.  The patient was taken to the operative suite and placed in the supine position.  A roll was placed down the patient's spine and IV sedation administered by the anesthesia department.  The bilateral neck and upper chest area were prepped and draped in usual sterile fashion.  Skin and subcutaneous tissue at the lateral third of the left clavicle was infiltrated with 1% Xylocaine with epinephrine, and a Cook needle was advanced underneath the clavicle and advanced toward the sternal notch where dark red blood was aspirated.  A J-tip guidewire was then advanced through the Cook needle to the superior vena cava under fluoroscopic visualization.    The needle was removed.  A small skin incision made at the guidewire exit site using a #11 scalpel blade.  A skin and vein dilator, skin dilator, and breakaway sheath were advanced over the guidewire to the superior vena cava under fluoroscopic visualization.  Then, a single-lumen Mediport catheter was advanced to the superior vena cava under fluoroscopic visualization.  The catheter was grasped, and the skin and vein, skin dilator removed and breakaway sheath removed.    Location of the catheter was confirmed by C-arm.    Attention was then directed toward the anterior chest where skin and subcutaneous tissue was  infiltrated with 1% Xylocaine with epinephrine.  A transverse skin incision was made in the upper chest.  Sharp dissection carried down through the skin and subcutaneous tissue using handheld electrocautery.  A subcutaneous pocket was created using handheld electrocautery and the Port-A-Cath port was placed within the subcutaneous pocket and found to fit nicely.  The catheter was then tunneled from the insertion site to the subcutaneous pocket.  The excess catheter was cut, and the flange was attached to the remaining catheter.  The port was then attached to the catheter and the flange placed in full locking position.  The port was found to aspirate blood and flush freely.  The port was then sutured to the prepectoral soft tissues using a 3-0 Prolene suture x2.  Skin edges approximated using 4-0 Monocryl in a running subdermal fashion.  Overlying Mastisol and Steri-Strips were applied.  Postprocedure, chest x-ray in the operating room demonstrated the catheter to be in good position without evidence of pneumothorax.  The patient was transported from the operative suite to day surgery in stable condition.    Dictated By Pierce Uribe D.O.  d: 02/12/2024 16:15:41  t: 02/13/2024 00:25:07  Job [id number]  /

## 2024-02-14 ENCOUNTER — OFFICE VISIT (OUTPATIENT)
Dept: HEMATOLOGY/ONCOLOGY | Facility: HOSPITAL | Age: 74
End: 2024-02-14
Attending: INTERNAL MEDICINE
Payer: MEDICARE

## 2024-02-14 ENCOUNTER — SOCIAL WORK SERVICES (OUTPATIENT)
Dept: HEMATOLOGY/ONCOLOGY | Facility: HOSPITAL | Age: 74
End: 2024-02-14

## 2024-02-14 VITALS
OXYGEN SATURATION: 97 % | HEART RATE: 77 BPM | DIASTOLIC BLOOD PRESSURE: 95 MMHG | TEMPERATURE: 98 F | HEIGHT: 62.01 IN | SYSTOLIC BLOOD PRESSURE: 197 MMHG | BODY MASS INDEX: 21.8 KG/M2 | RESPIRATION RATE: 16 BRPM | WEIGHT: 120 LBS

## 2024-02-14 DIAGNOSIS — C43.62 MALIGNANT MELANOMA OF ARM, LEFT (HCC): Primary | ICD-10-CM

## 2024-02-14 DIAGNOSIS — Z17.0 MALIGNANT NEOPLASM OF OVERLAPPING SITES OF RIGHT BREAST IN FEMALE, ESTROGEN RECEPTOR POSITIVE  (HCC): ICD-10-CM

## 2024-02-14 DIAGNOSIS — C50.811 MALIGNANT NEOPLASM OF OVERLAPPING SITES OF RIGHT BREAST IN FEMALE, ESTROGEN RECEPTOR POSITIVE  (HCC): ICD-10-CM

## 2024-02-14 LAB
ALBUMIN SERPL-MCNC: 3.3 G/DL (ref 3.4–5)
ALBUMIN/GLOB SERPL: 0.8 {RATIO} (ref 1–2)
ALP LIVER SERPL-CCNC: 58 U/L
ALT SERPL-CCNC: 15 U/L
ANION GAP SERPL CALC-SCNC: 6 MMOL/L (ref 0–18)
AST SERPL-CCNC: 24 U/L (ref 15–37)
BASOPHILS # BLD AUTO: 0.03 X10(3) UL (ref 0–0.2)
BASOPHILS NFR BLD AUTO: 0.5 %
BILIRUB SERPL-MCNC: 0.3 MG/DL (ref 0.1–2)
BUN BLD-MCNC: 11 MG/DL (ref 9–23)
CALCIUM BLD-MCNC: 8.8 MG/DL (ref 8.5–10.1)
CHLORIDE SERPL-SCNC: 109 MMOL/L (ref 98–112)
CO2 SERPL-SCNC: 27 MMOL/L (ref 21–32)
CREAT BLD-MCNC: 0.85 MG/DL
EGFRCR SERPLBLD CKD-EPI 2021: 71 ML/MIN/1.73M2 (ref 60–?)
EOSINOPHIL # BLD AUTO: 0.17 X10(3) UL (ref 0–0.7)
EOSINOPHIL NFR BLD AUTO: 2.9 %
ERYTHROCYTE [DISTWIDTH] IN BLOOD BY AUTOMATED COUNT: 15.6 %
GLOBULIN PLAS-MCNC: 4.2 G/DL (ref 2.8–4.4)
GLUCOSE BLD-MCNC: 98 MG/DL (ref 70–99)
HCT VFR BLD AUTO: 37 %
HGB BLD-MCNC: 11.6 G/DL
IMM GRANULOCYTES # BLD AUTO: 0.04 X10(3) UL (ref 0–1)
IMM GRANULOCYTES NFR BLD: 0.7 %
LYMPHOCYTES # BLD AUTO: 1.5 X10(3) UL (ref 1–4)
LYMPHOCYTES NFR BLD AUTO: 25.5 %
MCH RBC QN AUTO: 27.5 PG (ref 26–34)
MCHC RBC AUTO-ENTMCNC: 31.4 G/DL (ref 31–37)
MCV RBC AUTO: 87.7 FL
MONOCYTES # BLD AUTO: 0.65 X10(3) UL (ref 0.1–1)
MONOCYTES NFR BLD AUTO: 11 %
NEUTROPHILS # BLD AUTO: 3.5 X10 (3) UL (ref 1.5–7.7)
NEUTROPHILS # BLD AUTO: 3.5 X10(3) UL (ref 1.5–7.7)
NEUTROPHILS NFR BLD AUTO: 59.4 %
OSMOLALITY SERPL CALC.SUM OF ELEC: 293 MOSM/KG (ref 275–295)
PLATELET # BLD AUTO: 233 10(3)UL (ref 150–450)
POTASSIUM SERPL-SCNC: 3.7 MMOL/L (ref 3.5–5.1)
PROT SERPL-MCNC: 7.5 G/DL (ref 6.4–8.2)
RBC # BLD AUTO: 4.22 X10(6)UL
SODIUM SERPL-SCNC: 142 MMOL/L (ref 136–145)
T4 FREE SERPL-MCNC: 1.1 NG/DL (ref 0.8–1.7)
TSI SER-ACNC: 7.27 MIU/ML (ref 0.36–3.74)
WBC # BLD AUTO: 5.9 X10(3) UL (ref 4–11)

## 2024-02-14 PROCEDURE — 84443 ASSAY THYROID STIM HORMONE: CPT

## 2024-02-14 PROCEDURE — 99214 OFFICE O/P EST MOD 30 MIN: CPT | Performed by: INTERNAL MEDICINE

## 2024-02-14 PROCEDURE — 96413 CHEMO IV INFUSION 1 HR: CPT

## 2024-02-14 PROCEDURE — 84439 ASSAY OF FREE THYROXINE: CPT

## 2024-02-14 PROCEDURE — 80053 COMPREHEN METABOLIC PANEL: CPT

## 2024-02-14 PROCEDURE — 85025 COMPLETE CBC W/AUTO DIFF WBC: CPT

## 2024-02-14 NOTE — PROGRESS NOTES
Pt here for C1D1 Drug(s)Keytruda.  Arrives Ambulating independently, accompanied by Self and Spouse     Patient was evaluated today by MD.    Oral medications included in this regimen:  no    Patient confirms comprehension of cancer treatment schedule:  yes    Pregnancy screening:  Denies possibility of pregnancy    Modifications in dose or schedule:  No    Medications appearance and physical integrity checked by RN: yes.    Chemotherapy IV pump settings verified by 2 RNs:  No due to targeted therapy IV administration.  Frequency of blood return and site check throughout administration: Prior to administration     Infusion/treatment outcome:  patient tolerated treatment without incident    Education Record    Learner:  Patient  Barriers / Limitations:  None  Method:  Discussion  Education / instructions given:  POC  Outcome:  Shows understanding    Discharged Home, Ambulating independently, accompanied by:Self    Patient/family verbalized understanding of future appointments: by printed AVS    Patient tolerated treatment today. Elevated BP on arrival - MD aware and okay to proceed with treatment. Elevated TSH level as well - also cleared by MD to treat. Pt aware to follow up with PCP regarding the elevated TSH level. Labs were forwarded to the office by clinic MD and a printout was given to the patient. AVS provided. Left in stable condition.

## 2024-02-14 NOTE — PROGRESS NOTES
met with patient and  Bob in treatment area for introduction and role explanation. Patient scored 1 on Distress Screening, with Emotional and Physical concerns. Patient shared that she is feeling anxious today, mostly about the unknown and the side effects, as she has never experienced this before. She shared that she has had a rough 2months with health concerns “popping” up, and is hoping that things start to settle. Normalized emotions and provided support.     Patient lives in [place], [place] with  Bob. Their Son Néstor lives nearby in [place] with his family (2 Grandchildren), and Daughter Vidhi lives in [place] (3 grandchildren). Her grandchildren are ranged in age from 18-14yo. Patient states that they have not informed the family about the treatment yet, though they are aware of the other medical concerns and surgeries she has had the las couple of months. She reports that they will tell them tomorrow, but she wanted to have all the information available before telling them without answers. Her son works as a /Paramedic, so she knows that he will have questions, that she now feels confident in answering.      educated on Power of  for Healthcare; Patient stated that one has already been completed and will submit to Cancer Center to keep on file.     Educated on available resources, providing Social Work Support Packet including Cancer Center Support Services, [facility], Transportation, and Home Care contacts. Educated on BHI if desired. Contact provided and family is aware to reach out with any needs. Bringing Paty Bag given, which patient was grateful for.

## 2024-02-14 NOTE — PROGRESS NOTES
Patient is here for follow up for melanoma and to begin C1D1 of keytruda.    Her blood pressure is very high this morning. She has never been on blood pressure medication.  She denies headache.  She is nervous about starting treatment.   She has had chemo education and has signed consent.     Education Record    Learner:  Patient and Spouse    Disease / Diagnosis: melanoma    Barriers / Limitations:  None   Comments:    Method:  Discussion   Comments:    General Topics:  Side effects and symptom management   Comments:    Outcome:  Shows understanding   Comments:

## 2024-02-14 NOTE — PROGRESS NOTES
Cancer Center Progress Note    Problem List:      Patient Active Problem List   Diagnosis    Coronary atherosclerosis    Anxiety state    Pure hypercholesterolemia    Hypertension    Pulmonary fibrosis (HCC)    Abdominal aortic ectasia (HCC)    Reactive depression    Benign essential tremor    Flat epithelial atypia (FEA) of left breast    Ductal hyperplasia, atypical, breast    Exudative age-related macular degeneration of left eye with active choroidal neovascularization (HCC)    Status post bilateral mastectomy    Invasive lobular carcinoma of breast, stage 2, right (HCC)    Osteopenia, senile    Malignant melanoma of arm, left (HCC)    Acute appendicitis with localized peritonitis, without perforation, abscess, or gangrene       Interim History:    Dillan Casillas presents today for evaluation and management of a diagnosis of right breast cancer.    She presents today to start adjuvant immunotherapy with pembrolizumab. She has been healing with mild soreness at the surgical site. She has been otherwise well. She has no bone pain. She has no dyspnea or cough. She has no fever or sweats.    She has a prior h/o right breast cancer. She has been on anastrozole. She now presents with a left upper arm lesion. She was evaluated by Dr. Pierce Uribe. He did an excision biopsy on 11/13/2023 that showed invasive nodular melanoma with ulceration (tumor thickness 10 mm). She proceeded to have WLE and SLN procedure on 1/9/2024 by Dr. Zurdo Montesinos. There was no residual disease in the WLE. She had 2 of 6 nodes positive. One SLN had a 3 mm focus of metastatic melanoma with no extracapsular extension. A second SLN had a 1.5 mm of focus of metastatic melanoma with no extracapsular extension.      She had a PET scan on 12/8/2023 that was negative for metastatic disease. She had a MRI of the brain that was negative for brain metastases.     She had an acute appendicitis on 12/13/2023 s/p appendectomy. She has had some fatigue  since that surgery and the recent arm surgery.    She had bilateral mastectomy on 2022. The left breast had DCIS on her biopsy on 2021 but there was no residual cancer in the left mastectomy. The left SLN was negative. The right breast had an invasive lobular carcinoma, grade II, measuring 2.2 cm. The margins were negative. The right SLN procedure had one node with isolated tumor cells and another node that was negative. She had Oncotype Dx testing with a RS of 26.     Review of Systems:   Constitutional: Negative for anorexia, fatigue, fevers, chills, night sweats and weight loss.  Psychiatric: The patient's mood was calm and appropriate for this visit.  The pertinent positives and negatives were described. All other systems were negative.    PMH/PSH:  Past Medical History:   Diagnosis Date    Anxiety     Cancer (HCC)     breast cancer- right breast    Cancer (HCC)     left arm melanoma    Depression     Ductal hyperplasia, atypical, breast 2020    High blood pressure     not on any medication    High cholesterol     Hyperlipidemia     Macular degeneration     Osteoporosis     Personal history of colonic polyps     PONV (postoperative nausea and vomiting)     Tremors of nervous system     Visual impairment     glasses       Past Surgical History:   Procedure Laterality Date    ANGIOPLASTY (CORONARY)      APPENDECTOMY  2023    COLONOSCOPY      COLONOSCOPY & POLYPECTOMY  2003    HYSTERECTOMY  2017    and oophorectomy    AYLA BIOPSY STEREO NODULE 1 SITE LEFT (CPT=19081)  2020    ADH/FEA    AYLA LOCALIZATION WIRE 1 SITE LEFT (CPT=19281)  2020    Benign (core was ADH/FEA)    MASTECTOMY LEFT      MASTECTOMY RIGHT      NEEDLE BIOPSY LEFT      Benign          x2    OTHER SURGICAL HISTORY  10/01/2008    cardiac cath post-procedure date (mild LAD)                    TUBAL LIGATION         Family History Reviewed:  Family History   Problem Relation Age of Onset    High  Cholesterol Other         siblings    Heart Disease Father         Heart disease    High Blood Pressure Father     Cancer Sister 64        leukemia    Cancer Sister 29        Kidney cancer    Neurological Disorder Sister     Other (als) Sister     Cancer Paternal Uncle 70        breast       Allergies:     No Known Allergies    Medications:   acetaminophen 500 MG Oral Tab Take 2 tablets (1,000 mg total) by mouth every 6 (six) hours as needed for Pain.      [START ON 2/15/2024] aspirin 81 MG Oral Chew Tab Chew 1 tablet (81 mg total) by mouth daily.      HYDROcodone-acetaminophen 5-325 MG Oral Tab Take 1 tablet by mouth every 6 (six) hours as needed. 6 tablet 0    anastrozole 1 MG Oral Tab tab Take 1 tablet (1 mg total) by mouth daily. 90 tablet 3    ATORVASTATIN 10 MG Oral Tab TAKE 1 TABLET BY MOUTH EVERY DAY AT NIGHT 90 tablet 0    escitalopram 10 MG Oral Tab Take 1 tablet (10 mg total) by mouth daily. 90 tablet 1    Multiple Vitamin Oral Tab Take 1 tablet by mouth daily.      Acidophilus/Pectin Oral Cap Take 1 capsule by mouth daily.           Vital Signs:      Height: 157.5 cm (5' 2.01\") (02/14 0923)  Weight: 54.4 kg (120 lb) (02/14 0923)  BSA (Calculated - sq m): 1.54 sq meters (02/14 0923)  Pulse: 77 (02/14 0923)  BP: 202/90 (02/14 0933)  Temp: 97.8 °F (36.6 °C) (02/14 0923)  Do Not Use - Resp Rate: --  SpO2: 97 % (02/14 0923)      Performance Status:  ECOG 0: Fully active, able to carry on all pre-disease performance without restriction     Physical Examination:    Constitutional: Patient is alert and oriented x 3, not in acute distress.  Respiratory: Clear to auscultation and percussion. No rales.  No wheezes.  Cardiovascular: Regular rate and rhythm. No murmurs.  Gastrointestinal: Soft, non tender with good bowel sounds.  Musculoskeletal: No edema. No calf tenderness.  Psychiatric: The patient's mood is calm and appropriate for this visit.       Labs reviewed at this visit:     Lab Results   Component Value  Date    WBC 5.9 02/14/2024    RBC 4.22 02/14/2024    HGB 11.6 (L) 02/14/2024    HCT 37.0 02/14/2024    MCV 87.7 02/14/2024    MCH 27.5 02/14/2024    MCHC 31.4 02/14/2024    RDW 15.6 02/14/2024    .0 02/14/2024     Lab Results   Component Value Date     02/14/2024    K 3.7 02/14/2024     02/14/2024    CO2 27.0 02/14/2024    BUN 11 02/14/2024    CREATSERUM 0.85 02/14/2024    GLU 98 02/14/2024    CA 8.8 02/14/2024    ALKPHO 58 02/14/2024    ALT 15 02/14/2024    AST 24 02/14/2024    BILT 0.3 02/14/2024    ALB 3.3 (L) 02/14/2024    TP 7.5 02/14/2024       Radiologic imaging reviewed at this visit:    Bone Dexa Scan on 5/5/2022:  LUMBAR SPINE ANALYSIS RESULTS:       Bone mineral density (BMD) (g/cm2):  0.487     Lumbar T-Score:  -5.1       % young normals:  46       % age matched controls:  61       Change from prior spine examination:  n/a                TOTAL HIP ANALYSIS RESULTS:         Bone mineral density (BMD) (g/cm2):  0.446       Total Hip T-Score:  -4.1       % young normals:  47       % age matched controls:  61       Change from prior hip examination:  n/a                FEMORAL NECK ANALYSIS RESULTS:         Bone mineral density (BMD) (g/cm2):  0.388       Femoral neck T-Score:  -4.2       % young normals:  46       % age matched controls:  62       Change from prior hip examination:  n/a           ADDITIONAL FINDINGS:  No significant additional findings.        Impression   CONCLUSION:  Bone mineral density values are compatible with the diagnosis of osteoporosis by WHO definition (T score less than -2.5). If therapy is initiated, a follow-up study in 1 year may aid in evaluation of therapeutic efficacy.        MRI of breast on 12/30/2021:  DESCRIPTION:  Witnessed verbal and written informed consent was obtained.  Time out was performed by the staff.  Discussed benefits and risks of MR guided core needle biopsy including, but not limited to, bleeding, infection, and/or failure to obtain    adequate sample. The patient was placed prone on the MRI Scanner.  The left breast was positioned in a dedicated breast coil and MR guidance grid device.   A fiducial was placed on the grid.       The skin was prepped and after local anesthesia was achieved, an introducer sheath and a localizing obturator were placed using a lateral approach at both sites.  The location of the obturator sheaths was confirmed with imaging.       The patient was then taken out of the bore of the magnet and samples were obtained using an MR compatible vacuum assisted core biopsy device at both sites.  A regular size needle was used for site 1 and a petite sized needle was used for site 2. Six   samples were taken at site 1, and 12 samples were taken at site 2. A tissue marker was placed at both sites.  Post biopsy images were obtained which confirm pot biopsy changes at the targets of the biopsy.         Post procedural mammogram performed on separate digital equipment show the clips in appropriate position.       The patient tolerated the procedure well and left the department in stable condition.       BIOPSY NEEDLE:            9-gauge [employer] biopsy device   MEDICATION:               1% lidocaine and 1% lidocaine containing epinephrine     SITE MARKER COORDINATES:     SITE 1:  Non-mass enhancement 7 o'clock  left breast   SITE 2:  0800 hours left breast   CLIP TYPE:                SITE 1:  M  SITE 2:  X   PHYSICIANS PRESENT:       Jaquelin Cole M.D.         PATHOLOGY:   A.  Left breast, 7:00, MRI guided biopsy:   -Breast tissue with nodular adenosis, stromal fibrosis, secretory change, apocrine metaplasia and microcalcifications.   -See comment.       B.  Left breast, 8:00, MRI guided biopsy:   -Ductal carcinoma in situ, nuclear grade 2, solid and cribriform types.   -Largest focus of DCIS measures 5 mm (0.5 cm) in greatest dimension.   -Background fibroadenomatous change, apocrine metaplasia, sclerosing adenosis and  microcalcifications.   -See comment.      Impression   CONCLUSION:     MRI-guided biopsy of two areas in the left breast.       Pathology from the 1st site at the 7 o'clock position is reported as benign.  This is likely concordant with the imaging assessment and is marked with an M clip.       Pathology from the 2nd site at the 8 o'clock position shows the presence of DCIS.  This is concordant with the imaging assessment and is marked with an X clip.       Surgical consultation is recommended for the bilateral biopsy proven malignancies.  Per the patient's electronic medical record, she is planning on a bilateral mastectomy.         Assessment/Plan:     Malignant melanoma of the left upper arm:  Nodular melanoma 10 mm thickness  2 of 6 left axillary nodes positive  Microscopic metastases  No extranodal extension  BRAF negative     The patient has a pT4b,pN2a (Stage IIIc) melanoma of the left upper arm and axilla. She has had definitive surgery. She has BRAF negative disease. She has good performance status (PS 0).She will proceed with adjuvant immunotherapy with  pembrolizumab. She understands the risk of endocrine, pulmonary, gastrointestinal, skin and neurologic side effects. She wanted to proceed. We will give the pembro at 200 mg every 3 weeks with the option to double the dose and give every 6 weeks for a full year. She had a portacath.     Invasive lobular carcinoma of the right breast 12 o'clock (overlapping quadrant):  Biopsy on 11/17/2021  ER 90%  DE <1%  K--67 5%  Her2 1+  Bilateral mastectomy on 1/21/2022:  Right Breast with Invasive lobular carcinoma  Measuring 2.2 cm  SLN 0/2 (one with ITC)  Negative margins  Left breast with no residual DCIS  0.5 cm DCIS biopsy on 12/30/2021  ER/DE positive  Right breast Oncotype Dx RS 26     She has started anastrozole. She does have severe osteoporosis. She continues to follow with endocrine. She has OPAL on the AI. She has no side effects from the AI. She is  comfortable with this plan.     Severe Osteoporosis:     She is following with endocrinology. She has had dental work with teeth extractions. She had Reclast on 6/16/2023. She will get this yearly.    Hypertension secondary to anxiety about treatment:    Repeat was decreasing. Will have patient check at home when relaxed.    Ben Garsia MD

## 2024-02-15 ENCOUNTER — TELEPHONE (OUTPATIENT)
Dept: HEMATOLOGY/ONCOLOGY | Facility: HOSPITAL | Age: 74
End: 2024-02-15

## 2024-02-15 NOTE — TELEPHONE ENCOUNTER
Patient called back, doing well, no symptoms other than new port site a little sore and she is taking medication for comfort.  Reinforced plan and to call if any issues.  She verbalizes understanding.

## 2024-02-23 ENCOUNTER — OFFICE VISIT (OUTPATIENT)
Dept: FAMILY MEDICINE CLINIC | Facility: CLINIC | Age: 74
End: 2024-02-23
Payer: MEDICARE

## 2024-02-23 VITALS
HEART RATE: 64 BPM | BODY MASS INDEX: 22.08 KG/M2 | TEMPERATURE: 98 F | OXYGEN SATURATION: 96 % | WEIGHT: 120 LBS | DIASTOLIC BLOOD PRESSURE: 80 MMHG | HEIGHT: 62 IN | SYSTOLIC BLOOD PRESSURE: 138 MMHG | RESPIRATION RATE: 16 BRPM

## 2024-02-23 DIAGNOSIS — E03.9 ACQUIRED HYPOTHYROIDISM: Primary | ICD-10-CM

## 2024-02-23 PROCEDURE — 99213 OFFICE O/P EST LOW 20 MIN: CPT | Performed by: FAMILY MEDICINE

## 2024-02-23 RX ORDER — LEVOTHYROXINE SODIUM 0.05 MG/1
50 TABLET ORAL DAILY
Qty: 90 TABLET | Refills: 0 | Status: SHIPPED | OUTPATIENT
Start: 2024-02-23 | End: 2025-02-17

## 2024-02-25 NOTE — PROGRESS NOTES
Subjective:   Dillan Casillas is a 73 year old female who presents for Thyroid Problem     Here to discuss issues with   Thyroid    Has a skin cancer removed  And will start on chemo    For this reason base test done  And has thyroid deficiency    No symptom noted    History/Other:   has a current medication list which includes the following prescription(s): levothyroxine, aspirin, anastrozole, primidone, atorvastatin, escitalopram, multiple vitamin, and acidophilus-pectin.     has No Known Allergies.     Review of Systems:  GENERAL HEALTH: feels well no complaints  SKIN: recent excision and plastic repair  See HPI    RESPIRATORY: denies shortness of breath with exertion  CARDIOVASCULAR: denies chest pain on exertion  GI: denies abdominal pain and denies heartburn  NEURO: denies headaches  ENDO no symptom  see HPI     Objective:   /80   Pulse 64   Temp 98.1 °F (36.7 °C) (Temporal)   Resp 16   Ht 5' 2\" (1.575 m)   Wt 120 lb (54.4 kg)   SpO2 96%   BMI 21.95 kg/m²  Estimated body mass index is 21.95 kg/m² as calculated from the following:    Height as of this encounter: 5' 2\" (1.575 m).    Weight as of this encounter: 120 lb (54.4 kg).  GENERAL: well developed, well nourished,in no apparent distress  SKIN: well healed left posterior arm scar    NECK: supple,no adenopathy,no bruits  No thyromegaly  LUNGS: clear to auscultation  CARDIO: RRR without murmur  Has basic tremor  unchanged   status     Assessment & Plan:   1. Acquired hypothyroidism (Primary)  -     Levothyroxine Sodium; Take 1 tablet (50 mcg total) by mouth daily.  Dispense: 90 tablet; Refill: 0  -     TSH and Free T4; Future; Expected date: 03/15/2024          Antonio Vargas MD, 2/24/2024, 6:38 PM

## 2024-03-06 ENCOUNTER — OFFICE VISIT (OUTPATIENT)
Dept: HEMATOLOGY/ONCOLOGY | Facility: HOSPITAL | Age: 74
End: 2024-03-06
Attending: INTERNAL MEDICINE
Payer: MEDICARE

## 2024-03-06 VITALS
HEIGHT: 62.01 IN | TEMPERATURE: 98 F | OXYGEN SATURATION: 97 % | DIASTOLIC BLOOD PRESSURE: 90 MMHG | BODY MASS INDEX: 21.92 KG/M2 | HEART RATE: 84 BPM | WEIGHT: 120.63 LBS | SYSTOLIC BLOOD PRESSURE: 197 MMHG

## 2024-03-06 DIAGNOSIS — C50.811 MALIGNANT NEOPLASM OF OVERLAPPING SITES OF RIGHT BREAST IN FEMALE, ESTROGEN RECEPTOR POSITIVE (HCC): Primary | ICD-10-CM

## 2024-03-06 DIAGNOSIS — Z17.0 MALIGNANT NEOPLASM OF OVERLAPPING SITES OF RIGHT BREAST IN FEMALE, ESTROGEN RECEPTOR POSITIVE (HCC): Primary | ICD-10-CM

## 2024-03-06 DIAGNOSIS — C43.62 MALIGNANT MELANOMA OF ARM, LEFT (HCC): ICD-10-CM

## 2024-03-06 DIAGNOSIS — C43.62 MALIGNANT MELANOMA OF ARM, LEFT (HCC): Primary | ICD-10-CM

## 2024-03-06 LAB
ALBUMIN SERPL-MCNC: 3.4 G/DL (ref 3.4–5)
ALBUMIN/GLOB SERPL: 0.9 {RATIO} (ref 1–2)
ALP LIVER SERPL-CCNC: 58 U/L
ALT SERPL-CCNC: 20 U/L
ANION GAP SERPL CALC-SCNC: 5 MMOL/L (ref 0–18)
AST SERPL-CCNC: 18 U/L (ref 15–37)
BASOPHILS # BLD AUTO: 0.05 X10(3) UL (ref 0–0.2)
BASOPHILS NFR BLD AUTO: 0.8 %
BILIRUB SERPL-MCNC: 0.4 MG/DL (ref 0.1–2)
BUN BLD-MCNC: 10 MG/DL (ref 9–23)
CALCIUM BLD-MCNC: 8.9 MG/DL (ref 8.5–10.1)
CHLORIDE SERPL-SCNC: 111 MMOL/L (ref 98–112)
CO2 SERPL-SCNC: 25 MMOL/L (ref 21–32)
CREAT BLD-MCNC: 0.76 MG/DL
EGFRCR SERPLBLD CKD-EPI 2021: 82 ML/MIN/1.73M2 (ref 60–?)
EOSINOPHIL # BLD AUTO: 0.12 X10(3) UL (ref 0–0.7)
EOSINOPHIL NFR BLD AUTO: 1.9 %
ERYTHROCYTE [DISTWIDTH] IN BLOOD BY AUTOMATED COUNT: 15 %
FASTING STATUS PATIENT QL REPORTED: NO
GLOBULIN PLAS-MCNC: 4 G/DL (ref 2.8–4.4)
GLUCOSE BLD-MCNC: 110 MG/DL (ref 70–99)
HCT VFR BLD AUTO: 38.2 %
HGB BLD-MCNC: 12.2 G/DL
IMM GRANULOCYTES # BLD AUTO: 0.03 X10(3) UL (ref 0–1)
IMM GRANULOCYTES NFR BLD: 0.5 %
LYMPHOCYTES # BLD AUTO: 1.19 X10(3) UL (ref 1–4)
LYMPHOCYTES NFR BLD AUTO: 18.4 %
MCH RBC QN AUTO: 27.4 PG (ref 26–34)
MCHC RBC AUTO-ENTMCNC: 31.9 G/DL (ref 31–37)
MCV RBC AUTO: 85.7 FL
MONOCYTES # BLD AUTO: 0.62 X10(3) UL (ref 0.1–1)
MONOCYTES NFR BLD AUTO: 9.6 %
NEUTROPHILS # BLD AUTO: 4.45 X10 (3) UL (ref 1.5–7.7)
NEUTROPHILS # BLD AUTO: 4.45 X10(3) UL (ref 1.5–7.7)
NEUTROPHILS NFR BLD AUTO: 68.8 %
OSMOLALITY SERPL CALC.SUM OF ELEC: 292 MOSM/KG (ref 275–295)
PLATELET # BLD AUTO: 197 10(3)UL (ref 150–450)
POTASSIUM SERPL-SCNC: 3.5 MMOL/L (ref 3.5–5.1)
PROT SERPL-MCNC: 7.4 G/DL (ref 6.4–8.2)
RBC # BLD AUTO: 4.46 X10(6)UL
SODIUM SERPL-SCNC: 141 MMOL/L (ref 136–145)
WBC # BLD AUTO: 6.5 X10(3) UL (ref 4–11)

## 2024-03-06 PROCEDURE — 80053 COMPREHEN METABOLIC PANEL: CPT

## 2024-03-06 PROCEDURE — 85025 COMPLETE CBC W/AUTO DIFF WBC: CPT

## 2024-03-06 PROCEDURE — 96413 CHEMO IV INFUSION 1 HR: CPT

## 2024-03-06 PROCEDURE — 99214 OFFICE O/P EST MOD 30 MIN: CPT | Performed by: INTERNAL MEDICINE

## 2024-03-06 NOTE — PROGRESS NOTES
Pt here for C2D1 Drug(s)keytruda.  Arrives Ambulating independently, accompanied by Self and Spouse     Patient was evaluated today by MD.    Oral medications included in this regimen:  no    Patient confirms comprehension of cancer treatment schedule:  yes    Pregnancy screening:  Not applicable    Modifications in dose or schedule:  No    Medications appearance and physical integrity checked by RN: yes.    Chemotherapy IV pump settings verified by 2 RNs:  Yes.  Frequency of blood return and site check throughout administration: Prior to administration     Infusion/treatment outcome:  patient tolerated treatment without incident    Education Record    Learner:  Patient  Barriers / Limitations:  None  Method:  Brief focused  Education / instructions given:    Outcome:  Shows understanding    Discharged Home, Ambulating independently, accompanied by:Self and Spouse    Patient/family verbalized understanding of future appointments: by MyChart messaging

## 2024-03-06 NOTE — PROGRESS NOTES
Cancer Center Progress Note    Problem List:      Patient Active Problem List   Diagnosis    Coronary atherosclerosis    Anxiety state    Pure hypercholesterolemia    Hypertension    Pulmonary fibrosis (HCC)    Abdominal aortic ectasia (HCC)    Reactive depression    Benign essential tremor    Flat epithelial atypia (FEA) of left breast    Ductal hyperplasia, atypical, breast    Exudative age-related macular degeneration of left eye with active choroidal neovascularization (HCC)    Status post bilateral mastectomy    Invasive lobular carcinoma of breast, stage 2, right (HCC)    Osteopenia, senile    Malignant melanoma of arm, left (HCC)    Acute appendicitis with localized peritonitis, without perforation, abscess, or gangrene       Interim History:    Dillan Casillas presents today for evaluation and management of a diagnosis of right breast cancer.    She has had a first cycle of pembrolizumab. She had no side effects. She has been otherwise well. She has no bone pain. She has no dyspnea or cough. She has no fever or sweats.    She has a prior h/o right breast cancer. She has been on anastrozole. She now presents with a left upper arm lesion. She was evaluated by Dr. Pierce Uribe. He did an excision biopsy on 11/13/2023 that showed invasive nodular melanoma with ulceration (tumor thickness 10 mm). She proceeded to have WLE and SLN procedure on 1/9/2024 by Dr. Zurdo Montesinos. There was no residual disease in the WLE. She had 2 of 6 nodes positive. One SLN had a 3 mm focus of metastatic melanoma with no extracapsular extension. A second SLN had a 1.5 mm of focus of metastatic melanoma with no extracapsular extension.      She had a PET scan on 12/8/2023 that was negative for metastatic disease. She had a MRI of the brain that was negative for brain metastases.     She had an acute appendicitis on 12/13/2023 s/p appendectomy. She has had some fatigue since that surgery and the recent arm surgery.    She had  bilateral mastectomy on 2022. The left breast had DCIS on her biopsy on 2021 but there was no residual cancer in the left mastectomy. The left SLN was negative. The right breast had an invasive lobular carcinoma, grade II, measuring 2.2 cm. The margins were negative. The right SLN procedure had one node with isolated tumor cells and another node that was negative. She had Oncotype Dx testing with a RS of 26.     Review of Systems:   Constitutional: Negative for anorexia, fatigue, fevers, chills, night sweats and weight loss.  Psychiatric: The patient's mood was calm and appropriate for this visit.  The pertinent positives and negatives were described. All other systems were negative.    PMH/PSH:  Past Medical History:   Diagnosis Date    Anxiety     Cancer (HCC)     breast cancer- right breast    Cancer (HCC)     left arm melanoma    Depression     Ductal hyperplasia, atypical, breast 2020    High blood pressure     not on any medication    High cholesterol     Hyperlipidemia     Macular degeneration     Osteoporosis     Personal history of colonic polyps     PONV (postoperative nausea and vomiting)     Tremors of nervous system     Visual impairment     glasses       Past Surgical History:   Procedure Laterality Date    ANGIOPLASTY (CORONARY)      APPENDECTOMY  2023    COLONOSCOPY      COLONOSCOPY & POLYPECTOMY  2003    HYSTERECTOMY  2017    and oophorectomy    AYLA BIOPSY STEREO NODULE 1 SITE LEFT (CPT=19081)  2020    ADH/FEA    AYLA LOCALIZATION WIRE 1 SITE LEFT (CPT=19281)  2020    Benign (core was ADH/FEA)    MASTECTOMY LEFT      MASTECTOMY RIGHT      NEEDLE BIOPSY LEFT      Benign          x2    OTHER SURGICAL HISTORY  10/01/2008    cardiac cath post-procedure date (mild LAD)                    TUBAL LIGATION         Family History Reviewed:  Family History   Problem Relation Age of Onset    High Cholesterol Other         siblings    Heart Disease Father          Heart disease    High Blood Pressure Father     Cancer Sister 64        leukemia    Cancer Sister 29        Kidney cancer    Neurological Disorder Sister     Other (als) Sister     Cancer Paternal Uncle 70        breast       Allergies:     No Known Allergies    Medications:   levothyroxine 50 MCG Oral Tab Take 1 tablet (50 mcg total) by mouth daily. 90 tablet 0    aspirin 81 MG Oral Chew Tab Chew 1 tablet (81 mg total) by mouth daily.      anastrozole 1 MG Oral Tab tab Take 1 tablet (1 mg total) by mouth daily. 90 tablet 3    primidone 50 MG Oral Tab Take 1 tablet (50 mg total) by mouth in the morning and 1 tablet (50 mg total) before bedtime. 180 tablet 1    ATORVASTATIN 10 MG Oral Tab TAKE 1 TABLET BY MOUTH EVERY DAY AT NIGHT 90 tablet 0    escitalopram 10 MG Oral Tab Take 1 tablet (10 mg total) by mouth daily. 90 tablet 1    Multiple Vitamin Oral Tab Take 1 tablet by mouth daily.      Acidophilus/Pectin Oral Cap Take 1 capsule by mouth daily.           Vital Signs:      Height: 157.5 cm (5' 2.01\") (03/06 0921)  Weight: 54.7 kg (120 lb 9.6 oz) (03/06 0921)  BSA (Calculated - sq m): 1.54 sq meters (03/06 0921)  Pulse: 84 (03/06 0921)  BP: 197/90 (03/06 0921)  Temp: 97.9 °F (36.6 °C) (03/06 0921)  Do Not Use - Resp Rate: --  SpO2: 97 % (03/06 0921)      Performance Status:  ECOG 0: Fully active, able to carry on all pre-disease performance without restriction     Physical Examination:    Constitutional: Patient is alert and oriented x 3, not in acute distress.  Respiratory: Clear to auscultation and percussion. No rales.  No wheezes.  Cardiovascular: Regular rate and rhythm. No murmurs.  Gastrointestinal: Soft, non tender with good bowel sounds.  Musculoskeletal: No edema. No calf tenderness.  Psychiatric: The patient's mood is calm and appropriate for this visit.       Labs reviewed at this visit:     Lab Results   Component Value Date    WBC 6.5 03/06/2024    RBC 4.46 03/06/2024    HGB 12.2 03/06/2024    HCT 38.2  03/06/2024    MCV 85.7 03/06/2024    MCH 27.4 03/06/2024    MCHC 31.9 03/06/2024    RDW 15.0 03/06/2024    .0 03/06/2024     Lab Results   Component Value Date     02/14/2024    K 3.7 02/14/2024     02/14/2024    CO2 27.0 02/14/2024    BUN 11 02/14/2024    CREATSERUM 0.85 02/14/2024    GLU 98 02/14/2024    CA 8.8 02/14/2024    ALKPHO 58 02/14/2024    ALT 15 02/14/2024    AST 24 02/14/2024    BILT 0.3 02/14/2024    ALB 3.3 (L) 02/14/2024    TP 7.5 02/14/2024       Radiologic imaging reviewed at this visit:    Bone Dexa Scan on 5/5/2022:  LUMBAR SPINE ANALYSIS RESULTS:       Bone mineral density (BMD) (g/cm2):  0.487     Lumbar T-Score:  -5.1       % young normals:  46       % age matched controls:  61       Change from prior spine examination:  n/a                TOTAL HIP ANALYSIS RESULTS:         Bone mineral density (BMD) (g/cm2):  0.446       Total Hip T-Score:  -4.1       % young normals:  47       % age matched controls:  61       Change from prior hip examination:  n/a                FEMORAL NECK ANALYSIS RESULTS:         Bone mineral density (BMD) (g/cm2):  0.388       Femoral neck T-Score:  -4.2       % young normals:  46       % age matched controls:  62       Change from prior hip examination:  n/a           ADDITIONAL FINDINGS:  No significant additional findings.        Impression   CONCLUSION:  Bone mineral density values are compatible with the diagnosis of osteoporosis by WHO definition (T score less than -2.5). If therapy is initiated, a follow-up study in 1 year may aid in evaluation of therapeutic efficacy.        MRI of breast on 12/30/2021:  DESCRIPTION:  Witnessed verbal and written informed consent was obtained.  Time out was performed by the staff.  Discussed benefits and risks of MR guided core needle biopsy including, but not limited to, bleeding, infection, and/or failure to obtain   adequate sample. The patient was placed prone on the MRI Scanner.  The left breast was  positioned in a dedicated breast coil and MR guidance grid device.   A fiducial was placed on the grid.       The skin was prepped and after local anesthesia was achieved, an introducer sheath and a localizing obturator were placed using a lateral approach at both sites.  The location of the obturator sheaths was confirmed with imaging.       The patient was then taken out of the bore of the magnet and samples were obtained using an MR compatible vacuum assisted core biopsy device at both sites.  A regular size needle was used for site 1 and a petite sized needle was used for site 2. Six   samples were taken at site 1, and 12 samples were taken at site 2. A tissue marker was placed at both sites.  Post biopsy images were obtained which confirm pot biopsy changes at the targets of the biopsy.         Post procedural mammogram performed on separate digital equipment show the clips in appropriate position.       The patient tolerated the procedure well and left the department in stable condition.       BIOPSY NEEDLE:            9-gauge RealScout biopsy device   MEDICATION:               1% lidocaine and 1% lidocaine containing epinephrine     SITE MARKER COORDINATES:     SITE 1:  Non-mass enhancement 7 o'clock  left breast   SITE 2:  0800 hours left breast   CLIP TYPE:                SITE 1:  M  SITE 2:  X   PHYSICIANS PRESENT:       Jaquelin Cole M.D.         PATHOLOGY:   A.  Left breast, 7:00, MRI guided biopsy:   -Breast tissue with nodular adenosis, stromal fibrosis, secretory change, apocrine metaplasia and microcalcifications.   -See comment.       B.  Left breast, 8:00, MRI guided biopsy:   -Ductal carcinoma in situ, nuclear grade 2, solid and cribriform types.   -Largest focus of DCIS measures 5 mm (0.5 cm) in greatest dimension.   -Background fibroadenomatous change, apocrine metaplasia, sclerosing adenosis and microcalcifications.   -See comment.      Impression   CONCLUSION:     MRI-guided biopsy of two areas in  the left breast.       Pathology from the 1st site at the 7 o'clock position is reported as benign.  This is likely concordant with the imaging assessment and is marked with an M clip.       Pathology from the 2nd site at the 8 o'clock position shows the presence of DCIS.  This is concordant with the imaging assessment and is marked with an X clip.       Surgical consultation is recommended for the bilateral biopsy proven malignancies.  Per the patient's electronic medical record, she is planning on a bilateral mastectomy.         Assessment/Plan:     Malignant melanoma of the left upper arm:  Nodular melanoma 10 mm thickness  2 of 6 left axillary nodes positive  Microscopic metastases  No extranodal extension  BRAF negative     The patient has a pT4b,pN2a (Stage IIIc) melanoma of the left upper arm and axilla. She has had definitive surgery. She has BRAF negative disease. She has good performance status (PS 0).    She will continue with the pembrolizumab every three weeks. I will see her in three weeks with repeat labs.    She has possible thyroid insufficiency. She was started on levothyroxine 50 mcg daily. She will take this and we will continue to follow the thyroid function on the IO.    Invasive lobular carcinoma of the right breast 12 o'clock (overlapping quadrant):  Biopsy on 11/17/2021  ER 90%  MO <1%  K--67 5%  Her2 1+  Bilateral mastectomy on 1/21/2022:  Right Breast with Invasive lobular carcinoma  Measuring 2.2 cm  SLN 0/2 (one with ITC)  Negative margins  Left breast with no residual DCIS  0.5 cm DCIS biopsy on 12/30/2021  ER/MO positive  Right breast Oncotype Dx RS 26     She has started anastrozole. She does have severe osteoporosis. She continues to follow with endocrine. She has OPAL on the AI. She has no side effects from the AI. She is comfortable with this plan.     Severe Osteoporosis:     She is following with endocrinology. She has had dental work with teeth extractions. She had Reclast on  6/16/2023. She will get this yearly.    Hypertension secondary to anxiety about treatment:    Repeat was decreasing. Will have patient check at home when relaxed.    Ben Garsia MD

## 2024-03-06 NOTE — PROGRESS NOTES
Patient here for C2D1 Keytruda. Patient currently taking anastrozole as directed and with no complaints. Patient states she did good on her C1 Keytruda. Denies n/v/d. Patient denies pain. Patient has good appetite and adequate fluid intake. Patient is accompanied by her . Patient has no further concerns or complaints at this time.     Education Record    Learner:  Patient and Spouse    Disease / Diagnosis: Malignant neoplasm of breast    Barriers / Limitations:  None   Comments:    Method:  Discussion   Comments:    General Topics:  Diet, Medication, Side effects and symptom management, and Plan of care reviewed   Comments:    Outcome:  Shows understanding   Comments:

## 2024-03-27 ENCOUNTER — APPOINTMENT (OUTPATIENT)
Dept: HEMATOLOGY/ONCOLOGY | Facility: HOSPITAL | Age: 74
End: 2024-03-27
Attending: INTERNAL MEDICINE
Payer: MEDICARE

## 2024-03-27 VITALS
DIASTOLIC BLOOD PRESSURE: 79 MMHG | BODY MASS INDEX: 22.28 KG/M2 | OXYGEN SATURATION: 100 % | SYSTOLIC BLOOD PRESSURE: 165 MMHG | HEART RATE: 68 BPM | HEIGHT: 62.01 IN | TEMPERATURE: 98 F | WEIGHT: 122.63 LBS

## 2024-03-27 DIAGNOSIS — C50.811 MALIGNANT NEOPLASM OF OVERLAPPING SITES OF RIGHT BREAST IN FEMALE, ESTROGEN RECEPTOR POSITIVE (HCC): ICD-10-CM

## 2024-03-27 DIAGNOSIS — Z17.0 MALIGNANT NEOPLASM OF OVERLAPPING SITES OF RIGHT BREAST IN FEMALE, ESTROGEN RECEPTOR POSITIVE (HCC): ICD-10-CM

## 2024-03-27 DIAGNOSIS — C43.62 MALIGNANT MELANOMA OF ARM, LEFT (HCC): Primary | ICD-10-CM

## 2024-03-27 LAB
ALBUMIN SERPL-MCNC: 3.4 G/DL (ref 3.4–5)
ALBUMIN/GLOB SERPL: 0.8 {RATIO} (ref 1–2)
ALP LIVER SERPL-CCNC: 58 U/L
ALT SERPL-CCNC: 20 U/L
ANION GAP SERPL CALC-SCNC: 6 MMOL/L (ref 0–18)
AST SERPL-CCNC: 28 U/L (ref 15–37)
BASOPHILS # BLD AUTO: 0.06 X10(3) UL (ref 0–0.2)
BASOPHILS NFR BLD AUTO: 1 %
BILIRUB SERPL-MCNC: 0.4 MG/DL (ref 0.1–2)
BUN BLD-MCNC: 12 MG/DL (ref 9–23)
CALCIUM BLD-MCNC: 9.1 MG/DL (ref 8.5–10.1)
CHLORIDE SERPL-SCNC: 108 MMOL/L (ref 98–112)
CO2 SERPL-SCNC: 26 MMOL/L (ref 21–32)
CREAT BLD-MCNC: 0.88 MG/DL
EGFRCR SERPLBLD CKD-EPI 2021: 68 ML/MIN/1.73M2 (ref 60–?)
EOSINOPHIL # BLD AUTO: 0.15 X10(3) UL (ref 0–0.7)
EOSINOPHIL NFR BLD AUTO: 2.5 %
ERYTHROCYTE [DISTWIDTH] IN BLOOD BY AUTOMATED COUNT: 14.6 %
FASTING STATUS PATIENT QL REPORTED: NO
GLOBULIN PLAS-MCNC: 4.2 G/DL (ref 2.8–4.4)
GLUCOSE BLD-MCNC: 96 MG/DL (ref 70–99)
HCT VFR BLD AUTO: 38.9 %
HGB BLD-MCNC: 12.1 G/DL
IMM GRANULOCYTES # BLD AUTO: 0.04 X10(3) UL (ref 0–1)
IMM GRANULOCYTES NFR BLD: 0.7 %
LYMPHOCYTES # BLD AUTO: 1.58 X10(3) UL (ref 1–4)
LYMPHOCYTES NFR BLD AUTO: 26.4 %
MCH RBC QN AUTO: 27 PG (ref 26–34)
MCHC RBC AUTO-ENTMCNC: 31.1 G/DL (ref 31–37)
MCV RBC AUTO: 86.8 FL
MONOCYTES # BLD AUTO: 0.81 X10(3) UL (ref 0.1–1)
MONOCYTES NFR BLD AUTO: 13.5 %
NEUTROPHILS # BLD AUTO: 3.34 X10 (3) UL (ref 1.5–7.7)
NEUTROPHILS # BLD AUTO: 3.34 X10(3) UL (ref 1.5–7.7)
NEUTROPHILS NFR BLD AUTO: 55.9 %
OSMOLALITY SERPL CALC.SUM OF ELEC: 290 MOSM/KG (ref 275–295)
PLATELET # BLD AUTO: 217 10(3)UL (ref 150–450)
POTASSIUM SERPL-SCNC: 4.2 MMOL/L (ref 3.5–5.1)
PROT SERPL-MCNC: 7.6 G/DL (ref 6.4–8.2)
RBC # BLD AUTO: 4.48 X10(6)UL
SODIUM SERPL-SCNC: 140 MMOL/L (ref 136–145)
T4 FREE SERPL-MCNC: 1.4 NG/DL (ref 0.8–1.7)
TSI SER-ACNC: 0.22 MIU/ML (ref 0.36–3.74)
WBC # BLD AUTO: 6 X10(3) UL (ref 4–11)

## 2024-03-27 PROCEDURE — 84439 ASSAY OF FREE THYROXINE: CPT

## 2024-03-27 PROCEDURE — 84443 ASSAY THYROID STIM HORMONE: CPT

## 2024-03-27 PROCEDURE — 85025 COMPLETE CBC W/AUTO DIFF WBC: CPT

## 2024-03-27 PROCEDURE — 99215 OFFICE O/P EST HI 40 MIN: CPT | Performed by: INTERNAL MEDICINE

## 2024-03-27 PROCEDURE — 80053 COMPREHEN METABOLIC PANEL: CPT

## 2024-03-27 PROCEDURE — 96413 CHEMO IV INFUSION 1 HR: CPT

## 2024-03-27 NOTE — PROGRESS NOTES
Pt here for C3D1 Drug(s)keytruda.  Arrives Ambulating independently, accompanied by Self and Spouse     Patient was evaluated today by MD.    Oral medications included in this regimen:  no    Patient confirms comprehension of cancer treatment schedule:  yes    Pregnancy screening:  Not applicable    Modifications in dose or schedule:  No    Medications appearance and physical integrity checked by RN: yes.    Chemotherapy IV pump settings verified by 2 RNs:  Yes.  Frequency of blood return and site check throughout administration: Prior to administration     Infusion/treatment outcome:  patient tolerated treatment without incident    Education Record    Learner:  Patient and Spouse  Barriers / Limitations:  None  Method:  Brief focused  Education / instructions given:    Outcome:  Shows understanding    Discharged Home, Ambulating independently, accompanied by:Self and Spouse    Patient/family verbalized understanding of future appointments: by MyChart messaging

## 2024-03-27 NOTE — PROGRESS NOTES
Cancer Center Progress Note    Problem List:      Patient Active Problem List   Diagnosis    Coronary atherosclerosis    Anxiety state    Pure hypercholesterolemia    Hypertension    Pulmonary fibrosis (HCC)    Abdominal aortic ectasia (HCC)    Reactive depression    Benign essential tremor    Flat epithelial atypia (FEA) of left breast    Ductal hyperplasia, atypical, breast    Exudative age-related macular degeneration of left eye with active choroidal neovascularization (HCC)    Status post bilateral mastectomy    Invasive lobular carcinoma of breast, stage 2, right (HCC)    Osteopenia, senile    Malignant melanoma of arm, left (HCC)    Acute appendicitis with localized peritonitis, without perforation, abscess, or gangrene       Interim History:    Dillan Casillas presents today for evaluation and management of a diagnosis of right breast cancer.    She has had a second cycle of pembrolizumab. She had no side effects. She has been otherwise well. She has no bone pain. She has no dyspnea or cough. She has no fever or sweats.    She has a prior h/o right breast cancer. She has been on anastrozole. She now presents with a left upper arm lesion. She was evaluated by Dr. Pierce Uribe. He did an excision biopsy on 11/13/2023 that showed invasive nodular melanoma with ulceration (tumor thickness 10 mm). She proceeded to have WLE and SLN procedure on 1/9/2024 by Dr. Zurdo Montesinos. There was no residual disease in the WLE. She had 2 of 6 nodes positive. One SLN had a 3 mm focus of metastatic melanoma with no extracapsular extension. A second SLN had a 1.5 mm of focus of metastatic melanoma with no extracapsular extension.      She had a PET scan on 12/8/2023 that was negative for metastatic disease. She had a MRI of the brain that was negative for brain metastases.     She had an acute appendicitis on 12/13/2023 s/p appendectomy. She has had some fatigue since that surgery and the recent arm surgery.    She had  bilateral mastectomy on 2022. The left breast had DCIS on her biopsy on 2021 but there was no residual cancer in the left mastectomy. The left SLN was negative. The right breast had an invasive lobular carcinoma, grade II, measuring 2.2 cm. The margins were negative. The right SLN procedure had one node with isolated tumor cells and another node that was negative. She had Oncotype Dx testing with a RS of 26.     Review of Systems:   Constitutional: Negative for anorexia, fatigue, fevers, chills, night sweats and weight loss.  Psychiatric: The patient's mood was calm and appropriate for this visit.  The pertinent positives and negatives were described. All other systems were negative.    PMH/PSH:  Past Medical History:   Diagnosis Date    Anxiety     Cancer (HCC)     breast cancer- right breast    Cancer (HCC)     left arm melanoma    Depression     Ductal hyperplasia, atypical, breast 2020    High blood pressure     not on any medication    High cholesterol     Hyperlipidemia     Macular degeneration     Osteoporosis     Personal history of colonic polyps     PONV (postoperative nausea and vomiting)     Tremors of nervous system     Visual impairment     glasses       Past Surgical History:   Procedure Laterality Date    ANGIOPLASTY (CORONARY)      APPENDECTOMY  2023    COLONOSCOPY      COLONOSCOPY & POLYPECTOMY  2003    HYSTERECTOMY  2017    and oophorectomy    AYLA BIOPSY STEREO NODULE 1 SITE LEFT (CPT=19081)  2020    ADH/FEA    AYLA LOCALIZATION WIRE 1 SITE LEFT (CPT=19281)  2020    Benign (core was ADH/FEA)    MASTECTOMY LEFT      MASTECTOMY RIGHT      NEEDLE BIOPSY LEFT      Benign          x2    OTHER SURGICAL HISTORY  10/01/2008    cardiac cath post-procedure date (mild LAD)                    TUBAL LIGATION         Family History Reviewed:  Family History   Problem Relation Age of Onset    High Cholesterol Other         siblings    Heart Disease Father          Heart disease    High Blood Pressure Father     Cancer Sister 64        leukemia    Cancer Sister 29        Kidney cancer    Neurological Disorder Sister     Other (als) Sister     Cancer Paternal Uncle 70        breast       Allergies:     No Known Allergies    Medications:   aspirin 81 MG Oral Chew Tab Chew 1 tablet (81 mg total) by mouth daily.      anastrozole 1 MG Oral Tab tab Take 1 tablet (1 mg total) by mouth daily. 90 tablet 3    primidone 50 MG Oral Tab Take 1 tablet (50 mg total) by mouth in the morning and 1 tablet (50 mg total) before bedtime. 180 tablet 1    ATORVASTATIN 10 MG Oral Tab TAKE 1 TABLET BY MOUTH EVERY DAY AT NIGHT 90 tablet 0    escitalopram 10 MG Oral Tab Take 1 tablet (10 mg total) by mouth daily. 90 tablet 1    Multiple Vitamin Oral Tab Take 1 tablet by mouth daily.      Acidophilus/Pectin Oral Cap Take 1 capsule by mouth daily.           Vital Signs:      Height: 157.5 cm (5' 2.01\") (03/27 0852)  Weight: 55.6 kg (122 lb 9.6 oz) (03/27 0852)  BSA (Calculated - sq m): 1.55 sq meters (03/27 0852)  Pulse: 68 (03/27 0852)  BP: 165/79 (03/27 0852)  Temp: 98.2 °F (36.8 °C) (03/27 0852)  Do Not Use - Resp Rate: --  SpO2: 100 % (03/27 0852)      Performance Status:  ECOG 0: Fully active, able to carry on all pre-disease performance without restriction     Physical Examination:    Constitutional: Patient is alert and oriented x 3, not in acute distress.  Respiratory: Clear to auscultation and percussion. No rales.  No wheezes.  Cardiovascular: Regular rate and rhythm. No murmurs.  Gastrointestinal: Soft, non tender with good bowel sounds.  Musculoskeletal: No edema. No calf tenderness.  Psychiatric: The patient's mood is calm and appropriate for this visit.       Labs reviewed at this visit:     Lab Results   Component Value Date    WBC 6.5 03/06/2024    RBC 4.46 03/06/2024    HGB 12.2 03/06/2024    HCT 38.2 03/06/2024    MCV 85.7 03/06/2024    MCH 27.4 03/06/2024    MCHC 31.9 03/06/2024    RDW  15.0 03/06/2024    .0 03/06/2024     Lab Results   Component Value Date     03/06/2024    K 3.5 03/06/2024     03/06/2024    CO2 25.0 03/06/2024    BUN 10 03/06/2024    CREATSERUM 0.76 03/06/2024     (H) 03/06/2024    CA 8.9 03/06/2024    ALKPHO 58 03/06/2024    ALT 20 03/06/2024    AST 18 03/06/2024    BILT 0.4 03/06/2024    ALB 3.4 03/06/2024    TP 7.4 03/06/2024       Radiologic imaging reviewed at this visit:    Bone Dexa Scan on 5/5/2022:  LUMBAR SPINE ANALYSIS RESULTS:       Bone mineral density (BMD) (g/cm2):  0.487     Lumbar T-Score:  -5.1       % young normals:  46       % age matched controls:  61       Change from prior spine examination:  n/a                TOTAL HIP ANALYSIS RESULTS:         Bone mineral density (BMD) (g/cm2):  0.446       Total Hip T-Score:  -4.1       % young normals:  47       % age matched controls:  61       Change from prior hip examination:  n/a                FEMORAL NECK ANALYSIS RESULTS:         Bone mineral density (BMD) (g/cm2):  0.388       Femoral neck T-Score:  -4.2       % young normals:  46       % age matched controls:  62       Change from prior hip examination:  n/a           ADDITIONAL FINDINGS:  No significant additional findings.        Impression   CONCLUSION:  Bone mineral density values are compatible with the diagnosis of osteoporosis by WHO definition (T score less than -2.5). If therapy is initiated, a follow-up study in 1 year may aid in evaluation of therapeutic efficacy.        MRI of breast on 12/30/2021:  DESCRIPTION:  Witnessed verbal and written informed consent was obtained.  Time out was performed by the staff.  Discussed benefits and risks of MR guided core needle biopsy including, but not limited to, bleeding, infection, and/or failure to obtain   adequate sample. The patient was placed prone on the MRI Scanner.  The left breast was positioned in a dedicated breast coil and MR guidance grid device.   A fiducial was  placed on the grid.       The skin was prepped and after local anesthesia was achieved, an introducer sheath and a localizing obturator were placed using a lateral approach at both sites.  The location of the obturator sheaths was confirmed with imaging.       The patient was then taken out of the bore of the magnet and samples were obtained using an MR compatible vacuum assisted core biopsy device at both sites.  A regular size needle was used for site 1 and a petite sized needle was used for site 2. Six   samples were taken at site 1, and 12 samples were taken at site 2. A tissue marker was placed at both sites.  Post biopsy images were obtained which confirm pot biopsy changes at the targets of the biopsy.         Post procedural mammogram performed on separate digital equipment show the clips in appropriate position.       The patient tolerated the procedure well and left the department in stable condition.       BIOPSY NEEDLE:            9-gauge "[employer]" biopsy device   MEDICATION:               1% lidocaine and 1% lidocaine containing epinephrine     SITE MARKER COORDINATES:     SITE 1:  Non-mass enhancement 7 o'clock  left breast   SITE 2:  0800 hours left breast   CLIP TYPE:                SITE 1:  M  SITE 2:  X   PHYSICIANS PRESENT:       Jaquelin Cole M.D.         PATHOLOGY:   A.  Left breast, 7:00, MRI guided biopsy:   -Breast tissue with nodular adenosis, stromal fibrosis, secretory change, apocrine metaplasia and microcalcifications.   -See comment.       B.  Left breast, 8:00, MRI guided biopsy:   -Ductal carcinoma in situ, nuclear grade 2, solid and cribriform types.   -Largest focus of DCIS measures 5 mm (0.5 cm) in greatest dimension.   -Background fibroadenomatous change, apocrine metaplasia, sclerosing adenosis and microcalcifications.   -See comment.      Impression   CONCLUSION:     MRI-guided biopsy of two areas in the left breast.       Pathology from the 1st site at the 7 o'clock position is  reported as benign.  This is likely concordant with the imaging assessment and is marked with an M clip.       Pathology from the 2nd site at the 8 o'clock position shows the presence of DCIS.  This is concordant with the imaging assessment and is marked with an X clip.       Surgical consultation is recommended for the bilateral biopsy proven malignancies.  Per the patient's electronic medical record, she is planning on a bilateral mastectomy.         Assessment/Plan:     Malignant melanoma of the left upper arm:  Nodular melanoma 10 mm thickness  2 of 6 left axillary nodes positive  Microscopic metastases  No extranodal extension  BRAF negative     The patient has a pT4b,pN2a (Stage IIIc) melanoma of the left upper arm and axilla. She has had definitive surgery. She has BRAF negative disease. She has good performance status (PS 0).    She will continue with the pembrolizumab every three weeks. We assessed her for immune toxicity. She has no symptoms or signs at the time. She will get cycle 3 today. She will return in three weeks for APN visit and six weeks to see me.  She will get labs at three week intervals.    She has decreased TSH with normal T4. Repeat thyroid studies in three weeks.    Invasive lobular carcinoma of the right breast 12 o'clock (overlapping quadrant):  Biopsy on 11/17/2021  ER 90%  MD <1%  K--67 5%  Her2 1+  Bilateral mastectomy on 1/21/2022:  Right Breast with Invasive lobular carcinoma  Measuring 2.2 cm  SLN 0/2 (one with ITC)  Negative margins  Left breast with no residual DCIS  0.5 cm DCIS biopsy on 12/30/2021  ER/MD positive  Right breast Oncotype Dx RS 26     She has started anastrozole. She does have severe osteoporosis. She continues to follow with endocrine. She has OPAL on the AI. She has no side effects from the AI. She is comfortable with this plan.     Severe Osteoporosis:     She is following with endocrinology. She has had dental work with teeth extractions. She had Reclast on  6/16/2023. She will get this yearly.    Hypertension secondary to anxiety about treatment:    Repeat was decreasing. Will have patient check at home when relaxed.    Ben Garsia MD

## 2024-03-27 NOTE — PROGRESS NOTES
Patient is here for follow up for melanoma on C3 of keytruda.   She is feeling very well. She denies any side effects from the infusion.  She is eating well and her energy is good.  She denies any cough or shortness of breath.  She is not having any bowel issues.   She is not taking levothyroxine prescribed by Dr. Vargas due to side effect concerns and she will talk with him about this. Medication was not removed from her med list.     Education Record    Learner:  Patient and Spouse    Disease / Diagnosis: melanoma    Barriers / Limitations:  None   Comments:    Method:  Discussion   Comments:    General Topics:  Side effects and symptom management   Comments:    Outcome:  Shows understanding   Comments:

## 2024-04-01 RX ORDER — ATORVASTATIN CALCIUM 10 MG/1
TABLET, FILM COATED ORAL
Qty: 90 TABLET | Refills: 0 | Status: SHIPPED | OUTPATIENT
Start: 2024-04-01

## 2024-04-01 NOTE — TELEPHONE ENCOUNTER
Last refill: 01/03/24  Qty: 90  W/ 0 refills  Last ov: 02/23/24    Requested Prescriptions     Pending Prescriptions Disp Refills    ATORVASTATIN 10 MG Oral Tab [Pharmacy Med Name: ATORVASTATIN 10 MG TABLET] 90 tablet 0     Sig: TAKE 1 TABLET BY MOUTH EVERY DAY AT NIGHT     Future Appointments   Date Time Provider Department Center   4/17/2024  9:30 AM  [place] RN2  [facility]   4/17/2024 10:00 AM Portia Rowell APRN  [facility]   5/8/2024  9:30 AM  [place] RN6  [facility]   5/8/2024 10:00 AM Ben Garsia MD  [facility]   5/8/2024 12:30 PM Zurdo Montesinos MD [id number]   5/16/2024  2:00 PM Ameena Cevallos DO [id number] [facility]   5/22/2024 11:30 AM Anila Piedra MD [id number]

## 2024-04-03 ENCOUNTER — TELEPHONE (OUTPATIENT)
Dept: FAMILY MEDICINE CLINIC | Facility: CLINIC | Age: 74
End: 2024-04-03

## 2024-04-16 NOTE — PROGRESS NOTES
Cancer Center Progress Note    Patient Name: Dillan Casillas   YOB: 1950   Medical Record Number: [id number]   CSN: [id number]   Date of visit: 4/17/2024       Chief Complaint/Reason for Visit:  Chief Complaint   Patient presents with    Follow - Up    Immunotherapy        Oncology history:  Breast cancer: She had bilateral mastectomy on 1/21/2022. The left breast had DCIS on her biopsy on 12/30/2021 but there was no residual cancer in the left mastectomy. The left SLN was negative. The right breast had an invasive lobular carcinoma, grade II, measuring 2.2 cm. The margins were negative. The right SLN procedure had one node with isolated tumor cells and another node that was negative. She had Oncotype Dx testing with a RS of 26. On anastrazole.     Metastatic melanoma:  Presented with left upper arm lesion. She was evaluated by Dr. Pierce Uribe. He did an excision biopsy on 11/13/2023 that showed invasive nodular melanoma with ulceration (tumor thickness 10 mm). She proceeded to have WLE and SLN procedure on 1/9/2024 by Dr. Zurdo Montesinos. There was no residual disease in the WLE. She had 2 of 6 nodes positive. One SLN had a 3 mm focus of metastatic melanoma with no extracapsular extension. A second SLN had a 1.5 mm of focus of metastatic melanoma with no extracapsular extension.   She had a PET scan on 12/8/2023 that was negative for metastatic disease. She had a MRI of the brain that was negative for brain metastases.      History of Present Illness:   Dillan Casillas is a 73 year old patient of Dr. Max with malignant melanoma of the left upper arm which she is receiving pembrolizumab for every three weeks. She also has a history of right breat cancer which she is receiving anastrozole for. She presents today for consideration of her next pembrolizumab infusion. She has been tolerating treatment without an issue. Denies any significant side effects from pembro or anastrozole. She has no  n/v/d.  No recent fevers or signs of infection, no sob, cp or cough.   She reports right knee pain with some swelling, no injury. Pain is intermittent but usually in the morning along with stiffness. Using ice and tylenol as needed.   Appetite has been well, pushing fluids.  Denies any skin rash, no itchiness.  No new skin lesions      Problem List:  Patient Active Problem List   Diagnosis    Coronary atherosclerosis    Anxiety state    Pure hypercholesterolemia    Hypertension    Pulmonary fibrosis (HCC)    Abdominal aortic ectasia (HCC)    Reactive depression    Benign essential tremor    Flat epithelial atypia (FEA) of left breast    Ductal hyperplasia, atypical, breast    Exudative age-related macular degeneration of left eye with active choroidal neovascularization (HCC)    Status post bilateral mastectomy    Invasive lobular carcinoma of breast, stage 2, right (HCC)    Osteopenia, senile    Malignant melanoma of arm, left (HCC)    Acute appendicitis with localized peritonitis, without perforation, abscess, or gangrene        Medical History:  Past Medical History:    Anxiety    Cancer (HCC)    breast cancer- right breast    Cancer (HCC)    left arm melanoma    Depression    Ductal hyperplasia, atypical, breast    High blood pressure    not on any medication    High cholesterol    Hyperlipidemia    Macular degeneration    Osteoporosis    Personal history of colonic polyps    PONV (postoperative nausea and vomiting)    Tremors of nervous system    Visual impairment    glasses       Surgical History:  Past Surgical History:   Procedure Laterality Date    Angioplasty (coronary)      Appendectomy  12/13/2023    Colonoscopy      Colonoscopy & polypectomy  12/01/2003    Hysterectomy  2017    and oophorectomy    Ajit biopsy stereo nodule 1 site left (cpt=19081)  08/2020    ADH/FEA    Ajit localization wire 1 site left (cpt=19281)  09/2020    Benign (core was ADH/FEA)    Mastectomy left      Mastectomy right      Needle  biopsy left      Benign          x2    Other surgical history  10/01/2008    cardiac cath post-procedure date (mild LAD)                    Tubal ligation         Allergies:  No Known Allergies    Family History:  Family History   Problem Relation Age of Onset    High Cholesterol Other         siblings    Heart Disease Father         Heart disease    High Blood Pressure Father     Cancer Sister 64        leukemia    Cancer Sister 29        Kidney cancer    Neurological Disorder Sister     Other (als) Sister     Cancer Paternal Uncle 70        breast       Social History:  Social History     Socioeconomic History    Marital status:      Spouse name: Not on file    Number of children: 2    Years of education: Not on file    Highest education level: Not on file   Occupational History    Not on file   Tobacco Use    Smoking status: Former     Current packs/day: 0.00     Average packs/day: 1 pack/day for 40.0 years (40.0 ttl pk-yrs)     Types: Cigarettes     Start date: 1973     Quit date: 2013     Years since quitting: 10.9    Smokeless tobacco: Never    Tobacco comments:     PPD   Vaping Use    Vaping status: Never Used   Substance and Sexual Activity    Alcohol use: No    Drug use: No    Sexual activity: Yes     Partners: Male   Other Topics Concern     Service Not Asked    Blood Transfusions Not Asked    Caffeine Concern No    Occupational Exposure Not Asked    Hobby Hazards Not Asked    Sleep Concern Not Asked    Stress Concern Yes    Weight Concern No    Special Diet No    Back Care Not Asked    Exercise Yes    Bike Helmet Not Asked    Seat Belt Yes    Self-Exams Not Asked   Social History Narrative    Not on file     Social Determinants of Health     Financial Resource Strain: Not on file   Food Insecurity: No Food Insecurity (2023)    Food Insecurity     Food Insecurity: Never true   Transportation Needs: No Transportation Needs (2023)    Transportation Needs     Lack of  Transportation: No   Physical Activity: Not on file   Stress: Not on file   Social Connections: Not on file   Housing Stability: Low Risk  (12/14/2023)    Housing Stability     Housing Instability: No     Housing Instability Emergency: Not on file       Medications:    Current Outpatient Medications:     ATORVASTATIN 10 MG Oral Tab, TAKE 1 TABLET BY MOUTH EVERY DAY AT NIGHT, Disp: 90 tablet, Rfl: 0    aspirin 81 MG Oral Chew Tab, Chew 1 tablet (81 mg total) by mouth daily., Disp: , Rfl:     anastrozole 1 MG Oral Tab tab, Take 1 tablet (1 mg total) by mouth daily., Disp: 90 tablet, Rfl: 3    primidone 50 MG Oral Tab, Take 1 tablet (50 mg total) by mouth in the morning and 1 tablet (50 mg total) before bedtime., Disp: 180 tablet, Rfl: 1    escitalopram 10 MG Oral Tab, Take 1 tablet (10 mg total) by mouth daily., Disp: 90 tablet, Rfl: 1    Multiple Vitamin Oral Tab, Take 1 tablet by mouth daily., Disp: , Rfl:     Acidophilus/Pectin Oral Cap, Take 1 capsule by mouth daily., Disp: , Rfl:     Review of Systems:  A comprehensive 14 point review of systems was completed.  Pertinent positives and negatives noted in the HPI.    Performance Status: ECOG 1 - No physically strenuous activity, but ambulatory and able to carry out light or sedentary work (e.g. office work, light house work).    Physical Examination:  Vital Signs: Height: 157.5 cm (5' 2.01\") (04/17 0947)  Weight: 54.4 kg (120 lb) (04/17 0947)  BSA (Calculated - sq m): 1.54 sq meters (04/17 0947)  Pulse: 81 (04/17 0947)  BP: 185/81 (04/17 0947)  Temp: 97.9 °F (36.6 °C) (04/17 0947)  Do Not Use - Resp Rate: --  SpO2: 96 % (04/17 0947)    General: Patient is alert and oriented x 3, not in acute distress.  Chest: Clear to auscultation. Respirations unlabored.   Heart: Regular rate and rhythm.   Abdomen: Soft, non-distended, non-tender with present bowel sounds.  Extremities: No edema.  Neurological: Grossly intact.   Skin: warm, dry, no erythema or rash    Psych/Depression: mood and affect are appropriate.     Labs:     Recent Results (from the past 72 hour(s))   Comp Metabolic Panel (14)    Collection Time: 04/17/24  9:37 AM   Result Value Ref Range    Glucose 112 (H) 70 - 99 mg/dL    Sodium 140 136 - 145 mmol/L    Potassium 3.8 3.5 - 5.1 mmol/L    Chloride 110 98 - 112 mmol/L    CO2 24.0 21.0 - 32.0 mmol/L    Anion Gap 6 0 - 18 mmol/L    BUN 9 9 - 23 mg/dL    Creatinine 0.68 0.55 - 1.02 mg/dL    Calcium, Total 9.2 8.5 - 10.1 mg/dL    Calculated Osmolality 289 275 - 295 mOsm/kg    eGFR-Cr 91 >=60 mL/min/1.73m2    AST 24 15 - 37 U/L    ALT 14 13 - 56 U/L    Alkaline Phosphatase 62 55 - 142 U/L    Bilirubin, Total 0.2 0.1 - 2.0 mg/dL    Total Protein 8.0 6.4 - 8.2 g/dL    Albumin 3.3 (L) 3.4 - 5.0 g/dL    Globulin  4.7 (H) 2.8 - 4.4 g/dL    A/G Ratio 0.7 (L) 1.0 - 2.0    Patient Fasting for CMP? Patient not present    CBC W/ DIFFERENTIAL    Collection Time: 04/17/24  9:37 AM   Result Value Ref Range    WBC 6.6 4.0 - 11.0 x10(3) uL    RBC 4.31 3.80 - 5.30 x10(6)uL    HGB 11.6 (L) 12.0 - 16.0 g/dL    HCT 37.3 35.0 - 48.0 %    .0 150.0 - 450.0 10(3)uL    MCV 86.5 80.0 - 100.0 fL    MCH 26.9 26.0 - 34.0 pg    MCHC 31.1 31.0 - 37.0 g/dL    RDW 14.3 %    Neutrophil Absolute Prelim 4.55 1.50 - 7.70 x10 (3) uL    Neutrophil Absolute 4.55 1.50 - 7.70 x10(3) uL    Lymphocyte Absolute 1.14 1.00 - 4.00 x10(3) uL    Monocyte Absolute 0.73 0.10 - 1.00 x10(3) uL    Eosinophil Absolute 0.14 0.00 - 0.70 x10(3) uL    Basophil Absolute 0.05 0.00 - 0.20 x10(3) uL    Immature Granulocyte Absolute 0.03 0.00 - 1.00 x10(3) uL    Neutrophil % 68.4 %    Lymphocyte % 17.2 %    Monocyte % 11.0 %    Eosinophil % 2.1 %    Basophil % 0.8 %    Immature Granulocyte % 0.5 %       Impression/Plan    Malignant melanoma of the left upper arm, stage IIIc  S/p surgery with Dr. Montesinos. Has a follow up in 3 weeks.   Receiving pembrolizumab every 3 weeks - tolerating without adverse reactions.  Labs  reviewed, will proceed with treatment as planned.       Right breast cancer:  Continue anastrozole as she is tolerating this well.  No evidence of disease, continue surveillance   She follows with endocrinology for osteoporosis, will continue to monitor      Planned Follow Up:  3 weeks    Risk Level: HIGH, breast cancer and melanoma receiving systemic therapy requiring close monitoring     The 21st Century Cures Act makes medical notes like these available to patients in the interest of transparency. Please be advised this is a medical document. Medical documents are intended to carry relevant information, facts as evident, and the clinical opinion of the practitioner. The medical note is intended as peer to peer communication and may appear blunt or direct. It is written in medical language and may contain abbreviations or verbiage that are unfamiliar.     Electronically Signed by:    JUANIS Haq-BC  Nurse Practitioner  [facility]

## 2024-04-17 ENCOUNTER — OFFICE VISIT (OUTPATIENT)
Dept: HEMATOLOGY/ONCOLOGY | Facility: HOSPITAL | Age: 74
End: 2024-04-17
Attending: INTERNAL MEDICINE
Payer: MEDICARE

## 2024-04-17 VITALS
TEMPERATURE: 98 F | RESPIRATION RATE: 16 BRPM | BODY MASS INDEX: 21.8 KG/M2 | DIASTOLIC BLOOD PRESSURE: 81 MMHG | HEIGHT: 62.01 IN | SYSTOLIC BLOOD PRESSURE: 185 MMHG | OXYGEN SATURATION: 96 % | WEIGHT: 120 LBS | HEART RATE: 81 BPM

## 2024-04-17 DIAGNOSIS — C43.62 MALIGNANT MELANOMA OF ARM, LEFT (HCC): Primary | ICD-10-CM

## 2024-04-17 DIAGNOSIS — C43.62 MALIGNANT MELANOMA OF ARM, LEFT (HCC): ICD-10-CM

## 2024-04-17 DIAGNOSIS — M85.80 OSTEOPENIA, SENILE: ICD-10-CM

## 2024-04-17 DIAGNOSIS — Z17.0 MALIGNANT NEOPLASM OF OVERLAPPING SITES OF RIGHT BREAST IN FEMALE, ESTROGEN RECEPTOR POSITIVE (HCC): Primary | ICD-10-CM

## 2024-04-17 DIAGNOSIS — C50.811 MALIGNANT NEOPLASM OF OVERLAPPING SITES OF RIGHT BREAST IN FEMALE, ESTROGEN RECEPTOR POSITIVE (HCC): Primary | ICD-10-CM

## 2024-04-17 LAB
ALBUMIN SERPL-MCNC: 3.3 G/DL (ref 3.4–5)
ALBUMIN/GLOB SERPL: 0.7 {RATIO} (ref 1–2)
ALP LIVER SERPL-CCNC: 62 U/L
ALT SERPL-CCNC: 14 U/L
ANION GAP SERPL CALC-SCNC: 6 MMOL/L (ref 0–18)
AST SERPL-CCNC: 24 U/L (ref 15–37)
BASOPHILS # BLD AUTO: 0.05 X10(3) UL (ref 0–0.2)
BASOPHILS NFR BLD AUTO: 0.8 %
BILIRUB SERPL-MCNC: 0.2 MG/DL (ref 0.1–2)
BUN BLD-MCNC: 9 MG/DL (ref 9–23)
CALCIUM BLD-MCNC: 9.2 MG/DL (ref 8.5–10.1)
CHLORIDE SERPL-SCNC: 110 MMOL/L (ref 98–112)
CO2 SERPL-SCNC: 24 MMOL/L (ref 21–32)
CREAT BLD-MCNC: 0.68 MG/DL
EGFRCR SERPLBLD CKD-EPI 2021: 91 ML/MIN/1.73M2 (ref 60–?)
EOSINOPHIL # BLD AUTO: 0.14 X10(3) UL (ref 0–0.7)
EOSINOPHIL NFR BLD AUTO: 2.1 %
ERYTHROCYTE [DISTWIDTH] IN BLOOD BY AUTOMATED COUNT: 14.3 %
GLOBULIN PLAS-MCNC: 4.7 G/DL (ref 2.8–4.4)
GLUCOSE BLD-MCNC: 112 MG/DL (ref 70–99)
HCT VFR BLD AUTO: 37.3 %
HGB BLD-MCNC: 11.6 G/DL
IMM GRANULOCYTES # BLD AUTO: 0.03 X10(3) UL (ref 0–1)
IMM GRANULOCYTES NFR BLD: 0.5 %
LYMPHOCYTES # BLD AUTO: 1.14 X10(3) UL (ref 1–4)
LYMPHOCYTES NFR BLD AUTO: 17.2 %
MCH RBC QN AUTO: 26.9 PG (ref 26–34)
MCHC RBC AUTO-ENTMCNC: 31.1 G/DL (ref 31–37)
MCV RBC AUTO: 86.5 FL
MONOCYTES # BLD AUTO: 0.73 X10(3) UL (ref 0.1–1)
MONOCYTES NFR BLD AUTO: 11 %
NEUTROPHILS # BLD AUTO: 4.55 X10 (3) UL (ref 1.5–7.7)
NEUTROPHILS # BLD AUTO: 4.55 X10(3) UL (ref 1.5–7.7)
NEUTROPHILS NFR BLD AUTO: 68.4 %
OSMOLALITY SERPL CALC.SUM OF ELEC: 289 MOSM/KG (ref 275–295)
PLATELET # BLD AUTO: 311 10(3)UL (ref 150–450)
POTASSIUM SERPL-SCNC: 3.8 MMOL/L (ref 3.5–5.1)
PROT SERPL-MCNC: 8 G/DL (ref 6.4–8.2)
RBC # BLD AUTO: 4.31 X10(6)UL
SODIUM SERPL-SCNC: 140 MMOL/L (ref 136–145)
WBC # BLD AUTO: 6.6 X10(3) UL (ref 4–11)

## 2024-04-17 PROCEDURE — 85025 COMPLETE CBC W/AUTO DIFF WBC: CPT

## 2024-04-17 PROCEDURE — 99215 OFFICE O/P EST HI 40 MIN: CPT | Performed by: NURSE PRACTITIONER

## 2024-04-17 PROCEDURE — 80053 COMPREHEN METABOLIC PANEL: CPT

## 2024-04-17 PROCEDURE — 96413 CHEMO IV INFUSION 1 HR: CPT

## 2024-04-17 NOTE — PROGRESS NOTES
Chief Complaint   Patient presents with    Follow - Up    Immunotherapy     Pt is here for treatment - C4 D1 keytruda is expected. Appetite is strong, denies N,V,D,C. Sleeping ok. Reports right knee pain with occasional swelling.    Education Record    Learner:  Patient and Spouse    Disease / Diagnosis: breast cancer    Barriers / Limitations:  None   Comments:    Method:  Brief focused   Comments:    General Topics:  Diet, Medication, Pain, Side effects and symptom management, and Plan of care reviewed   Comments:    Outcome:  Shows understanding   Comments:

## 2024-04-17 NOTE — PROGRESS NOTES
Pt here for C 4 D 1  Drug(s)Keytruda.  Arrives Ambulating independently, accompanied by Self and Spouse     Patient was evaluated today by MAGALY.    Oral medications included in this regimen:  no    Patient confirms comprehension of cancer treatment schedule:  yes    Pregnancy screening:  Not applicable    Modifications in dose or schedule:  No    Medications appearance and physical integrity checked by RN: yes.    Chemotherapy IV pump settings verified by 2 RNs:  No due to targeted therapy IV administration.  Frequency of blood return and site check throughout administration: Prior to administration and At completion of therapy     Infusion/treatment outcome:  patient tolerated treatment without incident    Education Record    Learner:  Patient and Spouse  Barriers / Limitations:  None  Method:  Discussion  Education / instructions given:    Outcome:  Shows understanding    Discharged Home, Ambulating independently, accompanied by:Self and Spouse    Patient/family verbalized understanding of future appointments: by printed AVS

## 2024-04-18 ENCOUNTER — HOSPITAL ENCOUNTER (OUTPATIENT)
Dept: MAMMOGRAPHY | Facility: HOSPITAL | Age: 74
Discharge: HOME | End: 2024-04-18
Payer: MEDICARE

## 2024-04-18 DIAGNOSIS — C76.42: ICD-10-CM

## 2024-04-18 DIAGNOSIS — C77.9 MALIGNANT MELANOMA METASTATIC TO LYMPH NODE (HCC): ICD-10-CM

## 2024-04-18 PROCEDURE — 76882 US LMTD JT/FCL EVL NVASC XTR: CPT

## 2024-04-25 ENCOUNTER — TELEPHONE (OUTPATIENT)
Dept: SURGERY | Facility: CLINIC | Age: 74
End: 2024-04-25

## 2024-04-25 DIAGNOSIS — F41.1 GENERALIZED ANXIETY DISORDER: ICD-10-CM

## 2024-04-25 RX ORDER — ESCITALOPRAM OXALATE 10 MG/1
10 TABLET ORAL DAILY
Qty: 90 TABLET | Refills: 1 | Status: SHIPPED | OUTPATIENT
Start: 2024-04-25

## 2024-04-25 NOTE — TELEPHONE ENCOUNTER
Last office visit: 2/23/24  Last refill: 11/1/23  Future Appointments   Date Time Provider Department Center   5/8/2024  9:30 AM  [place] RN6  [facility]   5/8/2024 10:00 AM Ben Garsia MD [facility]   5/8/2024 12:30 PM Zurdo Montesinos MD [id number]   5/16/2024  2:00 PM Ameena Cevallos DO [id number] [facility]   5/22/2024 11:30 AM Anila Piedra MD [id number]   6/10/2024  2:20 PM Antonio Vargas MD EMGSW [facility]

## 2024-05-07 NOTE — PROGRESS NOTES
[facility]      Patient Name:  Dillan Casillas   YOB: 1950   Gender:  Female   Appt Date:  5/8/2024   Provider:  Zurdo Montesinos MD     PATIENT PROVIDERS  Referring Provider: Pierce Uribe DO     Primary Care Provider:Antonio Vargas MD   Address: [address] [place] [id number]   Phone #: [phone]       CHIEF COMPLAINT  Chief Complaint   Patient presents with    Follow - Up     Malignant melanoma of L arm         PROBLEMS  Reviewed   Patient Active Problem List   Diagnosis    Coronary atherosclerosis    Anxiety state    Pure hypercholesterolemia    Hypertension    Pulmonary fibrosis (HCC)    Abdominal aortic ectasia (HCC)    Reactive depression    Benign essential tremor    Flat epithelial atypia (FEA) of left breast    Ductal hyperplasia, atypical, breast    Exudative age-related macular degeneration of left eye with active choroidal neovascularization (HCC)    Status post bilateral mastectomy    Invasive lobular carcinoma of breast, stage 2, right (HCC)    Osteopenia, senile    Malignant melanoma of arm, left (HCC)    Acute appendicitis with localized peritonitis, without perforation, abscess, or gangrene        History of Present Illness:  Patient is a 72 year old woman who was referred to our clinic for surgical oncology recommendations in regard to a newly diagnosed melanoma of the left arm. Patient has history significant for invasive lobular carcinoma of right breast and DCIS left breast. She is s/p bilateral mastectomy with bilateral SLNB.      7/11/2017: robotic assisted hysterectomy, left salpingo-oophorectomy and frozen section. ([place]) --> benign serous cystadenofibroma, 7.5 cm     9/28/2020: needle localized left breast lumpectomy for atypical ductal hyperplasia ([place])     1/21/2022: s/p bilateral mastectomy with SLNB for invasive lobular carcinoma of right breast and DCIS of left breast. Single lymph node with rare isolated tumor cells (N0).  ([place])     10/18/2023: Initially the lesion appeared to just be a mound, no color or pigment, some flaking noted. Seen by Dr. Uribe for mass of the left arm that had been present for year. Per chart review, appeared to be increasing in size over the past 2 months. Confirmed easy bleeding once scraped. Dr Uribe concerned for pyogenic granuloma.      11/13/2023: s/p excision mass of left arm --> invasive nodular melanoma with ulceration (10 mm thickness)     1/9/2024: s/p wide excision melanoma left upper arm with left SLNB and reconstruction by Dr Piedra --> margins negative, no residual melanoma, 2/6 LN positive for metastatic melanoma; [id number]    Interval History:  She is undergoing adjuvant pembrolizumab with Dr Garsia.      Vital Signs:  BP (!) 194/88 (BP Location: Right arm, Patient Position: Sitting, Cuff Size: adult)   Pulse 68   Temp 98 °F (36.7 °C) (Temporal)   Resp 20   Wt 57.3 kg (126 lb 6.4 oz)   BMI 23.11 kg/m²      Medications Reviewed:    Current Outpatient Medications:     levothyroxine 50 MCG Oral Tab, Take 1 tablet (50 mcg total) by mouth before breakfast., Disp: 30 tablet, Rfl: 3    escitalopram 10 MG Oral Tab, Take 1 tablet (10 mg total) by mouth daily., Disp: 90 tablet, Rfl: 1    ATORVASTATIN 10 MG Oral Tab, TAKE 1 TABLET BY MOUTH EVERY DAY AT NIGHT, Disp: 90 tablet, Rfl: 0    aspirin 81 MG Oral Chew Tab, Chew 1 tablet (81 mg total) by mouth daily., Disp: , Rfl:     anastrozole 1 MG Oral Tab tab, Take 1 tablet (1 mg total) by mouth daily., Disp: 90 tablet, Rfl: 3    primidone 50 MG Oral Tab, Take 1 tablet (50 mg total) by mouth in the morning and 1 tablet (50 mg total) before bedtime., Disp: 180 tablet, Rfl: 1    Multiple Vitamin Oral Tab, Take 1 tablet by mouth daily., Disp: , Rfl:     Acidophilus/Pectin Oral Cap, Take 1 capsule by mouth daily., Disp: , Rfl:      Allergies Reviewed:  No Known Allergies     History:  Reviewed:  Past Medical History:    Anxiety    Cancer (HCC)    breast cancer-  right breast    Cancer (HCC)    left arm melanoma    Depression    Ductal hyperplasia, atypical, breast    High blood pressure    not on any medication    High cholesterol    Hyperlipidemia    Macular degeneration    Osteoporosis    Personal history of colonic polyps    PONV (postoperative nausea and vomiting)    Tremors of nervous system    Visual impairment    glasses      Reviewed:  Past Surgical History:   Procedure Laterality Date    Angioplasty (coronary)      Appendectomy  2023    Colonoscopy      Colonoscopy & polypectomy  2003    Hysterectomy  2017    and oophorectomy    Ajit biopsy stereo nodule 1 site left (cpt=19081)  2020    ADH/FEA    Ajit localization wire 1 site left (cpt=19281)  2020    Benign (core was ADH/FEA)    Mastectomy left      Mastectomy right      Needle biopsy left      Benign          x2    Other surgical history  10/01/2008    cardiac cath post-procedure date (mild LAD)                    Tubal ligation        Reviewed Social History:  Social History     Socioeconomic History    Marital status:     Number of children: 2   Tobacco Use    Smoking status: Former     Current packs/day: 0.00     Average packs/day: 1 pack/day for 40.0 years (40.0 ttl pk-yrs)     Types: Cigarettes     Start date: 1973     Quit date: 2013     Years since quittin.0    Smokeless tobacco: Never    Tobacco comments:     PPD   Vaping Use    Vaping status: Never Used   Substance and Sexual Activity    Alcohol use: No    Drug use: No    Sexual activity: Yes     Partners: Male   Other Topics Concern    Caffeine Concern No    Stress Concern Yes    Weight Concern No    Special Diet No    Exercise Yes    Seat Belt Yes     Social Determinants of Health     Food Insecurity: No Food Insecurity (2023)    Food Insecurity     Food Insecurity: Never true   Transportation Needs: No Transportation Needs (2023)    Transportation Needs     Lack of Transportation: No   Housing  Stability: Low Risk  (12/14/2023)    Housing Stability     Housing Instability: No      Reviewed:  Family History   Problem Relation Age of Onset    Other (Melanoma) Self     Other (Other) Self     Breast Cancer Self     Heart Disease Father         Heart disease    High Blood Pressure Father     Cancer Sister 64        leukemia    Cancer Sister 29        Kidney cancer    Neurological Disorder Sister     Other (als) Sister     Cancer Paternal Uncle 70        breast    High Cholesterol Other         siblings      Review of Systems:  Patient reports no rapid or irregular heartbeat. She reports no chest pain. She reports no nausea. She reports no vomiting. She reports no diarrhea. She reports no constipation. She reports no bright red blood per rectum. She reports no fatigue. She reports no blood in the urine hematuria, no changes in urinary habits: initiating urination requires straining, no changes in urinary habits: no hesitancy, and no change in urine appearance. She reports no headache. She reports no dizziness. She reports no easy bruising tendency. She reports no diffuse joint pains. She reports no bone pain. She reports no back pain. She reports no swollen glands. She reports no pain. She reports no numbness. She reports no shortness of breath. She reports no change in a mole. She reports no recent change in weight, no fever, no chills, and no sweating heavily at night.        Physical Examination:  Constitutional: NAD.   Eyes: Sclera: non-icteric.   Neck: Neck: supple.   Lymph Nodes: Lymph Nodes no cervical LAD, supraclavicular LAD, axillary LAD, or inguinal LAD.   Abdomen: Inspection and Palpation: no masses, tenderness (no guarding, no rebound), or CVA tenderness and soft and non-distended. Liver: no hepatomegaly. Spleen: no splenomegaly. Hernia: none palpable.   Musculoskeletal: Extremities: no edema.   Skin: The scalp an remainder of the skin do not show any evidence for new or suspicious lesions. The wide  excision site is well healed. There is no evidence for local recurrence. There are no intransit metastases.      Document Review:  US Left Axilla 4/18/2024:  FINDINGS:    Targeted ultrasound of the left axilla was performed.  There are 2 lymph nodes identified.  The 1st lymph node measures 1.6 x 1.3 x 0.8 cm.  This lymph node maintains normal lymph node architecture within normal length hyper ratio.  There is a normal echogenic fatty hilus.  There are normal hilar vessels.  Maximum cortical thickness is 1 mm.   Second lymph node measures 1.5 x 0.8 x 0.6 cm.  This has a normal length to height ratio.  There is no abnormal cortical thickening.  There is a normal echogenic fatty hilus.  There are normal hilar vessels noted.  Maximum cortical thickness is 1.5 mm.      Procedure(s):  None     Assessment / Plan:  Malignant melanoma of arm, left (HCC) (C43.62)  Patient doing well and remains OPAL.  She is to continue monthly self skin examination.  Continue immunotherapy with Dr. Garsia.  Return to clinic 4 months with ultrasound left axilla.    Invasive lobular carcinoma of breast, stage 2, right (HCC) (C50.911)  -Also following up with Dr. Garsia      Follow Up:  4 mo       Electronically Signed by: Zurdo Montesinos MD

## 2024-05-08 ENCOUNTER — OFFICE VISIT (OUTPATIENT)
Dept: SURGERY | Facility: CLINIC | Age: 74
End: 2024-05-08
Payer: MEDICARE

## 2024-05-08 ENCOUNTER — OFFICE VISIT (OUTPATIENT)
Dept: HEMATOLOGY/ONCOLOGY | Facility: HOSPITAL | Age: 74
End: 2024-05-08
Attending: INTERNAL MEDICINE
Payer: MEDICARE

## 2024-05-08 VITALS
RESPIRATION RATE: 16 BRPM | OXYGEN SATURATION: 94 % | TEMPERATURE: 98 F | SYSTOLIC BLOOD PRESSURE: 193 MMHG | HEIGHT: 62.01 IN | DIASTOLIC BLOOD PRESSURE: 83 MMHG | HEART RATE: 76 BPM | WEIGHT: 124 LBS | BODY MASS INDEX: 22.53 KG/M2

## 2024-05-08 VITALS
DIASTOLIC BLOOD PRESSURE: 88 MMHG | HEART RATE: 68 BPM | WEIGHT: 126.38 LBS | SYSTOLIC BLOOD PRESSURE: 194 MMHG | BODY MASS INDEX: 23 KG/M2 | TEMPERATURE: 98 F | RESPIRATION RATE: 20 BRPM

## 2024-05-08 DIAGNOSIS — C43.62 MALIGNANT MELANOMA OF ARM, LEFT (HCC): Primary | ICD-10-CM

## 2024-05-08 DIAGNOSIS — C50.811 MALIGNANT NEOPLASM OF OVERLAPPING SITES OF RIGHT BREAST IN FEMALE, ESTROGEN RECEPTOR POSITIVE (HCC): ICD-10-CM

## 2024-05-08 DIAGNOSIS — M25.561 ACUTE PAIN OF RIGHT KNEE: ICD-10-CM

## 2024-05-08 DIAGNOSIS — Z17.0 MALIGNANT NEOPLASM OF OVERLAPPING SITES OF RIGHT BREAST IN FEMALE, ESTROGEN RECEPTOR POSITIVE (HCC): ICD-10-CM

## 2024-05-08 DIAGNOSIS — C50.911: ICD-10-CM

## 2024-05-08 DIAGNOSIS — C76.42: ICD-10-CM

## 2024-05-08 LAB
ALBUMIN SERPL-MCNC: 3.4 G/DL (ref 3.4–5)
ALBUMIN/GLOB SERPL: 0.9 {RATIO} (ref 1–2)
ALP LIVER SERPL-CCNC: 62 U/L
ALT SERPL-CCNC: 22 U/L
ANION GAP SERPL CALC-SCNC: 5 MMOL/L (ref 0–18)
AST SERPL-CCNC: 30 U/L (ref 15–37)
BASOPHILS # BLD AUTO: 0.06 X10(3) UL (ref 0–0.2)
BASOPHILS NFR BLD AUTO: 1 %
BILIRUB SERPL-MCNC: 0.2 MG/DL (ref 0.1–2)
BUN BLD-MCNC: 8 MG/DL (ref 9–23)
CALCIUM BLD-MCNC: 8.7 MG/DL (ref 8.5–10.1)
CHLORIDE SERPL-SCNC: 107 MMOL/L (ref 98–112)
CO2 SERPL-SCNC: 29 MMOL/L (ref 21–32)
CREAT BLD-MCNC: 0.81 MG/DL
CRP SERPL-MCNC: 1.26 MG/DL (ref ?–0.3)
EGFRCR SERPLBLD CKD-EPI 2021: 76 ML/MIN/1.73M2 (ref 60–?)
EOSINOPHIL # BLD AUTO: 0.22 X10(3) UL (ref 0–0.7)
EOSINOPHIL NFR BLD AUTO: 3.6 %
ERYTHROCYTE [DISTWIDTH] IN BLOOD BY AUTOMATED COUNT: 15 %
ERYTHROCYTE [SEDIMENTATION RATE] IN BLOOD: 48 MM/HR
FASTING STATUS PATIENT QL REPORTED: NO
GLOBULIN PLAS-MCNC: 3.8 G/DL (ref 2.8–4.4)
GLUCOSE BLD-MCNC: 94 MG/DL (ref 70–99)
HCT VFR BLD AUTO: 37.3 %
HGB BLD-MCNC: 11.7 G/DL
IMM GRANULOCYTES # BLD AUTO: 0.03 X10(3) UL (ref 0–1)
IMM GRANULOCYTES NFR BLD: 0.5 %
LYMPHOCYTES # BLD AUTO: 1.43 X10(3) UL (ref 1–4)
LYMPHOCYTES NFR BLD AUTO: 23.3 %
MCH RBC QN AUTO: 27.1 PG (ref 26–34)
MCHC RBC AUTO-ENTMCNC: 31.4 G/DL (ref 31–37)
MCV RBC AUTO: 86.3 FL
MONOCYTES # BLD AUTO: 0.69 X10(3) UL (ref 0.1–1)
MONOCYTES NFR BLD AUTO: 11.2 %
NEUTROPHILS # BLD AUTO: 3.72 X10 (3) UL (ref 1.5–7.7)
NEUTROPHILS # BLD AUTO: 3.72 X10(3) UL (ref 1.5–7.7)
NEUTROPHILS NFR BLD AUTO: 60.4 %
OSMOLALITY SERPL CALC.SUM OF ELEC: 290 MOSM/KG (ref 275–295)
PLATELET # BLD AUTO: 232 10(3)UL (ref 150–450)
POTASSIUM SERPL-SCNC: 4 MMOL/L (ref 3.5–5.1)
PROT SERPL-MCNC: 7.2 G/DL (ref 6.4–8.2)
RBC # BLD AUTO: 4.32 X10(6)UL
RHEUMATOID FACT SERPL-ACNC: <10 IU/ML (ref ?–15)
SODIUM SERPL-SCNC: 141 MMOL/L (ref 136–145)
T4 FREE SERPL-MCNC: 0.6 NG/DL (ref 0.8–1.7)
TSI SER-ACNC: 18.8 MIU/ML (ref 0.36–3.74)
WBC # BLD AUTO: 6.2 X10(3) UL (ref 4–11)

## 2024-05-08 PROCEDURE — 85025 COMPLETE CBC W/AUTO DIFF WBC: CPT

## 2024-05-08 PROCEDURE — 99213 OFFICE O/P EST LOW 20 MIN: CPT | Performed by: SURGERY

## 2024-05-08 PROCEDURE — 84443 ASSAY THYROID STIM HORMONE: CPT

## 2024-05-08 PROCEDURE — 86200 CCP ANTIBODY: CPT

## 2024-05-08 PROCEDURE — 80053 COMPREHEN METABOLIC PANEL: CPT

## 2024-05-08 PROCEDURE — 99215 OFFICE O/P EST HI 40 MIN: CPT | Performed by: INTERNAL MEDICINE

## 2024-05-08 PROCEDURE — 84439 ASSAY OF FREE THYROXINE: CPT

## 2024-05-08 PROCEDURE — 86140 C-REACTIVE PROTEIN: CPT

## 2024-05-08 PROCEDURE — 96413 CHEMO IV INFUSION 1 HR: CPT

## 2024-05-08 PROCEDURE — 86431 RHEUMATOID FACTOR QUANT: CPT

## 2024-05-08 PROCEDURE — 85652 RBC SED RATE AUTOMATED: CPT

## 2024-05-08 RX ORDER — LEVOTHYROXINE SODIUM 0.05 MG/1
50 TABLET ORAL
Qty: 30 TABLET | Refills: 3 | Status: SHIPPED | OUTPATIENT
Start: 2024-05-08

## 2024-05-08 NOTE — PROGRESS NOTES
Pt here for C5D1 Drug(s)Keytruda.  Arrives Ambulating independently, accompanied by Spouse     Patient was evaluated today by MD.    Oral medications included in this regimen:  no    Patient confirms comprehension of cancer treatment schedule:  yes    Pregnancy screening:  Denies possibility of pregnancy    Modifications in dose or schedule:  No    Medications appearance and physical integrity checked by RN: yes.    Chemotherapy IV pump settings verified by 2 RNs:  No due to targeted therapy IV administration.  Frequency of blood return and site check throughout administration: Prior to administration and At completion of therapy     Infusion/treatment outcome:  patient tolerated treatment without incident    Education Record    Learner:  Patient and Spouse  Barriers / Limitations:  None  Method:  Brief focused and Reinforcement  Education / instructions given:  Plan of care reviewed -   Outcome:  Shows understanding    Discharged Home, Ambulating independently, accompanied by:Spouse    Patient/family verbalized understanding of future appointments: by printed AVS  Elevated TSH - Dr. Garsia aware, received orders to proceed with treatment today. Patient will be started in Levothyroxine. Dr. Garsia's RN, Lukas Flores, came to the treatment area to inform patient of this orders. Patient and spouse verbalized understanding.

## 2024-05-08 NOTE — PROGRESS NOTES
Patient here for C5D1 Keytruda. Patient currently taking anastrozole as directed with c/o right knee pain. Patient states knee pain started 1 month ago, that she rates a 7/10. Patient takes Advil or Aleve that she states helps with pain control. Patient states she has a good appetite and adequate fluid intake. Patient is accompanied by her . Patient has no further concerns or complaints at this time.     Education Record    Learner:  Patient and Spouse    Disease / Diagnosis:carcinoma of breast    Barriers / Limitations:  None   Comments:    Method:  Discussion   Comments:    General Topics:  Medication, Side effects and symptom management, and Plan of care reviewed   Comments:    Outcome:  Shows understanding   Comments:

## 2024-05-08 NOTE — PROGRESS NOTES
Cancer Center Progress Note    Problem List:      Patient Active Problem List   Diagnosis    Coronary atherosclerosis    Anxiety state    Pure hypercholesterolemia    Hypertension    Pulmonary fibrosis (HCC)    Abdominal aortic ectasia (HCC)    Reactive depression    Benign essential tremor    Flat epithelial atypia (FEA) of left breast    Ductal hyperplasia, atypical, breast    Exudative age-related macular degeneration of left eye with active choroidal neovascularization (HCC)    Status post bilateral mastectomy    Invasive lobular carcinoma of breast, stage 2, right (HCC)    Osteopenia, senile    Malignant melanoma of arm, left (HCC)    Acute appendicitis with localized peritonitis, without perforation, abscess, or gangrene       Interim History:    Dillan Casillas presents today for evaluation and management of a diagnosis of right breast cancer.    She has had a fourth cycle of pembrolizumab. She complains of right knee pain and swelling that started about 4 weeks ago. She has been putting an ice pack on the knee with some relief. She takes occasional tylenol and ibuprofen with relief. She has no other new complaints. She has been otherwise well. She has no bone pain. She has no dyspnea or cough. She has no fever or sweats.    She has a prior h/o right breast cancer. She has been on anastrozole. She now presents with a left upper arm lesion. She was evaluated by Dr. Pierce Uribe. He did an excision biopsy on 11/13/2023 that showed invasive nodular melanoma with ulceration (tumor thickness 10 mm). She proceeded to have WLE and SLN procedure on 1/9/2024 by Dr. Zurdo Montesinos. There was no residual disease in the WLE. She had 2 of 6 nodes positive. One SLN had a 3 mm focus of metastatic melanoma with no extracapsular extension. A second SLN had a 1.5 mm of focus of metastatic melanoma with no extracapsular extension.      She had a PET scan on 12/8/2023 that was negative for metastatic disease. She had a MRI of  the brain that was negative for brain metastases.     She had an acute appendicitis on 2023 s/p appendectomy. She has had some fatigue since that surgery and the recent arm surgery.    She had bilateral mastectomy on 2022. The left breast had DCIS on her biopsy on 2021 but there was no residual cancer in the left mastectomy. The left SLN was negative. The right breast had an invasive lobular carcinoma, grade II, measuring 2.2 cm. The margins were negative. The right SLN procedure had one node with isolated tumor cells and another node that was negative. She had Oncotype Dx testing with a RS of 26.     Review of Systems:   Constitutional: Negative for anorexia, fatigue, fevers, chills, night sweats and weight loss.  Psychiatric: The patient's mood was calm and appropriate for this visit.  The pertinent positives and negatives were described. All other systems were negative.    PMH/PSH:  Past Medical History:    Anxiety    Cancer (HCC)    breast cancer- right breast    Cancer (HCC)    left arm melanoma    Depression    Ductal hyperplasia, atypical, breast    High blood pressure    not on any medication    High cholesterol    Hyperlipidemia    Macular degeneration    Osteoporosis    Personal history of colonic polyps    PONV (postoperative nausea and vomiting)    Tremors of nervous system    Visual impairment    glasses       Past Surgical History:   Procedure Laterality Date    Angioplasty (coronary)      Appendectomy  2023    Colonoscopy      Colonoscopy & polypectomy  2003    Hysterectomy  2017    and oophorectomy    Ajit biopsy stereo nodule 1 site left (cpt=19081)  2020    ADH/FEA    Ajit localization wire 1 site left (cpt=19281)  2020    Benign (core was ADH/FEA)    Mastectomy left      Mastectomy right      Needle biopsy left      Benign          x2    Other surgical history  10/01/2008    cardiac cath post-procedure date (mild LAD)                    Tubal ligation          Family History Reviewed:  Family History   Problem Relation Age of Onset    Other (Melanoma) Self     Other (Other) Self     Breast Cancer Self     Heart Disease Father         Heart disease    High Blood Pressure Father     Cancer Sister 64        leukemia    Cancer Sister 29        Kidney cancer    Neurological Disorder Sister     Other (als) Sister     Cancer Paternal Uncle 70        breast    High Cholesterol Other         siblings       Allergies:     No Known Allergies    Medications:   escitalopram 10 MG Oral Tab Take 1 tablet (10 mg total) by mouth daily. 90 tablet 1    ATORVASTATIN 10 MG Oral Tab TAKE 1 TABLET BY MOUTH EVERY DAY AT NIGHT 90 tablet 0    aspirin 81 MG Oral Chew Tab Chew 1 tablet (81 mg total) by mouth daily.      anastrozole 1 MG Oral Tab tab Take 1 tablet (1 mg total) by mouth daily. 90 tablet 3    primidone 50 MG Oral Tab Take 1 tablet (50 mg total) by mouth in the morning and 1 tablet (50 mg total) before bedtime. 180 tablet 1    Multiple Vitamin Oral Tab Take 1 tablet by mouth daily.      Acidophilus/Pectin Oral Cap Take 1 capsule by mouth daily.           Vital Signs:      Height: 157.5 cm (5' 2.01\") (05/08 0940)  Weight: 56.2 kg (124 lb) (05/08 0940)  BSA (Calculated - sq m): 1.56 sq meters (05/08 0940)  Pulse: 76 (05/08 0940)  BP: 193/83 (05/08 0940)  Temp: 98.2 °F (36.8 °C) (05/08 0940)  Do Not Use - Resp Rate: --  SpO2: 94 % (05/08 0940)      Performance Status:  ECOG 0: Fully active, able to carry on all pre-disease performance without restriction     Physical Examination:    Constitutional: Patient is alert and oriented x 3, not in acute distress.  Respiratory: Clear to auscultation and percussion. No rales.  No wheezes.  Cardiovascular: Regular rate and rhythm. No murmurs.  Gastrointestinal: Soft, non tender with good bowel sounds.  Musculoskeletal: No edema. No calf tenderness.  Psychiatric: The patient's mood is calm and appropriate for this visit.       Labs reviewed at  this visit:     Lab Results   Component Value Date    WBC 6.2 05/08/2024    RBC 4.32 05/08/2024    HGB 11.7 (L) 05/08/2024    HCT 37.3 05/08/2024    MCV 86.3 05/08/2024    MCH 27.1 05/08/2024    MCHC 31.4 05/08/2024    RDW 15.0 05/08/2024    .0 05/08/2024     Lab Results   Component Value Date     05/08/2024    K 4.0 05/08/2024     05/08/2024    CO2 29.0 05/08/2024    BUN 8 (L) 05/08/2024    CREATSERUM 0.81 05/08/2024    GLU 94 05/08/2024    CA 8.7 05/08/2024    ALKPHO 62 05/08/2024    ALT 22 05/08/2024    AST 30 05/08/2024    BILT 0.2 05/08/2024    ALB 3.4 05/08/2024    TP 7.2 05/08/2024         Component  Ref Range & Units 5/8/24 1107   Rheumatoid Factor  <15 IU/mL <10            Component  Ref Range & Units 5/8/24 0938   Sed Rate  0 - 30 mm/Hr 48 High             Component  Ref Range & Units 5/8/24 0938   C-Reactive Protein  <0.30 mg/dL 1.26 High         Component  Ref Range & Units 5/8/24 0938   Free T4  0.8 - 1.7 ng/dL 0.6 Low          Component  Ref Range & Units 5/8/24 0938   TSH  0.358 - 3.740 mIU/mL 18.800 High         Radiologic imaging reviewed at this visit:    Bone Dexa Scan on 5/5/2022:  LUMBAR SPINE ANALYSIS RESULTS:       Bone mineral density (BMD) (g/cm2):  0.487     Lumbar T-Score:  -5.1       % young normals:  46       % age matched controls:  61       Change from prior spine examination:  n/a                TOTAL HIP ANALYSIS RESULTS:         Bone mineral density (BMD) (g/cm2):  0.446       Total Hip T-Score:  -4.1       % young normals:  47       % age matched controls:  61       Change from prior hip examination:  n/a                FEMORAL NECK ANALYSIS RESULTS:         Bone mineral density (BMD) (g/cm2):  0.388       Femoral neck T-Score:  -4.2       % young normals:  46       % age matched controls:  62       Change from prior hip examination:  n/a           ADDITIONAL FINDINGS:  No significant additional findings.        Impression   CONCLUSION:  Bone mineral density  values are compatible with the diagnosis of osteoporosis by WHO definition (T score less than -2.5). If therapy is initiated, a follow-up study in 1 year may aid in evaluation of therapeutic efficacy.        MRI of breast on 12/30/2021:  DESCRIPTION:  Witnessed verbal and written informed consent was obtained.  Time out was performed by the staff.  Discussed benefits and risks of MR guided core needle biopsy including, but not limited to, bleeding, infection, and/or failure to obtain   adequate sample. The patient was placed prone on the MRI Scanner.  The left breast was positioned in a dedicated breast coil and MR guidance grid device.   A fiducial was placed on the grid.       The skin was prepped and after local anesthesia was achieved, an introducer sheath and a localizing obturator were placed using a lateral approach at both sites.  The location of the obturator sheaths was confirmed with imaging.       The patient was then taken out of the bore of the magnet and samples were obtained using an MR compatible vacuum assisted core biopsy device at both sites.  A regular size needle was used for site 1 and a petite sized needle was used for site 2. Six   samples were taken at site 1, and 12 samples were taken at site 2. A tissue marker was placed at both sites.  Post biopsy images were obtained which confirm pot biopsy changes at the targets of the biopsy.         Post procedural mammogram performed on separate digital equipment show the clips in appropriate position.       The patient tolerated the procedure well and left the department in stable condition.       BIOPSY NEEDLE:            9-gauge [employer] biopsy device   MEDICATION:               1% lidocaine and 1% lidocaine containing epinephrine     SITE MARKER COORDINATES:     SITE 1:  Non-mass enhancement 7 o'clock  left breast   SITE 2:  0800 hours left breast   CLIP TYPE:                SITE 1:  M  SITE 2:  X   PHYSICIANS PRESENT:       Jaquelin Cole M.D.          PATHOLOGY:   A.  Left breast, 7:00, MRI guided biopsy:   -Breast tissue with nodular adenosis, stromal fibrosis, secretory change, apocrine metaplasia and microcalcifications.   -See comment.       B.  Left breast, 8:00, MRI guided biopsy:   -Ductal carcinoma in situ, nuclear grade 2, solid and cribriform types.   -Largest focus of DCIS measures 5 mm (0.5 cm) in greatest dimension.   -Background fibroadenomatous change, apocrine metaplasia, sclerosing adenosis and microcalcifications.   -See comment.      Impression   CONCLUSION:     MRI-guided biopsy of two areas in the left breast.       Pathology from the 1st site at the 7 o'clock position is reported as benign.  This is likely concordant with the imaging assessment and is marked with an M clip.       Pathology from the 2nd site at the 8 o'clock position shows the presence of DCIS.  This is concordant with the imaging assessment and is marked with an X clip.       Surgical consultation is recommended for the bilateral biopsy proven malignancies.  Per the patient's electronic medical record, she is planning on a bilateral mastectomy.         Assessment/Plan:     Malignant melanoma of the left upper arm:  Nodular melanoma 10 mm thickness  2 of 6 left axillary nodes positive  Microscopic metastases  No extranodal extension  BRAF negative     The patient has a pT4b,pN2a (Stage IIIc) melanoma of the left upper arm and axilla. She has had definitive surgery. She has BRAF negative disease. She has good performance status (PS 0).    She will continue with the pembrolizumab every three weeks.    She has pain in the right knee with mild swelling. There is no warmth. She has mild elevation of the CRP and ESR. The RF is negative. The anti CCP is pending. We will continue with nsaids and tylenol. If this worsens she will see rheumatology.    She has increased TSH and decreased T4. I recommended starting levothyroxine 50 mcg daily. Repeat thyroid studies in three  weeks.    Invasive lobular carcinoma of the right breast 12 o'clock (overlapping quadrant):  Biopsy on 11/17/2021  ER 90%  OH <1%  K--67 5%  Her2 1+  Bilateral mastectomy on 1/21/2022:  Right Breast with Invasive lobular carcinoma  Measuring 2.2 cm  SLN 0/2 (one with ITC)  Negative margins  Left breast with no residual DCIS  0.5 cm DCIS biopsy on 12/30/2021  ER/OH positive  Right breast Oncotype Dx RS 26     She has started anastrozole. She does have severe osteoporosis. She continues to follow with endocrine. She has OPAL on the AI. She has no side effects from the AI. She is comfortable with this plan.     Severe Osteoporosis:     She is following with endocrinology. She has had dental work with teeth extractions. She had Reclast on 6/16/2023. She will get this yearly.    Hypertension secondary to anxiety about treatment:    Repeat was decreasing. Will have patient check at home when relaxed.    Ben Garsia MD

## 2024-05-09 LAB — CCP IGG SERPL-ACNC: 1.2 U/ML (ref 0–6.9)

## 2024-05-16 ENCOUNTER — OFFICE VISIT (OUTPATIENT)
Facility: CLINIC | Age: 74
End: 2024-05-16

## 2024-05-16 VITALS
HEIGHT: 62 IN | DIASTOLIC BLOOD PRESSURE: 88 MMHG | OXYGEN SATURATION: 93 % | WEIGHT: 121 LBS | SYSTOLIC BLOOD PRESSURE: 168 MMHG | BODY MASS INDEX: 22.26 KG/M2 | HEART RATE: 69 BPM

## 2024-05-16 DIAGNOSIS — M81.0 AGE-RELATED OSTEOPOROSIS WITHOUT CURRENT PATHOLOGICAL FRACTURE: Primary | ICD-10-CM

## 2024-05-16 DIAGNOSIS — E03.9 ACQUIRED HYPOTHYROIDISM: ICD-10-CM

## 2024-05-16 PROCEDURE — 99214 OFFICE O/P EST MOD 30 MIN: CPT | Performed by: STUDENT IN AN ORGANIZED HEALTH CARE EDUCATION/TRAINING PROGRAM

## 2024-05-16 NOTE — PROGRESS NOTES
Return Office TELEMEDICINE Visit     CHIEF COMPLAINT:    Chief Complaint   Patient presents with    Follow - Up     Osteoporosis needs Dexa Scan     Thyroid Problem     Abnormal Thyroid Labs, havent started Medication yet       Date: 5/16/2024      HISTORY OF PRESENT ILLNESS:  Dillan Casillas is a 73 year old female with PMHx significant for hyperlipidemia, CAD, breast CA s/p mastectomy who presents for follow up for severe osteoporosis.    Initial consult HPI: May 2022  Referred by oncologist Dr. Garsia after recent baseline screening DXA (starting on AI for adjuvant therapy for breast CA) showed severe osteoporosis. May 2022 DXA showed lowest T score -5.1    Pt denied Hx of fragility fx, Fhx of osteoporosis, Hx of kidney stones, Hx of chronic steroids >3 months, Hx of treatment for osteoporosis, Hx of hyperTH, Hx of hyperPTH, Hx of vitamin D deficiency, Heavy EtOH use and Hx of smoking/Current smoker    Severe osteoporosis at baseline, now on AI as well for adjuvant therapy for breast CA. Will work up secondary causes and concomittantly start tx for severe osteoporosis. Discussed starting anabolic agent for short-term management to build up bone density and then switching to anti-resorptive tx for long-term management (ZA has good long-term data for bone loss due to AI tx for breast CA)    Encouraged continued vit D, Ca, falls precaution and weight-bearing exercises    Given degree of BMD loss, recommended anabolic agent followed by anti-resorptive agent.    Interim hx:   Nov 2022  Secondary workup for osteoporosis showed normal vit D, bone specific alk phos, PTH, TSH. Has elevated CTX of 1010 (upper end of normal range)  Has had follow up with Dr Garsia.  Due to ongoing dental work, pt has had all teeth extracted, will need about 3 months of healing before permanent implant (Feb 2023) can be placed.  Pt states her dentist doesn't want her on any osteoporotic tx until everything has healed.    March 2023  I had  spoke to her  Dr. Aparna Calloway, would confirms that she wants pt to avoid ALL osteoporotic medications (not even fosamax) until she gives her the clearance after Feb 2023 that the implants are healing, to prevent ONJ.   3/2023 - pt called to inform that she's been given dental clearance  She and  are debating between Forteo (her  would need inj training) and Evenity (what she's leaning towards)  Continues to take Ca 600mg and vit D 2000IU     May 2023  Pt and  reviewed Evenity and Forteo. Not comfortable with SE.  Leaning towards Reclast  She has the dental clearance. Continues to take Ca and vit D. No falls.    May 2024  First dose of Reclast 6/2023  Continued on anastrazole   Since last visit, started on immunotherapy (keytruda) and has had 5 infusions each 3 weeks apart, last infusion was 5/8  Started on LT4 50 mcg by Dr. Garsia   Is currently taking calcium and vitamin D  Denies falls or fractures since last visit     CURRENT MEDICATION:    Current Outpatient Medications   Medication Sig Dispense Refill    levothyroxine 50 MCG Oral Tab Take 1 tablet (50 mcg total) by mouth before breakfast. 30 tablet 3    escitalopram 10 MG Oral Tab Take 1 tablet (10 mg total) by mouth daily. 90 tablet 1    ATORVASTATIN 10 MG Oral Tab TAKE 1 TABLET BY MOUTH EVERY DAY AT NIGHT 90 tablet 0    aspirin 81 MG Oral Chew Tab Chew 1 tablet (81 mg total) by mouth daily.      anastrozole 1 MG Oral Tab tab Take 1 tablet (1 mg total) by mouth daily. 90 tablet 3    primidone 50 MG Oral Tab Take 1 tablet (50 mg total) by mouth in the morning and 1 tablet (50 mg total) before bedtime. 180 tablet 1    Multiple Vitamin Oral Tab Take 1 tablet by mouth daily.      Acidophilus/Pectin Oral Cap Take 1 capsule by mouth daily.         Endocrine Medications            levothyroxine 50 MCG Oral Tab            ALLERGY:  No Known Allergies      PAST MEDICAL, SOCIAL AND FAMILY HISTORY:  See past medical history marked as  reviewed.  See past surgical history marked as reviewed.  See past family history marked as reviewed.  See past social history marked as reviewed.      REVIEW OF SYSTEMS:   Ten point review of systems has been performed and is otherwise negative and/or non-contributory, except as described above.      PHYSICAL EXAM:   Vitals:    05/16/24 1409   BP: (!) 168/88   Pulse: 69   SpO2: 93%   Weight: 121 lb (54.9 kg)   Height: 5' 2\" (1.575 m)     BMI: Body mass index is 22.13 kg/m².     CONSTITUTIONAL:  awake, alert, cooperative, no apparent distress, and appears stated age  PSYCH: normal affect        DATA:   Pertinent data reviewed    Component      Latest Ref Rng 2/14/2024 3/27/2024 5/8/2024   TSH      0.358 - 3.740 mIU/mL 7.270 (H)  0.218 (L)  18.800 (H)    PTH INTACT      18.5 - 88.0 pg/mL      VITAMIN D, 25-OH, TOTAL      30.0 - 100.0 ng/mL      T4,Free (Direct)      0.8 - 1.7 ng/dL 1.1  1.4  0.6 (L)            ASSESSMENT AND PLAN:    1. Age-related osteoporosis without current pathological fracture  - XR DEXA BONE DENSITOMETRY (CPT=77080); Future  - C-Telopeptide ([employer]); Future   Plan:   Severe osteoporosis at baseline (May 2022 DXA L spine T -5.1, hip T -4.1, fem neck -4.2), now on AI as well for adjuvant therapy for breast CA. Secondary workup negative. CTX at the upper end of normal range. Given the degree of BMD loss, would recommend anabolic agent for short-term management to build up bone density, then transition to anti-resorptive tx for long-term management (ZA has good long-term data for bone loss due to AI tx for breast CA).    Due to ongoing and extensive dental issues (teeth extraction), pt's dentist only cleared her for osteoporotic treatment after March 2023.  Pt offered anabolic agents but ultimately not comfortable with side effects. She would like to proceed with Reclast.  She received her first dose on 6/16/2023    - annual Reclast due June 2025, plan for at least 3 years  - DXA in 1  yr; if not enough BMD improvement, will re-open discussion with anabolics  - continue vit D and Ca  - continue weight bearing exercises and falls prevention    2.  Hypothyroidism  -Reviewed thyroid labs and would recommend starting levothyroxine 50 mcg daily     Return in about 1 year (around 5/16/2025).   A total of 30 minutes was spent today on obtaining history, reviewing pertinent labs, evaluating patient, providing multiple treatment options, reinforcing diet/exercise and compliance, and completing documentation.     5/16/2024  Ameena Cevallos DO

## 2024-05-16 NOTE — PATIENT INSTRUCTIONS
Return Visit   [ x ] Physician in 12 months   [  ] In person or video visit  [  ] In person only    [ x ] After visit summary   [ x ] Placed labs/imaging. Labs are to be drawn at 8A and fasting.   [ x ] Central scheduling # for ultrasound/nuclear med/CT/MRI/DXA     It was great seeing you today!    Today we discussed your thyroid and osteoporosis:  -Please complete your bone density and blood test for bone turnover markers  -Once these result, I will be in touch if we need to make any changes  -You are due for Reclast #2 6/17 onwards  -We reviewed your thyroid labs and I would recommend starting  levothyroxine 50 mcg daily, as prescribed  -I would like for you to reach out to me 6 weeks after starting this dose to review your thyroid labs     Take care!  -Dr. Cevallos

## 2024-05-29 ENCOUNTER — OFFICE VISIT (OUTPATIENT)
Dept: HEMATOLOGY/ONCOLOGY | Facility: HOSPITAL | Age: 74
End: 2024-05-29
Attending: INTERNAL MEDICINE

## 2024-05-29 ENCOUNTER — TELEPHONE (OUTPATIENT)
Facility: CLINIC | Age: 74
End: 2024-05-29

## 2024-05-29 VITALS
WEIGHT: 124 LBS | RESPIRATION RATE: 16 BRPM | HEART RATE: 92 BPM | BODY MASS INDEX: 22.53 KG/M2 | SYSTOLIC BLOOD PRESSURE: 191 MMHG | OXYGEN SATURATION: 98 % | DIASTOLIC BLOOD PRESSURE: 82 MMHG | TEMPERATURE: 98 F | HEIGHT: 62.01 IN

## 2024-05-29 DIAGNOSIS — C50.811 MALIGNANT NEOPLASM OF OVERLAPPING SITES OF RIGHT BREAST IN FEMALE, ESTROGEN RECEPTOR POSITIVE (HCC): ICD-10-CM

## 2024-05-29 DIAGNOSIS — R94.6 ABNORMAL THYROID FUNCTION TEST: Primary | ICD-10-CM

## 2024-05-29 DIAGNOSIS — C50.811 MALIGNANT NEOPLASM OF OVERLAPPING SITES OF RIGHT BREAST IN FEMALE, ESTROGEN RECEPTOR POSITIVE (HCC): Primary | ICD-10-CM

## 2024-05-29 DIAGNOSIS — C43.62 MALIGNANT MELANOMA OF ARM, LEFT (HCC): Primary | ICD-10-CM

## 2024-05-29 DIAGNOSIS — Z17.0 MALIGNANT NEOPLASM OF OVERLAPPING SITES OF RIGHT BREAST IN FEMALE, ESTROGEN RECEPTOR POSITIVE (HCC): ICD-10-CM

## 2024-05-29 DIAGNOSIS — E03.2 HYPOTHYROIDISM DUE TO MEDICATION: ICD-10-CM

## 2024-05-29 DIAGNOSIS — E21.1 SECONDARY HYPERPARATHYROIDISM, NOT ELSEWHERE CLASSIFIED (HCC): ICD-10-CM

## 2024-05-29 DIAGNOSIS — Z17.0 MALIGNANT NEOPLASM OF OVERLAPPING SITES OF RIGHT BREAST IN FEMALE, ESTROGEN RECEPTOR POSITIVE (HCC): Primary | ICD-10-CM

## 2024-05-29 DIAGNOSIS — C43.62 MALIGNANT MELANOMA OF ARM, LEFT (HCC): ICD-10-CM

## 2024-05-29 LAB
ALBUMIN SERPL-MCNC: 3.6 G/DL (ref 3.4–5)
ALBUMIN/GLOB SERPL: 0.9 {RATIO} (ref 1–2)
ALP LIVER SERPL-CCNC: 59 U/L
ALT SERPL-CCNC: 26 U/L
ANION GAP SERPL CALC-SCNC: 8 MMOL/L (ref 0–18)
AST SERPL-CCNC: 35 U/L (ref 15–37)
BASOPHILS # BLD AUTO: 0.07 X10(3) UL (ref 0–0.2)
BASOPHILS NFR BLD AUTO: 1 %
BILIRUB SERPL-MCNC: 0.3 MG/DL (ref 0.1–2)
BUN BLD-MCNC: 10 MG/DL (ref 9–23)
CALCIUM BLD-MCNC: 9 MG/DL (ref 8.5–10.1)
CHLORIDE SERPL-SCNC: 108 MMOL/L (ref 98–112)
CO2 SERPL-SCNC: 25 MMOL/L (ref 21–32)
CREAT BLD-MCNC: 0.96 MG/DL
EGFRCR SERPLBLD CKD-EPI 2021: 62 ML/MIN/1.73M2 (ref 60–?)
EOSINOPHIL # BLD AUTO: 0.13 X10(3) UL (ref 0–0.7)
EOSINOPHIL NFR BLD AUTO: 1.9 %
ERYTHROCYTE [DISTWIDTH] IN BLOOD BY AUTOMATED COUNT: 15.8 %
GLOBULIN PLAS-MCNC: 4.2 G/DL (ref 2.8–4.4)
GLUCOSE BLD-MCNC: 109 MG/DL (ref 70–99)
HCT VFR BLD AUTO: 36.4 %
HGB BLD-MCNC: 11.5 G/DL
IMM GRANULOCYTES # BLD AUTO: 0.04 X10(3) UL (ref 0–1)
IMM GRANULOCYTES NFR BLD: 0.6 %
LYMPHOCYTES # BLD AUTO: 1.33 X10(3) UL (ref 1–4)
LYMPHOCYTES NFR BLD AUTO: 19.2 %
MCH RBC QN AUTO: 26.8 PG (ref 26–34)
MCHC RBC AUTO-ENTMCNC: 31.6 G/DL (ref 31–37)
MCV RBC AUTO: 84.8 FL
MONOCYTES # BLD AUTO: 0.58 X10(3) UL (ref 0.1–1)
MONOCYTES NFR BLD AUTO: 8.4 %
NEUTROPHILS # BLD AUTO: 4.77 X10 (3) UL (ref 1.5–7.7)
NEUTROPHILS # BLD AUTO: 4.77 X10(3) UL (ref 1.5–7.7)
NEUTROPHILS NFR BLD AUTO: 68.9 %
OSMOLALITY SERPL CALC.SUM OF ELEC: 292 MOSM/KG (ref 275–295)
PLATELET # BLD AUTO: 257 10(3)UL (ref 150–450)
POTASSIUM SERPL-SCNC: 3.7 MMOL/L (ref 3.5–5.1)
PROT SERPL-MCNC: 7.8 G/DL (ref 6.4–8.2)
RBC # BLD AUTO: 4.29 X10(6)UL
SODIUM SERPL-SCNC: 141 MMOL/L (ref 136–145)
T4 FREE SERPL-MCNC: 0.7 NG/DL (ref 0.8–1.7)
TSI SER-ACNC: 52.6 MIU/ML (ref 0.36–3.74)
WBC # BLD AUTO: 6.9 X10(3) UL (ref 4–11)

## 2024-05-29 PROCEDURE — 82523 COLLAGEN CROSSLINKS: CPT

## 2024-05-29 PROCEDURE — 85025 COMPLETE CBC W/AUTO DIFF WBC: CPT

## 2024-05-29 PROCEDURE — 84439 ASSAY OF FREE THYROXINE: CPT

## 2024-05-29 PROCEDURE — 96413 CHEMO IV INFUSION 1 HR: CPT

## 2024-05-29 PROCEDURE — 80053 COMPREHEN METABOLIC PANEL: CPT

## 2024-05-29 PROCEDURE — 84443 ASSAY THYROID STIM HORMONE: CPT

## 2024-05-29 PROCEDURE — 99214 OFFICE O/P EST MOD 30 MIN: CPT | Performed by: INTERNAL MEDICINE

## 2024-05-29 NOTE — TELEPHONE ENCOUNTER
Per Dr. Cevallos:  \"Reviewed labs on LT4 50 mcg daily. Please have her increase to 75 mcg daily if she has been compliant, and discuss taking LT4 on an empty stomach, 30-60 minutes before the rest of her medications/food and 4 hours before supplements. Repeat labs in 6 weeks. Thank you!\"    RN phoned pt; pt didn't answer. Per pt's YAW, RN left v.m. asking patient to call RN back- to discuss above orders per Dr. Cevallos.    Awaiting pt's return call.

## 2024-05-29 NOTE — PROGRESS NOTES
Cancer Center Progress Note    Problem List:      Patient Active Problem List   Diagnosis    Coronary atherosclerosis    Anxiety state    Pure hypercholesterolemia    Hypertension    Pulmonary fibrosis (HCC)    Abdominal aortic ectasia (HCC)    Reactive depression    Benign essential tremor    Flat epithelial atypia (FEA) of left breast    Ductal hyperplasia, atypical, breast    Exudative age-related macular degeneration of left eye with active choroidal neovascularization (HCC)    Status post bilateral mastectomy    Invasive lobular carcinoma of breast, stage 2, right (HCC)    Osteopenia, senile    Malignant melanoma of arm, left (HCC)    Acute appendicitis with localized peritonitis, without perforation, abscess, or gangrene       Interim History:    Dillan Casillas presents today for evaluation and management of a diagnosis of right breast cancer.    She has had a fifth cycle of pembrolizumab. She complains of knee pain. This was initially in the right side but now is bilateral. She gets relief taking NSAIDs. She has no fever or sweats. She has no dyspnea. She has no rash or pruritus. 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 She is on levothyroxine 50 mcg daily. She has seen endocrine.    She has a prior h/o right breast cancer. She has been on anastrozole. She now presents with a left upper arm lesion. She was evaluated by Dr. Pierce Uribe. He did an excision biopsy on 11/13/2023 that showed invasive nodular melanoma with ulceration (tumor thickness 10 mm). She proceeded to have WLE and SLN procedure on 1/9/2024 by Dr. Zurdo Montesinos. There was no residual disease in the WLE. She had 2 of 6 nodes positive. One SLN had a 3 mm focus of metastatic melanoma with no extracapsular extension. A second SLN had a 1.5 mm of focus of metastatic melanoma with no extracapsular extension.      She had a PET scan on 12/8/2023 that was negative for metastatic disease. She had a MRI of the  brain that was negative for brain metastases.     She had an acute appendicitis on 2023 s/p appendectomy. She has had some fatigue since that surgery and the recent arm surgery.    She had bilateral mastectomy on 2022. The left breast had DCIS on her biopsy on 2021 but there was no residual cancer in the left mastectomy. The left SLN was negative. The right breast had an invasive lobular carcinoma, grade II, measuring 2.2 cm. The margins were negative. The right SLN procedure had one node with isolated tumor cells and another node that was negative. She had Oncotype Dx testing with a RS of 26.     Review of Systems:   Constitutional: Negative for anorexia, fatigue, fevers, chills, night sweats and weight loss.  Psychiatric: The patient's mood was calm and appropriate for this visit.  The pertinent positives and negatives were described. All other systems were negative.    PMH/PSH:  Past Medical History:    Anxiety    Cancer (HCC)    breast cancer- right breast    Cancer (HCC)    left arm melanoma    Depression    Ductal hyperplasia, atypical, breast    High blood pressure    not on any medication    High cholesterol    Hyperlipidemia    Macular degeneration    Osteoporosis    Personal history of colonic polyps    PONV (postoperative nausea and vomiting)    Tremors of nervous system    Visual impairment    glasses       Past Surgical History:   Procedure Laterality Date    Angioplasty (coronary)      Appendectomy  2023    Colonoscopy      Colonoscopy & polypectomy  2003    Hysterectomy  2017    and oophorectomy    Ajit biopsy stereo nodule 1 site left (cpt=19081)  2020    ADH/FEA    Ajit localization wire 1 site left (cpt=19281)  2020    Benign (core was ADH/FEA)    Mastectomy left      Mastectomy right      Needle biopsy left      Benign          x2    Other surgical history  10/01/2008    cardiac cath post-procedure date (mild LAD)                    Tubal ligation          Family History Reviewed:  Family History   Problem Relation Age of Onset    Other (Melanoma) Self     Other (Other) Self     Breast Cancer Self     Heart Disease Father         Heart disease    High Blood Pressure Father     Cancer Sister 64        leukemia    Cancer Sister 29        Kidney cancer    Neurological Disorder Sister     Other (als) Sister     Cancer Paternal Uncle 70        breast    High Cholesterol Other         siblings       Allergies:     No Known Allergies    Medications:   levothyroxine 50 MCG Oral Tab Take 1 tablet (50 mcg total) by mouth before breakfast. 30 tablet 3    escitalopram 10 MG Oral Tab Take 1 tablet (10 mg total) by mouth daily. 90 tablet 1    ATORVASTATIN 10 MG Oral Tab TAKE 1 TABLET BY MOUTH EVERY DAY AT NIGHT 90 tablet 0    aspirin 81 MG Oral Chew Tab Chew 1 tablet (81 mg total) by mouth daily.      anastrozole 1 MG Oral Tab tab Take 1 tablet (1 mg total) by mouth daily. 90 tablet 3    primidone 50 MG Oral Tab Take 1 tablet (50 mg total) by mouth in the morning and 1 tablet (50 mg total) before bedtime. 180 tablet 1    Multiple Vitamin Oral Tab Take 1 tablet by mouth daily.      Acidophilus/Pectin Oral Cap Take 1 capsule by mouth daily.           Vital Signs:      Height: 157.5 cm (5' 2.01\") (05/29 0841)  Weight: 56.2 kg (124 lb) (05/29 0841)  BSA (Calculated - sq m): 1.56 sq meters (05/29 0841)  Pulse: 92 (05/29 0841)  BP: 191/82 (05/29 0841)  Temp: 97.7 °F (36.5 °C) (05/29 0841)  Do Not Use - Resp Rate: --  SpO2: 98 % (05/29 0841)      Performance Status:  ECOG 0: Fully active, able to carry on all pre-disease performance without restriction     Physical Examination:    Constitutional: Patient is alert and oriented x 3, not in acute distress.  Respiratory: Clear to auscultation and percussion. No rales.  No wheezes.  Cardiovascular: Regular rate and rhythm. No murmurs.  Gastrointestinal: Soft, non tender with good bowel sounds.  Musculoskeletal: No edema. No calf  tenderness.  Psychiatric: The patient's mood is calm and appropriate for this visit.       Labs reviewed at this visit:     Lab Results   Component Value Date    WBC 6.2 05/08/2024    RBC 4.32 05/08/2024    HGB 11.7 (L) 05/08/2024    HCT 37.3 05/08/2024    MCV 86.3 05/08/2024    MCH 27.1 05/08/2024    MCHC 31.4 05/08/2024    RDW 15.0 05/08/2024    .0 05/08/2024     Lab Results   Component Value Date     05/08/2024    K 4.0 05/08/2024     05/08/2024    CO2 29.0 05/08/2024    BUN 8 (L) 05/08/2024    CREATSERUM 0.81 05/08/2024    GLU 94 05/08/2024    CA 8.7 05/08/2024    ALKPHO 62 05/08/2024    ALT 22 05/08/2024    AST 30 05/08/2024    BILT 0.2 05/08/2024    ALB 3.4 05/08/2024    TP 7.2 05/08/2024         Component  Ref Range & Units 5/8/24 1107   Rheumatoid Factor  <15 IU/mL <10            Component  Ref Range & Units 5/8/24 0938   Sed Rate  0 - 30 mm/Hr 48 High             Component  Ref Range & Units 5/8/24 0938   C-Reactive Protein  <0.30 mg/dL 1.26 High         Component  Ref Range & Units 5/8/24 0938   Free T4  0.8 - 1.7 ng/dL 0.6 Low          Component  Ref Range & Units 5/8/24 0938   TSH  0.358 - 3.740 mIU/mL 18.800 High         Radiologic imaging reviewed at this visit:    Bone Dexa Scan on 5/5/2022:  LUMBAR SPINE ANALYSIS RESULTS:       Bone mineral density (BMD) (g/cm2):  0.487     Lumbar T-Score:  -5.1       % young normals:  46       % age matched controls:  61       Change from prior spine examination:  n/a                TOTAL HIP ANALYSIS RESULTS:         Bone mineral density (BMD) (g/cm2):  0.446       Total Hip T-Score:  -4.1       % young normals:  47       % age matched controls:  61       Change from prior hip examination:  n/a                FEMORAL NECK ANALYSIS RESULTS:         Bone mineral density (BMD) (g/cm2):  0.388       Femoral neck T-Score:  -4.2       % young normals:  46       % age matched controls:  62       Change from prior hip examination:  n/a            ADDITIONAL FINDINGS:  No significant additional findings.        Impression   CONCLUSION:  Bone mineral density values are compatible with the diagnosis of osteoporosis by WHO definition (T score less than -2.5). If therapy is initiated, a follow-up study in 1 year may aid in evaluation of therapeutic efficacy.        MRI of breast on 12/30/2021:  DESCRIPTION:  Witnessed verbal and written informed consent was obtained.  Time out was performed by the staff.  Discussed benefits and risks of MR guided core needle biopsy including, but not limited to, bleeding, infection, and/or failure to obtain   adequate sample. The patient was placed prone on the MRI Scanner.  The left breast was positioned in a dedicated breast coil and MR guidance grid device.   A fiducial was placed on the grid.       The skin was prepped and after local anesthesia was achieved, an introducer sheath and a localizing obturator were placed using a lateral approach at both sites.  The location of the obturator sheaths was confirmed with imaging.       The patient was then taken out of the bore of the magnet and samples were obtained using an MR compatible vacuum assisted core biopsy device at both sites.  A regular size needle was used for site 1 and a petite sized needle was used for site 2. Six   samples were taken at site 1, and 12 samples were taken at site 2. A tissue marker was placed at both sites.  Post biopsy images were obtained which confirm pot biopsy changes at the targets of the biopsy.         Post procedural mammogram performed on separate digital equipment show the clips in appropriate position.       The patient tolerated the procedure well and left the department in stable condition.       BIOPSY NEEDLE:            9-gauge [employer] biopsy device   MEDICATION:               1% lidocaine and 1% lidocaine containing epinephrine     SITE MARKER COORDINATES:     SITE 1:  Non-mass enhancement 7 o'clock  left breast   SITE 2:  0800 hours  left breast   CLIP TYPE:                SITE 1:  M  SITE 2:  X   PHYSICIANS PRESENT:       Jaquelin Cole M.D.         PATHOLOGY:   A.  Left breast, 7:00, MRI guided biopsy:   -Breast tissue with nodular adenosis, stromal fibrosis, secretory change, apocrine metaplasia and microcalcifications.   -See comment.       B.  Left breast, 8:00, MRI guided biopsy:   -Ductal carcinoma in situ, nuclear grade 2, solid and cribriform types.   -Largest focus of DCIS measures 5 mm (0.5 cm) in greatest dimension.   -Background fibroadenomatous change, apocrine metaplasia, sclerosing adenosis and microcalcifications.   -See comment.      Impression   CONCLUSION:     MRI-guided biopsy of two areas in the left breast.       Pathology from the 1st site at the 7 o'clock position is reported as benign.  This is likely concordant with the imaging assessment and is marked with an M clip.       Pathology from the 2nd site at the 8 o'clock position shows the presence of DCIS.  This is concordant with the imaging assessment and is marked with an X clip.       Surgical consultation is recommended for the bilateral biopsy proven malignancies.  Per the patient's electronic medical record, she is planning on a bilateral mastectomy.         Assessment/Plan:     Malignant melanoma of the left upper arm:  Nodular melanoma 10 mm thickness  2 of 6 left axillary nodes positive  Microscopic metastases  No extranodal extension  BRAF negative     The patient has a pT4b,pN2a (Stage IIIc) melanoma of the left upper arm and axilla. She has had definitive surgery. She has BRAF negative disease. She has good performance status (PS 0).    She will continue with the pembrolizumab every three weeks.    She has pain in the bilateral knee that is being managed with NSAIDs. She will continue this for now since this is stable. If this worsens she will see rheumatology.    She has increased TSH and decreased T4. She will continue with endocrine management.    Invasive  lobular carcinoma of the right breast 12 o'clock (overlapping quadrant):  Biopsy on 11/17/2021  ER 90%  MI <1%  K--67 5%  Her2 1+  Bilateral mastectomy on 1/21/2022:  Right Breast with Invasive lobular carcinoma  Measuring 2.2 cm  SLN 0/2 (one with ITC)  Negative margins  Left breast with no residual DCIS  0.5 cm DCIS biopsy on 12/30/2021  ER/MI positive  Right breast Oncotype Dx RS 26     She has started anastrozole. She does have severe osteoporosis. She continues to follow with endocrine. She has OPAL on the AI. She has no side effects from the AI. She is comfortable with this plan.     Severe Osteoporosis:     She is following with endocrinology. She has had dental work with teeth extractions. She had Reclast on 6/16/2023. She will get this yearly.    Hypertension secondary to anxiety about treatment:    She has white coat HTN. Her home bp measurements are normal.    Ben Garsia MD

## 2024-05-29 NOTE — PROGRESS NOTES
Patient here for C6D1 Keytruda. Patient taking anastrozole as directed with no complaints of night sweats/hot flashes. Patient c/o bilateral knee pain that started 5 weeks ago. Patient taking motrin and tylenol that offers relief. Patient states she feels well otherwise, no further concerns or complaints at this time.   Education Record    Learner:  Patient and Spouse    Disease / Diagnosis: invasive lobular carcinoma of breast     Barriers / Limitations:  None   Comments:    Method:  Discussion   Comments:    General Topics:  Medication, Pain, Side effects and symptom management, and Plan of care reviewed   Comments:    Outcome:  Shows understanding   Comments:

## 2024-05-29 NOTE — PROGRESS NOTES
Pt here for C6D1 Drug(s)Keytruda.  Arrives Ambulating independently, accompanied by Spouse     Patient was evaluated today by MD.    Oral medications included in this regimen:  no    Patient confirms comprehension of cancer treatment schedule:  yes    Pregnancy screening:  Denies possibility of pregnancy    Modifications in dose or schedule:  No    Medications appearance and physical integrity checked by RN: yes.    Chemotherapy IV pump settings verified by 2 RNs:  No due to targeted therapy IV administration.  Frequency of blood return and site check throughout administration: Prior to administration and At completion of therapy     Infusion/treatment outcome:  patient tolerated treatment without incident    Education Record    Learner:  Patient and Spouse  Barriers / Limitations:  None  Method:  Brief focused and Reinforcement  Education / instructions given:  Plan of care reviewed.   Outcome:  Shows understanding    Discharged Home, Ambulating independently, accompanied by:Spouse    Patient/family verbalized understanding of future appointments: by printed AVS  Elevated TSH - reviewed by Dr. Garsia and will forward results to her endocrinologist. Patient was instructed to call her endo MD office to get directions on Levothyroxine dosing. Patient and spouse verbalized understanding. Patient will be going to [place] for her next treatments as she lives closer to that location.

## 2024-05-29 NOTE — TELEPHONE ENCOUNTER
RN phoned pt; pt made aware of Dr. Cevallos's order/response: \"Thank you for following up. Okay to increase to 75 mcg with repeat labs in 4 weeks.\"  Pt verbalizes understanding. Will recheck thyroid labs in 4 weeks (will hold Biotin-containing vitamins/supplements X 3 days before draw, if applicable.). Pt to take 50 mcg tabs on hand and cut some of the tabs in half and take 1 1/2 tabs daily to equal the 75 mcg dosage pt is now to be on. Pt to continue taking LT4 as instructed.    Pt has no questions at this time.    TSH, Free T4 and Total T3 pended and routed to Dr. Ellsworth for approval.

## 2024-05-29 NOTE — TELEPHONE ENCOUNTER
Patient phoned RN back. Pt stated she's been on Levothyroxine 50 mcg daily X 1 week. Pt has been compliant taking medication, as instructed: taking daily, on an empty stomach- waiting 30-60 minutes before taking the rest of her medications/eating food and waiting 4 hours before taking any supplements (vitamins).    Per Dr. Cevallos's ov note dated- 5/16/24    \"May 2024  First dose of Reclast 6/2023  Continued on anastrazole   Since last visit, started on immunotherapy (keytruda) and has had 5 infusions each 3 weeks apart, last infusion was 5/8  Started on LT4 50 mcg by Dr. Garsia   Is currently taking calcium and vitamin D  Denies falls or fractures since last visit   AND  -I would like for you to reach out to me 6 weeks after starting this dose to review your thyroid labs\".    TFTs dated- 5/29/24   Free T4--> 0.7  TSH--> 52.600    Message routed to Dr. Cevallos.

## 2024-06-03 LAB — C-TELOPEPTIDE: 125 PG/ML

## 2024-06-10 ENCOUNTER — OFFICE VISIT (OUTPATIENT)
Dept: FAMILY MEDICINE CLINIC | Facility: CLINIC | Age: 74
End: 2024-06-10
Payer: MEDICARE

## 2024-06-10 VITALS
TEMPERATURE: 99 F | HEIGHT: 61 IN | OXYGEN SATURATION: 98 % | HEART RATE: 72 BPM | WEIGHT: 122.25 LBS | DIASTOLIC BLOOD PRESSURE: 80 MMHG | SYSTOLIC BLOOD PRESSURE: 130 MMHG | RESPIRATION RATE: 12 BRPM | BODY MASS INDEX: 23.08 KG/M2

## 2024-06-10 DIAGNOSIS — I25.10 ATHEROSCLEROSIS OF NATIVE CORONARY ARTERY OF NATIVE HEART WITHOUT ANGINA PECTORIS: ICD-10-CM

## 2024-06-10 DIAGNOSIS — M85.80 OSTEOPENIA, SENILE: ICD-10-CM

## 2024-06-10 DIAGNOSIS — I77.811 ABDOMINAL AORTIC ECTASIA (HCC): ICD-10-CM

## 2024-06-10 DIAGNOSIS — Z00.00 ENCOUNTER FOR MEDICARE ANNUAL WELLNESS EXAM: Primary | ICD-10-CM

## 2024-06-10 DIAGNOSIS — J84.10 PULMONARY FIBROSIS (HCC): ICD-10-CM

## 2024-06-10 DIAGNOSIS — F41.1 ANXIETY STATE: ICD-10-CM

## 2024-06-10 DIAGNOSIS — E78.00 PURE HYPERCHOLESTEROLEMIA: ICD-10-CM

## 2024-06-10 DIAGNOSIS — I10 PRIMARY HYPERTENSION: ICD-10-CM

## 2024-06-10 DIAGNOSIS — G25.0 BENIGN ESSENTIAL TREMOR: ICD-10-CM

## 2024-06-10 DIAGNOSIS — Z79.899 ENCOUNTER FOR LONG-TERM (CURRENT) USE OF MEDICATIONS: ICD-10-CM

## 2024-06-10 DIAGNOSIS — C43.62 MALIGNANT MELANOMA OF ARM, LEFT (HCC): ICD-10-CM

## 2024-06-10 DIAGNOSIS — C50.911: ICD-10-CM

## 2024-06-10 DIAGNOSIS — F32.9 REACTIVE DEPRESSION: ICD-10-CM

## 2024-06-10 DIAGNOSIS — H35.3221 EXUDATIVE AGE-RELATED MACULAR DEGENERATION OF LEFT EYE WITH ACTIVE CHOROIDAL NEOVASCULARIZATION (HCC): ICD-10-CM

## 2024-06-10 PROCEDURE — G0439 PPPS, SUBSEQ VISIT: HCPCS | Performed by: FAMILY MEDICINE

## 2024-06-10 PROCEDURE — 99214 OFFICE O/P EST MOD 30 MIN: CPT | Performed by: FAMILY MEDICINE

## 2024-06-10 RX ORDER — LEVOTHYROXINE SODIUM 0.07 MG/1
75 TABLET ORAL
COMMUNITY
Start: 2024-06-10

## 2024-06-12 PROBLEM — N60.99 DUCTAL HYPERPLASIA, ATYPICAL, BREAST: Status: RESOLVED | Noted: 2020-08-18 | Resolved: 2024-06-12

## 2024-06-12 PROBLEM — K35.30 ACUTE APPENDICITIS WITH LOCALIZED PERITONITIS, WITHOUT PERFORATION, ABSCESS, OR GANGRENE: Status: RESOLVED | Noted: 2023-12-13 | Resolved: 2024-06-12

## 2024-06-12 PROBLEM — N60.82 FLAT EPITHELIAL ATYPIA (FEA) OF LEFT BREAST: Status: RESOLVED | Noted: 2020-08-18 | Resolved: 2024-06-12

## 2024-06-12 PROBLEM — Z90.13 STATUS POST BILATERAL MASTECTOMY: Status: RESOLVED | Noted: 2022-06-01 | Resolved: 2024-06-12

## 2024-06-13 NOTE — ASSESSMENT & PLAN NOTE
Current treatment with oncology team   has had excision and gets q3 months injection until 12 2024

## 2024-06-13 NOTE — PROGRESS NOTES
Subjective:   Dillan Casillas is a 73 year old female who presents for a Medicare Subsequent Annual Wellness visit (Pt already had Initial Annual Wellness) and scheduled follow up of multiple significant but stable problems.     Last K was 3.7 done on 5/29/2024.  Last Cr was 0.96 done on 5/29/2024.  Last eGFR was 62 on 5/29/2024.    Cholesterol shows Good control and Good compliance. Long term heart-healthy diet and lifestyle discussed and encouraged to reduce risk of cardiovascular disease.  Cholesterol: 184, done on 12/27/2023.  HDL Cholesterol: 64, done on 12/27/2023.  TriGlycerides 221, done on 12/27/2023.  LDL Cholesterol: 84, done on 12/27/2023.   Cholesterol medications include ATORVASTATIN 10 MG Oral Tab [[id number]].      Mood is stable    Sees oncology  And  Endocrinology    Also ophtho manages macular degeneration    And sees oncology team for the melanoma management      History/Other:   Fall Risk Assessment:   She has been screened for Falls and is low risk.      Cognitive Assessment:   She had a completely normal cognitive assessment - see flowsheet entries     Functional Ability/Status:   Dillan Casillas has some abnormal functions as listed below:  She has Vision problems based on screening of functional status.       Depression Screening (PHQ-2/PHQ-9): PHQ-2 SCORE: 0  , done 6/5/2024   If you checked off any problems, how difficult have these problems made it for you to do your work, take care of things at home, or get along with other people?: Not difficult at all              Advanced Directives:   She does have a Living Will but we do NOT have it on file in Epic.    She does have a POA but we do NOT have it on file in Epic.    Discussed Advance Care Planning with patient (and family/surrogate if present). Standard forms made available to patient in After Visit Summary.      Patient Active Problem List   Diagnosis    Coronary atherosclerosis    Anxiety state    Pure hypercholesterolemia     Hypertension    Pulmonary fibrosis (HCC)    Abdominal aortic ectasia (HCC)    Reactive depression    Benign essential tremor    Exudative age-related macular degeneration of left eye with active choroidal neovascularization (HCC)    Invasive lobular carcinoma of breast, stage 2, right (HCC)    Osteopenia, senile    Malignant melanoma of arm, left (HCC)     Allergies:  She has No Known Allergies.    Current Medications:  Outpatient Medications Marked as Taking for the 6/10/24 encounter (Office Visit) with Antonio Vargas MD   Medication Sig    levothyroxine 75 MCG Oral Tab Take 1 tablet (75 mcg total) by mouth before breakfast.    escitalopram 10 MG Oral Tab Take 1 tablet (10 mg total) by mouth daily.    ATORVASTATIN 10 MG Oral Tab TAKE 1 TABLET BY MOUTH EVERY DAY AT NIGHT    aspirin 81 MG Oral Chew Tab Chew 1 tablet (81 mg total) by mouth daily.    anastrozole 1 MG Oral Tab tab Take 1 tablet (1 mg total) by mouth daily.    primidone 50 MG Oral Tab Take 1 tablet (50 mg total) by mouth in the morning and 1 tablet (50 mg total) before bedtime.    Multiple Vitamin Oral Tab Take 1 tablet by mouth daily.    Acidophilus/Pectin Oral Cap Take 1 capsule by mouth daily.       Medical History:  She  has a past medical history of Acute appendicitis with localized peritonitis, without perforation, abscess, or gangrene (2023), Anxiety, Cancer (HCC) (), Cancer (HCC) (), Depression, Ductal hyperplasia, atypical, breast (2020), Flat epithelial atypia (FEA) of left breast (2020), High blood pressure, High cholesterol, Hyperlipidemia, Macular degeneration, Osteoporosis, Personal history of colonic polyps, PONV (postoperative nausea and vomiting), Status post bilateral mastectomy (2022), Tremors of nervous system, and Visual impairment.  Surgical History:  She  has a past surgical history that includes colonoscopy & polypectomy (2003); other surgical history (10/01/2008); ; soniya localization wire  1 site left (cpt=19281) (2020); soniya biopsy stereo nodule 1 site left (cpt=19081) (2020); needle biopsy left (); hysterectomy (); mastectomy left; mastectomy right; appendectomy (2023); angioplasty (coronary); colonoscopy; and tubal ligation.   Family History:  Her family history includes Breast Cancer in her self; Cancer (age of onset: 29) in her sister; Cancer (age of onset: 64) in her sister; Cancer (age of onset: 70) in her paternal uncle; Heart Disease in her father; High Blood Pressure in her father; High Cholesterol in an other family member; Melanoma in her self; Neurological Disorder in her sister; Other in her self; als in her sister.  Social History:  She  reports that she quit smoking about 11 years ago. Her smoking use included cigarettes. She started smoking about 51 years ago. She has a 40 pack-year smoking history. She has never used smokeless tobacco. She reports that she does not drink alcohol and does not use drugs.    Tobacco:  She smoked tobacco in the past but quit greater than 12 months ago.  Social History     Tobacco Use   Smoking Status Former    Current packs/day: 0.00    Average packs/day: 1 pack/day for 40.0 years (40.0 ttl pk-yrs)    Types: Cigarettes    Start date: 1973    Quit date: 2013    Years since quittin.1   Smokeless Tobacco Never   Tobacco Comments    PPD          CAGE Alcohol Screen:   CAGE screening score of 0 on 2024, showing low risk of alcohol abuse.      Patient Care Team:  Antonio Vargas MD as PCP - General (Family Medicine)  Christi Zhang RN as Registered Nurse (Registered Nurse)  Zurdo Montesinos MD (Surgical Oncology)  Anila Piedra MD (SURGERY, PLASTIC)  Ameena Cevallos DO (ENDOCRINOLOGY)    Review of Systems  GENERAL: feels well otherwise  SKIN: denies any unusual skin lesions  EYES: denies blurred vision or double vision  HEENT: denies nasal congestion, sinus pain or ST  LUNGS: denies shortness of breath with  exertion  CARDIOVASCULAR: denies chest pain on exertion  GI: denies abdominal pain, denies heartburn  : denies dysuria, vaginal discharge or itching, no complaint of urinary incontinence   MUSCULOSKELETAL: denies back pain  NEURO: denies headaches  PSYCHE: denies depression or anxiety  HEMATOLOGIC: denies hx of anemia  ENDOCRINE: denies thyroid history  ALL/ASTHMA: denies hx of allergy or asthma    Objective:   Physical Exam  General Appearance:  Alert, cooperative, no distress, appears stated age   Head:  Normocephalic, without obvious abnormality, atraumatic   Eyes:  PERRL, conjunctiva/corneas clear, EOM's intact both eyes   Neck: Supple, symmetrical, trachea midline, no adenopathy;  thyroid: not enlarged, symmetric, no tenderness/mass/nodules; no carotid bruit or JVD   Back:   Symmetric, no curvature, ROM normal, no CVA tenderness   Lungs:   Clear to auscultation bilaterally, respirations unlabored   Heart:  Regular rate and rhythm, S1 and S2 normal, no murmur, rub, or gallop   Abdomen:   Soft, non-tender, bowel sounds active all four quadrants,  no masses, no organomegaly   Pelvic: Deferred   Extremities: Extremities normal, atraumatic, no cyanosis or edema   Pulses: 2+ and symmetric   Skin: Skin color, texture, turgor normal, no rashes or lesions  Healed arm surgical site left upper from melanoma excision     Lymph nodes: Cervical, supraclavicular, and axillary nodes normal   Neurologic: Normal       /80 (BP Location: Left arm, Patient Position: Sitting, Cuff Size: adult)   Pulse 72   Temp 98.6 °F (37 °C) (Temporal)   Resp 12   Ht 5' 1\" (1.549 m)   Wt 122 lb 4 oz (55.5 kg)   SpO2 98%   BMI 23.10 kg/m²  Estimated body mass index is 23.1 kg/m² as calculated from the following:    Height as of this encounter: 5' 1\" (1.549 m).    Weight as of this encounter: 122 lb 4 oz (55.5 kg).    Medicare Hearing Assessment:   Hearing Screening    Time taken: 6/10/2024  2:15 PM  Entry User: Isabella Gilmore  MA  Screening Method: Whisper Test  Whisper Test Result: Pass         Visual Acuity:   Right Eye Visual Acuity: Corrected Right Eye Chart Acuity: 20/50   Left Eye Visual Acuity: Corrected Left Eye Chart Acuity: 20/50   Both Eyes Visual Acuity: Corrected Both Eyes Chart Acuity: 20/50   Able To Tolerate Visual Acuity: Yes        Assessment & Plan:   Dillan Casillas is a 73 year old female who presents for a Medicare Assessment.     1. Encounter for Medicare annual wellness exam (Primary)  2. Pure hypercholesterolemia  Overview:  Cholesterol Lowering Medications            ATORVASTATIN 10 MG Oral Tab            Assessment & Plan:  Cholesterol shows Good control and Good compliance. Long term heart-healthy diet and lifestyle discussed and encouraged to reduce risk of cardiovascular disease.  Cholesterol: 184, done on 12/27/2023.  HDL Cholesterol: 64, done on 12/27/2023.  TriGlycerides 221, done on 12/27/2023.  LDL Cholesterol: 84, done on 12/27/2023.   Cholesterol medications include ATORVASTATIN 10 MG Oral Tab [[id number]].      3. Atherosclerosis of native coronary artery of native heart without angina pectoris  Assessment & Plan:   Stable    [x] chronic stable condition, noted historically, continues present status      4. Primary hypertension  Overview:  On no medications currently  Assessment & Plan:  Last K was 3.7 done on 5/29/2024.  Last Cr was 0.96 done on 5/29/2024.  Last eGFR was 62 on 5/29/2024.    5. Benign essential tremor  Assessment & Plan:   Stable    [x] chronic stable condition, noted historically, continues present status      6. Reactive depression  Overview:  Mood and Thought Medications            ESCITALOPRAM 10 MG Oral Tab          Assessment & Plan:   Stable    [x] chronic stable condition, noted historically, continues present status      7. Anxiety state  Overview:  Mood and Thought Medications            ESCITALOPRAM 10 MG Oral Tab            Assessment & Plan:   Stable    [x] chronic  stable condition, noted historically, continues present status      8. Osteopenia, senile  Overview:  Sees endo  Assessment & Plan:  Done with reclast injections in 2023   Stable    [x] chronic stable condition, noted historically, continues present status    9. Encounter for long-term (current) use of medications  10. Pulmonary fibrosis (HCC)  Assessment & Plan:   Stable    [x] chronic stable condition, noted historically, continues present status    11. Abdominal aortic ectasia (HCC)  Assessment & Plan:   Stable    [x] chronic stable condition, noted historically, continues present status    12. Invasive lobular carcinoma of breast, stage 2, right (HCC)  Overview:  Sees Dr Garsia onco  On anastrazole    Assessment & Plan:   Stable    [x] chronic stable condition, noted historically, continues present status    13. Exudative age-related macular degeneration of left eye with active choroidal neovascularization (HCC)  Overview:  Sees artie for injections    Assessment & Plan:  Slow progression  14. Malignant melanoma of arm, left (HCC)  Assessment & Plan:  Current treatment with oncology team   has had excision and gets q3 months injection until 12 2024  The patient indicates understanding of these issues and agrees to the plan.  Continue with current treatment plan.  Reinforced healthy diet, lifestyle, and exercise.      No follow-ups on file.     Antonio Vargas MD, 6/12/2024     Supplementary Documentation:   General Health:  In the past six months, have you lost more than 10 pounds without trying?: 2 - No  Has your appetite been poor?: No  Type of Diet: Balanced  How does the patient maintain a good energy level?: Appropriate Exercise  How would you describe your daily physical activity?: Moderate  How would you describe your current health state?: Fair  How do you maintain positive mental well-being?: Visiting Family  On a scale of 0 to 10, with 0 being no pain and 10 being severe pain, what is your pain level?:  3 - (Mild)  In the past six months, have you experienced urine leakage?: 0-No  At any time do you feel concerned for the safety/well-being of yourself and/or your children, in your home or elsewhere?: Yes  Have you had any immunizations at another office such as Influenza, Hepatitis B, Tetanus, or Pneumococcal?: No         Dillan Casillas's SCREENING SCHEDULE   Tests on this list are recommended by your physician but may not be covered, or covered at this frequency, by your insurer.   Please check with your insurance carrier before scheduling to verify coverage.   PREVENTATIVE SERVICES FREQUENCY &  COVERAGE DETAILS LAST COMPLETION DATE   Diabetes Screening    Fasting Blood Sugar /  Glucose    One screening every 12 months if never tested or if previously tested but not diagnosed with pre-diabetes   One screening every 6 months if diagnosed with pre-diabetes Lab Results   Component Value Date     (H) 05/29/2024        Cardiovascular Disease Screening    Lipid Panel  Cholesterol  Lipoprotein (HDL)  Triglycerides Covered every 5 years for all Medicare beneficiaries without apparent signs or symptoms of cardiovascular disease Lab Results   Component Value Date    CHOLEST 184 12/27/2023    HDL 64 (H) 12/27/2023    LDL 84 12/27/2023    TRIG 221 (H) 12/27/2023         Electrocardiogram (EKG)   Covered if needed at Welcome to Medicare, and non-screening if indicated for medical reasons 12/29/2023      Ultrasound Screening for Abdominal Aortic Aneurysm (AAA) Covered once in a lifetime for one of the following risk factors    Men who are 65-75 years old and have ever smoked    Anyone with a family history -     Colorectal Cancer Screening  Covered for ages 50-85; only need ONE of the following:    Colonoscopy   Covered every 10 years    Covered every 2 years if patient is at high risk or previous colonoscopy was abnormal -    Health Maintenance   Topic Date Due    Colorectal Cancer Screening  12/01/2013        Flexible Sigmoidoscopy   Covered every 4 years -    Fecal Occult Blood Test Covered annually -   Bone Density Screening    Bone density screening    Covered every 2 years after age 65 if diagnosed with risk of osteoporosis or estrogen deficiency.    Covered yearly for long-term glucocorticoid medication use (Steroids) Last Dexa Scan:    XR DEXA BONE DENSITOMETRY (CPT=77080) 05/05/2022      No recommendations at this time   Pap and Pelvic    Pap   Covered every 2 years for women at normal risk; Annually if at high risk -  No recommendations at this time    Chlamydia Annually if high risk -  No recommendations at this time   Screening Mammogram    Mammogram     Recommend annually for all female patients aged 40 and older    One baseline mammogram covered for patients aged 35-39 12/30/2021    Health Maintenance   Topic Date Due    Mammogram  Discontinued       Immunizations    Influenza Covered once per flu season  Please get every year 12/15/2023  No recommendations at this time    Pneumococcal Each vaccine ([id number] & [id number]) covered once after 65 Prevnar 13: -    [id number]: 07/12/2017     No recommendations at this time    Hepatitis B One screening covered for patients with certain risk factors   -  No recommendations at this time    Tetanus Toxoid Not covered by Medicare Part B unless medically necessary (cut with metal); may be covered with your pharmacy prescription benefits -    Tetanus, Diptheria and Pertusis TD and TDaP Not covered by Medicare Part B -  No recommendations at this time    Zoster Not covered by Medicare Part B; may be covered with your pharmacy  prescription benefits -  Zoster Vaccines(1 of 2) Never done     Annual Monitoring of Persistent Medications (ACE/ARB, digoxin diuretics, anticonvulsants)    Potassium Annually Lab Results   Component Value Date    K 3.7 05/29/2024         Creatinine   Annually Lab Results   Component Value Date    CREATSERUM 0.96 05/29/2024         BUN Annually  Lab Results   Component Value Date    BUN 10 05/29/2024       Drug Serum Conc Annually No results found for: \"DIGOXIN\", \"DIG\", \"VALP\"

## 2024-06-13 NOTE — ASSESSMENT & PLAN NOTE
Last K was 3.7 done on 5/29/2024.  Last Cr was 0.96 done on 5/29/2024.  Last eGFR was 62 on 5/29/2024.

## 2024-06-13 NOTE — ASSESSMENT & PLAN NOTE
Cholesterol shows Good control and Good compliance. Long term heart-healthy diet and lifestyle discussed and encouraged to reduce risk of cardiovascular disease.  Cholesterol: 184, done on 12/27/2023.  HDL Cholesterol: 64, done on 12/27/2023.  TriGlycerides 221, done on 12/27/2023.  LDL Cholesterol: 84, done on 12/27/2023.   Cholesterol medications include ATORVASTATIN 10 MG Oral Tab [[id number]].

## 2024-06-13 NOTE — PATIENT INSTRUCTIONS
Dillan Casillas's SCREENING SCHEDULE   Tests on this list are recommended by your physician but may not be covered, or covered at this frequency, by your insurer.   Please check with your insurance carrier before scheduling to verify coverage.   PREVENTATIVE SERVICES FREQUENCY &  COVERAGE DETAILS LAST COMPLETION DATE   Diabetes Screening    Fasting Blood Sugar /  Glucose    One screening every 12 months if never tested or if previously tested but not diagnosed with pre-diabetes   One screening every 6 months if diagnosed with pre-diabetes Lab Results   Component Value Date     (H) 05/29/2024        Cardiovascular Disease Screening    Lipid Panel  Cholesterol  Lipoprotein (HDL)  Triglycerides Covered every 5 years for all Medicare beneficiaries without apparent signs or symptoms of cardiovascular disease Lab Results   Component Value Date    CHOLEST 184 12/27/2023    HDL 64 (H) 12/27/2023    LDL 84 12/27/2023    TRIG 221 (H) 12/27/2023         Electrocardiogram (EKG)   Covered if needed at Welcome to Medicare, and non-screening if indicated for medical reasons 12/29/2023      Ultrasound Screening for Abdominal Aortic Aneurysm (AAA) Covered once in a lifetime for one of the following risk factors   • Men who are 65-75 years old and have ever smoked   • Anyone with a family history -     Colorectal Cancer Screening  Covered for ages 50-85; only need ONE of the following:    Colonoscopy   Covered every 10 years    Covered every 2 years if patient is at high risk or previous colonoscopy was abnormal -    Health Maintenance   Topic Date Due   • Colorectal Cancer Screening  12/01/2013       Flexible Sigmoidoscopy   Covered every 4 years -    Fecal Occult Blood Test Covered annually -   Bone Density Screening    Bone density screening    Covered every 2 years after age 65 if diagnosed with risk of osteoporosis or estrogen deficiency.    Covered yearly for long-term glucocorticoid medication use (Steroids) Last Dexa  Scan:    XR DEXA BONE DENSITOMETRY (CPT=77080) 05/05/2022      No recommendations at this time   Pap and Pelvic    Pap   Covered every 2 years for women at normal risk; Annually if at high risk -  No recommendations at this time    Chlamydia Annually if high risk -  No recommendations at this time   Screening Mammogram    Mammogram     Recommend annually for all female patients aged 40 and older    One baseline mammogram covered for patients aged 35-39 12/30/2021    Health Maintenance   Topic Date Due   • Mammogram  Discontinued       Immunizations    Influenza Covered once per flu season  Please get every year 12/15/2023  No recommendations at this time    Pneumococcal Each vaccine ([id number] & [id number]) covered once after 65 Prevnar 13: -    [id number]: 07/12/2017     No recommendations at this time    Hepatitis B One screening covered for patients with certain risk factors   -  No recommendations at this time    Tetanus Toxoid Not covered by Medicare Part B unless medically necessary (cut with metal); may be covered with your pharmacy prescription benefits -    Tetanus, Diptheria and Pertusis TD and TDaP Not covered by Medicare Part B -  No recommendations at this time    Zoster Not covered by Medicare Part B; may be covered with your pharmacy  prescription benefits -  Zoster Vaccines(1 of 2) Never done     Annual Monitoring of Persistent Medications (ACE/ARB, digoxin diuretics, anticonvulsants)    Potassium Annually Lab Results   Component Value Date    K 3.7 05/29/2024         Creatinine   Annually Lab Results   Component Value Date    CREATSERUM 0.96 05/29/2024         BUN Annually Lab Results   Component Value Date    BUN 10 05/29/2024       Drug Serum Conc Annually No results found for: \"DIGOXIN\", \"DIG\", \"VALP\"

## 2024-06-13 NOTE — ASSESSMENT & PLAN NOTE
Done with reclast injections in 2023   Stable    [x] chronic stable condition, noted historically, continues present status

## 2024-06-20 ENCOUNTER — OFFICE VISIT (OUTPATIENT)
Dept: HEMATOLOGY/ONCOLOGY | Age: 74
End: 2024-06-20
Attending: INTERNAL MEDICINE

## 2024-06-20 VITALS
TEMPERATURE: 97 F | BODY MASS INDEX: 22.08 KG/M2 | SYSTOLIC BLOOD PRESSURE: 185 MMHG | HEART RATE: 68 BPM | RESPIRATION RATE: 16 BRPM | OXYGEN SATURATION: 98 % | WEIGHT: 121.5 LBS | HEIGHT: 62.01 IN | DIASTOLIC BLOOD PRESSURE: 83 MMHG

## 2024-06-20 DIAGNOSIS — M81.0 AGE-RELATED OSTEOPOROSIS WITHOUT CURRENT PATHOLOGICAL FRACTURE: ICD-10-CM

## 2024-06-20 DIAGNOSIS — Z17.0 MALIGNANT NEOPLASM OF OVERLAPPING SITES OF RIGHT BREAST IN FEMALE, ESTROGEN RECEPTOR POSITIVE (HCC): ICD-10-CM

## 2024-06-20 DIAGNOSIS — C50.911: ICD-10-CM

## 2024-06-20 DIAGNOSIS — C43.62 MALIGNANT MELANOMA OF ARM, LEFT (HCC): Primary | ICD-10-CM

## 2024-06-20 DIAGNOSIS — C50.811 MALIGNANT NEOPLASM OF OVERLAPPING SITES OF RIGHT BREAST IN FEMALE, ESTROGEN RECEPTOR POSITIVE (HCC): ICD-10-CM

## 2024-06-20 LAB
ALBUMIN SERPL-MCNC: 3.5 G/DL (ref 3.4–5)
ALBUMIN/GLOB SERPL: 0.9 {RATIO} (ref 1–2)
ALP LIVER SERPL-CCNC: 62 U/L
ALT SERPL-CCNC: 21 U/L
ANION GAP SERPL CALC-SCNC: 6 MMOL/L (ref 0–18)
AST SERPL-CCNC: 19 U/L (ref 15–37)
BASOPHILS # BLD AUTO: 0.05 X10(3) UL (ref 0–0.2)
BASOPHILS NFR BLD AUTO: 0.9 %
BILIRUB SERPL-MCNC: 0.3 MG/DL (ref 0.1–2)
BUN BLD-MCNC: 13 MG/DL (ref 9–23)
CALCIUM BLD-MCNC: 9.1 MG/DL (ref 8.5–10.1)
CHLORIDE SERPL-SCNC: 108 MMOL/L (ref 98–112)
CO2 SERPL-SCNC: 25 MMOL/L (ref 21–32)
CREAT BLD-MCNC: 0.9 MG/DL
EGFRCR SERPLBLD CKD-EPI 2021: 67 ML/MIN/1.73M2 (ref 60–?)
EOSINOPHIL # BLD AUTO: 0.11 X10(3) UL (ref 0–0.7)
EOSINOPHIL NFR BLD AUTO: 2 %
ERYTHROCYTE [DISTWIDTH] IN BLOOD BY AUTOMATED COUNT: 16.1 %
GLOBULIN PLAS-MCNC: 3.9 G/DL (ref 2.8–4.4)
GLUCOSE BLD-MCNC: 115 MG/DL (ref 70–99)
HCT VFR BLD AUTO: 34.6 %
HGB BLD-MCNC: 11.3 G/DL
IMM GRANULOCYTES # BLD AUTO: 0.03 X10(3) UL (ref 0–1)
IMM GRANULOCYTES NFR BLD: 0.6 %
LYMPHOCYTES # BLD AUTO: 1.26 X10(3) UL (ref 1–4)
LYMPHOCYTES NFR BLD AUTO: 23.2 %
MCH RBC QN AUTO: 28.2 PG (ref 26–34)
MCHC RBC AUTO-ENTMCNC: 32.7 G/DL (ref 31–37)
MCV RBC AUTO: 86.3 FL
MONOCYTES # BLD AUTO: 0.54 X10(3) UL (ref 0.1–1)
MONOCYTES NFR BLD AUTO: 10 %
NEUTROPHILS # BLD AUTO: 3.43 X10 (3) UL (ref 1.5–7.7)
NEUTROPHILS # BLD AUTO: 3.43 X10(3) UL (ref 1.5–7.7)
NEUTROPHILS NFR BLD AUTO: 63.3 %
OSMOLALITY SERPL CALC.SUM OF ELEC: 289 MOSM/KG (ref 275–295)
PLATELET # BLD AUTO: 268 10(3)UL (ref 150–450)
POTASSIUM SERPL-SCNC: 3.5 MMOL/L (ref 3.5–5.1)
PROT SERPL-MCNC: 7.4 G/DL (ref 6.4–8.2)
RBC # BLD AUTO: 4.01 X10(6)UL
SODIUM SERPL-SCNC: 139 MMOL/L (ref 136–145)
T4 FREE SERPL-MCNC: 1.4 NG/DL (ref 0.8–1.7)
TSI SER-ACNC: 4.27 MIU/ML (ref 0.36–3.74)
WBC # BLD AUTO: 5.4 X10(3) UL (ref 4–11)

## 2024-06-20 PROCEDURE — 84443 ASSAY THYROID STIM HORMONE: CPT

## 2024-06-20 PROCEDURE — 99214 OFFICE O/P EST MOD 30 MIN: CPT | Performed by: INTERNAL MEDICINE

## 2024-06-20 PROCEDURE — 80053 COMPREHEN METABOLIC PANEL: CPT

## 2024-06-20 PROCEDURE — 96413 CHEMO IV INFUSION 1 HR: CPT

## 2024-06-20 PROCEDURE — 84439 ASSAY OF FREE THYROXINE: CPT

## 2024-06-20 PROCEDURE — 85025 COMPLETE CBC W/AUTO DIFF WBC: CPT

## 2024-06-20 PROCEDURE — 96367 TX/PROPH/DG ADDL SEQ IV INF: CPT

## 2024-06-20 RX ORDER — ZOLEDRONIC ACID 5 MG/100ML
5 INJECTION, SOLUTION INTRAVENOUS ONCE
Status: CANCELLED | OUTPATIENT
Start: 2024-06-20

## 2024-06-20 RX ORDER — ZOLEDRONIC ACID 5 MG/100ML
5 INJECTION, SOLUTION INTRAVENOUS ONCE
Status: COMPLETED | OUTPATIENT
Start: 2024-06-20 | End: 2024-06-20

## 2024-06-20 RX ADMIN — ZOLEDRONIC ACID 5 MG: 5 INJECTION, SOLUTION INTRAVENOUS at 11:54:00

## 2024-06-20 NOTE — PROGRESS NOTES
Patient here for C7D1 Keytruda. Patient taking anastrozole as directed. Patient states she has bilateral knee pain that started about 2 months ago. Patient alternates between tylenol and advil that she states helps with pain. Patient states she has good appetite and adequate fluid intake. Patient is accompanied by her .     Education Record    Learner:  Patient and Spouse    Disease / Diagnosis: malignant neoplasm of breast    Barriers / Limitations:  None   Comments:    Method:  Discussion   Comments:    General Topics:  Medication, Side effects and symptom management, and Plan of care reviewed   Comments:    Outcome:  Shows understanding   Comments:

## 2024-06-20 NOTE — PROGRESS NOTES
Cancer Center Progress Note    Problem List:      Patient Active Problem List   Diagnosis    Coronary atherosclerosis    Anxiety state    Pure hypercholesterolemia    Hypertension    Pulmonary fibrosis (HCC)    Abdominal aortic ectasia (HCC)    Reactive depression    Benign essential tremor    Exudative age-related macular degeneration of left eye with active choroidal neovascularization (HCC)    Invasive lobular carcinoma of breast, stage 2, right (HCC)    Osteopenia, senile    Malignant melanoma of arm, left (HCC)       Interim History:    Dillan Casillas presents today for evaluation and management of a diagnosis of right breast cancer.    She has had a sixth cycle of pembrolizumab. She complains of chronic knee pain. This was initially in the right side but now is bilateral. The right continues to be worse than the left. This fluctuates. She gets relief taking NSAIDs. She has no fever or sweats. She has no dyspnea. She has no rash or pruritus. 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 She is on levothyroxine 75 mcg daily. She is following with endocrine.    She has a prior h/o right breast cancer. She has been on anastrozole. She now presents with a left upper arm lesion. She was evaluated by Dr. Pierce Uribe. He did an excision biopsy on 11/13/2023 that showed invasive nodular melanoma with ulceration (tumor thickness 10 mm). She proceeded to have WLE and SLN procedure on 1/9/2024 by Dr. Zurdo Montesinos. There was no residual disease in the WLE. She had 2 of 6 nodes positive. One SLN had a 3 mm focus of metastatic melanoma with no extracapsular extension. A second SLN had a 1.5 mm of focus of metastatic melanoma with no extracapsular extension.      She had a PET scan on 12/8/2023 that was negative for metastatic disease. She had a MRI of the brain that was negative for brain metastases.     She had an acute appendicitis on 12/13/2023 s/p appendectomy. She has had some  fatigue since that surgery and the recent arm surgery.    She had bilateral mastectomy on 2022. The left breast had DCIS on her biopsy on 2021 but there was no residual cancer in the left mastectomy. The left SLN was negative. The right breast had an invasive lobular carcinoma, grade II, measuring 2.2 cm. The margins were negative. The right SLN procedure had one node with isolated tumor cells and another node that was negative. She had Oncotype Dx testing with a RS of 26.     Review of Systems:   Constitutional: Negative for anorexia, fatigue, fevers, chills, night sweats and weight loss.  Psychiatric: The patient's mood was calm and appropriate for this visit.  The pertinent positives and negatives were described. All other systems were negative.    PMH/PSH:  Past Medical History:    Acute appendicitis with localized peritonitis, without perforation, abscess, or gangrene    Anxiety    Cancer (HCC)    breast cancer- right breast    Cancer (HCC)    left arm melanoma    Depression    Ductal hyperplasia, atypical, breast    Flat epithelial atypia (FEA) of left breast    High blood pressure    not on any medication    High cholesterol    Hyperlipidemia    Macular degeneration    Osteoporosis    Personal history of colonic polyps    PONV (postoperative nausea and vomiting)    Status post bilateral mastectomy    Tremors of nervous system    Visual impairment    glasses       Past Surgical History:   Procedure Laterality Date    Angioplasty (coronary)      Appendectomy  2023    Colonoscopy      Colonoscopy & polypectomy  2003    Hysterectomy  2017    and oophorectomy    Ajit biopsy stereo nodule 1 site left (cpt=19081)  2020    ADH/FEA    Ajit localization wire 1 site left (cpt=19281)  2020    Benign (core was ADH/FEA)    Mastectomy left      Mastectomy right      Needle biopsy left      Benign          x2    Other surgical history  10/01/2008    cardiac cath post-procedure date (mild  LAD)                    Tubal ligation         Family History Reviewed:  Family History   Problem Relation Age of Onset    Other (Melanoma) Self     Other (Other) Self     Breast Cancer Self     Heart Disease Father         Heart disease    High Blood Pressure Father     Cancer Sister 64        leukemia    Cancer Sister 29        Kidney cancer    Neurological Disorder Sister     Other (als) Sister     Cancer Paternal Uncle 70        breast    High Cholesterol Other         siblings       Allergies:     No Known Allergies    Medications:   levothyroxine 75 MCG Oral Tab Take 1 tablet (75 mcg total) by mouth before breakfast.      escitalopram 10 MG Oral Tab Take 1 tablet (10 mg total) by mouth daily. 90 tablet 1    ATORVASTATIN 10 MG Oral Tab TAKE 1 TABLET BY MOUTH EVERY DAY AT NIGHT 90 tablet 0    aspirin 81 MG Oral Chew Tab Chew 1 tablet (81 mg total) by mouth daily.      anastrozole 1 MG Oral Tab tab Take 1 tablet (1 mg total) by mouth daily. 90 tablet 3    primidone 50 MG Oral Tab Take 1 tablet (50 mg total) by mouth in the morning and 1 tablet (50 mg total) before bedtime. 180 tablet 1    Multiple Vitamin Oral Tab Take 1 tablet by mouth daily.      Acidophilus/Pectin Oral Cap Take 1 capsule by mouth daily.           Vital Signs:      Height: 157.5 cm (5' 2.01\") (06/20 1005)  Weight: 55.1 kg (121 lb 8 oz) (06/20 1005)  BSA (Calculated - sq m): 1.55 sq meters (06/20 1005)  Pulse: 68 (06/20 1005)  BP: 185/83 (06/20 1005)  Temp: 97 °F (36.1 °C) (06/20 1005)  Do Not Use - Resp Rate: --  SpO2: 98 % (06/20 1005)      Performance Status:  ECOG 0: Fully active, able to carry on all pre-disease performance without restriction     Physical Examination:    Constitutional: Patient is alert and oriented x 3, not in acute distress.  Respiratory: Clear to auscultation and percussion. No rales.  No wheezes.  Cardiovascular: Regular rate and rhythm. No murmurs.  Gastrointestinal: Soft, non tender with good bowel  sounds.  Musculoskeletal: No edema. No calf tenderness.  Psychiatric: The patient's mood is calm and appropriate for this visit.       Labs reviewed at this visit:     Lab Results   Component Value Date    WBC 5.4 06/20/2024    RBC 4.01 06/20/2024    HGB 11.3 (L) 06/20/2024    HCT 34.6 (L) 06/20/2024    MCV 86.3 06/20/2024    MCH 28.2 06/20/2024    MCHC 32.7 06/20/2024    RDW 16.1 06/20/2024    .0 06/20/2024     Lab Results   Component Value Date     06/20/2024    K 3.5 06/20/2024     06/20/2024    CO2 25.0 06/20/2024    BUN 13 06/20/2024    CREATSERUM 0.90 06/20/2024     (H) 06/20/2024    CA 9.1 06/20/2024    ALKPHO 62 06/20/2024    ALT 21 06/20/2024    AST 19 06/20/2024    BILT 0.3 06/20/2024    ALB 3.5 06/20/2024    TP 7.4 06/20/2024         Component  Ref Range & Units 5/8/24 1107   Rheumatoid Factor  <15 IU/mL <10            Component  Ref Range & Units 5/8/24 0938   Sed Rate  0 - 30 mm/Hr 48 High             Component  Ref Range & Units 5/8/24 0938   C-Reactive Protein  <0.30 mg/dL 1.26 High         Component  Ref Range & Units 5/8/24 0938   Free T4  0.8 - 1.7 ng/dL 0.6 Low          Component  Ref Range & Units 5/8/24 0938   TSH  0.358 - 3.740 mIU/mL 18.800 High           Radiologic imaging reviewed at this visit:    Bone Dexa Scan on 5/5/2022:  LUMBAR SPINE ANALYSIS RESULTS:       Bone mineral density (BMD) (g/cm2):  0.487     Lumbar T-Score:  -5.1       % young normals:  46       % age matched controls:  61       Change from prior spine examination:  n/a                TOTAL HIP ANALYSIS RESULTS:         Bone mineral density (BMD) (g/cm2):  0.446       Total Hip T-Score:  -4.1       % young normals:  47       % age matched controls:  61       Change from prior hip examination:  n/a                FEMORAL NECK ANALYSIS RESULTS:         Bone mineral density (BMD) (g/cm2):  0.388       Femoral neck T-Score:  -4.2       % young normals:  46       % age matched controls:  62       Change  from prior hip examination:  n/a           ADDITIONAL FINDINGS:  No significant additional findings.        Impression   CONCLUSION:  Bone mineral density values are compatible with the diagnosis of osteoporosis by WHO definition (T score less than -2.5). If therapy is initiated, a follow-up study in 1 year may aid in evaluation of therapeutic efficacy.        MRI of breast on 12/30/2021:  DESCRIPTION:  Witnessed verbal and written informed consent was obtained.  Time out was performed by the staff.  Discussed benefits and risks of MR guided core needle biopsy including, but not limited to, bleeding, infection, and/or failure to obtain   adequate sample. The patient was placed prone on the MRI Scanner.  The left breast was positioned in a dedicated breast coil and MR guidance grid device.   A fiducial was placed on the grid.       The skin was prepped and after local anesthesia was achieved, an introducer sheath and a localizing obturator were placed using a lateral approach at both sites.  The location of the obturator sheaths was confirmed with imaging.       The patient was then taken out of the bore of the magnet and samples were obtained using an MR compatible vacuum assisted core biopsy device at both sites.  A regular size needle was used for site 1 and a petite sized needle was used for site 2. Six   samples were taken at site 1, and 12 samples were taken at site 2. A tissue marker was placed at both sites.  Post biopsy images were obtained which confirm pot biopsy changes at the targets of the biopsy.         Post procedural mammogram performed on separate digital equipment show the clips in appropriate position.       The patient tolerated the procedure well and left the department in stable condition.       BIOPSY NEEDLE:            9-gauge Talko biopsy device   MEDICATION:               1% lidocaine and 1% lidocaine containing epinephrine     SITE MARKER COORDINATES:     SITE 1:  Non-mass enhancement 7  o'clock  left breast   SITE 2:  0800 hours left breast   CLIP TYPE:                SITE 1:  M  SITE 2:  X   PHYSICIANS PRESENT:       Jaquelin Cole M.D.         PATHOLOGY:   A.  Left breast, 7:00, MRI guided biopsy:   -Breast tissue with nodular adenosis, stromal fibrosis, secretory change, apocrine metaplasia and microcalcifications.   -See comment.       B.  Left breast, 8:00, MRI guided biopsy:   -Ductal carcinoma in situ, nuclear grade 2, solid and cribriform types.   -Largest focus of DCIS measures 5 mm (0.5 cm) in greatest dimension.   -Background fibroadenomatous change, apocrine metaplasia, sclerosing adenosis and microcalcifications.   -See comment.      Impression   CONCLUSION:     MRI-guided biopsy of two areas in the left breast.       Pathology from the 1st site at the 7 o'clock position is reported as benign.  This is likely concordant with the imaging assessment and is marked with an M clip.       Pathology from the 2nd site at the 8 o'clock position shows the presence of DCIS.  This is concordant with the imaging assessment and is marked with an X clip.       Surgical consultation is recommended for the bilateral biopsy proven malignancies.  Per the patient's electronic medical record, she is planning on a bilateral mastectomy.         Assessment/Plan:     Malignant melanoma of the left upper arm:  Nodular melanoma 10 mm thickness  2 of 6 left axillary nodes positive  Microscopic metastases  No extranodal extension  BRAF negative     The patient has a pT4b,pN2a (Stage IIIc) melanoma of the left upper arm and axilla. She has had definitive surgery. She has BRAF negative disease. She has good performance status (PS 0).    She will continue with the pembrolizumab every three weeks.    She has pain in the bilateral knee that is being managed with NSAIDs. She will continue this for now since this is stable. If this worsens she will see rheumatology.    She has increased TSH and decreased T4. She will  continue with endocrine management.    Invasive lobular carcinoma of the right breast 12 o'clock (overlapping quadrant):  Biopsy on 11/17/2021  ER 90%  PA <1%  K--67 5%  Her2 1+  Bilateral mastectomy on 1/21/2022:  Right Breast with Invasive lobular carcinoma  Measuring 2.2 cm  SLN 0/2 (one with ITC)  Negative margins  Left breast with no residual DCIS  0.5 cm DCIS biopsy on 12/30/2021  ER/PA positive  Right breast Oncotype Dx RS 26     Continue anastrozole. She does have severe osteoporosis. She continues to follow with endocrine. She has OPAL on the AI. She has no side effects from the AI. She is comfortable with this plan.     Severe Osteoporosis:     She is following with endocrinology. She has had dental work with teeth extractions. She has been cleared by dental. She had Reclast on 6/16/2023. She will get this yearly. I ordered this now and she will proceed with it.    Hypertension secondary to anxiety about treatment:    She has white coat HTN. Her home bp measurements are normal.    Ben Garsia MD

## 2024-06-20 NOTE — PROGRESS NOTES
Pt here for C7 D1 Drug(s): Keytruda/Reclast.  Arrives Ambulating independently, accompanied by Self and Spouse     Patient was evaluated today by MD and Treatment Nurse.    Oral medications included in this regimen:  no    Patient confirms comprehension of cancer treatment schedule:  yes    Pregnancy screening:  Not applicable    Modifications in dose or schedule:  No    Medications appearance and physical integrity checked by RN: yes.    Chemotherapy IV pump settings verified by 2 RNs:  No due to targeted therapy IV administration.  Frequency of blood return and site check throughout administration: Prior to administration and At completion of therapy     Infusion/treatment outcome:  patient tolerated treatment without incident    Education Record    Learner:  Patient and Spouse  Barriers / Limitations:  None  Method:  Discussion  Education / instructions given:  Reviewed plan of care and follow up appts.  Outcome:  Shows understanding    Discharged Home, Ambulating independently, accompanied by:Self and Spouse    Patient/family verbalized understanding of future appointments: by printed AVS

## 2024-06-25 ENCOUNTER — HOSPITAL ENCOUNTER (OUTPATIENT)
Dept: BONE DENSITY | Age: 74
Discharge: HOME | End: 2024-06-25
Attending: STUDENT IN AN ORGANIZED HEALTH CARE EDUCATION/TRAINING PROGRAM

## 2024-06-25 DIAGNOSIS — M81.0 AGE-RELATED OSTEOPOROSIS WITHOUT CURRENT PATHOLOGICAL FRACTURE: ICD-10-CM

## 2024-06-25 PROCEDURE — 77080 DXA BONE DENSITY AXIAL: CPT | Performed by: STUDENT IN AN ORGANIZED HEALTH CARE EDUCATION/TRAINING PROGRAM

## 2024-06-26 RX ORDER — ATORVASTATIN CALCIUM 10 MG/1
TABLET, FILM COATED ORAL
Qty: 90 TABLET | Refills: 0 | Status: SHIPPED | OUTPATIENT
Start: 2024-06-26

## 2024-06-26 NOTE — TELEPHONE ENCOUNTER
Last office visit:1/5/24  Future Appointments   Date Time Provider Department Center   7/10/2024  9:30 AM  [place] RN6 [facility]   7/10/2024 10:00 AM Ben Garsia MD [facility]EM [facility]   9/11/2024 11:15 AM Zurdo Montesinos MD [facility]   Last filled: 4/1/24 #90 with 0 RF   Last labs: 6/20/24     Protocol:  Cholesterol Medication Protocol [id number]/26/2024 08:33 AM   Protocol Details ALT < 80    ALT resulted within past year    Lipid panel within past 12 months    In person appointment or virtual visit in the past 12 mos or appointment in next 3 mos

## 2024-07-10 ENCOUNTER — OFFICE VISIT (OUTPATIENT)
Dept: HEMATOLOGY/ONCOLOGY | Facility: HOSPITAL | Age: 74
End: 2024-07-10
Attending: INTERNAL MEDICINE
Payer: MEDICARE

## 2024-07-10 VITALS
OXYGEN SATURATION: 97 % | WEIGHT: 121.63 LBS | HEIGHT: 62.01 IN | DIASTOLIC BLOOD PRESSURE: 85 MMHG | SYSTOLIC BLOOD PRESSURE: 191 MMHG | TEMPERATURE: 98 F | HEART RATE: 77 BPM | BODY MASS INDEX: 22.1 KG/M2

## 2024-07-10 DIAGNOSIS — Z17.0 MALIGNANT NEOPLASM OF OVERLAPPING SITES OF RIGHT BREAST IN FEMALE, ESTROGEN RECEPTOR POSITIVE (HCC): Primary | ICD-10-CM

## 2024-07-10 DIAGNOSIS — M19.90 INFLAMMATORY ARTHRITIS: ICD-10-CM

## 2024-07-10 DIAGNOSIS — C50.811 MALIGNANT NEOPLASM OF OVERLAPPING SITES OF RIGHT BREAST IN FEMALE, ESTROGEN RECEPTOR POSITIVE (HCC): ICD-10-CM

## 2024-07-10 DIAGNOSIS — Z17.0 MALIGNANT NEOPLASM OF OVERLAPPING SITES OF RIGHT BREAST IN FEMALE, ESTROGEN RECEPTOR POSITIVE (HCC): ICD-10-CM

## 2024-07-10 DIAGNOSIS — M81.0 AGE-RELATED OSTEOPOROSIS WITHOUT CURRENT PATHOLOGICAL FRACTURE: ICD-10-CM

## 2024-07-10 DIAGNOSIS — E03.2 HYPOTHYROIDISM DUE TO MEDICATION: ICD-10-CM

## 2024-07-10 DIAGNOSIS — C43.62 MALIGNANT MELANOMA OF ARM, LEFT (HCC): Primary | ICD-10-CM

## 2024-07-10 DIAGNOSIS — C50.811 MALIGNANT NEOPLASM OF OVERLAPPING SITES OF RIGHT BREAST IN FEMALE, ESTROGEN RECEPTOR POSITIVE (HCC): Primary | ICD-10-CM

## 2024-07-10 DIAGNOSIS — C43.62 MALIGNANT MELANOMA OF ARM, LEFT (HCC): ICD-10-CM

## 2024-07-10 LAB
ALBUMIN SERPL-MCNC: 3.5 G/DL (ref 3.4–5)
ALBUMIN/GLOB SERPL: 0.9 {RATIO} (ref 1–2)
ALP LIVER SERPL-CCNC: 60 U/L
ALT SERPL-CCNC: 22 U/L
ANION GAP SERPL CALC-SCNC: 6 MMOL/L (ref 0–18)
AST SERPL-CCNC: 36 U/L (ref 15–37)
BASOPHILS # BLD AUTO: 0.04 X10(3) UL (ref 0–0.2)
BASOPHILS NFR BLD AUTO: 0.7 %
BILIRUB SERPL-MCNC: 0.4 MG/DL (ref 0.1–2)
BUN BLD-MCNC: 12 MG/DL (ref 9–23)
CALCIUM BLD-MCNC: 8.9 MG/DL (ref 8.5–10.1)
CHLORIDE SERPL-SCNC: 109 MMOL/L (ref 98–112)
CO2 SERPL-SCNC: 27 MMOL/L (ref 21–32)
CREAT BLD-MCNC: 0.97 MG/DL
CRP SERPL-MCNC: 1.38 MG/DL (ref ?–0.3)
EGFRCR SERPLBLD CKD-EPI 2021: 61 ML/MIN/1.73M2 (ref 60–?)
EOSINOPHIL # BLD AUTO: 0.17 X10(3) UL (ref 0–0.7)
EOSINOPHIL NFR BLD AUTO: 3 %
ERYTHROCYTE [DISTWIDTH] IN BLOOD BY AUTOMATED COUNT: 15.9 %
ERYTHROCYTE [SEDIMENTATION RATE] IN BLOOD: 49 MM/HR
GLOBULIN PLAS-MCNC: 4.1 G/DL (ref 2.8–4.4)
GLUCOSE BLD-MCNC: 95 MG/DL (ref 70–99)
HCT VFR BLD AUTO: 35.5 %
HGB BLD-MCNC: 11.4 G/DL
IMM GRANULOCYTES # BLD AUTO: 0.02 X10(3) UL (ref 0–1)
IMM GRANULOCYTES NFR BLD: 0.3 %
LYMPHOCYTES # BLD AUTO: 1.26 X10(3) UL (ref 1–4)
LYMPHOCYTES NFR BLD AUTO: 22 %
MCH RBC QN AUTO: 27.9 PG (ref 26–34)
MCHC RBC AUTO-ENTMCNC: 32.1 G/DL (ref 31–37)
MCV RBC AUTO: 87 FL
MONOCYTES # BLD AUTO: 0.63 X10(3) UL (ref 0.1–1)
MONOCYTES NFR BLD AUTO: 11 %
NEUTROPHILS # BLD AUTO: 3.61 X10 (3) UL (ref 1.5–7.7)
NEUTROPHILS # BLD AUTO: 3.61 X10(3) UL (ref 1.5–7.7)
NEUTROPHILS NFR BLD AUTO: 63 %
OSMOLALITY SERPL CALC.SUM OF ELEC: 294 MOSM/KG (ref 275–295)
PLATELET # BLD AUTO: 247 10(3)UL (ref 150–450)
POTASSIUM SERPL-SCNC: 4.4 MMOL/L (ref 3.5–5.1)
PROT SERPL-MCNC: 7.6 G/DL (ref 6.4–8.2)
RBC # BLD AUTO: 4.08 X10(6)UL
SODIUM SERPL-SCNC: 142 MMOL/L (ref 136–145)
T4 FREE SERPL-MCNC: 1.2 NG/DL (ref 0.8–1.7)
TSI SER-ACNC: 2.92 MIU/ML (ref 0.36–3.74)
WBC # BLD AUTO: 5.7 X10(3) UL (ref 4–11)

## 2024-07-10 PROCEDURE — 36415 COLL VENOUS BLD VENIPUNCTURE: CPT

## 2024-07-10 PROCEDURE — 80053 COMPREHEN METABOLIC PANEL: CPT

## 2024-07-10 PROCEDURE — 36593 DECLOT VASCULAR DEVICE: CPT

## 2024-07-10 PROCEDURE — 85652 RBC SED RATE AUTOMATED: CPT

## 2024-07-10 PROCEDURE — 84439 ASSAY OF FREE THYROXINE: CPT

## 2024-07-10 PROCEDURE — 85025 COMPLETE CBC W/AUTO DIFF WBC: CPT

## 2024-07-10 PROCEDURE — 99215 OFFICE O/P EST HI 40 MIN: CPT | Performed by: INTERNAL MEDICINE

## 2024-07-10 PROCEDURE — 86140 C-REACTIVE PROTEIN: CPT

## 2024-07-10 PROCEDURE — 96413 CHEMO IV INFUSION 1 HR: CPT

## 2024-07-10 PROCEDURE — 84443 ASSAY THYROID STIM HORMONE: CPT

## 2024-07-10 RX ORDER — PREDNISONE 10 MG/1
20 TABLET ORAL DAILY
Qty: 60 TABLET | Refills: 1 | Status: SHIPPED | OUTPATIENT
Start: 2024-07-10

## 2024-07-10 NOTE — PROGRESS NOTES
Patient here for C8D1 Keytruda. Patient currently taking anastrozole as directed. Patient c/o bilateral knee pain and swelling that started with C4 of Keytruda. Patient states that knee pain is not worse. Patient is wearing a knee brace that she states is helping. Patient takes tylenol/advil and aleve and using a heating pad that helps.  Patient denies redness. Patient has no current complaints or concerns at this time     Education Record    Learner:  Patient and Spouse    Disease / Diagnosis: malignant neoplasm of breast     Barriers / Limitations:  None   Comments:    Method:  Discussion   Comments:    General Topics:  Medication, Side effects and symptom management, and Plan of care reviewed   Comments:    Outcome:  Shows understanding   Comments:

## 2024-07-10 NOTE — PROGRESS NOTES
Cancer Center Progress Note    Problem List:      Patient Active Problem List   Diagnosis    Coronary atherosclerosis    Anxiety state    Pure hypercholesterolemia    Hypertension    Pulmonary fibrosis (HCC)    Abdominal aortic ectasia (HCC)    Reactive depression    Benign essential tremor    Exudative age-related macular degeneration of left eye with active choroidal neovascularization (HCC)    Invasive lobular carcinoma of breast, stage 2, right (HCC)    Osteopenia, senile    Malignant melanoma of arm, left (HCC)    Age-related osteoporosis without current pathological fracture       Interim History:    Dillan Casillas presents today for evaluation and management of a diagnosis of right breast cancer.    She has had a 7th cycle of pembrolizumab. She complains of chronic bilateral knee pain. The left side is now worse than the right side. She gets relief taking NSAIDs. She has no fever or sweats. She has no dyspnea. She has no rash or pruritus. 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 She is on levothyroxine 75 mcg daily. She is following with endocrine.    She has a prior h/o right breast cancer. She has been on anastrozole. She now presents with a left upper arm lesion. She was evaluated by Dr. Pierce Uribe. He did an excision biopsy on 11/13/2023 that showed invasive nodular melanoma with ulceration (tumor thickness 10 mm). She proceeded to have WLE and SLN procedure on 1/9/2024 by Dr. Zurdo Montesinos. There was no residual disease in the WLE. She had 2 of 6 nodes positive. One SLN had a 3 mm focus of metastatic melanoma with no extracapsular extension. A second SLN had a 1.5 mm of focus of metastatic melanoma with no extracapsular extension.      She had a PET scan on 12/8/2023 that was negative for metastatic disease. She had a MRI of the brain that was negative for brain metastases.     She had an acute appendicitis on 12/13/2023 s/p appendectomy. She has had some fatigue  since that surgery and the recent arm surgery.    She had bilateral mastectomy on 2022. The left breast had DCIS on her biopsy on 2021 but there was no residual cancer in the left mastectomy. The left SLN was negative. The right breast had an invasive lobular carcinoma, grade II, measuring 2.2 cm. The margins were negative. The right SLN procedure had one node with isolated tumor cells and another node that was negative. She had Oncotype Dx testing with a RS of 26.     Review of Systems:   Constitutional: Negative for anorexia, fatigue, fevers, chills, night sweats and weight loss.  Psychiatric: The patient's mood was calm and appropriate for this visit.  The pertinent positives and negatives were described. All other systems were negative.    PMH/PSH:  Past Medical History:    Acute appendicitis with localized peritonitis, without perforation, abscess, or gangrene    Anxiety    Cancer (HCC)    breast cancer- right breast    Cancer (HCC)    left arm melanoma    Depression    Ductal hyperplasia, atypical, breast    Flat epithelial atypia (FEA) of left breast    High blood pressure    not on any medication    High cholesterol    Hyperlipidemia    Macular degeneration    Osteoporosis    Personal history of colonic polyps    PONV (postoperative nausea and vomiting)    Status post bilateral mastectomy    Tremors of nervous system    Visual impairment    glasses       Past Surgical History:   Procedure Laterality Date    Angioplasty (coronary)      Appendectomy  2023    Colonoscopy      Colonoscopy & polypectomy  2003    Hysterectomy  2017    and oophorectomy    Ajit biopsy stereo nodule 1 site left (cpt=19081)  2020    ADH/FEA    Ajit localization wire 1 site left (cpt=19281)  2020    Benign (core was ADH/FEA)    Mastectomy left      Mastectomy right      Needle biopsy left      Benign          x2    Other surgical history  10/01/2008    cardiac cath post-procedure date (mild LAD)                     Tubal ligation         Family History Reviewed:  Family History   Problem Relation Age of Onset    Other (Melanoma) Self     Other (Other) Self     Breast Cancer Self     Heart Disease Father         Heart disease    High Blood Pressure Father     Cancer Sister 64        leukemia    Cancer Sister 29        Kidney cancer    Neurological Disorder Sister     Other (als) Sister     Cancer Paternal Uncle 70        breast    High Cholesterol Other         siblings       Allergies:     No Known Allergies    Medications:   predniSONE 10 MG Oral Tab Take 2 tablets (20 mg total) by mouth daily. 60 tablet 1         Vital Signs:      Height: 157.5 cm (5' 2.01\") (07/10 0932)  Weight: 55.2 kg (121 lb 9.6 oz) (07/10 0932)  BSA (Calculated - sq m): 1.55 sq meters (07/10 0932)  Pulse: 77 (07/10 0932)  BP: 191/85 (07/10 0932)  Temp: 97.9 °F (36.6 °C) (07/10 0932)  Do Not Use - Resp Rate: --  SpO2: 97 % (07/10 0932)      Performance Status:  ECOG 0: Fully active, able to carry on all pre-disease performance without restriction     Physical Examination:    Constitutional: Patient is alert and oriented x 3, not in acute distress.  Respiratory: Clear to auscultation and percussion. No rales.  No wheezes.  Cardiovascular: Regular rate and rhythm. No murmurs.  Gastrointestinal: Soft, non tender with good bowel sounds.  Musculoskeletal: No edema. No calf tenderness.  Psychiatric: The patient's mood is calm and appropriate for this visit.       Labs reviewed at this visit:     Lab Results   Component Value Date    WBC 5.7 07/10/2024    RBC 4.08 07/10/2024    HGB 11.4 (L) 07/10/2024    HCT 35.5 07/10/2024    MCV 87.0 07/10/2024    MCH 27.9 07/10/2024    MCHC 32.1 07/10/2024    RDW 15.9 07/10/2024    .0 07/10/2024     Lab Results   Component Value Date     07/10/2024    K 4.4 07/10/2024     07/10/2024    CO2 27.0 07/10/2024    BUN 12 07/10/2024    CREATSERUM 0.97 07/10/2024    GLU 95 07/10/2024    CA 8.9  07/10/2024    ALKPHO 60 07/10/2024    ALT 22 07/10/2024    AST 36 07/10/2024    BILT 0.4 07/10/2024    ALB 3.5 07/10/2024    TP 7.6 07/10/2024         Component  Ref Range & Units 5/8/24 1107   Rheumatoid Factor  <15 IU/mL <10            Component  Ref Range & Units 5/8/24 0938   Sed Rate  0 - 30 mm/Hr 48 High             Component  Ref Range & Units 5/8/24 0938   C-Reactive Protein  <0.30 mg/dL 1.26 High         Component  Ref Range & Units 5/8/24 0938   Free T4  0.8 - 1.7 ng/dL 0.6 Low          Component  Ref Range & Units 5/8/24 0938   TSH  0.358 - 3.740 mIU/mL 18.800 High           Radiologic imaging reviewed at this visit:    Bone Dexa Scan on 5/5/2022:  LUMBAR SPINE ANALYSIS RESULTS:       Bone mineral density (BMD) (g/cm2):  0.487     Lumbar T-Score:  -5.1       % young normals:  46       % age matched controls:  61       Change from prior spine examination:  n/a                TOTAL HIP ANALYSIS RESULTS:         Bone mineral density (BMD) (g/cm2):  0.446       Total Hip T-Score:  -4.1       % young normals:  47       % age matched controls:  61       Change from prior hip examination:  n/a                FEMORAL NECK ANALYSIS RESULTS:         Bone mineral density (BMD) (g/cm2):  0.388       Femoral neck T-Score:  -4.2       % young normals:  46       % age matched controls:  62       Change from prior hip examination:  n/a           ADDITIONAL FINDINGS:  No significant additional findings.        Impression   CONCLUSION:  Bone mineral density values are compatible with the diagnosis of osteoporosis by WHO definition (T score less than -2.5). If therapy is initiated, a follow-up study in 1 year may aid in evaluation of therapeutic efficacy.        MRI of breast on 12/30/2021:  DESCRIPTION:  Witnessed verbal and written informed consent was obtained.  Time out was performed by the staff.  Discussed benefits and risks of MR guided core needle biopsy including, but not limited to, bleeding, infection, and/or  failure to obtain   adequate sample. The patient was placed prone on the MRI Scanner.  The left breast was positioned in a dedicated breast coil and MR guidance grid device.   A fiducial was placed on the grid.       The skin was prepped and after local anesthesia was achieved, an introducer sheath and a localizing obturator were placed using a lateral approach at both sites.  The location of the obturator sheaths was confirmed with imaging.       The patient was then taken out of the bore of the magnet and samples were obtained using an MR compatible vacuum assisted core biopsy device at both sites.  A regular size needle was used for site 1 and a petite sized needle was used for site 2. Six   samples were taken at site 1, and 12 samples were taken at site 2. A tissue marker was placed at both sites.  Post biopsy images were obtained which confirm pot biopsy changes at the targets of the biopsy.         Post procedural mammogram performed on separate digital equipment show the clips in appropriate position.       The patient tolerated the procedure well and left the department in stable condition.       BIOPSY NEEDLE:            9-gauge Skubana biopsy device   MEDICATION:               1% lidocaine and 1% lidocaine containing epinephrine     SITE MARKER COORDINATES:     SITE 1:  Non-mass enhancement 7 o'clock  left breast   SITE 2:  0800 hours left breast   CLIP TYPE:                SITE 1:  M  SITE 2:  X   PHYSICIANS PRESENT:       Jaquelin Cole M.D.         PATHOLOGY:   A.  Left breast, 7:00, MRI guided biopsy:   -Breast tissue with nodular adenosis, stromal fibrosis, secretory change, apocrine metaplasia and microcalcifications.   -See comment.       B.  Left breast, 8:00, MRI guided biopsy:   -Ductal carcinoma in situ, nuclear grade 2, solid and cribriform types.   -Largest focus of DCIS measures 5 mm (0.5 cm) in greatest dimension.   -Background fibroadenomatous change, apocrine metaplasia, sclerosing adenosis  and microcalcifications.   -See comment.      Impression   CONCLUSION:     MRI-guided biopsy of two areas in the left breast.       Pathology from the 1st site at the 7 o'clock position is reported as benign.  This is likely concordant with the imaging assessment and is marked with an M clip.       Pathology from the 2nd site at the 8 o'clock position shows the presence of DCIS.  This is concordant with the imaging assessment and is marked with an X clip.       Surgical consultation is recommended for the bilateral biopsy proven malignancies.  Per the patient's electronic medical record, she is planning on a bilateral mastectomy.         Assessment/Plan:     Malignant melanoma of the left upper arm:  Nodular melanoma 10 mm thickness  2 of 6 left axillary nodes positive  Microscopic metastases  No extranodal extension  BRAF negative     The patient has a pT4b,pN2a (Stage IIIc) melanoma of the left upper arm and axilla. She has had definitive surgery. She has BRAF negative disease. She has good performance status (PS 0).    She will continue with the pembrolizumab every three weeks.    She has pain in the bilateral knee that is consistent with an inflammatory arthritis related to the pembrolizumab. This is stable and she is managing with NSAIDs. She will continue this but I will try a course of low dose steroids starting with prednisone 20 mg daily. If she has no improvement after 7 days she will contact me and we will refer to rheumatology. If she has improvement then she will reduce the prednisone to 10 mg daily after 14 days. She will return in 3 weeks.     She has increased TSH and decreased T4. She will continue with endocrine management.    Invasive lobular carcinoma of the right breast 12 o'clock (overlapping quadrant):  Biopsy on 11/17/2021  ER 90%  CT <1%  K--67 5%  Her2 1+  Bilateral mastectomy on 1/21/2022:  Right Breast with Invasive lobular carcinoma  Measuring 2.2 cm  SLN 0/2 (one with ITC)  Negative  margins  Left breast with no residual DCIS  0.5 cm DCIS biopsy on 12/30/2021  ER/AK positive  Right breast Oncotype Dx RS 26     Continue anastrozole. She does have severe osteoporosis. She continues to follow with endocrine. She has OPAL on the AI. She has no side effects from the AI. She is comfortable with this plan.     Severe Osteoporosis:     She is following with endocrinology. She has had dental work with teeth extractions. She has been cleared by dental. She had Reclast on 6/20/2024. She will get this yearly.     Hypertension secondary to anxiety about treatment:    She has white coat HTN. Her home bp measurements are normal.    Ben Garsia MD

## 2024-07-17 DIAGNOSIS — G25.0 ESSENTIAL TREMOR: ICD-10-CM

## 2024-07-17 RX ORDER — PRIMIDONE 50 MG/1
50 TABLET ORAL 2 TIMES DAILY
Qty: 180 TABLET | Refills: 1 | Status: SHIPPED | OUTPATIENT
Start: 2024-07-17 | End: 2025-01-13

## 2024-07-17 NOTE — TELEPHONE ENCOUNTER
Requested Prescriptions     Signed Prescriptions Disp Refills    PRIMIDONE 50 MG Oral Tab 180 tablet 1     Sig: TAKE 1 TABLET (50 MG TOTAL) BY MOUTH IN THE MORNING AND 1 TABLET (50 MG TOTAL) BEFORE BEDTIME     Authorizing Provider: MARIAJOSE MCKENZIE     Ordering User: KRISTA GARY        Refilled per protocol/OV notes    
with RW/good balance

## 2024-07-31 ENCOUNTER — OFFICE VISIT (OUTPATIENT)
Dept: HEMATOLOGY/ONCOLOGY | Facility: HOSPITAL | Age: 74
End: 2024-07-31
Attending: INTERNAL MEDICINE
Payer: MEDICARE

## 2024-07-31 VITALS
SYSTOLIC BLOOD PRESSURE: 181 MMHG | HEART RATE: 82 BPM | WEIGHT: 120.81 LBS | RESPIRATION RATE: 18 BRPM | TEMPERATURE: 98 F | HEIGHT: 62.01 IN | BODY MASS INDEX: 21.95 KG/M2 | OXYGEN SATURATION: 99 % | DIASTOLIC BLOOD PRESSURE: 94 MMHG

## 2024-07-31 DIAGNOSIS — C50.811 MALIGNANT NEOPLASM OF OVERLAPPING SITES OF RIGHT BREAST IN FEMALE, ESTROGEN RECEPTOR POSITIVE (HCC): Primary | ICD-10-CM

## 2024-07-31 DIAGNOSIS — C43.62 MALIGNANT MELANOMA OF ARM, LEFT (HCC): Primary | ICD-10-CM

## 2024-07-31 DIAGNOSIS — Z17.0 MALIGNANT NEOPLASM OF OVERLAPPING SITES OF RIGHT BREAST IN FEMALE, ESTROGEN RECEPTOR POSITIVE (HCC): Primary | ICD-10-CM

## 2024-07-31 LAB
ALBUMIN SERPL-MCNC: 4.2 G/DL (ref 3.2–4.8)
ALBUMIN/GLOB SERPL: 1.3 {RATIO} (ref 1–2)
ALP LIVER SERPL-CCNC: 53 U/L
ALT SERPL-CCNC: 12 U/L
ANION GAP SERPL CALC-SCNC: 5 MMOL/L (ref 0–18)
AST SERPL-CCNC: 22 U/L (ref ?–34)
BASOPHILS # BLD AUTO: 0.05 X10(3) UL (ref 0–0.2)
BASOPHILS NFR BLD AUTO: 0.5 %
BILIRUB SERPL-MCNC: 0.3 MG/DL (ref 0.2–1.1)
BUN BLD-MCNC: 9 MG/DL (ref 9–23)
CALCIUM BLD-MCNC: 8.8 MG/DL (ref 8.7–10.4)
CHLORIDE SERPL-SCNC: 108 MMOL/L (ref 98–112)
CO2 SERPL-SCNC: 24 MMOL/L (ref 21–32)
CREAT BLD-MCNC: 0.88 MG/DL
EGFRCR SERPLBLD CKD-EPI 2021: 68 ML/MIN/1.73M2 (ref 60–?)
EOSINOPHIL # BLD AUTO: 0.02 X10(3) UL (ref 0–0.7)
EOSINOPHIL NFR BLD AUTO: 0.2 %
ERYTHROCYTE [DISTWIDTH] IN BLOOD BY AUTOMATED COUNT: 16.5 %
GLOBULIN PLAS-MCNC: 3.3 G/DL (ref 2–3.5)
GLUCOSE BLD-MCNC: 107 MG/DL (ref 70–99)
HCT VFR BLD AUTO: 38.4 %
HGB BLD-MCNC: 12.2 G/DL
IMM GRANULOCYTES # BLD AUTO: 0.14 X10(3) UL (ref 0–1)
IMM GRANULOCYTES NFR BLD: 1.4 %
LYMPHOCYTES # BLD AUTO: 0.98 X10(3) UL (ref 1–4)
LYMPHOCYTES NFR BLD AUTO: 9.5 %
MCH RBC QN AUTO: 27.7 PG (ref 26–34)
MCHC RBC AUTO-ENTMCNC: 31.8 G/DL (ref 31–37)
MCV RBC AUTO: 87.1 FL
MONOCYTES # BLD AUTO: 0.56 X10(3) UL (ref 0.1–1)
MONOCYTES NFR BLD AUTO: 5.4 %
NEUTROPHILS # BLD AUTO: 8.6 X10 (3) UL (ref 1.5–7.7)
NEUTROPHILS # BLD AUTO: 8.6 X10(3) UL (ref 1.5–7.7)
NEUTROPHILS NFR BLD AUTO: 83 %
OSMOLALITY SERPL CALC.SUM OF ELEC: 283 MOSM/KG (ref 275–295)
PLATELET # BLD AUTO: 301 10(3)UL (ref 150–450)
POTASSIUM SERPL-SCNC: 4.2 MMOL/L (ref 3.5–5.1)
PROT SERPL-MCNC: 7.5 G/DL (ref 5.7–8.2)
RBC # BLD AUTO: 4.41 X10(6)UL
SODIUM SERPL-SCNC: 137 MMOL/L (ref 136–145)
T4 FREE SERPL-MCNC: 1.7 NG/DL (ref 0.8–1.7)
TSI SER-ACNC: 1.94 MIU/ML (ref 0.55–4.78)
WBC # BLD AUTO: 10.4 X10(3) UL (ref 4–11)

## 2024-07-31 PROCEDURE — 99214 OFFICE O/P EST MOD 30 MIN: CPT | Performed by: INTERNAL MEDICINE

## 2024-07-31 PROCEDURE — 80053 COMPREHEN METABOLIC PANEL: CPT

## 2024-07-31 PROCEDURE — 96413 CHEMO IV INFUSION 1 HR: CPT

## 2024-07-31 PROCEDURE — 84439 ASSAY OF FREE THYROXINE: CPT

## 2024-07-31 PROCEDURE — 85025 COMPLETE CBC W/AUTO DIFF WBC: CPT

## 2024-07-31 PROCEDURE — 84443 ASSAY THYROID STIM HORMONE: CPT

## 2024-07-31 NOTE — PROGRESS NOTES
Patient here for C9d1 Keytruda. Patient taking anastrozole as directed with no complaints. Patient states her bilateral knee pain as resolved. Patient taking prednisone 20 mg daily. Patient denies n/v/d or pain. Patient has no further concerns or complaints at this time.     Education Record    Learner:  Patient and Spouse    Disease / Diagnosis: malignant neoplasm of breast    Barriers / Limitations:  None   Comments:    Method:  Discussion   Comments:    General Topics:  Medication, Pain, and Side effects and symptom management   Comments:    Outcome:  Shows understanding   Comments:

## 2024-07-31 NOTE — PROGRESS NOTES
Pt here for C9D1 Drug(s)keytruda.  Arrives Ambulating independently, accompanied by Self     Patient was evaluated today by MD.    Oral medications included in this regimen:  no    Patient confirms comprehension of cancer treatment schedule:  yes    Pregnancy screening:  Not applicable    Modifications in dose or schedule:  No    Medications appearance and physical integrity checked by RN: yes.    Chemotherapy IV pump settings verified by 2 RNs:  No due to targeted therapy IV administration.  Frequency of blood return and site check throughout administration: Prior to administration     Infusion/treatment outcome:  patient tolerated treatment without incident    Education Record    Learner:  Patient  Barriers / Limitations:  None  Method:  Discussion and Printed material  Education / instructions given:  avs  Outcome:  Shows understanding    Discharged Home, Ambulating independently, accompanied by:Spouse    Patient/family verbalized understanding of future appointments: by printed AVS    Here for labs, MD, and treatment. Had knee pain requiring steroids earlier during treatment, but resolved since. No complaints today. Tolerated infusion. Next cycle scheduled in 3wks

## 2024-07-31 NOTE — PROGRESS NOTES
Cancer Center Progress Note    Problem List:      Patient Active Problem List   Diagnosis    Coronary atherosclerosis    Anxiety state    Pure hypercholesterolemia    Hypertension    Pulmonary fibrosis (HCC)    Abdominal aortic ectasia (HCC)    Reactive depression    Benign essential tremor    Exudative age-related macular degeneration of left eye with active choroidal neovascularization (HCC)    Invasive lobular carcinoma of breast, stage 2, right (HCC)    Osteopenia, senile    Malignant melanoma of arm, left (HCC)    Age-related osteoporosis without current pathological fracture       Interim History:    Dillan Casillas presents today for evaluation and management of a diagnosis of right breast cancer.    She has had a 8th cycle of pembrolizumab. She had complained of chronic bilateral knee pain. She was started on prednisone 20 mg PO daily last visit. She now has had resolution of the pain. She continues to take pred 20 daily now.     She has no fever or sweats. She has no dyspnea. She has no rash or pruritus. 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 She is on levothyroxine 75 mcg daily. She is following with endocrine.    She has a prior h/o right breast cancer. She has been on anastrozole. She now presents with a left upper arm lesion. She was evaluated by Dr. Pierce Uribe. He did an excision biopsy on 11/13/2023 that showed invasive nodular melanoma with ulceration (tumor thickness 10 mm). She proceeded to have WLE and SLN procedure on 1/9/2024 by Dr. Zurdo Montesinos. There was no residual disease in the WLE. She had 2 of 6 nodes positive. One SLN had a 3 mm focus of metastatic melanoma with no extracapsular extension. A second SLN had a 1.5 mm of focus of metastatic melanoma with no extracapsular extension.      She had a PET scan on 12/8/2023 that was negative for metastatic disease. She had a MRI of the brain that was negative for brain metastases.     She had an acute  appendicitis on 2023 s/p appendectomy. She has had some fatigue since that surgery and the recent arm surgery.    She had bilateral mastectomy on 2022. The left breast had DCIS on her biopsy on 2021 but there was no residual cancer in the left mastectomy. The left SLN was negative. The right breast had an invasive lobular carcinoma, grade II, measuring 2.2 cm. The margins were negative. The right SLN procedure had one node with isolated tumor cells and another node that was negative. She had Oncotype Dx testing with a RS of 26.     Review of Systems:   Constitutional: Negative for anorexia, fatigue, fevers, chills, night sweats and weight loss.  Psychiatric: The patient's mood was calm and appropriate for this visit.  The pertinent positives and negatives were described. All other systems were negative.    PMH/PSH:  Past Medical History:    Acute appendicitis with localized peritonitis, without perforation, abscess, or gangrene    Anxiety    Cancer (HCC)    breast cancer- right breast    Cancer (HCC)    left arm melanoma    Depression    Ductal hyperplasia, atypical, breast    Flat epithelial atypia (FEA) of left breast    High blood pressure    not on any medication    High cholesterol    Hyperlipidemia    Macular degeneration    Osteoporosis    Personal history of colonic polyps    PONV (postoperative nausea and vomiting)    Status post bilateral mastectomy    Tremors of nervous system    Visual impairment    glasses       Past Surgical History:   Procedure Laterality Date    Angioplasty (coronary)      Appendectomy  2023    Colonoscopy      Colonoscopy & polypectomy  2003    Hysterectomy  2017    and oophorectomy    Ajit biopsy stereo nodule 1 site left (cpt=19081)  2020    ADH/FEA    Ajit localization wire 1 site left (cpt=19281)  2020    Benign (core was ADH/FEA)    Mastectomy left      Mastectomy right      Needle biopsy left      Benign          x2    Other surgical  history  10/01/2008    cardiac cath post-procedure date (mild LAD)                    Tubal ligation         Family History Reviewed:  Family History   Problem Relation Age of Onset    Other (Melanoma) Self     Other (Other) Self     Breast Cancer Self     Heart Disease Father         Heart disease    High Blood Pressure Father     Cancer Sister 64        leukemia    Cancer Sister 29        Kidney cancer    Neurological Disorder Sister     Other (als) Sister     Cancer Paternal Uncle 70        breast    High Cholesterol Other         siblings       Allergies:     No Known Allergies    Medications:   PRIMIDONE 50 MG Oral Tab TAKE 1 TABLET (50 MG TOTAL) BY MOUTH IN THE MORNING AND 1 TABLET (50 MG TOTAL) BEFORE BEDTIME 180 tablet 1    predniSONE 10 MG Oral Tab Take 2 tablets (20 mg total) by mouth daily. 60 tablet 1    ATORVASTATIN 10 MG Oral Tab TAKE 1 TABLET BY MOUTH EVERY DAY AT NIGHT 90 tablet 0    levothyroxine 75 MCG Oral Tab Take 1 tablet (75 mcg total) by mouth before breakfast.      escitalopram 10 MG Oral Tab Take 1 tablet (10 mg total) by mouth daily. 90 tablet 1    aspirin 81 MG Oral Chew Tab Chew 1 tablet (81 mg total) by mouth daily.      anastrozole 1 MG Oral Tab tab Take 1 tablet (1 mg total) by mouth daily. 90 tablet 3    Multiple Vitamin Oral Tab Take 1 tablet by mouth daily.      Acidophilus/Pectin Oral Cap Take 1 capsule by mouth daily.           Vital Signs:      Height: 157.5 cm (5' 2.01\") (07/31 1003)  Weight: 54.8 kg (120 lb 12.8 oz) (07/31 1003)  BSA (Calculated - sq m): 1.54 sq meters (07/31 1003)  Pulse: 82 (07/31 1003)  BP: 181/94 (07/31 1003)  Temp: 97.5 °F (36.4 °C) (07/31 1003)  Do Not Use - Resp Rate: --  SpO2: 99 % (07/31 1003)      Performance Status:  ECOG 0: Fully active, able to carry on all pre-disease performance without restriction     Physical Examination:    Constitutional: Patient is alert and oriented x 3, not in acute distress.  Respiratory: Clear to auscultation and  percussion. No rales.  No wheezes.  Cardiovascular: Regular rate and rhythm. No murmurs.  Gastrointestinal: Soft, non tender with good bowel sounds.  Musculoskeletal: No edema. No calf tenderness.  Psychiatric: The patient's mood is calm and appropriate for this visit.       Labs reviewed at this visit:     Lab Results   Component Value Date    WBC 10.4 07/31/2024    RBC 4.41 07/31/2024    HGB 12.2 07/31/2024    HCT 38.4 07/31/2024    MCV 87.1 07/31/2024    MCH 27.7 07/31/2024    MCHC 31.8 07/31/2024    RDW 16.5 07/31/2024    .0 07/31/2024     Lab Results   Component Value Date     07/31/2024    K 4.2 07/31/2024     07/31/2024    CO2 24.0 07/31/2024    BUN 9 07/31/2024    CREATSERUM 0.88 07/31/2024     (H) 07/31/2024    CA 8.8 07/31/2024    ALKPHO 53 (L) 07/31/2024    ALT 12 07/31/2024    AST 22 07/31/2024    BILT 0.3 07/31/2024    ALB 4.2 07/31/2024    TP 7.5 07/31/2024         Component  Ref Range & Units 5/8/24 1107   Rheumatoid Factor  <15 IU/mL <10            Component  Ref Range & Units 5/8/24 0938   Sed Rate  0 - 30 mm/Hr 48 High             Component  Ref Range & Units 5/8/24 0938   C-Reactive Protein  <0.30 mg/dL 1.26 High         Component  Ref Range & Units 5/8/24 0938   Free T4  0.8 - 1.7 ng/dL 0.6 Low          Component  Ref Range & Units 5/8/24 0938   TSH  0.358 - 3.740 mIU/mL 18.800 High           Radiologic imaging reviewed at this visit:    Bone Dexa Scan on 5/5/2022:  LUMBAR SPINE ANALYSIS RESULTS:       Bone mineral density (BMD) (g/cm2):  0.487     Lumbar T-Score:  -5.1       % young normals:  46       % age matched controls:  61       Change from prior spine examination:  n/a                TOTAL HIP ANALYSIS RESULTS:         Bone mineral density (BMD) (g/cm2):  0.446       Total Hip T-Score:  -4.1       % young normals:  47       % age matched controls:  61       Change from prior hip examination:  n/a                FEMORAL NECK ANALYSIS RESULTS:         Bone mineral  density (BMD) (g/cm2):  0.388       Femoral neck T-Score:  -4.2       % young normals:  46       % age matched controls:  62       Change from prior hip examination:  n/a           ADDITIONAL FINDINGS:  No significant additional findings.        Impression   CONCLUSION:  Bone mineral density values are compatible with the diagnosis of osteoporosis by WHO definition (T score less than -2.5). If therapy is initiated, a follow-up study in 1 year may aid in evaluation of therapeutic efficacy.        MRI of breast on 12/30/2021:  DESCRIPTION:  Witnessed verbal and written informed consent was obtained.  Time out was performed by the staff.  Discussed benefits and risks of MR guided core needle biopsy including, but not limited to, bleeding, infection, and/or failure to obtain   adequate sample. The patient was placed prone on the MRI Scanner.  The left breast was positioned in a dedicated breast coil and MR guidance grid device.   A fiducial was placed on the grid.       The skin was prepped and after local anesthesia was achieved, an introducer sheath and a localizing obturator were placed using a lateral approach at both sites.  The location of the obturator sheaths was confirmed with imaging.       The patient was then taken out of the bore of the magnet and samples were obtained using an MR compatible vacuum assisted core biopsy device at both sites.  A regular size needle was used for site 1 and a petite sized needle was used for site 2. Six   samples were taken at site 1, and 12 samples were taken at site 2. A tissue marker was placed at both sites.  Post biopsy images were obtained which confirm pot biopsy changes at the targets of the biopsy.         Post procedural mammogram performed on separate digital equipment show the clips in appropriate position.       The patient tolerated the procedure well and left the department in stable condition.       BIOPSY NEEDLE:            9-gauge Naked biopsy device    MEDICATION:               1% lidocaine and 1% lidocaine containing epinephrine     SITE MARKER COORDINATES:     SITE 1:  Non-mass enhancement 7 o'clock  left breast   SITE 2:  0800 hours left breast   CLIP TYPE:                SITE 1:  M  SITE 2:  X   PHYSICIANS PRESENT:       Jaquelin Cole M.D.         PATHOLOGY:   A.  Left breast, 7:00, MRI guided biopsy:   -Breast tissue with nodular adenosis, stromal fibrosis, secretory change, apocrine metaplasia and microcalcifications.   -See comment.       B.  Left breast, 8:00, MRI guided biopsy:   -Ductal carcinoma in situ, nuclear grade 2, solid and cribriform types.   -Largest focus of DCIS measures 5 mm (0.5 cm) in greatest dimension.   -Background fibroadenomatous change, apocrine metaplasia, sclerosing adenosis and microcalcifications.   -See comment.      Impression   CONCLUSION:     MRI-guided biopsy of two areas in the left breast.       Pathology from the 1st site at the 7 o'clock position is reported as benign.  This is likely concordant with the imaging assessment and is marked with an M clip.       Pathology from the 2nd site at the 8 o'clock position shows the presence of DCIS.  This is concordant with the imaging assessment and is marked with an X clip.       Surgical consultation is recommended for the bilateral biopsy proven malignancies.  Per the patient's electronic medical record, she is planning on a bilateral mastectomy.         Assessment/Plan:     Malignant melanoma of the left upper arm:  Nodular melanoma 10 mm thickness  2 of 6 left axillary nodes positive  Microscopic metastases  No extranodal extension  BRAF negative     The patient has a pT4b,pN2a (Stage IIIc) melanoma of the left upper arm and axilla. She has had definitive surgery. She has BRAF negative disease. She has good performance status (PS 0).    She will continue with the pembrolizumab every three weeks.    She has had pain in the bilateral knee that is consistent with an  inflammatory arthritis related to the pembrolizumab. This is improved on the prednisone 20 mg daily. I will have her decrease this to 10 mg daily. She will contact me if the pain increases. She has no other new signs of symptoms of immune toxicity. I will see her in three weeks.    Invasive lobular carcinoma of the right breast 12 o'clock (overlapping quadrant):  Biopsy on 11/17/2021  ER 90%  [place] <1%  K--67 5%  Her2 1+  Bilateral mastectomy on 1/21/2022:  Right Breast with Invasive lobular carcinoma  Measuring 2.2 cm  SLN 0/2 (one with ITC)  Negative margins  Left breast with no residual DCIS  0.5 cm DCIS biopsy on 12/30/2021  ER/NY positive  Right breast Oncotype Dx RS 26     Continue anastrozole. She does have severe osteoporosis. She continues to follow with endocrine. She has OPAL on the AI. She has no side effects from the AI. She is comfortable with this plan.     Severe Osteoporosis:     She is following with endocrinology. She has had dental work with teeth extractions. She has been cleared by dental. She had Reclast on 6/20/2024. She will get this yearly.     Hypertension secondary to anxiety about treatment:    She has white coat HTN. Her home bp measurements are normal.    Hypothyroid:    TSH is normal. Continue current dosing of levothyroxine.    Ben Garsia MD

## 2024-08-15 RX ORDER — LEVOTHYROXINE SODIUM 50 UG/1
50 TABLET ORAL
Qty: 90 TABLET | Refills: 1 | OUTPATIENT
Start: 2024-08-15

## 2024-08-21 ENCOUNTER — OFFICE VISIT (OUTPATIENT)
Dept: HEMATOLOGY/ONCOLOGY | Facility: HOSPITAL | Age: 74
End: 2024-08-21
Attending: INTERNAL MEDICINE
Payer: MEDICARE

## 2024-08-21 VITALS
SYSTOLIC BLOOD PRESSURE: 179 MMHG | WEIGHT: 123 LBS | HEART RATE: 81 BPM | OXYGEN SATURATION: 95 % | HEIGHT: 62.01 IN | DIASTOLIC BLOOD PRESSURE: 90 MMHG | TEMPERATURE: 98 F | BODY MASS INDEX: 22.35 KG/M2 | RESPIRATION RATE: 16 BRPM

## 2024-08-21 DIAGNOSIS — C43.62 MALIGNANT MELANOMA OF ARM, LEFT (HCC): Primary | ICD-10-CM

## 2024-08-21 DIAGNOSIS — M19.90 INFLAMMATORY ARTHRITIS: ICD-10-CM

## 2024-08-21 LAB
ALBUMIN SERPL-MCNC: 4.3 G/DL (ref 3.2–4.8)
ALBUMIN/GLOB SERPL: 1.3 {RATIO} (ref 1–2)
ALP LIVER SERPL-CCNC: 50 U/L
ALT SERPL-CCNC: 9 U/L
ANION GAP SERPL CALC-SCNC: 4 MMOL/L (ref 0–18)
AST SERPL-CCNC: 22 U/L (ref ?–34)
BASOPHILS # BLD AUTO: 0.06 X10(3) UL (ref 0–0.2)
BASOPHILS NFR BLD AUTO: 0.6 %
BILIRUB SERPL-MCNC: 0.3 MG/DL (ref 0.2–1.1)
BUN BLD-MCNC: 9 MG/DL (ref 9–23)
CALCIUM BLD-MCNC: 9.5 MG/DL (ref 8.7–10.4)
CHLORIDE SERPL-SCNC: 108 MMOL/L (ref 98–112)
CO2 SERPL-SCNC: 28 MMOL/L (ref 21–32)
CREAT BLD-MCNC: 0.86 MG/DL
EGFRCR SERPLBLD CKD-EPI 2021: 70 ML/MIN/1.73M2 (ref 60–?)
EOSINOPHIL # BLD AUTO: 0.03 X10(3) UL (ref 0–0.7)
EOSINOPHIL NFR BLD AUTO: 0.3 %
ERYTHROCYTE [DISTWIDTH] IN BLOOD BY AUTOMATED COUNT: 16 %
GLOBULIN PLAS-MCNC: 3.3 G/DL (ref 2–3.5)
GLUCOSE BLD-MCNC: 116 MG/DL (ref 70–99)
HCT VFR BLD AUTO: 40 %
HGB BLD-MCNC: 12.4 G/DL
IMM GRANULOCYTES # BLD AUTO: 0.13 X10(3) UL (ref 0–1)
IMM GRANULOCYTES NFR BLD: 1.3 %
LYMPHOCYTES # BLD AUTO: 0.83 X10(3) UL (ref 1–4)
LYMPHOCYTES NFR BLD AUTO: 8.2 %
MCH RBC QN AUTO: 27.6 PG (ref 26–34)
MCHC RBC AUTO-ENTMCNC: 31 G/DL (ref 31–37)
MCV RBC AUTO: 89.1 FL
MONOCYTES # BLD AUTO: 0.66 X10(3) UL (ref 0.1–1)
MONOCYTES NFR BLD AUTO: 6.5 %
NEUTROPHILS # BLD AUTO: 8.47 X10 (3) UL (ref 1.5–7.7)
NEUTROPHILS # BLD AUTO: 8.47 X10(3) UL (ref 1.5–7.7)
NEUTROPHILS NFR BLD AUTO: 83.1 %
OSMOLALITY SERPL CALC.SUM OF ELEC: 290 MOSM/KG (ref 275–295)
PLATELET # BLD AUTO: 285 10(3)UL (ref 150–450)
POTASSIUM SERPL-SCNC: 5 MMOL/L (ref 3.5–5.1)
PROT SERPL-MCNC: 7.6 G/DL (ref 5.7–8.2)
RBC # BLD AUTO: 4.49 X10(6)UL
SODIUM SERPL-SCNC: 140 MMOL/L (ref 136–145)
WBC # BLD AUTO: 10.2 X10(3) UL (ref 4–11)

## 2024-08-21 PROCEDURE — 85025 COMPLETE CBC W/AUTO DIFF WBC: CPT

## 2024-08-21 PROCEDURE — 99215 OFFICE O/P EST HI 40 MIN: CPT | Performed by: CLINICAL NURSE SPECIALIST

## 2024-08-21 PROCEDURE — 36593 DECLOT VASCULAR DEVICE: CPT

## 2024-08-21 PROCEDURE — 96413 CHEMO IV INFUSION 1 HR: CPT

## 2024-08-21 PROCEDURE — 80053 COMPREHEN METABOLIC PANEL: CPT

## 2024-08-21 NOTE — PROGRESS NOTES
Chief Complaint   Patient presents with    Follow - Up    Immunotherapy     Pt is here for treatment - C10 D1 keytruda is expected. She reports she is doing well. Eating and drinking without issue; denies N,V,D,C. No itching or rash; sleeping well. Bilateral knee pain and swelling improving on steroids.    Education Record    Learner:  Patient and Spouse    Disease / Diagnosis: breast cancer/melanoma    Barriers / Limitations:  None   Comments:    Method:  Brief focused   Comments:    General Topics:  Diet, Medication, Pain, Side effects and symptom management, and Plan of care reviewed   Comments:    Outcome:  Shows understanding   Comments:

## 2024-08-21 NOTE — PROGRESS NOTES
Cancer Center Progress Note    Patient Name: Dillan Casillas   YOB: 1950   Medical Record Number: [id number]   CSN: [id number]   Date of visit: 8/21/2024  Attending Oncology Physician:  Ben Garsia    NOTE TO PATIENT:  The 21st Century Cures Act makes medical notes like these available to patients in the interest of transparency. Please be advised this is a medical document. Medical documents are intended to carry relevant information, facts as evident, and the clinical opinion of the practitioner. The medical note is intended as peer to peer communication and may appear blunt or direct. It is written in medical language and may contain abbreviations or verbiage that are unfamiliar.      Chief Complaint:  Follow up       Oncologic History:  Breast cancer:  She had bilateral mastectomy on 1/21/2022. The left breast had DCIS on her biopsy on 12/30/2021 but there was no residual cancer in the left mastectomy. The left SLN was negative. The right breast had an invasive lobular carcinoma, grade II, measuring 2.2 cm. The margins were negative. The right SLN procedure had one node with isolated tumor cells and another node that was negative. She had Oncotype Dx testing with a RS of 26.     She had a PET scan on 12/8/2023 that was negative for metastatic disease. She had a MRI of the brain that was negative for brain metastases.     Melanoma:  Left upper arm lesion. She was evaluated by Dr. Pierce Uribe. He did an excision biopsy on 11/13/2023 that showed invasive nodular melanoma with ulceration (tumor thickness 10 mm). She proceeded to have WLE and SLN procedure on 1/9/2024 by Dr. Zurdo Montesinos. There was no residual disease in the WLE. She had 2 of 6 nodes positive. One SLN had a 3 mm focus of metastatic melanoma with no extracapsular extension. A second SLN had a 1.5 mm of focus of metastatic melanoma with no extracapsular extension.   BRAF negative    On 1/30/24 started Pembrolizumab    Interval  Events:  73 year old  patient of Dr. Max  who returns for pre-treatment evaluation on Pembrolizumab.  She developed immune mediated arthritis for which she had been on Prednisone.  This was reduced to 10mg 3 weeks ago.  No cough, SOB, CP.  No diarrhea, abdominal pain, melena, or hematochezia.  No rash or pruritus.  No joint or muscle pain.  Energy is good.  No mouth sores.      Allergies:  No Known Allergies    Social History     Socioeconomic History    Marital status:     Number of children: 2   Tobacco Use    Smoking status: Former     Current packs/day: 0.00     Average packs/day: 1 pack/day for 40.0 years (40.0 ttl pk-yrs)     Types: Cigarettes     Start date: 1973     Quit date: 2013     Years since quittin.3    Smokeless tobacco: Never    Tobacco comments:     PPD   Vaping Use    Vaping status: Never Used   Substance and Sexual Activity    Alcohol use: No    Drug use: No    Sexual activity: Yes     Partners: Male   Other Topics Concern    Caffeine Concern No    Stress Concern Yes    Weight Concern No    Special Diet No    Exercise Yes    Seat Belt Yes        Past Medical History:    Acute appendicitis with localized peritonitis, without perforation, abscess, or gangrene    Anxiety    Cancer (HCC)    breast cancer- right breast    Cancer (HCC)    left arm melanoma    Depression    Ductal hyperplasia, atypical, breast    Flat epithelial atypia (FEA) of left breast    High blood pressure    not on any medication    High cholesterol    Hyperlipidemia    Macular degeneration    Osteoporosis    Personal history of colonic polyps    PONV (postoperative nausea and vomiting)    Status post bilateral mastectomy    Tremors of nervous system    Visual impairment    glasses        Past Surgical History:   Procedure Laterality Date    Angioplasty (coronary)      Appendectomy  2023    Colonoscopy      Colonoscopy & polypectomy  2003    Hysterectomy  2017    and oophorectomy    Ajit biopsy  stereo nodule 1 site left (cpt=19081)  2020    ADH/FEA    Ajit localization wire 1 site left (cpt=19281)  2020    Benign (core was ADH/FEA)    Mastectomy left      Mastectomy right      Needle biopsy left  1991    Benign          x2    Other surgical history  10/01/2008    cardiac cath post-procedure date (mild LAD)                    Tubal ligation            Vital Signs:  Height: 157.5 cm (5' 2.01\") (955)  Weight: 55.8 kg (123 lb) (955)  BSA (Calculated - sq m): 1.55 sq meters (955)  Pulse: 81 (955)  BP: 179/90 (955)  Temp: 98.2 °F (36.8 °C) (955)  Do Not Use - Resp Rate: --  SpO2: 95 % (955)        Medications:    Current Outpatient Medications:     PRIMIDONE 50 MG Oral Tab, TAKE 1 TABLET (50 MG TOTAL) BY MOUTH IN THE MORNING AND 1 TABLET (50 MG TOTAL) BEFORE BEDTIME, Disp: 180 tablet, Rfl: 1    predniSONE 10 MG Oral Tab, Take 2 tablets (20 mg total) by mouth daily., Disp: 60 tablet, Rfl: 1    ATORVASTATIN 10 MG Oral Tab, TAKE 1 TABLET BY MOUTH EVERY DAY AT NIGHT, Disp: 90 tablet, Rfl: 0    levothyroxine 75 MCG Oral Tab, Take 1 tablet (75 mcg total) by mouth before breakfast., Disp: , Rfl:     escitalopram 10 MG Oral Tab, Take 1 tablet (10 mg total) by mouth daily., Disp: 90 tablet, Rfl: 1    aspirin 81 MG Oral Chew Tab, Chew 1 tablet (81 mg total) by mouth daily., Disp: , Rfl:     anastrozole 1 MG Oral Tab tab, Take 1 tablet (1 mg total) by mouth daily., Disp: 90 tablet, Rfl: 3    Multiple Vitamin Oral Tab, Take 1 tablet by mouth daily., Disp: , Rfl:     Acidophilus/Pectin Oral Cap, Take 1 capsule by mouth daily., Disp: , Rfl:     Review of Systems:   As in HPI    Physical Examination:  General: Awake, alert, oriented x3, no acute distress.    HEENT:  Anicteric, conjunctivae and sclerae clear, no sinus tenderness, no oropharyngeal lesion/thrush, mucous membranes are moist.  Neck:  Supple, no tenderness, no masses or adenopathy  Lungs:  Clear to  auscultation bilaterally  CV:  Regular rate and rhythm  Abdomen:  Non-distended, normoactive bowel sounds, soft,nontender, no hepatosplenomegaly.  Extremities:  No edema, no tenderness  Neuro:  CN 2-12 intact    ECO    Labs:  Recent Results (from the past 24 hour(s))   CBC W Differential W Platelet    Collection Time: 24  9:48 AM   Result Value Ref Range    WBC 10.2 4.0 - 11.0 x10(3) uL    RBC 4.49 3.80 - 5.30 x10(6)uL    HGB 12.4 12.0 - 16.0 g/dL    HCT 40.0 35.0 - 48.0 %    .0 150.0 - 450.0 10(3)uL    MCV 89.1 80.0 - 100.0 fL    MCH 27.6 26.0 - 34.0 pg    MCHC 31.0 31.0 - 37.0 g/dL    RDW 16.0 %    Neutrophil Absolute Prelim 8.47 (H) 1.50 - 7.70 x10 (3) uL    Neutrophil Absolute 8.47 (H) 1.50 - 7.70 x10(3) uL    Lymphocyte Absolute 0.83 (L) 1.00 - 4.00 x10(3) uL    Monocyte Absolute 0.66 0.10 - 1.00 x10(3) uL    Eosinophil Absolute 0.03 0.00 - 0.70 x10(3) uL    Basophil Absolute 0.06 0.00 - 0.20 x10(3) uL    Immature Granulocyte Absolute 0.13 0.00 - 1.00 x10(3) uL    Neutrophil % 83.1 %    Lymphocyte % 8.2 %    Monocyte % 6.5 %    Eosinophil % 0.3 %    Basophil % 0.6 %    Immature Granulocyte % 1.3 %   Comp Metabolic Panel (14)    Collection Time: 24  9:48 AM   Result Value Ref Range    Glucose 116 (H) 70 - 99 mg/dL    Sodium 140 136 - 145 mmol/L    Potassium 5.0 3.5 - 5.1 mmol/L    Chloride 108 98 - 112 mmol/L    CO2 28.0 21.0 - 32.0 mmol/L    Anion Gap 4 0 - 18 mmol/L    BUN 9 9 - 23 mg/dL    Creatinine 0.86 0.55 - 1.02 mg/dL    Calcium, Total 9.5 8.7 - 10.4 mg/dL    Calculated Osmolality 290 275 - 295 mOsm/kg    eGFR-Cr 70 >=60 mL/min/1.73m2    AST 22 <34 U/L    ALT 9 (L) 10 - 49 U/L    Alkaline Phosphatase 50 (L) 55 - 142 U/L    Bilirubin, Total 0.3 0.2 - 1.1 mg/dL    Total Protein 7.6 5.7 - 8.2 g/dL    Albumin 4.3 3.2 - 4.8 g/dL    Globulin  3.3 2.0 - 3.5 g/dL    A/G Ratio 1.3 1.0 - 2.0    Patient Fasting for CMP? Patient not present        Impression/Plan:    Melanoma, BRAF  negative:  Proceed with C10 Pembrolizumab      Immune mediated arthritis:  Prednisone 10mg is maintaining her comfort, will continue this.    Emotional Well Being:  I have assessed the patient's emotional well-being and any concerns about anxiety or depression.  No acute psychosocial intervention required at this time.    Risk level:  High-melanoma on chemotherapy, requiring intervention and close monitoring.    ARLIN Rolle  [facility]

## 2024-08-21 NOTE — PROGRESS NOTES
Pt here for C10D1 Drug(s) Keytruda.  Arrives Ambulating independently, accompanied by Spouse     Patient was evaluated today by MAGALY and Treatment Nurse.    Oral medications included in this regimen:  no    Patient confirms comprehension of cancer treatment schedule:  yes    Pregnancy screening:  Denies possibility of pregnancy    Modifications in dose or schedule:  No    Medications appearance and physical integrity checked by RN: yes.    Chemotherapy IV pump settings verified by 2 RNs:  No due to targeted therapy IV administration.  Frequency of blood return and site check throughout administration: Prior to administration and At completion of therapy     Infusion/treatment outcome:  patient tolerated treatment without incident    Education Record    Learner:  Patient  Barriers / Limitations:  None  Method:  Brief focused and Reinforcement  Education / instructions given:  POC  Outcome:  Shows understanding    Discharged Home, Ambulating independently, accompanied by:Spouse    Patient/family verbalized understanding of future appointments: by printed AVS

## 2024-08-30 ENCOUNTER — HOSPITAL ENCOUNTER (OUTPATIENT)
Dept: MAMMOGRAPHY | Facility: HOSPITAL | Age: 74
Discharge: HOME | End: 2024-08-30
Payer: MEDICARE

## 2024-08-30 DIAGNOSIS — C43.62 MALIGNANT MELANOMA OF ARM, LEFT (HCC): ICD-10-CM

## 2024-08-30 DIAGNOSIS — C76.42: ICD-10-CM

## 2024-08-30 PROCEDURE — 76882 US LMTD JT/FCL EVL NVASC XTR: CPT

## 2024-09-01 DIAGNOSIS — F41.1 GENERALIZED ANXIETY DISORDER: ICD-10-CM

## 2024-09-03 RX ORDER — LEVOTHYROXINE SODIUM 50 UG/1
50 TABLET ORAL
Qty: 90 TABLET | Refills: 1 | OUTPATIENT
Start: 2024-09-03

## 2024-09-03 RX ORDER — PREDNISONE 10 MG/1
20 TABLET ORAL DAILY
Qty: 60 TABLET | Refills: 1 | Status: SHIPPED | OUTPATIENT
Start: 2024-09-03 | End: 2024-10-02

## 2024-09-03 RX ORDER — ESCITALOPRAM OXALATE 10 MG/1
10 TABLET ORAL DAILY
Qty: 90 TABLET | Refills: 1 | OUTPATIENT
Start: 2024-09-03

## 2024-09-03 NOTE — TELEPHONE ENCOUNTER
Requested Prescriptions     Pending Prescriptions Disp Refills    ESCITALOPRAM 10 MG Oral Tab [Pharmacy Med Name: ESCITALOPRAM 10 MG TABLET] 90 tablet 1     Sig: TAKE 1 TABLET BY MOUTH EVERY DAY     Last refill 4/25/24 #90 x 1  Refill requested too soon

## 2024-09-04 ENCOUNTER — TELEPHONE (OUTPATIENT)
Dept: SURGERY | Facility: CLINIC | Age: 74
End: 2024-09-04

## 2024-09-11 ENCOUNTER — OFFICE VISIT (OUTPATIENT)
Dept: SURGERY | Facility: CLINIC | Age: 74
End: 2024-09-11
Payer: MEDICARE

## 2024-09-11 ENCOUNTER — OFFICE VISIT (OUTPATIENT)
Dept: HEMATOLOGY/ONCOLOGY | Facility: HOSPITAL | Age: 74
End: 2024-09-11
Attending: INTERNAL MEDICINE
Payer: MEDICARE

## 2024-09-11 VITALS
HEART RATE: 78 BPM | HEIGHT: 62.01 IN | RESPIRATION RATE: 16 BRPM | TEMPERATURE: 98 F | SYSTOLIC BLOOD PRESSURE: 176 MMHG | DIASTOLIC BLOOD PRESSURE: 89 MMHG | OXYGEN SATURATION: 93 % | BODY MASS INDEX: 22.35 KG/M2 | WEIGHT: 123 LBS

## 2024-09-11 VITALS
SYSTOLIC BLOOD PRESSURE: 206 MMHG | TEMPERATURE: 97 F | BODY MASS INDEX: 23 KG/M2 | HEART RATE: 74 BPM | DIASTOLIC BLOOD PRESSURE: 86 MMHG | WEIGHT: 125 LBS | RESPIRATION RATE: 20 BRPM | OXYGEN SATURATION: 97 %

## 2024-09-11 DIAGNOSIS — M19.90 INFLAMMATORY ARTHRITIS: ICD-10-CM

## 2024-09-11 DIAGNOSIS — C43.9 METASTATIC MELANOMA (HCC): Primary | ICD-10-CM

## 2024-09-11 DIAGNOSIS — E03.2 HYPOTHYROIDISM DUE TO MEDICATION: ICD-10-CM

## 2024-09-11 DIAGNOSIS — C43.62 MALIGNANT MELANOMA OF ARM, LEFT (HCC): Primary | ICD-10-CM

## 2024-09-11 DIAGNOSIS — C76.42: ICD-10-CM

## 2024-09-11 DIAGNOSIS — C50.811 MALIGNANT NEOPLASM OF OVERLAPPING SITES OF RIGHT BREAST IN FEMALE, ESTROGEN RECEPTOR POSITIVE (HCC): ICD-10-CM

## 2024-09-11 DIAGNOSIS — Z17.0 MALIGNANT NEOPLASM OF OVERLAPPING SITES OF RIGHT BREAST IN FEMALE, ESTROGEN RECEPTOR POSITIVE (HCC): ICD-10-CM

## 2024-09-11 DIAGNOSIS — C77.9 METASTATIC MELANOMA TO LYMPH NODE (HCC): ICD-10-CM

## 2024-09-11 LAB
ALBUMIN SERPL-MCNC: 4.3 G/DL (ref 3.2–4.8)
ALBUMIN/GLOB SERPL: 1.3 {RATIO} (ref 1–2)
ALP LIVER SERPL-CCNC: 51 U/L
ALT SERPL-CCNC: 9 U/L
ANION GAP SERPL CALC-SCNC: 8 MMOL/L (ref 0–18)
AST SERPL-CCNC: 19 U/L (ref ?–34)
BASOPHILS # BLD AUTO: 0.07 X10(3) UL (ref 0–0.2)
BASOPHILS NFR BLD AUTO: 0.8 %
BILIRUB SERPL-MCNC: 0.4 MG/DL (ref 0.2–1.1)
BUN BLD-MCNC: 8 MG/DL (ref 9–23)
CALCIUM BLD-MCNC: 9.6 MG/DL (ref 8.7–10.4)
CHLORIDE SERPL-SCNC: 107 MMOL/L (ref 98–112)
CO2 SERPL-SCNC: 26 MMOL/L (ref 21–32)
CREAT BLD-MCNC: 0.91 MG/DL
EGFRCR SERPLBLD CKD-EPI 2021: 66 ML/MIN/1.73M2 (ref 60–?)
EOSINOPHIL # BLD AUTO: 0.01 X10(3) UL (ref 0–0.7)
EOSINOPHIL NFR BLD AUTO: 0.1 %
ERYTHROCYTE [DISTWIDTH] IN BLOOD BY AUTOMATED COUNT: 15.5 %
GLOBULIN PLAS-MCNC: 3.4 G/DL (ref 2–3.5)
GLUCOSE BLD-MCNC: 106 MG/DL (ref 70–99)
HCT VFR BLD AUTO: 39.3 %
HGB BLD-MCNC: 12.6 G/DL
IMM GRANULOCYTES # BLD AUTO: 0.12 X10(3) UL (ref 0–1)
IMM GRANULOCYTES NFR BLD: 1.4 %
LYMPHOCYTES # BLD AUTO: 0.74 X10(3) UL (ref 1–4)
LYMPHOCYTES NFR BLD AUTO: 8.5 %
MCH RBC QN AUTO: 27.9 PG (ref 26–34)
MCHC RBC AUTO-ENTMCNC: 32.1 G/DL (ref 31–37)
MCV RBC AUTO: 87.1 FL
MONOCYTES # BLD AUTO: 0.52 X10(3) UL (ref 0.1–1)
MONOCYTES NFR BLD AUTO: 5.9 %
NEUTROPHILS # BLD AUTO: 7.28 X10 (3) UL (ref 1.5–7.7)
NEUTROPHILS # BLD AUTO: 7.28 X10(3) UL (ref 1.5–7.7)
NEUTROPHILS NFR BLD AUTO: 83.3 %
OSMOLALITY SERPL CALC.SUM OF ELEC: 291 MOSM/KG (ref 275–295)
PLATELET # BLD AUTO: 283 10(3)UL (ref 150–450)
POTASSIUM SERPL-SCNC: 4.1 MMOL/L (ref 3.5–5.1)
PROT SERPL-MCNC: 7.7 G/DL (ref 5.7–8.2)
RBC # BLD AUTO: 4.51 X10(6)UL
SODIUM SERPL-SCNC: 141 MMOL/L (ref 136–145)
T4 FREE SERPL-MCNC: 1.7 NG/DL (ref 0.8–1.7)
TSI SER-ACNC: 2.27 MIU/ML (ref 0.55–4.78)
WBC # BLD AUTO: 8.7 X10(3) UL (ref 4–11)

## 2024-09-11 PROCEDURE — 84439 ASSAY OF FREE THYROXINE: CPT

## 2024-09-11 PROCEDURE — 80053 COMPREHEN METABOLIC PANEL: CPT

## 2024-09-11 PROCEDURE — 84443 ASSAY THYROID STIM HORMONE: CPT

## 2024-09-11 PROCEDURE — 99215 OFFICE O/P EST HI 40 MIN: CPT | Performed by: INTERNAL MEDICINE

## 2024-09-11 PROCEDURE — 99213 OFFICE O/P EST LOW 20 MIN: CPT | Performed by: SURGERY

## 2024-09-11 PROCEDURE — 96413 CHEMO IV INFUSION 1 HR: CPT

## 2024-09-11 PROCEDURE — 85025 COMPLETE CBC W/AUTO DIFF WBC: CPT

## 2024-09-11 RX ORDER — PREDNISONE 2.5 MG/1
7.5 TABLET ORAL DAILY
Qty: 90 TABLET | Refills: 3 | Status: SHIPPED | OUTPATIENT
Start: 2024-09-11

## 2024-09-11 NOTE — PROGRESS NOTES
[facility]      Patient Name:  Dillan Caslilas   YOB: 1950   Gender:  Female   Appt Date:  9/11/2024   Provider:  Zurdo Montesinos MD     PATIENT PROVIDERS  Referring Provider: Pierce Uribe DO     Primary Care Provider:Antonio Vargas MD   Address: [address] [place] [id number]   Phone #: [phone]       CHIEF COMPLAINT  Chief Complaint   Patient presents with    Follow - Up        PROBLEMS  Reviewed   Patient Active Problem List   Diagnosis    Coronary atherosclerosis    Anxiety state    Pure hypercholesterolemia    Hypertension    Pulmonary fibrosis (HCC)    Abdominal aortic ectasia (HCC)    Reactive depression    Benign essential tremor    Exudative age-related macular degeneration of left eye with active choroidal neovascularization (HCC)    Invasive lobular carcinoma of breast, stage 2, right (HCC)    Osteopenia, senile    Malignant melanoma of arm, left (HCC)    Age-related osteoporosis without current pathological fracture        History of Present Illness:  [id number]  -11/13/2023: s/p excision mass of left arm --> invasive nodular melanoma with ulceration (10 mm thickness)  -1/9/2024: s/p wide excision melanoma left upper arm with left SLNB and reconstruction --> margins negative, no residual melanoma, 2/6 LN positive for metastatic melanoma;   -She is receiving pembro with Dr Garsia    Other history:   7/11/2017: robotic assisted hysterectomy, left salpingo-oophorectomy and frozen section. (Javi) --> benign serous cystadenofibroma, 7.5 cm     9/28/2020: needle localized left breast lumpectomy for atypical ductal hyperplasia (Rony)     1/21/2022: s/p bilateral mastectomy with SLNB for invasive lobular carcinoma of right breast and DCIS of left breast. Single lymph node with rare isolated tumor cells (N0). (Rony)        Vital Signs:  BP (!) 206/86 (BP Location: Right arm, Patient Position: Sitting, Cuff Size: adult)   Pulse 74   Temp 97.4 °F (36.3 °C)  (Temporal)   Resp 20   Wt 56.7 kg (125 lb)   SpO2 97%   BMI 22.86 kg/m²      Medications Reviewed:    Current Outpatient Medications:     predniSONE 2.5 MG Oral Tab, Take 3 tablets (7.5 mg total) by mouth daily., Disp: 90 tablet, Rfl: 3    PREDNISONE 10 MG Oral Tab, TAKE 2 TABLETS BY MOUTH EVERY DAY, Disp: 60 tablet, Rfl: 1    PRIMIDONE 50 MG Oral Tab, TAKE 1 TABLET (50 MG TOTAL) BY MOUTH IN THE MORNING AND 1 TABLET (50 MG TOTAL) BEFORE BEDTIME, Disp: 180 tablet, Rfl: 1    ATORVASTATIN 10 MG Oral Tab, TAKE 1 TABLET BY MOUTH EVERY DAY AT NIGHT, Disp: 90 tablet, Rfl: 0    levothyroxine 75 MCG Oral Tab, Take 1 tablet (75 mcg total) by mouth before breakfast., Disp: , Rfl:     escitalopram 10 MG Oral Tab, Take 1 tablet (10 mg total) by mouth daily., Disp: 90 tablet, Rfl: 1    aspirin 81 MG Oral Chew Tab, Chew 1 tablet (81 mg total) by mouth daily., Disp: , Rfl:     anastrozole 1 MG Oral Tab tab, Take 1 tablet (1 mg total) by mouth daily., Disp: 90 tablet, Rfl: 3    Multiple Vitamin Oral Tab, Take 1 tablet by mouth daily., Disp: , Rfl:     Acidophilus/Pectin Oral Cap, Take 1 capsule by mouth daily., Disp: , Rfl:      Allergies Reviewed:  No Known Allergies     History:  Reviewed:  Past Medical History:    Acute appendicitis with localized peritonitis, without perforation, abscess, or gangrene    Anxiety    Cancer (HCC)    breast cancer- right breast    Cancer (HCC)    left arm melanoma    Depression    Ductal hyperplasia, atypical, breast    Flat epithelial atypia (FEA) of left breast    High blood pressure    not on any medication    High cholesterol    Hyperlipidemia    Macular degeneration    Osteoporosis    Personal history of colonic polyps    PONV (postoperative nausea and vomiting)    Status post bilateral mastectomy    Tremors of nervous system    Visual impairment    glasses      Reviewed:  Past Surgical History:   Procedure Laterality Date    Angioplasty (coronary)      Appendectomy  12/13/2023     Colonoscopy      Colonoscopy & polypectomy  2003    Hysterectomy  2017    and oophorectomy    Ajit biopsy stereo nodule 1 site left (cpt=19081)  2020    ADH/FEA    Ajit localization wire 1 site left (cpt=19281)  2020    Benign (core was ADH/FEA)    Mastectomy left      Mastectomy right      Needle biopsy left      Benign          x2    Other surgical history  10/01/2008    cardiac cath post-procedure date (mild LAD)                    Tubal ligation        Reviewed Social History:  Social History     Socioeconomic History    Marital status:     Number of children: 2   Tobacco Use    Smoking status: Former     Current packs/day: 0.00     Average packs/day: 1 pack/day for 40.0 years (40.0 ttl pk-yrs)     Types: Cigarettes     Start date: 1973     Quit date: 2013     Years since quittin.3    Smokeless tobacco: Never    Tobacco comments:     PPD   Vaping Use    Vaping status: Never Used   Substance and Sexual Activity    Alcohol use: No    Drug use: No    Sexual activity: Yes     Partners: Male   Other Topics Concern    Caffeine Concern No    Stress Concern Yes    Weight Concern No    Special Diet No    Exercise Yes    Seat Belt Yes     Social Determinants of Health     Food Insecurity: No Food Insecurity (2023)    Food Insecurity     Food Insecurity: Never true   Transportation Needs: No Transportation Needs (2023)    Transportation Needs     Lack of Transportation: No   Housing Stability: Low Risk  (2023)    Housing Stability     Housing Instability: No      Reviewed:  Family History   Problem Relation Age of Onset    Other (Melanoma) Self     Other (Other) Self     Breast Cancer Self     Heart Disease Father         Heart disease    High Blood Pressure Father     Cancer Sister 64        leukemia    Cancer Sister 29        Kidney cancer    Neurological Disorder Sister     Other (als) Sister     Cancer Paternal Uncle 70        breast    High Cholesterol Other          siblings      Review of Systems:  Patient reports no rapid or irregular heartbeat. She reports no chest pain. She reports no nausea. She reports no vomiting. She reports no diarrhea. She reports no constipation. She reports no bright red blood per rectum. She reports no fatigue. She reports no blood in the urine hematuria, no changes in urinary habits: initiating urination requires straining, no changes in urinary habits: no hesitancy, and no change in urine appearance. She reports no headache. She reports no dizziness. She reports no easy bruising tendency. She reports no diffuse joint pains. She reports no bone pain. She reports no back pain. She reports no swollen glands. She reports no pain. She reports no numbness. She reports no shortness of breath. She reports no change in a mole. She reports no recent change in weight, no fever, no chills, and no sweating heavily at night.        Physical Examination:  Constitutional: NAD.   Eyes: Sclera: non-icteric.   Neck: Neck: supple.   Lymph Nodes: Lymph Nodes no cervical LAD, supraclavicular LAD, axillary LAD, or inguinal LAD.   Abdomen: Inspection and Palpation: no masses, tenderness (no guarding, no rebound), or CVA tenderness and soft and non-distended. Liver: no hepatomegaly. Spleen: no splenomegaly. Hernia: none palpable.   Musculoskeletal: Extremities: no edema.   Skin: The scalp an remainder of the skin do not show any evidence for new or suspicious lesions. The wide excision site is well healed. There is no evidence for local recurrence. There are no intransit metastases.  There is about 1 cm scaly papule right lower abdomen.     Document Review:  US Left Axilla 8/30/2024  1. The larger of 2 lymph nodes previously seen has decreased in size.  The other is no longer visualized.   2. During scanning today an additional tiny, nonenlarged lymph node is seen more laterally in the axilla.      Procedure(s):  None     Assessment / Plan:  Malignant melanoma of arm,  left (HCC) (C43.62)  Patient doing well and remains OPAL.  She is to continue monthly self skin examination.  Continue immunotherapy  imaging with Dr. Garsia.  Return to clinic 4 months with ultrasound left axilla.  She has not had a PET scan for some time which I will order.    Invasive lobular carcinoma of breast, stage 2, right   -Also following up with Dr. Garsia    Skin lesion right lower abdomen  -Recommended excision.  -To be performed during subsequent clinic visit.      Follow Up:  4 mo       Electronically Signed by: Zurdo Montesinos MD

## 2024-09-11 NOTE — PROGRESS NOTES
Cancer Center Progress Note    Problem List:      Patient Active Problem List   Diagnosis    Coronary atherosclerosis    Anxiety state    Pure hypercholesterolemia    Hypertension    Pulmonary fibrosis (HCC)    Abdominal aortic ectasia (HCC)    Reactive depression    Benign essential tremor    Exudative age-related macular degeneration of left eye with active choroidal neovascularization (HCC)    Invasive lobular carcinoma of breast, stage 2, right (HCC)    Osteopenia, senile    Malignant melanoma of arm, left (HCC)    Age-related osteoporosis without current pathological fracture       Interim History:    Dillan Casillas presents today for evaluation and management of a diagnosis of right breast cancer.    She has had a 10th cycle of pembrolizumab. She had complained of chronic bilateral knee pain but this improved on prednisone. She is down to prednisone 10 mg daily. She has not seen rheumatology. I have been managing the inflammatory arthritis.    She has no fever or sweats. She has no dyspnea. She has no rash or pruritus. 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 She is on levothyroxine 75 mcg daily. She is following with endocrine.    She has a prior h/o right breast cancer. She has been on anastrozole. She now presents with a left upper arm lesion. She was evaluated by Dr. Pierce Uribe. He did an excision biopsy on 11/13/2023 that showed invasive nodular melanoma with ulceration (tumor thickness 10 mm). She proceeded to have WLE and SLN procedure on 1/9/2024 by Dr. Zurdo Montesinos. There was no residual disease in the WLE. She had 2 of 6 nodes positive. One SLN had a 3 mm focus of metastatic melanoma with no extracapsular extension. A second SLN had a 1.5 mm of focus of metastatic melanoma with no extracapsular extension.      She had a PET scan on 12/8/2023 that was negative for metastatic disease. She had a MRI of the brain that was negative for brain metastases.     She  had an acute appendicitis on 2023 s/p appendectomy. She has had some fatigue since that surgery and the recent arm surgery.    She had bilateral mastectomy on 2022. The left breast had DCIS on her biopsy on 2021 but there was no residual cancer in the left mastectomy. The left SLN was negative. The right breast had an invasive lobular carcinoma, grade II, measuring 2.2 cm. The margins were negative. The right SLN procedure had one node with isolated tumor cells and another node that was negative. She had Oncotype Dx testing with a RS of 26.     Review of Systems:   Constitutional: Negative for anorexia, fatigue, fevers, chills, night sweats and weight loss.  Psychiatric: The patient's mood was calm and appropriate for this visit.  The pertinent positives and negatives were described. All other systems were negative.    PMH/PSH:  Past Medical History:    Acute appendicitis with localized peritonitis, without perforation, abscess, or gangrene    Anxiety    Cancer (HCC)    breast cancer- right breast    Cancer (HCC)    left arm melanoma    Depression    Ductal hyperplasia, atypical, breast    Flat epithelial atypia (FEA) of left breast    High blood pressure    not on any medication    High cholesterol    Hyperlipidemia    Macular degeneration    Osteoporosis    Personal history of colonic polyps    PONV (postoperative nausea and vomiting)    Status post bilateral mastectomy    Tremors of nervous system    Visual impairment    glasses       Past Surgical History:   Procedure Laterality Date    Angioplasty (coronary)      Appendectomy  2023    Colonoscopy      Colonoscopy & polypectomy  2003    Hysterectomy  2017    and oophorectomy    Ajit biopsy stereo nodule 1 site left (cpt=19081)  2020    ADH/FEA    Ajit localization wire 1 site left (cpt=19281)  2020    Benign (core was ADH/FEA)    Mastectomy left      Mastectomy right      Needle biopsy left      Benign          x2     Other surgical history  10/01/2008    cardiac cath post-procedure date (mild LAD)                    Tubal ligation         Family History Reviewed:  Family History   Problem Relation Age of Onset    Other (Melanoma) Self     Other (Other) Self     Breast Cancer Self     Heart Disease Father         Heart disease    High Blood Pressure Father     Cancer Sister 64        leukemia    Cancer Sister 29        Kidney cancer    Neurological Disorder Sister     Other (als) Sister     Cancer Paternal Uncle 70        breast    High Cholesterol Other         siblings       Allergies:     No Known Allergies    Medications:   PREDNISONE 10 MG Oral Tab TAKE 2 TABLETS BY MOUTH EVERY DAY 60 tablet 1    PRIMIDONE 50 MG Oral Tab TAKE 1 TABLET (50 MG TOTAL) BY MOUTH IN THE MORNING AND 1 TABLET (50 MG TOTAL) BEFORE BEDTIME 180 tablet 1    ATORVASTATIN 10 MG Oral Tab TAKE 1 TABLET BY MOUTH EVERY DAY AT NIGHT 90 tablet 0    levothyroxine 75 MCG Oral Tab Take 1 tablet (75 mcg total) by mouth before breakfast.      escitalopram 10 MG Oral Tab Take 1 tablet (10 mg total) by mouth daily. 90 tablet 1    aspirin 81 MG Oral Chew Tab Chew 1 tablet (81 mg total) by mouth daily.      anastrozole 1 MG Oral Tab tab Take 1 tablet (1 mg total) by mouth daily. 90 tablet 3    Multiple Vitamin Oral Tab Take 1 tablet by mouth daily.      Acidophilus/Pectin Oral Cap Take 1 capsule by mouth daily.           Vital Signs:      Height: 157.5 cm (5' 2.01\") (09/11 0910)  Weight: 55.8 kg (123 lb) (09/11 0910)  BSA (Calculated - sq m): 1.55 sq meters (09/11 0910)  Pulse: 78 (09/11 0910)  BP: 176/89 (09/11 0910)  Temp: 98.4 °F (36.9 °C) (09/11 0910)  Do Not Use - Resp Rate: --  SpO2: 93 % (09/11 0910)      Performance Status:  ECOG 0: Fully active, able to carry on all pre-disease performance without restriction     Physical Examination:    Constitutional: Patient is alert and oriented x 3, not in acute distress.  Respiratory: Clear to auscultation and percussion.  No rales.  No wheezes.  Cardiovascular: Regular rate and rhythm. No murmurs.  Gastrointestinal: Soft, non tender with good bowel sounds.  Musculoskeletal: No edema. No calf tenderness.  Psychiatric: The patient's mood is calm and appropriate for this visit.       Labs reviewed at this visit:     Lab Results   Component Value Date    WBC 8.7 09/11/2024    RBC 4.51 09/11/2024    HGB 12.6 09/11/2024    HCT 39.3 09/11/2024    MCV 87.1 09/11/2024    MCH 27.9 09/11/2024    MCHC 32.1 09/11/2024    RDW 15.5 09/11/2024    .0 09/11/2024     Lab Results   Component Value Date     09/11/2024    K 4.1 09/11/2024     09/11/2024    CO2 26.0 09/11/2024    BUN 8 (L) 09/11/2024    CREATSERUM 0.91 09/11/2024     (H) 09/11/2024    CA 9.6 09/11/2024    ALKPHO 51 (L) 09/11/2024    ALT 9 (L) 09/11/2024    AST 19 09/11/2024    BILT 0.4 09/11/2024    ALB 4.3 09/11/2024    TP 7.7 09/11/2024     Lab Component   Component Value Date/Time    TSH 2.269 09/11/2024 0901    TSH 1.936 07/31/2024 1002    TSH 2.920 07/10/2024 0928           Component  Ref Range & Units 5/8/24 1107   Rheumatoid Factor  <15 IU/mL <10            Component  Ref Range & Units 5/8/24 0938   Sed Rate  0 - 30 mm/Hr 48 High             Component  Ref Range & Units 5/8/24 0938   C-Reactive Protein  <0.30 mg/dL 1.26 High           Radiologic imaging reviewed at this visit:    Bone Dexa Scan on 5/5/2022:  LUMBAR SPINE ANALYSIS RESULTS:       Bone mineral density (BMD) (g/cm2):  0.487     Lumbar T-Score:  -5.1       % young normals:  46       % age matched controls:  61       Change from prior spine examination:  n/a                TOTAL HIP ANALYSIS RESULTS:         Bone mineral density (BMD) (g/cm2):  0.446       Total Hip T-Score:  -4.1       % young normals:  47       % age matched controls:  61       Change from prior hip examination:  n/a                FEMORAL NECK ANALYSIS RESULTS:         Bone mineral density (BMD) (g/cm2):  0.388       Femoral  neck T-Score:  -4.2       % young normals:  46       % age matched controls:  62       Change from prior hip examination:  n/a           ADDITIONAL FINDINGS:  No significant additional findings.        Impression   CONCLUSION:  Bone mineral density values are compatible with the diagnosis of osteoporosis by WHO definition (T score less than -2.5). If therapy is initiated, a follow-up study in 1 year may aid in evaluation of therapeutic efficacy.        MRI of breast on 12/30/2021:  DESCRIPTION:  Witnessed verbal and written informed consent was obtained.  Time out was performed by the staff.  Discussed benefits and risks of MR guided core needle biopsy including, but not limited to, bleeding, infection, and/or failure to obtain   adequate sample. The patient was placed prone on the MRI Scanner.  The left breast was positioned in a dedicated breast coil and MR guidance grid device.   A fiducial was placed on the grid.       The skin was prepped and after local anesthesia was achieved, an introducer sheath and a localizing obturator were placed using a lateral approach at both sites.  The location of the obturator sheaths was confirmed with imaging.       The patient was then taken out of the bore of the magnet and samples were obtained using an MR compatible vacuum assisted core biopsy device at both sites.  A regular size needle was used for site 1 and a petite sized needle was used for site 2. Six   samples were taken at site 1, and 12 samples were taken at site 2. A tissue marker was placed at both sites.  Post biopsy images were obtained which confirm pot biopsy changes at the targets of the biopsy.         Post procedural mammogram performed on separate digital equipment show the clips in appropriate position.       The patient tolerated the procedure well and left the department in stable condition.       BIOPSY NEEDLE:            9-gauge Haloband biopsy device   MEDICATION:               1% lidocaine and 1%  lidocaine containing epinephrine     SITE MARKER COORDINATES:     SITE 1:  Non-mass enhancement 7 o'clock  left breast   SITE 2:  0800 hours left breast   CLIP TYPE:                SITE 1:  M  SITE 2:  X   PHYSICIANS PRESENT:       Jaquelin Cole M.D.         PATHOLOGY:   A.  Left breast, 7:00, MRI guided biopsy:   -Breast tissue with nodular adenosis, stromal fibrosis, secretory change, apocrine metaplasia and microcalcifications.   -See comment.       B.  Left breast, 8:00, MRI guided biopsy:   -Ductal carcinoma in situ, nuclear grade 2, solid and cribriform types.   -Largest focus of DCIS measures 5 mm (0.5 cm) in greatest dimension.   -Background fibroadenomatous change, apocrine metaplasia, sclerosing adenosis and microcalcifications.   -See comment.      Impression   CONCLUSION:     MRI-guided biopsy of two areas in the left breast.       Pathology from the 1st site at the 7 o'clock position is reported as benign.  This is likely concordant with the imaging assessment and is marked with an M clip.       Pathology from the 2nd site at the 8 o'clock position shows the presence of DCIS.  This is concordant with the imaging assessment and is marked with an X clip.       Surgical consultation is recommended for the bilateral biopsy proven malignancies.  Per the patient's electronic medical record, she is planning on a bilateral mastectomy.         Assessment/Plan:     Malignant melanoma of the left upper arm:  Nodular melanoma 10 mm thickness  2 of 6 left axillary nodes positive  Microscopic metastases  No extranodal extension  BRAF negative     The patient has a pT4b,pN2a (Stage IIIc) melanoma of the left upper arm and axilla. She has had definitive surgery. She has BRAF negative disease. She has good performance status (PS 0).    She will continue with the pembrolizumab every three weeks. 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 She has responded to the prednisone. I will have her decrease  from the 10 mg daily to 7.5 mg daily. She will return in three weeks.    Invasive lobular carcinoma of the right breast 12 o'clock (overlapping quadrant):  Biopsy on 11/17/2021  ER 90%  RI <1%  K--67 5%  Her2 1+  Bilateral mastectomy on 1/21/2022:  Right Breast with Invasive lobular carcinoma  Measuring 2.2 cm  SLN 0/2 (one with ITC)  Negative margins  Left breast with no residual DCIS  0.5 cm DCIS biopsy on 12/30/2021  ER/RI positive  Right breast Oncotype Dx RS 26     Continue anastrozole. She does have severe osteoporosis. She continues to follow with endocrine. She has OPAL on the AI. She has no side effects from the AI. She is comfortable with this plan.     Severe Osteoporosis:     She is following with endocrinology. She has had dental work with teeth extractions. She has been cleared by dental. She had Reclast on 6/20/2024. She will get this yearly.     Hypertension secondary to anxiety about treatment:    She has white coat HTN. Her home bp measurements are normal.    Hypothyroid:    TSH is normal. Continue current dosing of levothyroxine.    Ben Garsia MD

## 2024-09-11 NOTE — PROGRESS NOTES
Pt here for C11D1 Drug(s)keytruda.  Arrives Ambulating independently, accompanied by Self     Patient was evaluated today by MD.    Oral medications included in this regimen:  no    Patient confirms comprehension of cancer treatment schedule:  yes    Pregnancy screening:  Not applicable    Modifications in dose or schedule:  No    Medications appearance and physical integrity checked by RN: yes.    Chemotherapy IV pump settings verified by 2 RNs:  No due to targeted therapy IV administration.  Frequency of blood return and site check throughout administration: Prior to administration and At completion of therapy     Infusion/treatment outcome:  patient tolerated treatment without incident    Education Record    Learner:  Patient  Barriers / Limitations:  None  Method:  Brief focused and Reinforcement  Education / instructions given:  poc   Outcome:  Shows understanding    Discharged Home, Ambulating independently, accompanied by:Self    Patient/family verbalized understanding of future appointments: by printed AVS    Next appt 10/2 at 8:30am.  Pt discharged in stable condition.

## 2024-09-11 NOTE — PROGRESS NOTES
Patient here for C11D1 Keytruda. Patient taking anastrozole as directed. Patient has no concerns or complaints at this time.     Education Record    Learner:  Patient    Disease / Diagnosis: breast cancer     Barriers / Limitations:  None   Comments:    Method:  Discussion   Comments:    General Topics:  Medication, Side effects and symptom management, and Plan of care reviewed   Comments:    Outcome:  Shows understanding   Comments:

## 2024-09-23 RX ORDER — ATORVASTATIN CALCIUM 10 MG/1
TABLET, FILM COATED ORAL
Qty: 90 TABLET | Refills: 0 | Status: SHIPPED | OUTPATIENT
Start: 2024-09-23

## 2024-09-23 NOTE — TELEPHONE ENCOUNTER
OV 06/10/24  LABS 12/27/23 LIPID, 09/11/24 COMP    REFILL 06/26/24 #90    Future Appointments   Date Time Provider Department Center   10/2/2024  8:30 AM  [place] RN6  [facility]   10/2/2024  9:00 AM Ben Garsia MD  [facility]

## 2024-09-30 RX ORDER — LEVOTHYROXINE SODIUM 50 UG/1
50 TABLET ORAL
Qty: 90 TABLET | Refills: 1 | OUTPATIENT
Start: 2024-09-30

## 2024-10-02 ENCOUNTER — OFFICE VISIT (OUTPATIENT)
Dept: HEMATOLOGY/ONCOLOGY | Facility: HOSPITAL | Age: 74
End: 2024-10-02
Attending: INTERNAL MEDICINE
Payer: MEDICARE

## 2024-10-02 VITALS
DIASTOLIC BLOOD PRESSURE: 93 MMHG | RESPIRATION RATE: 16 BRPM | WEIGHT: 127 LBS | BODY MASS INDEX: 23.08 KG/M2 | OXYGEN SATURATION: 95 % | TEMPERATURE: 98 F | HEART RATE: 77 BPM | HEIGHT: 62.01 IN | SYSTOLIC BLOOD PRESSURE: 207 MMHG

## 2024-10-02 DIAGNOSIS — C50.811 MALIGNANT NEOPLASM OF OVERLAPPING SITES OF RIGHT BREAST IN FEMALE, ESTROGEN RECEPTOR POSITIVE (HCC): ICD-10-CM

## 2024-10-02 DIAGNOSIS — E03.2 HYPOTHYROIDISM DUE TO MEDICATION: ICD-10-CM

## 2024-10-02 DIAGNOSIS — Z17.0 MALIGNANT NEOPLASM OF OVERLAPPING SITES OF RIGHT BREAST IN FEMALE, ESTROGEN RECEPTOR POSITIVE (HCC): ICD-10-CM

## 2024-10-02 DIAGNOSIS — C43.62 MALIGNANT MELANOMA OF ARM, LEFT (HCC): Primary | ICD-10-CM

## 2024-10-02 DIAGNOSIS — M19.90 INFLAMMATORY ARTHRITIS: ICD-10-CM

## 2024-10-02 LAB
ALBUMIN SERPL-MCNC: 3.8 G/DL (ref 3.2–4.8)
ALBUMIN/GLOB SERPL: 1.1 {RATIO} (ref 1–2)
ALP LIVER SERPL-CCNC: 49 U/L
ALT SERPL-CCNC: 13 U/L
ANION GAP SERPL CALC-SCNC: 4 MMOL/L (ref 0–18)
AST SERPL-CCNC: 26 U/L (ref ?–34)
BASOPHILS # BLD AUTO: 0.06 X10(3) UL (ref 0–0.2)
BASOPHILS NFR BLD AUTO: 0.7 %
BILIRUB SERPL-MCNC: 0.3 MG/DL (ref 0.2–1.1)
BUN BLD-MCNC: 10 MG/DL (ref 9–23)
CALCIUM BLD-MCNC: 9.8 MG/DL (ref 8.7–10.4)
CHLORIDE SERPL-SCNC: 106 MMOL/L (ref 98–112)
CO2 SERPL-SCNC: 27 MMOL/L (ref 21–32)
CREAT BLD-MCNC: 0.88 MG/DL
EGFRCR SERPLBLD CKD-EPI 2021: 68 ML/MIN/1.73M2 (ref 60–?)
EOSINOPHIL # BLD AUTO: 0.08 X10(3) UL (ref 0–0.7)
EOSINOPHIL NFR BLD AUTO: 0.9 %
ERYTHROCYTE [DISTWIDTH] IN BLOOD BY AUTOMATED COUNT: 15.2 %
GLOBULIN PLAS-MCNC: 3.6 G/DL (ref 2–3.5)
GLUCOSE BLD-MCNC: 99 MG/DL (ref 70–99)
HCT VFR BLD AUTO: 38.4 %
HGB BLD-MCNC: 12.5 G/DL
IMM GRANULOCYTES # BLD AUTO: 0.2 X10(3) UL (ref 0–1)
IMM GRANULOCYTES NFR BLD: 2.2 %
LYMPHOCYTES # BLD AUTO: 2.46 X10(3) UL (ref 1–4)
LYMPHOCYTES NFR BLD AUTO: 27.3 %
MCH RBC QN AUTO: 28.2 PG (ref 26–34)
MCHC RBC AUTO-ENTMCNC: 32.6 G/DL (ref 31–37)
MCV RBC AUTO: 86.7 FL
MONOCYTES # BLD AUTO: 1.08 X10(3) UL (ref 0.1–1)
MONOCYTES NFR BLD AUTO: 12 %
NEUTROPHILS # BLD AUTO: 5.13 X10 (3) UL (ref 1.5–7.7)
NEUTROPHILS # BLD AUTO: 5.13 X10(3) UL (ref 1.5–7.7)
NEUTROPHILS NFR BLD AUTO: 56.9 %
OSMOLALITY SERPL CALC.SUM OF ELEC: 283 MOSM/KG (ref 275–295)
PLATELET # BLD AUTO: 249 10(3)UL (ref 150–450)
POTASSIUM SERPL-SCNC: 3.9 MMOL/L (ref 3.5–5.1)
PROT SERPL-MCNC: 7.4 G/DL (ref 5.7–8.2)
RBC # BLD AUTO: 4.43 X10(6)UL
SODIUM SERPL-SCNC: 137 MMOL/L (ref 136–145)
WBC # BLD AUTO: 9 X10(3) UL (ref 4–11)

## 2024-10-02 PROCEDURE — 80053 COMPREHEN METABOLIC PANEL: CPT

## 2024-10-02 PROCEDURE — 96413 CHEMO IV INFUSION 1 HR: CPT

## 2024-10-02 PROCEDURE — 99214 OFFICE O/P EST MOD 30 MIN: CPT | Performed by: INTERNAL MEDICINE

## 2024-10-02 PROCEDURE — 85025 COMPLETE CBC W/AUTO DIFF WBC: CPT

## 2024-10-02 RX ORDER — LEVOTHYROXINE SODIUM 75 UG/1
75 TABLET ORAL
Qty: 90 TABLET | Refills: 3 | Status: SHIPPED | OUTPATIENT
Start: 2024-10-02

## 2024-10-02 NOTE — PROGRESS NOTES
Pt here for C12D1 Drug(s)keytruda.  Arrives Ambulating independently, accompanied by Spouse     Patient was evaluated today by MD.    Oral medications included in this regimen:  no    Patient confirms comprehension of cancer treatment schedule:  yes    Pregnancy screening:  Denies possibility of pregnancy    Modifications in dose or schedule:  No    Medications appearance and physical integrity checked by RN: yes.    Chemotherapy IV pump settings verified by 2 RNs:  No due to targeted therapy IV administration.  Frequency of blood return and site check throughout administration: Prior to administration and At completion of therapy     Infusion/treatment outcome:  patient tolerated treatment without incident    Education Record    Learner:  Patient  Barriers / Limitations:  None  Method:  Discussion  Education / instructions given:  plan of care  Outcome:  Shows understanding    Discharged Home, Ambulating independently, accompanied by:Spouse    Patient/family verbalized understanding of future appointments: by printed AVS    Patient here for labs, MD, and immunotherapy. Patient blood pressure elevated to 202/98 with recheck 207/93. MD aware of blood pressure - Okd to proceed with treatment. Patient understands to call or go to ER if becomes symptomatic of high blood pressure.  Patient left treatment center in stable condition.

## 2024-10-02 NOTE — PROGRESS NOTES
Pt here for C12D1 Keytruda. BP is elevated this morning, patient states she was stressed due to traffic coming to her appointment this morning, no c/o lightheadedness, dizziness, headache. Recheck BP once pt has calmed down. Compliant with Anastrozole, no side effects reported. Tolerating Prednisone. No fevers/chills or night sweats reported. Pt has orders to complete PET scan and Ultrasound - informed patient to call central scheduling to complete imaging, patient stated understanding.       Education Record    Learner:  Patient and Spouse    Disease / Diagnosis: malignant melanoma, lobular carcinoma of breast    Barriers / Limitations:  None   Comments:    Method:  Discussion   Comments:    General Topics:  Medication, Pain, Side effects and symptom management, and Plan of care reviewed   Comments:    Outcome:  Observed demonstration and Shows understanding   Comments:

## 2024-10-02 NOTE — PROGRESS NOTES
Cancer Center Progress Note    Problem List:      Patient Active Problem List   Diagnosis    Coronary atherosclerosis    Anxiety state    Pure hypercholesterolemia    Hypertension    Pulmonary fibrosis (HCC)    Abdominal aortic ectasia (HCC)    Reactive depression    Benign essential tremor    Exudative age-related macular degeneration of left eye with active choroidal neovascularization (HCC)    Invasive lobular carcinoma of breast, stage 2, right (HCC)    Osteopenia, senile    Malignant melanoma of arm, left (HCC)    Age-related osteoporosis without current pathological fracture       Interim History:    Dillan Casillas presents today for evaluation and management of a diagnosis of right breast cancer.    She has had a 11th cycle of pembrolizumab. She had complained of chronic bilateral knee pain but this improved on prednisone. She is down to prednisone 7.5 mg daily. She has not had any recent joint pain. She has not seen rheumatology. I have been managing the inflammatory arthritis.    She has no fever or sweats. She has no dyspnea. She has no rash or pruritus. 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 She is on levothyroxine 75 mcg daily. She is following with endocrine.    She has a prior h/o right breast cancer. She has been on anastrozole. She now presents with a left upper arm lesion. She was evaluated by Dr. Pierce Uribe. He did an excision biopsy on 11/13/2023 that showed invasive nodular melanoma with ulceration (tumor thickness 10 mm). She proceeded to have WLE and SLN procedure on 1/9/2024 by Dr. Zurdo Montesinos. There was no residual disease in the WLE. She had 2 of 6 nodes positive. One SLN had a 3 mm focus of metastatic melanoma with no extracapsular extension. A second SLN had a 1.5 mm of focus of metastatic melanoma with no extracapsular extension.      She had a PET scan on 12/8/2023 that was negative for metastatic disease. She had a MRI of the brain that was  negative for brain metastases.     She had an acute appendicitis on 12/13/2023 s/p appendectomy. She has had some fatigue since that surgery and the recent arm surgery.    She had bilateral mastectomy on 1/21/2022. The left breast had DCIS on her biopsy on 12/30/2021 but there was no residual cancer in the left mastectomy. The left SLN was negative. The right breast had an invasive lobular carcinoma, grade II, measuring 2.2 cm. The margins were negative. The right SLN procedure had one node with isolated tumor cells and another node that was negative. She had Oncotype Dx testing with a RS of 26.     Review of Systems:   Constitutional: Negative for anorexia, fatigue, fevers, chills, night sweats and weight loss.  Psychiatric: The patient's mood was calm and appropriate for this visit.  The pertinent positives and negatives were described. All other systems were negative.    PMH/PSH:  Past Medical History:    Acute appendicitis with localized peritonitis, without perforation, abscess, or gangrene    Anxiety    Cancer (HCC)    breast cancer- right breast    Cancer (HCC)    left arm melanoma    Depression    Ductal hyperplasia, atypical, breast    Flat epithelial atypia (FEA) of left breast    High blood pressure    not on any medication    High cholesterol    Hyperlipidemia    Macular degeneration    Osteoporosis    Personal history of colonic polyps    PONV (postoperative nausea and vomiting)    Status post bilateral mastectomy    Tremors of nervous system    Visual impairment    glasses       Past Surgical History:   Procedure Laterality Date    Angioplasty (coronary)      Appendectomy  12/13/2023    Colonoscopy      Colonoscopy & polypectomy  12/01/2003    Hysterectomy  2017    and oophorectomy    Ajit biopsy stereo nodule 1 site left (cpt=19081)  08/2020    ADH/FEA    Ajit localization wire 1 site left (cpt=19281)  09/2020    Benign (core was ADH/FEA)    Mastectomy left      Mastectomy right      Needle biopsy left       Benign          x2    Other surgical history  10/01/2008    cardiac cath post-procedure date (mild LAD)                    Tubal ligation         Family History Reviewed:  Family History   Problem Relation Age of Onset    Other (Melanoma) Self     Other (Other) Self     Breast Cancer Self     Heart Disease Father         Heart disease    High Blood Pressure Father     Cancer Sister 64        leukemia    Cancer Sister 29        Kidney cancer    Neurological Disorder Sister     Other (als) Sister     Cancer Paternal Uncle 70        breast    High Cholesterol Other         siblings       Allergies:     No Known Allergies    Medications:   ATORVASTATIN 10 MG Oral Tab TAKE 1 TABLET BY MOUTH EVERY DAY AT NIGHT 90 tablet 0    predniSONE 2.5 MG Oral Tab Take 3 tablets (7.5 mg total) by mouth daily. 90 tablet 3    PRIMIDONE 50 MG Oral Tab TAKE 1 TABLET (50 MG TOTAL) BY MOUTH IN THE MORNING AND 1 TABLET (50 MG TOTAL) BEFORE BEDTIME 180 tablet 1    levothyroxine 75 MCG Oral Tab Take 1 tablet (75 mcg total) by mouth before breakfast.      escitalopram 10 MG Oral Tab Take 1 tablet (10 mg total) by mouth daily. 90 tablet 1    aspirin 81 MG Oral Chew Tab Chew 1 tablet (81 mg total) by mouth daily.      anastrozole 1 MG Oral Tab tab Take 1 tablet (1 mg total) by mouth daily. 90 tablet 3    Multiple Vitamin Oral Tab Take 1 tablet by mouth daily.      Acidophilus/Pectin Oral Cap Take 1 capsule by mouth daily.           Vital Signs:      Height: 157.5 cm (5' 2.01\") (10/02 0841)  Weight: 57.6 kg (127 lb) (10/02 0841)  BSA (Calculated - sq m): 1.58 sq meters (10/02 0841)  Pulse: 77 (10/02 0841)  BP: 202/98 (10/02 0841)  Temp: 97.5 °F (36.4 °C) (10/02 0841)  Do Not Use - Resp Rate: --  SpO2: 95 % (10/02 0841)      Performance Status:  ECOG 0: Fully active, able to carry on all pre-disease performance without restriction     Physical Examination:    Constitutional: Patient is alert and oriented x 3, not in acute  distress.  Respiratory: Clear to auscultation and percussion. No rales.  No wheezes.  Cardiovascular: Regular rate and rhythm. No murmurs.  Gastrointestinal: Soft, non tender with good bowel sounds.  Musculoskeletal: No edema. No calf tenderness.  Psychiatric: The patient's mood is calm and appropriate for this visit.       Labs reviewed at this visit:     Lab Results   Component Value Date    WBC 9.0 10/02/2024    RBC 4.43 10/02/2024    HGB 12.5 10/02/2024    HCT 38.4 10/02/2024    MCV 86.7 10/02/2024    MCH 28.2 10/02/2024    MCHC 32.6 10/02/2024    RDW 15.2 10/02/2024    .0 10/02/2024     Lab Results   Component Value Date     10/02/2024    K 3.9 10/02/2024     10/02/2024    CO2 27.0 10/02/2024    BUN 10 10/02/2024    CREATSERUM 0.88 10/02/2024    GLU 99 10/02/2024    CA 9.8 10/02/2024    ALKPHO 49 (L) 10/02/2024    ALT 13 10/02/2024    AST 26 10/02/2024    BILT 0.3 10/02/2024    ALB 3.8 10/02/2024    TP 7.4 10/02/2024     Lab Component   Component Value Date/Time    TSH 2.269 09/11/2024 0901    TSH 1.936 07/31/2024 1002    TSH 2.920 07/10/2024 0928           Component  Ref Range & Units 5/8/24 1107   Rheumatoid Factor  <15 IU/mL <10            Component  Ref Range & Units 5/8/24 0938   Sed Rate  0 - 30 mm/Hr 48 High             Component  Ref Range & Units 5/8/24 0938   C-Reactive Protein  <0.30 mg/dL 1.26 High           Radiologic imaging reviewed at this visit:    Bone Dexa Scan on 5/5/2022:  LUMBAR SPINE ANALYSIS RESULTS:       Bone mineral density (BMD) (g/cm2):  0.487     Lumbar T-Score:  -5.1       % young normals:  46       % age matched controls:  61       Change from prior spine examination:  n/a                TOTAL HIP ANALYSIS RESULTS:         Bone mineral density (BMD) (g/cm2):  0.446       Total Hip T-Score:  -4.1       % young normals:  47       % age matched controls:  61       Change from prior hip examination:  n/a                FEMORAL NECK ANALYSIS RESULTS:         Bone  mineral density (BMD) (g/cm2):  0.388       Femoral neck T-Score:  -4.2       % young normals:  46       % age matched controls:  62       Change from prior hip examination:  n/a           ADDITIONAL FINDINGS:  No significant additional findings.        Impression   CONCLUSION:  Bone mineral density values are compatible with the diagnosis of osteoporosis by WHO definition (T score less than -2.5). If therapy is initiated, a follow-up study in 1 year may aid in evaluation of therapeutic efficacy.        MRI of breast on 12/30/2021:  DESCRIPTION:  Witnessed verbal and written informed consent was obtained.  Time out was performed by the staff.  Discussed benefits and risks of MR guided core needle biopsy including, but not limited to, bleeding, infection, and/or failure to obtain   adequate sample. The patient was placed prone on the MRI Scanner.  The left breast was positioned in a dedicated breast coil and MR guidance grid device.   A fiducial was placed on the grid.       The skin was prepped and after local anesthesia was achieved, an introducer sheath and a localizing obturator were placed using a lateral approach at both sites.  The location of the obturator sheaths was confirmed with imaging.       The patient was then taken out of the bore of the magnet and samples were obtained using an MR compatible vacuum assisted core biopsy device at both sites.  A regular size needle was used for site 1 and a petite sized needle was used for site 2. Six   samples were taken at site 1, and 12 samples were taken at site 2. A tissue marker was placed at both sites.  Post biopsy images were obtained which confirm pot biopsy changes at the targets of the biopsy.         Post procedural mammogram performed on separate digital equipment show the clips in appropriate position.       The patient tolerated the procedure well and left the department in stable condition.       BIOPSY NEEDLE:            9-gauge Closely biopsy device    MEDICATION:               1% lidocaine and 1% lidocaine containing epinephrine     SITE MARKER COORDINATES:     SITE 1:  Non-mass enhancement 7 o'clock  left breast   SITE 2:  0800 hours left breast   CLIP TYPE:                SITE 1:  M  SITE 2:  X   PHYSICIANS PRESENT:       Jaquelin Cole M.D.         PATHOLOGY:   A.  Left breast, 7:00, MRI guided biopsy:   -Breast tissue with nodular adenosis, stromal fibrosis, secretory change, apocrine metaplasia and microcalcifications.   -See comment.       B.  Left breast, 8:00, MRI guided biopsy:   -Ductal carcinoma in situ, nuclear grade 2, solid and cribriform types.   -Largest focus of DCIS measures 5 mm (0.5 cm) in greatest dimension.   -Background fibroadenomatous change, apocrine metaplasia, sclerosing adenosis and microcalcifications.   -See comment.      Impression   CONCLUSION:     MRI-guided biopsy of two areas in the left breast.       Pathology from the 1st site at the 7 o'clock position is reported as benign.  This is likely concordant with the imaging assessment and is marked with an M clip.       Pathology from the 2nd site at the 8 o'clock position shows the presence of DCIS.  This is concordant with the imaging assessment and is marked with an X clip.       Surgical consultation is recommended for the bilateral biopsy proven malignancies.  Per the patient's electronic medical record, she is planning on a bilateral mastectomy.         Assessment/Plan:     Malignant melanoma of the left upper arm:  Nodular melanoma 10 mm thickness  2 of 6 left axillary nodes positive  Microscopic metastases  No extranodal extension  BRAF negative     The patient has a pT4b,pN2a (Stage IIIc) melanoma of the left upper arm and axilla. She has had definitive surgery. She has BRAF negative disease. She has good performance status (PS 0).    She will continue with the pembrolizumab every three weeks. 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 She has  responded to the prednisone. I will have her decrease from the 7.5 mg daily to 5 mg daily. She will return in three weeks.    Invasive lobular carcinoma of the right breast 12 o'clock (overlapping quadrant):  Biopsy on 11/17/2021  ER 90%  MN <1%  K--67 5%  Her2 1+  Bilateral mastectomy on 1/21/2022:  Right Breast with Invasive lobular carcinoma  Measuring 2.2 cm  SLN 0/2 (one with ITC)  Negative margins  Left breast with no residual DCIS  0.5 cm DCIS biopsy on 12/30/2021  ER/MN positive  Right breast Oncotype Dx RS 26     Continue anastrozole. She does have severe osteoporosis. She continues to follow with endocrine. She has OPAL on the AI. She has no side effects from the AI. She is comfortable with this plan.     Severe Osteoporosis:     She is following with endocrinology. She has had dental work with teeth extractions. She has been cleared by dental. She had Reclast on 6/20/2024. She will get this yearly.     Hypertension secondary to anxiety about treatment:    She has white coat HTN. Her home bp measurements are normal.    Hypothyroid:    TSH is normal. Continue current dosing of levothyroxine.    Ben Garsia MD

## 2024-10-23 ENCOUNTER — OFFICE VISIT (OUTPATIENT)
Dept: HEMATOLOGY/ONCOLOGY | Facility: HOSPITAL | Age: 74
End: 2024-10-23
Attending: INTERNAL MEDICINE
Payer: MEDICARE

## 2024-10-23 VITALS
BODY MASS INDEX: 22.89 KG/M2 | HEIGHT: 62.01 IN | HEART RATE: 79 BPM | OXYGEN SATURATION: 93 % | SYSTOLIC BLOOD PRESSURE: 179 MMHG | DIASTOLIC BLOOD PRESSURE: 90 MMHG | WEIGHT: 126 LBS | RESPIRATION RATE: 16 BRPM | TEMPERATURE: 98 F

## 2024-10-23 DIAGNOSIS — C50.811 MALIGNANT NEOPLASM OF OVERLAPPING SITES OF RIGHT BREAST IN FEMALE, ESTROGEN RECEPTOR POSITIVE (HCC): Primary | ICD-10-CM

## 2024-10-23 DIAGNOSIS — M19.90 INFLAMMATORY ARTHRITIS: ICD-10-CM

## 2024-10-23 DIAGNOSIS — Z17.0 MALIGNANT NEOPLASM OF OVERLAPPING SITES OF RIGHT BREAST IN FEMALE, ESTROGEN RECEPTOR POSITIVE (HCC): Primary | ICD-10-CM

## 2024-10-23 DIAGNOSIS — C43.62 MALIGNANT MELANOMA OF ARM, LEFT (HCC): Primary | ICD-10-CM

## 2024-10-23 DIAGNOSIS — C43.62 MALIGNANT MELANOMA OF ARM, LEFT (HCC): ICD-10-CM

## 2024-10-23 LAB
ALBUMIN SERPL-MCNC: 4.2 G/DL (ref 3.2–4.8)
ALBUMIN/GLOB SERPL: 1.3 {RATIO} (ref 1–2)
ALP LIVER SERPL-CCNC: 55 U/L
ALT SERPL-CCNC: 10 U/L
ANION GAP SERPL CALC-SCNC: 5 MMOL/L (ref 0–18)
AST SERPL-CCNC: 23 U/L (ref ?–34)
BASOPHILS # BLD AUTO: 0.05 X10(3) UL (ref 0–0.2)
BASOPHILS NFR BLD AUTO: 0.8 %
BILIRUB SERPL-MCNC: 0.4 MG/DL (ref 0.2–1.1)
BUN BLD-MCNC: 10 MG/DL (ref 9–23)
CALCIUM BLD-MCNC: 9.1 MG/DL (ref 8.7–10.4)
CHLORIDE SERPL-SCNC: 108 MMOL/L (ref 98–112)
CO2 SERPL-SCNC: 28 MMOL/L (ref 21–32)
CREAT BLD-MCNC: 0.88 MG/DL
EGFRCR SERPLBLD CKD-EPI 2021: 68 ML/MIN/1.73M2 (ref 60–?)
EOSINOPHIL # BLD AUTO: 0.17 X10(3) UL (ref 0–0.7)
EOSINOPHIL NFR BLD AUTO: 2.8 %
ERYTHROCYTE [DISTWIDTH] IN BLOOD BY AUTOMATED COUNT: 15.2 %
GLOBULIN PLAS-MCNC: 3.2 G/DL (ref 2–3.5)
GLUCOSE BLD-MCNC: 102 MG/DL (ref 70–99)
HCT VFR BLD AUTO: 37.4 %
HGB BLD-MCNC: 12.1 G/DL
IMM GRANULOCYTES # BLD AUTO: 0.06 X10(3) UL (ref 0–1)
IMM GRANULOCYTES NFR BLD: 1 %
LYMPHOCYTES # BLD AUTO: 1.08 X10(3) UL (ref 1–4)
LYMPHOCYTES NFR BLD AUTO: 17.8 %
MCH RBC QN AUTO: 27.2 PG (ref 26–34)
MCHC RBC AUTO-ENTMCNC: 32.4 G/DL (ref 31–37)
MCV RBC AUTO: 84 FL
MONOCYTES # BLD AUTO: 0.87 X10(3) UL (ref 0.1–1)
MONOCYTES NFR BLD AUTO: 14.3 %
NEUTROPHILS # BLD AUTO: 3.84 X10 (3) UL (ref 1.5–7.7)
NEUTROPHILS # BLD AUTO: 3.84 X10(3) UL (ref 1.5–7.7)
NEUTROPHILS NFR BLD AUTO: 63.3 %
OSMOLALITY SERPL CALC.SUM OF ELEC: 291 MOSM/KG (ref 275–295)
PLATELET # BLD AUTO: 281 10(3)UL (ref 150–450)
POTASSIUM SERPL-SCNC: 3.7 MMOL/L (ref 3.5–5.1)
PROT SERPL-MCNC: 7.4 G/DL (ref 5.7–8.2)
RBC # BLD AUTO: 4.45 X10(6)UL
SODIUM SERPL-SCNC: 141 MMOL/L (ref 136–145)
T4 FREE SERPL-MCNC: 1.9 NG/DL (ref 0.8–1.7)
TSI SER-ACNC: 2.31 MIU/ML (ref 0.55–4.78)
WBC # BLD AUTO: 6.1 X10(3) UL (ref 4–11)

## 2024-10-23 PROCEDURE — 80053 COMPREHEN METABOLIC PANEL: CPT

## 2024-10-23 PROCEDURE — 84439 ASSAY OF FREE THYROXINE: CPT

## 2024-10-23 PROCEDURE — 84443 ASSAY THYROID STIM HORMONE: CPT

## 2024-10-23 PROCEDURE — 85025 COMPLETE CBC W/AUTO DIFF WBC: CPT

## 2024-10-23 PROCEDURE — 96413 CHEMO IV INFUSION 1 HR: CPT

## 2024-10-23 PROCEDURE — 99214 OFFICE O/P EST MOD 30 MIN: CPT | Performed by: INTERNAL MEDICINE

## 2024-10-23 NOTE — PROGRESS NOTES
Pt here for C13D1 Drug(s)keytruda.  Arrives Ambulating independently, accompanied by Spouse     Patient was evaluated today by MD.    Oral medications included in this regimen:  no    Patient confirms comprehension of cancer treatment schedule:  yes    Pregnancy screening:  Denies possibility of pregnancy    Modifications in dose or schedule:  No    Medications appearance and physical integrity checked by RN: yes.    Chemotherapy IV pump settings verified by 2 RNs:  No due to targeted therapy IV administration.  Frequency of blood return and site check throughout administration: Prior to administration     Infusion/treatment outcome:  patient tolerated treatment without incident    Education Record    Learner:  Patient  Barriers / Limitations:  None  Method:  Discussion  Education / instructions given:  plan of care  Outcome:  Shows understanding    Discharged Home, Ambulating independently, accompanied by:Spouse    Patient/family verbalized understanding of future appointments: by printed AVS    Patient in treatment center for labs, MD, and treatment. Patient tolerated without issue and left in stable condition with future appointments in place.

## 2024-10-23 NOTE — PROGRESS NOTES
Patient here for C13D1 Keytruda. Patient taking anastrozole as directed with no complaints or side effects. Patient taking 5 mg prednisone daily, that she started 3 weeks ago. Patient patient has good appetite and adequate fluid intake.     Education Record    Learner:  Patient and Spouse    Disease / Diagnosis: breast cancer     Barriers / Limitations:  None   Comments:    Method:  Discussion   Comments:    General Topics:  Medication, Pain, Side effects and symptom management, and Plan of care reviewed   Comments:    Outcome:  Shows understanding   Comments:

## 2024-10-23 NOTE — PROGRESS NOTES
Cancer Center Progress Note    Problem List:      Patient Active Problem List   Diagnosis    Coronary atherosclerosis    Anxiety state    Pure hypercholesterolemia    Hypertension    Pulmonary fibrosis (HCC)    Abdominal aortic ectasia (HCC)    Reactive depression    Benign essential tremor    Exudative age-related macular degeneration of left eye with active choroidal neovascularization (HCC)    Invasive lobular carcinoma of breast, stage 2, right (HCC)    Osteopenia, senile    Malignant melanoma of arm, left (HCC)    Age-related osteoporosis without current pathological fracture       Interim History:    Dillan Casillas presents today for evaluation and management of a diagnosis of right breast cancer.    She has had a 12th cycle of pembrolizumab. She had complained of chronic bilateral knee pain but this improved on prednisone. She is down to prednisone 5 mg daily as of three weeks ago. She has not had any recent joint pain. She has not seen rheumatology. I have been managing the inflammatory arthritis.    She has no fever or sweats. She has no dyspnea. She has no rash or pruritus. 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 She is on levothyroxine 75 mcg daily. She is following with endocrine.    She has a prior h/o right breast cancer. She has been on anastrozole. She now presents with a left upper arm lesion. She was evaluated by Dr. Pierce Uribe. He did an excision biopsy on 11/13/2023 that showed invasive nodular melanoma with ulceration (tumor thickness 10 mm). She proceeded to have WLE and SLN procedure on 1/9/2024 by Dr. Zurdo Montesinos. There was no residual disease in the WLE. She had 2 of 6 nodes positive. One SLN had a 3 mm focus of metastatic melanoma with no extracapsular extension. A second SLN had a 1.5 mm of focus of metastatic melanoma with no extracapsular extension.      She had a PET scan on 12/8/2023 that was negative for metastatic disease. She had a MRI of  the brain that was negative for brain metastases.     She had an acute appendicitis on 12/13/2023 s/p appendectomy. She has had some fatigue since that surgery and the recent arm surgery.    She had bilateral mastectomy on 1/21/2022. The left breast had DCIS on her biopsy on 12/30/2021 but there was no residual cancer in the left mastectomy. The left SLN was negative. The right breast had an invasive lobular carcinoma, grade II, measuring 2.2 cm. The margins were negative. The right SLN procedure had one node with isolated tumor cells and another node that was negative. She had Oncotype Dx testing with a RS of 26.     Review of Systems:   Constitutional: Negative for anorexia, fatigue, fevers, chills, night sweats and weight loss.  Psychiatric: The patient's mood was calm and appropriate for this visit.  The pertinent positives and negatives were described. All other systems were negative.    PMH/PSH:  Past Medical History:    Acute appendicitis with localized peritonitis, without perforation, abscess, or gangrene    Anxiety    Cancer (HCC)    breast cancer- right breast    Cancer (HCC)    left arm melanoma    Depression    Ductal hyperplasia, atypical, breast    Flat epithelial atypia (FEA) of left breast    High blood pressure    not on any medication    High cholesterol    Hyperlipidemia    Macular degeneration    Osteoporosis    Personal history of colonic polyps    PONV (postoperative nausea and vomiting)    Status post bilateral mastectomy    Tremors of nervous system    Visual impairment    glasses       Past Surgical History:   Procedure Laterality Date    Angioplasty (coronary)      Appendectomy  12/13/2023    Colonoscopy      Colonoscopy & polypectomy  12/01/2003    Hysterectomy  2017    and oophorectomy    Ajit biopsy stereo nodule 1 site left (cpt=19081)  08/2020    ADH/FEA    Ajit localization wire 1 site left (cpt=19281)  09/2020    Benign (core was ADH/FEA)    Mastectomy left      Mastectomy right       Needle biopsy left      Benign          x2    Other surgical history  10/01/2008    cardiac cath post-procedure date (mild LAD)                    Tubal ligation         Family History Reviewed:  Family History   Problem Relation Age of Onset    Other (Melanoma) Self     Other (Other) Self     Breast Cancer Self     Heart Disease Father         Heart disease    High Blood Pressure Father     Cancer Sister 64        leukemia    Cancer Sister 29        Kidney cancer    Neurological Disorder Sister     Other (als) Sister     Cancer Paternal Uncle 70        breast    High Cholesterol Other         siblings       Allergies:     No Known Allergies    Medications:   levothyroxine 75 MCG Oral Tab Take 1 tablet (75 mcg total) by mouth before breakfast. 90 tablet 3    ATORVASTATIN 10 MG Oral Tab TAKE 1 TABLET BY MOUTH EVERY DAY AT NIGHT 90 tablet 0    predniSONE 2.5 MG Oral Tab Take 3 tablets (7.5 mg total) by mouth daily. (Patient taking differently: Take 2 tablets (5 mg total) by mouth daily.) 90 tablet 3    PRIMIDONE 50 MG Oral Tab TAKE 1 TABLET (50 MG TOTAL) BY MOUTH IN THE MORNING AND 1 TABLET (50 MG TOTAL) BEFORE BEDTIME 180 tablet 1    escitalopram 10 MG Oral Tab Take 1 tablet (10 mg total) by mouth daily. 90 tablet 1    aspirin 81 MG Oral Chew Tab Chew 1 tablet (81 mg total) by mouth daily.      anastrozole 1 MG Oral Tab tab Take 1 tablet (1 mg total) by mouth daily. 90 tablet 3    Acidophilus/Pectin Oral Cap Take 1 capsule by mouth daily.           Vital Signs:      Height: 157.5 cm (5' 2.01\") (10/23 0926)  Weight: 57.2 kg (126 lb) (10/23 0926)  BSA (Calculated - sq m): 1.57 sq meters (10/23 0926)  Pulse: 79 (10/23 0926)  BP: 179/90 (10/23 0926)  Temp: 97.7 °F (36.5 °C) (10/23 0926)  Do Not Use - Resp Rate: --  SpO2: 93 % (10/23 0926)      Performance Status:  ECOG 0: Fully active, able to carry on all pre-disease performance without restriction     Physical Examination:    Constitutional: Patient is alert  and oriented x 3, not in acute distress.  Respiratory: Clear to auscultation and percussion. No rales.  No wheezes.  Cardiovascular: Regular rate and rhythm. No murmurs.  Gastrointestinal: Soft, non tender with good bowel sounds.  Musculoskeletal: No edema. No calf tenderness.  Psychiatric: The patient's mood is calm and appropriate for this visit.       Labs reviewed at this visit:     Lab Results   Component Value Date    WBC 6.1 10/23/2024    RBC 4.45 10/23/2024    HGB 12.1 10/23/2024    HCT 37.4 10/23/2024    MCV 84.0 10/23/2024    MCH 27.2 10/23/2024    MCHC 32.4 10/23/2024    RDW 15.2 10/23/2024    .0 10/23/2024     Lab Results   Component Value Date     10/23/2024    K 3.7 10/23/2024     10/23/2024    CO2 28.0 10/23/2024    BUN 10 10/23/2024    CREATSERUM 0.88 10/23/2024     (H) 10/23/2024    CA 9.1 10/23/2024    ALKPHO 55 10/23/2024    ALT 10 10/23/2024    AST 23 10/23/2024    BILT 0.4 10/23/2024    ALB 4.2 10/23/2024    TP 7.4 10/23/2024     Lab Component   Component Value Date/Time    TSH 2.306 10/23/2024 0923    TSH 2.269 09/11/2024 0901    TSH 1.936 07/31/2024 1002           Component  Ref Range & Units 5/8/24 1107   Rheumatoid Factor  <15 IU/mL <10            Component  Ref Range & Units 5/8/24 0938   Sed Rate  0 - 30 mm/Hr 48 High             Component  Ref Range & Units 5/8/24 0938   C-Reactive Protein  <0.30 mg/dL 1.26 High           Radiologic imaging reviewed at this visit:    Bone Dexa Scan on 5/5/2022:  LUMBAR SPINE ANALYSIS RESULTS:       Bone mineral density (BMD) (g/cm2):  0.487     Lumbar T-Score:  -5.1       % young normals:  46       % age matched controls:  61       Change from prior spine examination:  n/a                TOTAL HIP ANALYSIS RESULTS:         Bone mineral density (BMD) (g/cm2):  0.446       Total Hip T-Score:  -4.1       % young normals:  47       % age matched controls:  61       Change from prior hip examination:  n/a                FEMORAL NECK  ANALYSIS RESULTS:         Bone mineral density (BMD) (g/cm2):  0.388       Femoral neck T-Score:  -4.2       % young normals:  46       % age matched controls:  62       Change from prior hip examination:  n/a           ADDITIONAL FINDINGS:  No significant additional findings.        Impression   CONCLUSION:  Bone mineral density values are compatible with the diagnosis of osteoporosis by WHO definition (T score less than -2.5). If therapy is initiated, a follow-up study in 1 year may aid in evaluation of therapeutic efficacy.        MRI of breast on 12/30/2021:  DESCRIPTION:  Witnessed verbal and written informed consent was obtained.  Time out was performed by the staff.  Discussed benefits and risks of MR guided core needle biopsy including, but not limited to, bleeding, infection, and/or failure to obtain   adequate sample. The patient was placed prone on the MRI Scanner.  The left breast was positioned in a dedicated breast coil and MR guidance grid device.   A fiducial was placed on the grid.       The skin was prepped and after local anesthesia was achieved, an introducer sheath and a localizing obturator were placed using a lateral approach at both sites.  The location of the obturator sheaths was confirmed with imaging.       The patient was then taken out of the bore of the magnet and samples were obtained using an MR compatible vacuum assisted core biopsy device at both sites.  A regular size needle was used for site 1 and a petite sized needle was used for site 2. Six   samples were taken at site 1, and 12 samples were taken at site 2. A tissue marker was placed at both sites.  Post biopsy images were obtained which confirm pot biopsy changes at the targets of the biopsy.         Post procedural mammogram performed on separate digital equipment show the clips in appropriate position.       The patient tolerated the procedure well and left the department in stable condition.       BIOPSY NEEDLE:             9-gauge Suros biopsy device   MEDICATION:               1% lidocaine and 1% lidocaine containing epinephrine     SITE MARKER COORDINATES:     SITE 1:  Non-mass enhancement 7 o'clock  left breast   SITE 2:  0800 hours left breast   CLIP TYPE:                SITE 1:  M  SITE 2:  X   PHYSICIANS PRESENT:       Jaquelin Cole M.D.         PATHOLOGY:   A.  Left breast, 7:00, MRI guided biopsy:   -Breast tissue with nodular adenosis, stromal fibrosis, secretory change, apocrine metaplasia and microcalcifications.   -See comment.       B.  Left breast, 8:00, MRI guided biopsy:   -Ductal carcinoma in situ, nuclear grade 2, solid and cribriform types.   -Largest focus of DCIS measures 5 mm (0.5 cm) in greatest dimension.   -Background fibroadenomatous change, apocrine metaplasia, sclerosing adenosis and microcalcifications.   -See comment.      Impression   CONCLUSION:     MRI-guided biopsy of two areas in the left breast.       Pathology from the 1st site at the 7 o'clock position is reported as benign.  This is likely concordant with the imaging assessment and is marked with an M clip.       Pathology from the 2nd site at the 8 o'clock position shows the presence of DCIS.  This is concordant with the imaging assessment and is marked with an X clip.       Surgical consultation is recommended for the bilateral biopsy proven malignancies.  Per the patient's electronic medical record, she is planning on a bilateral mastectomy.         Assessment/Plan:     Malignant melanoma of the left upper arm:  Nodular melanoma 10 mm thickness  2 of 6 left axillary nodes positive  Microscopic metastases  No extranodal extension  BRAF negative     The patient has a pT4b,pN2a (Stage IIIc) melanoma of the left upper arm and axilla. She has had definitive surgery. She has BRAF negative disease. She has good performance status (PS 0).    She will continue with the pembrolizumab every three weeks. 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 She has responded to the prednisone. I will continue the 5 mg daily dose. She will return in three weeks.    Invasive lobular carcinoma of the right breast 12 o'clock (overlapping quadrant):  Biopsy on 11/17/2021  ER 90%  NH <1%  K--67 5%  Her2 1+  Bilateral mastectomy on 1/21/2022:  Right Breast with Invasive lobular carcinoma  Measuring 2.2 cm  SLN 0/2 (one with ITC)  Negative margins  Left breast with no residual DCIS  0.5 cm DCIS biopsy on 12/30/2021  ER/NH positive  Right breast Oncotype Dx RS 26     Continue anastrozole. She does have severe osteoporosis. She continues to follow with endocrine. She has OPAL on the AI. She has no side effects from the AI. She is comfortable with this plan.     Severe Osteoporosis:     She is following with endocrinology. She has had dental work with teeth extractions. She has been cleared by dental. She had Reclast on 6/20/2024. She will get this yearly.     Hypertension secondary to anxiety about treatment:    She has white coat HTN. Her home bp measurements are normal.    Hypothyroid:    TSH is normal. Continue current dosing of levothyroxine.    Ben Garsia MD

## 2024-10-29 RX ORDER — PREDNISONE 10 MG/1
20 TABLET ORAL DAILY
Qty: 60 TABLET | Refills: 1 | Status: SHIPPED | OUTPATIENT
Start: 2024-10-29 | End: 2024-11-13

## 2024-11-13 ENCOUNTER — OFFICE VISIT (OUTPATIENT)
Dept: HEMATOLOGY/ONCOLOGY | Facility: HOSPITAL | Age: 74
End: 2024-11-13
Attending: INTERNAL MEDICINE
Payer: MEDICARE

## 2024-11-13 VITALS
HEIGHT: 62.01 IN | HEART RATE: 85 BPM | BODY MASS INDEX: 23.04 KG/M2 | WEIGHT: 126.81 LBS | DIASTOLIC BLOOD PRESSURE: 93 MMHG | TEMPERATURE: 97 F | OXYGEN SATURATION: 96 % | RESPIRATION RATE: 18 BRPM | SYSTOLIC BLOOD PRESSURE: 195 MMHG

## 2024-11-13 DIAGNOSIS — Z17.0 MALIGNANT NEOPLASM OF OVERLAPPING SITES OF RIGHT BREAST IN FEMALE, ESTROGEN RECEPTOR POSITIVE (HCC): Primary | ICD-10-CM

## 2024-11-13 DIAGNOSIS — C43.62 MALIGNANT MELANOMA OF ARM, LEFT (HCC): Primary | ICD-10-CM

## 2024-11-13 DIAGNOSIS — C43.62 MALIGNANT MELANOMA OF ARM, LEFT (HCC): ICD-10-CM

## 2024-11-13 DIAGNOSIS — C50.811 MALIGNANT NEOPLASM OF OVERLAPPING SITES OF RIGHT BREAST IN FEMALE, ESTROGEN RECEPTOR POSITIVE (HCC): Primary | ICD-10-CM

## 2024-11-13 LAB
ALBUMIN SERPL-MCNC: 4.1 G/DL (ref 3.2–4.8)
ALBUMIN/GLOB SERPL: 1.3 {RATIO} (ref 1–2)
ALP LIVER SERPL-CCNC: 53 U/L
ALT SERPL-CCNC: 13 U/L
ANION GAP SERPL CALC-SCNC: 3 MMOL/L (ref 0–18)
AST SERPL-CCNC: 23 U/L (ref ?–34)
BASOPHILS # BLD AUTO: 0.07 X10(3) UL (ref 0–0.2)
BASOPHILS NFR BLD AUTO: 0.8 %
BILIRUB SERPL-MCNC: 0.3 MG/DL (ref 0.2–1.1)
BUN BLD-MCNC: 9 MG/DL (ref 9–23)
CALCIUM BLD-MCNC: 9.3 MG/DL (ref 8.7–10.4)
CHLORIDE SERPL-SCNC: 108 MMOL/L (ref 98–112)
CO2 SERPL-SCNC: 30 MMOL/L (ref 21–32)
CREAT BLD-MCNC: 0.88 MG/DL
EGFRCR SERPLBLD CKD-EPI 2021: 68 ML/MIN/1.73M2 (ref 60–?)
EOSINOPHIL # BLD AUTO: 0.03 X10(3) UL (ref 0–0.7)
EOSINOPHIL NFR BLD AUTO: 0.4 %
ERYTHROCYTE [DISTWIDTH] IN BLOOD BY AUTOMATED COUNT: 14.9 %
FASTING STATUS PATIENT QL REPORTED: NO
GLOBULIN PLAS-MCNC: 3.1 G/DL (ref 2–3.5)
GLUCOSE BLD-MCNC: 92 MG/DL (ref 70–99)
HCT VFR BLD AUTO: 38.9 %
HGB BLD-MCNC: 12.7 G/DL
IMM GRANULOCYTES # BLD AUTO: 0.06 X10(3) UL (ref 0–1)
IMM GRANULOCYTES NFR BLD: 0.7 %
LYMPHOCYTES # BLD AUTO: 0.79 X10(3) UL (ref 1–4)
LYMPHOCYTES NFR BLD AUTO: 9.6 %
MCH RBC QN AUTO: 27.9 PG (ref 26–34)
MCHC RBC AUTO-ENTMCNC: 32.6 G/DL (ref 31–37)
MCV RBC AUTO: 85.3 FL
MONOCYTES # BLD AUTO: 0.61 X10(3) UL (ref 0.1–1)
MONOCYTES NFR BLD AUTO: 7.4 %
NEUTROPHILS # BLD AUTO: 6.69 X10 (3) UL (ref 1.5–7.7)
NEUTROPHILS # BLD AUTO: 6.69 X10(3) UL (ref 1.5–7.7)
NEUTROPHILS NFR BLD AUTO: 81.1 %
OSMOLALITY SERPL CALC.SUM OF ELEC: 290 MOSM/KG (ref 275–295)
PLATELET # BLD AUTO: 275 10(3)UL (ref 150–450)
POTASSIUM SERPL-SCNC: 3.7 MMOL/L (ref 3.5–5.1)
PROT SERPL-MCNC: 7.2 G/DL (ref 5.7–8.2)
RBC # BLD AUTO: 4.56 X10(6)UL
SODIUM SERPL-SCNC: 141 MMOL/L (ref 136–145)
WBC # BLD AUTO: 8.3 X10(3) UL (ref 4–11)

## 2024-11-13 PROCEDURE — 80053 COMPREHEN METABOLIC PANEL: CPT

## 2024-11-13 PROCEDURE — 96413 CHEMO IV INFUSION 1 HR: CPT

## 2024-11-13 PROCEDURE — 99214 OFFICE O/P EST MOD 30 MIN: CPT | Performed by: INTERNAL MEDICINE

## 2024-11-13 PROCEDURE — 85025 COMPLETE CBC W/AUTO DIFF WBC: CPT

## 2024-11-13 NOTE — PROGRESS NOTES
Pt here for C14D1 Drug(s)Keytruda.  Arrives Ambulating independently, accompanied by Self     Patient was evaluated today by MD and Treatment Nurse.    Oral medications included in this regimen:  no    Patient confirms comprehension of cancer treatment schedule:  yes    Pregnancy screening:  Not applicable    Modifications in dose or schedule:  No    Medications appearance and physical integrity checked by RN: yes.    Chemotherapy IV pump settings verified by 2 RNs:  Yes.  Frequency of blood return and site check throughout administration: Prior to administration     Infusion/treatment outcome:  patient tolerated treatment without incident    Education Record    Learner:  Patient  Barriers / Limitations:  None  Method:  Brief focused  Education / instructions given:  patient  Outcome:  Shows understanding    Discharged Home, Ambulating independently, accompanied by:Self    Patient/family verbalized understanding of future appointments: by printed AVS      Tolerated well

## 2024-11-13 NOTE — PROGRESS NOTES
Patient here for C14D1 keytruda. Patient taking anastrozole as directed. Patient has no current complaints or concerns.     Education Record    Learner:  Patient and Spouse    Disease / Diagnosis: breast cancer     Barriers / Limitations:  None   Comments:    Method:  Discussion   Comments:    General Topics:  Medication, Pain, Side effects and symptom management, and Plan of care reviewed   Comments:    Outcome:  Shows understanding   Comments:

## 2024-11-13 NOTE — PROGRESS NOTES
Cancer Center Progress Note    Problem List:      Patient Active Problem List   Diagnosis    Coronary atherosclerosis    Anxiety state    Pure hypercholesterolemia    Hypertension    Pulmonary fibrosis (HCC)    Abdominal aortic ectasia (HCC)    Reactive depression    Benign essential tremor    Exudative age-related macular degeneration of left eye with active choroidal neovascularization (HCC)    Invasive lobular carcinoma of breast, stage 2, right (HCC)    Osteopenia, senile    Malignant melanoma of arm, left (HCC)    Age-related osteoporosis without current pathological fracture       Interim History:    Dillan Casillas presents today for evaluation and management of a diagnosis of right breast cancer.    She has had a 13th cycle of pembrolizumab. She had complained of chronic bilateral knee pain but this improved on prednisone. She is down to prednisone 5 mg daily as of three weeks ago. She has not had any recent joint pain. She has not seen rheumatology. I have been managing the inflammatory arthritis.    She has no fever or sweats. She has no dyspnea. She has no rash or pruritus. 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 She is on levothyroxine 75 mcg daily. She is following with endocrine.    She has a prior h/o right breast cancer. She has been on anastrozole. She now presents with a left upper arm lesion. She was evaluated by Dr. Pierce Uribe. He did an excision biopsy on 11/13/2023 that showed invasive nodular melanoma with ulceration (tumor thickness 10 mm). She proceeded to have WLE and SLN procedure on 1/9/2024 by Dr. Zurdo Montesinos. There was no residual disease in the WLE. She had 2 of 6 nodes positive. One SLN had a 3 mm focus of metastatic melanoma with no extracapsular extension. A second SLN had a 1.5 mm of focus of metastatic melanoma with no extracapsular extension.      She had a PET scan on 12/8/2023 that was negative for metastatic disease. She had a MRI of  the brain that was negative for brain metastases.     She had an acute appendicitis on 12/13/2023 s/p appendectomy. She has had some fatigue since that surgery and the recent arm surgery.    She had bilateral mastectomy on 1/21/2022. The left breast had DCIS on her biopsy on 12/30/2021 but there was no residual cancer in the left mastectomy. The left SLN was negative. The right breast had an invasive lobular carcinoma, grade II, measuring 2.2 cm. The margins were negative. The right SLN procedure had one node with isolated tumor cells and another node that was negative. She had Oncotype Dx testing with a RS of 26.     Review of Systems:   Constitutional: Negative for anorexia, fatigue, fevers, chills, night sweats and weight loss.  Psychiatric: The patient's mood was calm and appropriate for this visit.  The pertinent positives and negatives were described. All other systems were negative.    PMH/PSH:  Past Medical History:    Acute appendicitis with localized peritonitis, without perforation, abscess, or gangrene    Anxiety    Cancer (HCC)    breast cancer- right breast    Cancer (HCC)    left arm melanoma    Depression    Ductal hyperplasia, atypical, breast    Flat epithelial atypia (FEA) of left breast    High blood pressure    not on any medication    High cholesterol    Hyperlipidemia    Macular degeneration    Osteoporosis    Personal history of colonic polyps    PONV (postoperative nausea and vomiting)    Status post bilateral mastectomy    Tremors of nervous system    Visual impairment    glasses       Past Surgical History:   Procedure Laterality Date    Angioplasty (coronary)      Appendectomy  12/13/2023    Colonoscopy      Colonoscopy & polypectomy  12/01/2003    Hysterectomy  2017    and oophorectomy    Ajit biopsy stereo nodule 1 site left (cpt=19081)  08/2020    ADH/FEA    Ajit localization wire 1 site left (cpt=19281)  09/2020    Benign (core was ADH/FEA)    Mastectomy left      Mastectomy right       Needle biopsy left      Benign          x2    Other surgical history  10/01/2008    cardiac cath post-procedure date (mild LAD)                    Tubal ligation         Family History Reviewed:  Family History   Problem Relation Age of Onset    Other (Melanoma) Self     Other (Other) Self     Breast Cancer Self     Heart Disease Father         Heart disease    High Blood Pressure Father     Cancer Sister 64        leukemia    Cancer Sister 29        Kidney cancer    Neurological Disorder Sister     Other (als) Sister     Cancer Paternal Uncle 70        breast    High Cholesterol Other         siblings       Allergies:     No Known Allergies    Medications:   levothyroxine 75 MCG Oral Tab Take 1 tablet (75 mcg total) by mouth before breakfast. 90 tablet 3    ATORVASTATIN 10 MG Oral Tab TAKE 1 TABLET BY MOUTH EVERY DAY AT NIGHT 90 tablet 0    predniSONE 2.5 MG Oral Tab Take 3 tablets (7.5 mg total) by mouth daily. (Patient taking differently: Take 2 tablets (5 mg total) by mouth daily.) 90 tablet 3    PRIMIDONE 50 MG Oral Tab TAKE 1 TABLET (50 MG TOTAL) BY MOUTH IN THE MORNING AND 1 TABLET (50 MG TOTAL) BEFORE BEDTIME 180 tablet 1    escitalopram 10 MG Oral Tab Take 1 tablet (10 mg total) by mouth daily. 90 tablet 1    aspirin 81 MG Oral Chew Tab Chew 1 tablet (81 mg total) by mouth daily.      anastrozole 1 MG Oral Tab tab Take 1 tablet (1 mg total) by mouth daily. 90 tablet 3    Multiple Vitamin Oral Tab Take 1 tablet by mouth daily.      Acidophilus/Pectin Oral Cap Take 1 capsule by mouth daily.           Vital Signs:      Height: 157.5 cm (5' 2.01\") (953)  Weight: 57.5 kg (126 lb 12.8 oz) (953)  BSA (Calculated - sq m): 1.58 sq meters (953)  Pulse: 85 (953)  BP: 195/93 (953)  Temp: 97.2 °F (36.2 °C) (953)  Do Not Use - Resp Rate: --  SpO2: 96 % (953)      Performance Status:  ECOG 0: Fully active, able to carry on all pre-disease performance without  restriction     Physical Examination:    Constitutional: Patient is alert and oriented x 3, not in acute distress.  Respiratory: Clear to auscultation and percussion. No rales.  No wheezes.  Cardiovascular: Regular rate and rhythm. No murmurs.  Gastrointestinal: Soft, non tender with good bowel sounds.  Musculoskeletal: No edema. No calf tenderness.  Psychiatric: The patient's mood is calm and appropriate for this visit.       Labs reviewed at this visit:     Lab Results   Component Value Date    WBC 8.3 11/13/2024    RBC 4.56 11/13/2024    HGB 12.7 11/13/2024    HCT 38.9 11/13/2024    MCV 85.3 11/13/2024    MCH 27.9 11/13/2024    MCHC 32.6 11/13/2024    RDW 14.9 11/13/2024    .0 11/13/2024     Lab Results   Component Value Date     11/13/2024    K 3.7 11/13/2024     11/13/2024    CO2 30.0 11/13/2024    BUN 9 11/13/2024    CREATSERUM 0.88 11/13/2024    GLU 92 11/13/2024    CA 9.3 11/13/2024    ALKPHO 53 (L) 11/13/2024    ALT 13 11/13/2024    AST 23 11/13/2024    BILT 0.3 11/13/2024    ALB 4.1 11/13/2024    TP 7.2 11/13/2024     Lab Component   Component Value Date/Time    TSH 2.306 10/23/2024 0923    TSH 2.269 09/11/2024 0901    TSH 1.936 07/31/2024 1002           Component  Ref Range & Units 5/8/24 1107   Rheumatoid Factor  <15 IU/mL <10            Component  Ref Range & Units 5/8/24 0938   Sed Rate  0 - 30 mm/Hr 48 High             Component  Ref Range & Units 5/8/24 0938   C-Reactive Protein  <0.30 mg/dL 1.26 High           Radiologic imaging reviewed at this visit:    Bone Dexa Scan on 5/5/2022:  LUMBAR SPINE ANALYSIS RESULTS:       Bone mineral density (BMD) (g/cm2):  0.487     Lumbar T-Score:  -5.1       % young normals:  46       % age matched controls:  61       Change from prior spine examination:  n/a                TOTAL HIP ANALYSIS RESULTS:         Bone mineral density (BMD) (g/cm2):  0.446       Total Hip T-Score:  -4.1       % young normals:  47       % age matched controls:  61        Change from prior hip examination:  n/a                FEMORAL NECK ANALYSIS RESULTS:         Bone mineral density (BMD) (g/cm2):  0.388       Femoral neck T-Score:  -4.2       % young normals:  46       % age matched controls:  62       Change from prior hip examination:  n/a           ADDITIONAL FINDINGS:  No significant additional findings.        Impression   CONCLUSION:  Bone mineral density values are compatible with the diagnosis of osteoporosis by WHO definition (T score less than -2.5). If therapy is initiated, a follow-up study in 1 year may aid in evaluation of therapeutic efficacy.        MRI of breast on 12/30/2021:  DESCRIPTION:  Witnessed verbal and written informed consent was obtained.  Time out was performed by the staff.  Discussed benefits and risks of MR guided core needle biopsy including, but not limited to, bleeding, infection, and/or failure to obtain   adequate sample. The patient was placed prone on the MRI Scanner.  The left breast was positioned in a dedicated breast coil and MR guidance grid device.   A fiducial was placed on the grid.       The skin was prepped and after local anesthesia was achieved, an introducer sheath and a localizing obturator were placed using a lateral approach at both sites.  The location of the obturator sheaths was confirmed with imaging.       The patient was then taken out of the bore of the magnet and samples were obtained using an MR compatible vacuum assisted core biopsy device at both sites.  A regular size needle was used for site 1 and a petite sized needle was used for site 2. Six   samples were taken at site 1, and 12 samples were taken at site 2. A tissue marker was placed at both sites.  Post biopsy images were obtained which confirm pot biopsy changes at the targets of the biopsy.         Post procedural mammogram performed on separate digital equipment show the clips in appropriate position.       The patient tolerated the procedure well and  left the department in stable condition.       BIOPSY NEEDLE:            9-gauge [employer] biopsy device   MEDICATION:               1% lidocaine and 1% lidocaine containing epinephrine     SITE MARKER COORDINATES:     SITE 1:  Non-mass enhancement 7 o'clock  left breast   SITE 2:  0800 hours left breast   CLIP TYPE:                SITE 1:  M  SITE 2:  X   PHYSICIANS PRESENT:       Jaquelin Cole M.D.         PATHOLOGY:   A.  Left breast, 7:00, MRI guided biopsy:   -Breast tissue with nodular adenosis, stromal fibrosis, secretory change, apocrine metaplasia and microcalcifications.   -See comment.       B.  Left breast, 8:00, MRI guided biopsy:   -Ductal carcinoma in situ, nuclear grade 2, solid and cribriform types.   -Largest focus of DCIS measures 5 mm (0.5 cm) in greatest dimension.   -Background fibroadenomatous change, apocrine metaplasia, sclerosing adenosis and microcalcifications.   -See comment.      Impression   CONCLUSION:     MRI-guided biopsy of two areas in the left breast.       Pathology from the 1st site at the 7 o'clock position is reported as benign.  This is likely concordant with the imaging assessment and is marked with an M clip.       Pathology from the 2nd site at the 8 o'clock position shows the presence of DCIS.  This is concordant with the imaging assessment and is marked with an X clip.       Surgical consultation is recommended for the bilateral biopsy proven malignancies.  Per the patient's electronic medical record, she is planning on a bilateral mastectomy.         Assessment/Plan:     Malignant melanoma of the left upper arm:  Nodular melanoma 10 mm thickness  2 of 6 left axillary nodes positive  Microscopic metastases  No extranodal extension  BRAF negative     The patient has a pT4b,pN2a (Stage IIIc) melanoma of the left upper arm and axilla. She has had definitive surgery. She has BRAF negative disease. She has good performance status (PS 0).    She will continue with the  pembrolizumab every three weeks. 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 She has responded to the prednisone. I will continue the 5 mg daily dose. She will return in three weeks.    Invasive lobular carcinoma of the right breast 12 o'clock (overlapping quadrant):  Biopsy on 11/17/2021  ER 90%  IA <1%  K--67 5%  Her2 1+  Bilateral mastectomy on 1/21/2022:  Right Breast with Invasive lobular carcinoma  Measuring 2.2 cm  SLN 0/2 (one with ITC)  Negative margins  Left breast with no residual DCIS  0.5 cm DCIS biopsy on 12/30/2021  ER/IA positive  Right breast Oncotype Dx RS 26     Continue anastrozole. She does have severe osteoporosis. She continues to follow with endocrine. She has OPAL on the AI. She has no side effects from the AI. She is comfortable with this plan.     Severe Osteoporosis:     She is following with endocrinology. She has had dental work with teeth extractions. She has been cleared by dental. She had Reclast on 6/20/2024. She will get this yearly.     Hypertension secondary to anxiety about treatment:    She has white coat HTN. Her home bp measurements are normal.    Hypothyroid:    TSH is normal. Continue current dosing of levothyroxine.    Ben Garsia MD

## 2024-11-24 ENCOUNTER — IMMUNIZATION (OUTPATIENT)
Dept: LAB | Age: 74
End: 2024-11-24
Attending: EMERGENCY MEDICINE
Payer: MEDICARE

## 2024-11-24 DIAGNOSIS — Z23 NEED FOR VACCINATION: Primary | ICD-10-CM

## 2024-11-24 PROCEDURE — 90471 IMMUNIZATION ADMIN: CPT

## 2024-11-24 PROCEDURE — 90480 ADMN SARSCOV2 VAC 1/ONLY CMP: CPT

## 2024-11-24 PROCEDURE — 90662 IIV NO PRSV INCREASED AG IM: CPT

## 2024-12-03 ENCOUNTER — OFFICE VISIT (OUTPATIENT)
Dept: HEMATOLOGY/ONCOLOGY | Facility: HOSPITAL | Age: 74
End: 2024-12-03
Attending: INTERNAL MEDICINE
Payer: MEDICARE

## 2024-12-03 VITALS
HEART RATE: 87 BPM | RESPIRATION RATE: 16 BRPM | DIASTOLIC BLOOD PRESSURE: 83 MMHG | SYSTOLIC BLOOD PRESSURE: 187 MMHG | HEIGHT: 62.01 IN | BODY MASS INDEX: 23.08 KG/M2 | WEIGHT: 127 LBS | OXYGEN SATURATION: 94 % | TEMPERATURE: 98 F

## 2024-12-03 DIAGNOSIS — Z17.0 MALIGNANT NEOPLASM OF OVERLAPPING SITES OF RIGHT BREAST IN FEMALE, ESTROGEN RECEPTOR POSITIVE (HCC): ICD-10-CM

## 2024-12-03 DIAGNOSIS — C43.62 MALIGNANT MELANOMA OF ARM, LEFT (HCC): Primary | ICD-10-CM

## 2024-12-03 DIAGNOSIS — C50.811 MALIGNANT NEOPLASM OF OVERLAPPING SITES OF RIGHT BREAST IN FEMALE, ESTROGEN RECEPTOR POSITIVE (HCC): ICD-10-CM

## 2024-12-03 LAB
ALBUMIN SERPL-MCNC: 3.9 G/DL (ref 3.2–4.8)
ALBUMIN/GLOB SERPL: 1.3 {RATIO} (ref 1–2)
ALP LIVER SERPL-CCNC: 49 U/L
ALT SERPL-CCNC: 11 U/L
ANION GAP SERPL CALC-SCNC: 8 MMOL/L (ref 0–18)
AST SERPL-CCNC: 22 U/L (ref ?–34)
BASOPHILS # BLD AUTO: 0.05 X10(3) UL (ref 0–0.2)
BASOPHILS NFR BLD AUTO: 0.7 %
BILIRUB SERPL-MCNC: 0.4 MG/DL (ref 0.2–1.1)
BUN BLD-MCNC: 6 MG/DL (ref 9–23)
CALCIUM BLD-MCNC: 9.1 MG/DL (ref 8.7–10.4)
CHLORIDE SERPL-SCNC: 106 MMOL/L (ref 98–112)
CO2 SERPL-SCNC: 27 MMOL/L (ref 21–32)
CREAT BLD-MCNC: 0.9 MG/DL
EGFRCR SERPLBLD CKD-EPI 2021: 67 ML/MIN/1.73M2 (ref 60–?)
EOSINOPHIL # BLD AUTO: 0.04 X10(3) UL (ref 0–0.7)
EOSINOPHIL NFR BLD AUTO: 0.5 %
ERYTHROCYTE [DISTWIDTH] IN BLOOD BY AUTOMATED COUNT: 15.2 %
FASTING STATUS PATIENT QL REPORTED: NO
GLOBULIN PLAS-MCNC: 3.1 G/DL (ref 2–3.5)
GLUCOSE BLD-MCNC: 102 MG/DL (ref 70–99)
HCT VFR BLD AUTO: 38.5 %
HGB BLD-MCNC: 12 G/DL
IMM GRANULOCYTES # BLD AUTO: 0.04 X10(3) UL (ref 0–1)
IMM GRANULOCYTES NFR BLD: 0.5 %
LYMPHOCYTES # BLD AUTO: 0.75 X10(3) UL (ref 1–4)
LYMPHOCYTES NFR BLD AUTO: 10 %
MCH RBC QN AUTO: 27 PG (ref 26–34)
MCHC RBC AUTO-ENTMCNC: 31.2 G/DL (ref 31–37)
MCV RBC AUTO: 86.7 FL
MONOCYTES # BLD AUTO: 0.58 X10(3) UL (ref 0.1–1)
MONOCYTES NFR BLD AUTO: 7.7 %
NEUTROPHILS # BLD AUTO: 6.04 X10 (3) UL (ref 1.5–7.7)
NEUTROPHILS # BLD AUTO: 6.04 X10(3) UL (ref 1.5–7.7)
NEUTROPHILS NFR BLD AUTO: 80.6 %
OSMOLALITY SERPL CALC.SUM OF ELEC: 290 MOSM/KG (ref 275–295)
PLATELET # BLD AUTO: 276 10(3)UL (ref 150–450)
POTASSIUM SERPL-SCNC: 4.1 MMOL/L (ref 3.5–5.1)
PROT SERPL-MCNC: 7 G/DL (ref 5.7–8.2)
RBC # BLD AUTO: 4.44 X10(6)UL
SODIUM SERPL-SCNC: 141 MMOL/L (ref 136–145)
T4 FREE SERPL-MCNC: 1.5 NG/DL (ref 0.8–1.7)
TSI SER-ACNC: 0.76 UIU/ML (ref 0.55–4.78)
WBC # BLD AUTO: 7.5 X10(3) UL (ref 4–11)

## 2024-12-03 PROCEDURE — 96413 CHEMO IV INFUSION 1 HR: CPT

## 2024-12-03 PROCEDURE — 84443 ASSAY THYROID STIM HORMONE: CPT

## 2024-12-03 PROCEDURE — 80053 COMPREHEN METABOLIC PANEL: CPT

## 2024-12-03 PROCEDURE — 99214 OFFICE O/P EST MOD 30 MIN: CPT | Performed by: INTERNAL MEDICINE

## 2024-12-03 PROCEDURE — 84439 ASSAY OF FREE THYROXINE: CPT

## 2024-12-03 PROCEDURE — 85025 COMPLETE CBC W/AUTO DIFF WBC: CPT

## 2024-12-03 NOTE — PROGRESS NOTES
Pt here for C15D1 Drug(s)Keytruda.  Arrives Ambulating independently, accompanied by Self and Spouse     Patient was evaluated today by MD and Treatment Nurse.    Oral medications included in this regimen:  no    Patient confirms comprehension of cancer treatment schedule:  yes    Pregnancy screening:  Denies possibility of pregnancy    Modifications in dose or schedule:  No    Medications appearance and physical integrity checked by RN: yes.    Chemotherapy IV pump settings verified by 2 RNs:  Yes.  Frequency of blood return and site check throughout administration: Prior to administration     Infusion/treatment outcome:  patient tolerated treatment without incident    Education Record    Learner:  Patient and Spouse  Barriers / Limitations:  None  Method:  Brief focused  Education / instructions given:  patient  Outcome:  Shows understanding    Discharged Home, Ambulating independently, accompanied by:Self    Patient/family verbalized understanding of future appointments: by printed AVS    Tolerated her Keytruda well. Given new AVS

## 2024-12-03 NOTE — PROGRESS NOTES
Cancer Center Progress Note    Problem List:      Patient Active Problem List   Diagnosis    Coronary atherosclerosis    Anxiety state    Pure hypercholesterolemia    Hypertension    Pulmonary fibrosis (HCC)    Abdominal aortic ectasia (HCC)    Reactive depression    Benign essential tremor    Exudative age-related macular degeneration of left eye with active choroidal neovascularization (HCC)    Invasive lobular carcinoma of breast, stage 2, right (HCC)    Osteopenia, senile    Malignant melanoma of arm, left (HCC)    Age-related osteoporosis without current pathological fracture       Interim History:    Dillan Casillas presents today for evaluation and management of a diagnosis of right breast cancer.    She has had a 14th cycle of pembrolizumab. She has no complaint of pain. She is on prednisone 5 mg daily. She has not had any recent joint pain. She has not seen rheumatology. I have been managing the inflammatory arthritis.    She has no fever or sweats. She has no dyspnea. She has no rash or pruritus. 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 She is on levothyroxine 75 mcg daily. She is following with endocrine.    She has a prior h/o right breast cancer. She has been on anastrozole. She now presents with a left upper arm lesion. She was evaluated by Dr. Pierce Uribe. He did an excision biopsy on 11/13/2023 that showed invasive nodular melanoma with ulceration (tumor thickness 10 mm). She proceeded to have WLE and SLN procedure on 1/9/2024 by Dr. Zurdo Montesinos. There was no residual disease in the WLE. She had 2 of 6 nodes positive. One SLN had a 3 mm focus of metastatic melanoma with no extracapsular extension. A second SLN had a 1.5 mm of focus of metastatic melanoma with no extracapsular extension.      She had a PET scan on 12/8/2023 that was negative for metastatic disease. She had a MRI of the brain that was negative for brain metastases.     She had an acute  appendicitis on 2023 s/p appendectomy. She has had some fatigue since that surgery and the recent arm surgery.    She had bilateral mastectomy on 2022. The left breast had DCIS on her biopsy on 2021 but there was no residual cancer in the left mastectomy. The left SLN was negative. The right breast had an invasive lobular carcinoma, grade II, measuring 2.2 cm. The margins were negative. The right SLN procedure had one node with isolated tumor cells and another node that was negative. She had Oncotype Dx testing with a RS of 26.     Review of Systems:   Constitutional: Negative for anorexia, fatigue, fevers, chills, night sweats and weight loss.  Psychiatric: The patient's mood was calm and appropriate for this visit.  The pertinent positives and negatives were described. All other systems were negative.    PMH/PSH:  Past Medical History:    Acute appendicitis with localized peritonitis, without perforation, abscess, or gangrene    Anxiety    Cancer (HCC)    breast cancer- right breast    Cancer (HCC)    left arm melanoma    Depression    Ductal hyperplasia, atypical, breast    Flat epithelial atypia (FEA) of left breast    High blood pressure    not on any medication    High cholesterol    Hyperlipidemia    Macular degeneration    Osteoporosis    Personal history of colonic polyps    PONV (postoperative nausea and vomiting)    Status post bilateral mastectomy    Tremors of nervous system    Visual impairment    glasses       Past Surgical History:   Procedure Laterality Date    Angioplasty (coronary)      Appendectomy  2023    Colonoscopy      Colonoscopy & polypectomy  2003    Hysterectomy  2017    and oophorectomy    Ajit biopsy stereo nodule 1 site left (cpt=19081)  2020    ADH/FEA    Ajit localization wire 1 site left (cpt=19281)  2020    Benign (core was ADH/FEA)    Mastectomy left      Mastectomy right      Needle biopsy left      Benign          x2    Other surgical  history  10/01/2008    cardiac cath post-procedure date (mild LAD)                    Tubal ligation         Family History Reviewed:  Family History   Problem Relation Age of Onset    Other (Melanoma) Self     Other (Other) Self     Breast Cancer Self     Heart Disease Father         Heart disease    High Blood Pressure Father     Cancer Sister 64        leukemia    Cancer Sister 29        Kidney cancer    Neurological Disorder Sister     Other (als) Sister     Cancer Paternal Uncle 70        breast    High Cholesterol Other         siblings       Allergies:     No Known Allergies    Medications:   levothyroxine 75 MCG Oral Tab Take 1 tablet (75 mcg total) by mouth before breakfast. 90 tablet 3    ATORVASTATIN 10 MG Oral Tab TAKE 1 TABLET BY MOUTH EVERY DAY AT NIGHT 90 tablet 0    predniSONE 2.5 MG Oral Tab Take 3 tablets (7.5 mg total) by mouth daily. (Patient taking differently: Take 2 tablets (5 mg total) by mouth daily.) 90 tablet 3    PRIMIDONE 50 MG Oral Tab TAKE 1 TABLET (50 MG TOTAL) BY MOUTH IN THE MORNING AND 1 TABLET (50 MG TOTAL) BEFORE BEDTIME 180 tablet 1    escitalopram 10 MG Oral Tab Take 1 tablet (10 mg total) by mouth daily. 90 tablet 1    aspirin 81 MG Oral Chew Tab Chew 1 tablet (81 mg total) by mouth daily.      anastrozole 1 MG Oral Tab tab Take 1 tablet (1 mg total) by mouth daily. 90 tablet 3    Multiple Vitamin Oral Tab Take 1 tablet by mouth daily.      Acidophilus/Pectin Oral Cap Take 1 capsule by mouth daily.           Vital Signs:      Height: 157.5 cm (5' 2.01\") (12/03 1103)  Weight: 57.6 kg (127 lb) (12/03 1103)  BSA (Calculated - sq m): 1.58 sq meters (12/03 1103)  Pulse: 87 (12/03 1103)  BP: 187/83 (12/03 1103)  Temp: 97.8 °F (36.6 °C) (12/03 1103)  Do Not Use - Resp Rate: --  SpO2: 94 % (12/03 1103)      Performance Status:  ECOG 0: Fully active, able to carry on all pre-disease performance without restriction     Physical Examination:    Constitutional: Patient is alert and  oriented x 3, not in acute distress.  Respiratory: Clear to auscultation and percussion. No rales.  No wheezes.  Cardiovascular: Regular rate and rhythm. No murmurs.  Gastrointestinal: Soft, non tender with good bowel sounds.  Musculoskeletal: No edema. No calf tenderness.  Psychiatric: The patient's mood is calm and appropriate for this visit.       Labs reviewed at this visit:     Lab Results   Component Value Date    WBC 7.5 12/03/2024    RBC 4.44 12/03/2024    HGB 12.0 12/03/2024    HCT 38.5 12/03/2024    MCV 86.7 12/03/2024    MCH 27.0 12/03/2024    MCHC 31.2 12/03/2024    RDW 15.2 12/03/2024    .0 12/03/2024     Lab Results   Component Value Date     12/03/2024    K 4.1 12/03/2024     12/03/2024    CO2 27.0 12/03/2024    BUN 6 (L) 12/03/2024    CREATSERUM 0.90 12/03/2024     (H) 12/03/2024    CA 9.1 12/03/2024    ALKPHO 49 (L) 12/03/2024    ALT 11 12/03/2024    AST 22 12/03/2024    BILT 0.4 12/03/2024    ALB 3.9 12/03/2024    TP 7.0 12/03/2024     Lab Component   Component Value Date/Time    TSH 0.761 12/03/2024 1049    TSH 2.306 10/23/2024 0923    TSH 2.269 09/11/2024 0901           Component  Ref Range & Units 5/8/24 1107   Rheumatoid Factor  <15 IU/mL <10            Component  Ref Range & Units 5/8/24 0938   Sed Rate  0 - 30 mm/Hr 48 High             Component  Ref Range & Units 5/8/24 0938   C-Reactive Protein  <0.30 mg/dL 1.26 High           Radiologic imaging reviewed at this visit:    Bone Dexa Scan on 5/5/2022:  LUMBAR SPINE ANALYSIS RESULTS:       Bone mineral density (BMD) (g/cm2):  0.487     Lumbar T-Score:  -5.1       % young normals:  46       % age matched controls:  61       Change from prior spine examination:  n/a                TOTAL HIP ANALYSIS RESULTS:         Bone mineral density (BMD) (g/cm2):  0.446       Total Hip T-Score:  -4.1       % young normals:  47       % age matched controls:  61       Change from prior hip examination:  n/a                FEMORAL  NECK ANALYSIS RESULTS:         Bone mineral density (BMD) (g/cm2):  0.388       Femoral neck T-Score:  -4.2       % young normals:  46       % age matched controls:  62       Change from prior hip examination:  n/a           ADDITIONAL FINDINGS:  No significant additional findings.        Impression   CONCLUSION:  Bone mineral density values are compatible with the diagnosis of osteoporosis by WHO definition (T score less than -2.5). If therapy is initiated, a follow-up study in 1 year may aid in evaluation of therapeutic efficacy.        MRI of breast on 12/30/2021:  DESCRIPTION:  Witnessed verbal and written informed consent was obtained.  Time out was performed by the staff.  Discussed benefits and risks of MR guided core needle biopsy including, but not limited to, bleeding, infection, and/or failure to obtain   adequate sample. The patient was placed prone on the MRI Scanner.  The left breast was positioned in a dedicated breast coil and MR guidance grid device.   A fiducial was placed on the grid.       The skin was prepped and after local anesthesia was achieved, an introducer sheath and a localizing obturator were placed using a lateral approach at both sites.  The location of the obturator sheaths was confirmed with imaging.       The patient was then taken out of the bore of the magnet and samples were obtained using an MR compatible vacuum assisted core biopsy device at both sites.  A regular size needle was used for site 1 and a petite sized needle was used for site 2. Six   samples were taken at site 1, and 12 samples were taken at site 2. A tissue marker was placed at both sites.  Post biopsy images were obtained which confirm pot biopsy changes at the targets of the biopsy.         Post procedural mammogram performed on separate digital equipment show the clips in appropriate position.       The patient tolerated the procedure well and left the department in stable condition.       BIOPSY NEEDLE:             9-gauge Suros biopsy device   MEDICATION:               1% lidocaine and 1% lidocaine containing epinephrine     SITE MARKER COORDINATES:     SITE 1:  Non-mass enhancement 7 o'clock  left breast   SITE 2:  0800 hours left breast   CLIP TYPE:                SITE 1:  M  SITE 2:  X   PHYSICIANS PRESENT:       Jaquelin Cole M.D.         PATHOLOGY:   A.  Left breast, 7:00, MRI guided biopsy:   -Breast tissue with nodular adenosis, stromal fibrosis, secretory change, apocrine metaplasia and microcalcifications.   -See comment.       B.  Left breast, 8:00, MRI guided biopsy:   -Ductal carcinoma in situ, nuclear grade 2, solid and cribriform types.   -Largest focus of DCIS measures 5 mm (0.5 cm) in greatest dimension.   -Background fibroadenomatous change, apocrine metaplasia, sclerosing adenosis and microcalcifications.   -See comment.      Impression   CONCLUSION:     MRI-guided biopsy of two areas in the left breast.       Pathology from the 1st site at the 7 o'clock position is reported as benign.  This is likely concordant with the imaging assessment and is marked with an M clip.       Pathology from the 2nd site at the 8 o'clock position shows the presence of DCIS.  This is concordant with the imaging assessment and is marked with an X clip.       Surgical consultation is recommended for the bilateral biopsy proven malignancies.  Per the patient's electronic medical record, she is planning on a bilateral mastectomy.         Assessment/Plan:     Malignant melanoma of the left upper arm:  Nodular melanoma 10 mm thickness  2 of 6 left axillary nodes positive  Microscopic metastases  No extranodal extension  BRAF negative     The patient has a pT4b,pN2a (Stage IIIc) melanoma of the left upper arm and axilla. She has had definitive surgery. She has BRAF negative disease. She has good performance status (PS 0).    She will continue with the pembrolizumab every three weeks. 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 She has responded to the prednisone. I will continue the 5 mg daily dose. She will return in five weeks. She will have a two week delay due to the Christmas holiday. She has two more immunotherapy doses to complete a year of therapy.    Invasive lobular carcinoma of the right breast 12 o'clock (overlapping quadrant):  Biopsy on 11/17/2021  ER 90%  OR <1%  K--67 5%  Her2 1+  Bilateral mastectomy on 1/21/2022:  Right Breast with Invasive lobular carcinoma  Measuring 2.2 cm  SLN 0/2 (one with ITC)  Negative margins  Left breast with no residual DCIS  0.5 cm DCIS biopsy on 12/30/2021  ER/OR positive  Right breast Oncotype Dx RS 26     Continue anastrozole. She does have severe osteoporosis. She continues to follow with endocrine. She has OPAL on the AI. She has no side effects from the AI. She is comfortable with this plan.     Severe Osteoporosis:     She is following with endocrinology. She has had dental work with teeth extractions. She has been cleared by dental. She had Reclast on 6/20/2024. She will get this yearly.     Hypertension secondary to anxiety about treatment:    She has white coat HTN. Her home bp measurements are normal.    Hypothyroid:    TSH is normal. Continue current dosing of levothyroxine.    Ben Garsia MD

## 2024-12-04 ENCOUNTER — APPOINTMENT (OUTPATIENT)
Dept: HEMATOLOGY/ONCOLOGY | Facility: HOSPITAL | Age: 74
End: 2024-12-04
Attending: INTERNAL MEDICINE
Payer: MEDICARE

## 2024-12-19 NOTE — TELEPHONE ENCOUNTER
OV 06/10/24  LABS 12/27/23    REFILL 09/23/24 #90    Future Appointments   Date Time Provider Department Center   1/7/2025 11:00 AM NP TX RN5 NP [facility]   1/7/2025 11:30 AM Ben Garsia MD NP  [facility]c [facility]   1/7/2025  2:00 PM EH PET DOSE RM1 EH PET [facility]   1/7/2025  3:15 PM EH PET SCANNER EH PET [facility]     LIPID was due 06/27/24.    Calling patient to schedule OV and labs- left voicemail to call back

## 2024-12-23 RX ORDER — PREDNISONE 10 MG/1
20 TABLET ORAL DAILY
Qty: 60 TABLET | Refills: 1 | OUTPATIENT
Start: 2024-12-23

## 2025-01-03 RX ORDER — ATORVASTATIN CALCIUM 10 MG/1
10 TABLET, FILM COATED ORAL NIGHTLY
Qty: 30 TABLET | Refills: 0 | Status: SHIPPED | OUTPATIENT
Start: 2025-01-03

## 2025-01-06 DIAGNOSIS — G25.0 ESSENTIAL TREMOR: ICD-10-CM

## 2025-01-06 RX ORDER — PRIMIDONE 50 MG/1
50 TABLET ORAL 2 TIMES DAILY
Qty: 180 TABLET | Refills: 1 | Status: SHIPPED | OUTPATIENT
Start: 2025-01-06 | End: 2025-07-05

## 2025-01-06 RX ORDER — ANASTROZOLE 1 MG/1
1 TABLET ORAL DAILY
Qty: 90 TABLET | Refills: 3 | Status: SHIPPED | OUTPATIENT
Start: 2025-01-06

## 2025-01-06 NOTE — TELEPHONE ENCOUNTER
Last refill: 07/07/24  Qty: 180   W/ 1 refills  Last ov: 06/10/24    Requested Prescriptions     Pending Prescriptions Disp Refills    PRIMIDONE 50 MG Oral Tab [Pharmacy Med Name: PRIMIDONE 50 MG TABLET] 180 tablet 1     Sig: TAKE 1 TABLET (50 MG TOTAL) BY MOUTH IN THE MORNING AND 1 TABLET (50 MG TOTAL) BEFORE BEDTIME     Future Appointments   Date Time Provider Department Center   1/7/2025 11:00 AM NP TX RN5 NP  Inf [facility]   1/7/2025 11:30 AM Ben Garsia MD NP  [facility]   1/7/2025  2:00 PM [facility]   1/7/2025  3:15 PM [facility]   1/13/2025  8:40 AM Antonio Vargas MD [facility] [place]   1/13/2025  9:00 AM REF EMG SW [facility] REF [facility] Ref [facility] [place]   1/15/2025 12:15 PM Zurdo Montesinos MD EMGSURGONC [facility] Surg/Onc

## 2025-01-07 ENCOUNTER — OFFICE VISIT (OUTPATIENT)
Age: 75
End: 2025-01-07
Attending: INTERNAL MEDICINE
Payer: MEDICARE

## 2025-01-07 ENCOUNTER — HOSPITAL ENCOUNTER (OUTPATIENT)
Dept: NUCLEAR MEDICINE | Facility: HOSPITAL | Age: 75
Discharge: HOME | End: 2025-01-07
Attending: SURGERY
Payer: MEDICARE

## 2025-01-07 VITALS
DIASTOLIC BLOOD PRESSURE: 90 MMHG | BODY MASS INDEX: 23.51 KG/M2 | WEIGHT: 129.38 LBS | OXYGEN SATURATION: 97 % | HEART RATE: 74 BPM | RESPIRATION RATE: 16 BRPM | SYSTOLIC BLOOD PRESSURE: 195 MMHG | HEIGHT: 62.01 IN | TEMPERATURE: 98 F

## 2025-01-07 DIAGNOSIS — C76.42: ICD-10-CM

## 2025-01-07 DIAGNOSIS — C43.62 MALIGNANT MELANOMA OF ARM, LEFT (HCC): ICD-10-CM

## 2025-01-07 DIAGNOSIS — C43.9 METASTATIC MELANOMA (HCC): ICD-10-CM

## 2025-01-07 DIAGNOSIS — C50.811 MALIGNANT NEOPLASM OF OVERLAPPING SITES OF RIGHT BREAST IN FEMALE, ESTROGEN RECEPTOR POSITIVE (HCC): Primary | ICD-10-CM

## 2025-01-07 DIAGNOSIS — C43.62 MALIGNANT MELANOMA OF ARM, LEFT (HCC): Primary | ICD-10-CM

## 2025-01-07 DIAGNOSIS — C77.9 METASTATIC MELANOMA TO LYMPH NODE (HCC): ICD-10-CM

## 2025-01-07 DIAGNOSIS — Z17.0 MALIGNANT NEOPLASM OF OVERLAPPING SITES OF RIGHT BREAST IN FEMALE, ESTROGEN RECEPTOR POSITIVE (HCC): Primary | ICD-10-CM

## 2025-01-07 LAB
ALBUMIN SERPL-MCNC: 4.2 G/DL (ref 3.2–4.8)
ALBUMIN/GLOB SERPL: 1.2 {RATIO} (ref 1–2)
ALP LIVER SERPL-CCNC: 51 U/L
ALT SERPL-CCNC: 10 U/L
ANION GAP SERPL CALC-SCNC: 4 MMOL/L (ref 0–18)
AST SERPL-CCNC: 23 U/L (ref ?–34)
BASOPHILS # BLD AUTO: 0.07 X10(3) UL (ref 0–0.2)
BASOPHILS NFR BLD AUTO: 0.9 %
BILIRUB SERPL-MCNC: 0.3 MG/DL (ref 0.2–1.1)
BUN BLD-MCNC: 7 MG/DL (ref 9–23)
CALCIUM BLD-MCNC: 9.5 MG/DL (ref 8.7–10.4)
CHLORIDE SERPL-SCNC: 107 MMOL/L (ref 98–112)
CO2 SERPL-SCNC: 30 MMOL/L (ref 21–32)
CREAT BLD-MCNC: 0.84 MG/DL
EGFRCR SERPLBLD CKD-EPI 2021: 72 ML/MIN/1.73M2 (ref 60–?)
EOSINOPHIL # BLD AUTO: 0.09 X10(3) UL (ref 0–0.7)
EOSINOPHIL NFR BLD AUTO: 1.2 %
ERYTHROCYTE [DISTWIDTH] IN BLOOD BY AUTOMATED COUNT: 15.1 %
FASTING STATUS PATIENT QL REPORTED: NO
GLOBULIN PLAS-MCNC: 3.5 G/DL (ref 2–3.5)
GLUCOSE BLD-MCNC: 90 MG/DL (ref 70–99)
GLUCOSE BLD-MCNC: 94 MG/DL (ref 70–99)
HCT VFR BLD AUTO: 40.3 %
HGB BLD-MCNC: 12.7 G/DL
IMM GRANULOCYTES # BLD AUTO: 0.05 X10(3) UL (ref 0–1)
IMM GRANULOCYTES NFR BLD: 0.7 %
LYMPHOCYTES # BLD AUTO: 1.4 X10(3) UL (ref 1–4)
LYMPHOCYTES NFR BLD AUTO: 18.7 %
MCH RBC QN AUTO: 27.1 PG (ref 26–34)
MCHC RBC AUTO-ENTMCNC: 31.5 G/DL (ref 31–37)
MCV RBC AUTO: 86.1 FL
MONOCYTES # BLD AUTO: 0.83 X10(3) UL (ref 0.1–1)
MONOCYTES NFR BLD AUTO: 11.1 %
NEUTROPHILS # BLD AUTO: 5.03 X10 (3) UL (ref 1.5–7.7)
NEUTROPHILS # BLD AUTO: 5.03 X10(3) UL (ref 1.5–7.7)
NEUTROPHILS NFR BLD AUTO: 67.4 %
OSMOLALITY SERPL CALC.SUM OF ELEC: 290 MOSM/KG (ref 275–295)
PLATELET # BLD AUTO: 263 10(3)UL (ref 150–450)
POTASSIUM SERPL-SCNC: 4.4 MMOL/L (ref 3.5–5.1)
PROT SERPL-MCNC: 7.7 G/DL (ref 5.7–8.2)
RBC # BLD AUTO: 4.68 X10(6)UL
SODIUM SERPL-SCNC: 141 MMOL/L (ref 136–145)
WBC # BLD AUTO: 7.5 X10(3) UL (ref 4–11)

## 2025-01-07 PROCEDURE — 82962 GLUCOSE BLOOD TEST: CPT

## 2025-01-07 PROCEDURE — 78816 PET IMAGE W/CT FULL BODY: CPT | Performed by: SURGERY

## 2025-01-07 NOTE — PROGRESS NOTES
Cancer Center Progress Note    Problem List:      Patient Active Problem List   Diagnosis    Coronary atherosclerosis    Anxiety state    Pure hypercholesterolemia    Hypertension    Pulmonary fibrosis (HCC)    Abdominal aortic ectasia (HCC)    Reactive depression    Benign essential tremor    Exudative age-related macular degeneration of left eye with active choroidal neovascularization (HCC)    Invasive lobular carcinoma of breast, stage 2, right (HCC)    Osteopenia, senile    Malignant melanoma of arm, left (HCC)    Age-related osteoporosis without current pathological fracture       Interim History:    Dillan Casillas presents today for evaluation and management of a diagnosis of right breast cancer.    She has had a 14th cycle of pembrolizumab. She has no complaint of pain. She is on prednisone 5 mg daily. She has not had any recent joint pain. She has not seen rheumatology. I have been managing the inflammatory arthritis.    She has no fever or sweats. She has no dyspnea. She has no rash or pruritus. 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 She is on levothyroxine 75 mcg daily. She is following with endocrine.    She has a prior h/o right breast cancer. She has been on anastrozole. She now presents with a left upper arm lesion. She was evaluated by Dr. Pierce Uribe. He did an excision biopsy on 11/13/2023 that showed invasive nodular melanoma with ulceration (tumor thickness 10 mm). She proceeded to have WLE and SLN procedure on 1/9/2024 by Dr. Zurdo Montesinos. There was no residual disease in the WLE. She had 2 of 6 nodes positive. One SLN had a 3 mm focus of metastatic melanoma with no extracapsular extension. A second SLN had a 1.5 mm of focus of metastatic melanoma with no extracapsular extension.      She had a PET scan on 12/8/2023 that was negative for metastatic disease. She had a MRI of the brain that was negative for brain metastases.     She had an acute  appendicitis on 2023 s/p appendectomy. She has had some fatigue since that surgery and the recent arm surgery.    She had bilateral mastectomy on 2022. The left breast had DCIS on her biopsy on 2021 but there was no residual cancer in the left mastectomy. The left SLN was negative. The right breast had an invasive lobular carcinoma, grade II, measuring 2.2 cm. The margins were negative. The right SLN procedure had one node with isolated tumor cells and another node that was negative. She had Oncotype Dx testing with a RS of 26.     Review of Systems:   Constitutional: Negative for anorexia, fatigue, fevers, chills, night sweats and weight loss.  Psychiatric: The patient's mood was calm and appropriate for this visit.  The pertinent positives and negatives were described. All other systems were negative.    PMH/PSH:  Past Medical History:    Acute appendicitis with localized peritonitis, without perforation, abscess, or gangrene    Anxiety    Cancer (HCC)    breast cancer- right breast    Cancer (HCC)    left arm melanoma    Depression    Ductal hyperplasia, atypical, breast    Flat epithelial atypia (FEA) of left breast    High blood pressure    not on any medication    High cholesterol    Hyperlipidemia    Macular degeneration    Osteoporosis    Personal history of colonic polyps    PONV (postoperative nausea and vomiting)    Status post bilateral mastectomy    Tremors of nervous system    Visual impairment    glasses       Past Surgical History:   Procedure Laterality Date    Angioplasty (coronary)      Appendectomy  2023    Colonoscopy      Colonoscopy & polypectomy  2003    Hysterectomy  2017    and oophorectomy    Ajit biopsy stereo nodule 1 site left (cpt=19081)  2020    ADH/FEA    Ajit localization wire 1 site left (cpt=19281)  2020    Benign (core was ADH/FEA)    Mastectomy left      Mastectomy right      Needle biopsy left      Benign          x2    Other surgical  history  10/01/2008    cardiac cath post-procedure date (mild LAD)                    Tubal ligation         Family History Reviewed:  Family History   Problem Relation Age of Onset    Other (Melanoma) Self     Other (Other) Self     Breast Cancer Self     Heart Disease Father         Heart disease    High Blood Pressure Father     Cancer Sister 64        leukemia    Cancer Sister 29        Kidney cancer    Neurological Disorder Sister     Other (als) Sister     Cancer Paternal Uncle 70        breast    High Cholesterol Other         siblings       Allergies:     No Known Allergies    Medications:   ANASTROZOLE 1 MG Oral Tab tab TAKE 1 TABLET BY MOUTH EVERY DAY 90 tablet 3    PRIMIDONE 50 MG Oral Tab TAKE 1 TABLET (50 MG TOTAL) BY MOUTH IN THE MORNING AND 1 TABLET (50 MG TOTAL) BEFORE BEDTIME 180 tablet 1    atorvastatin 10 MG Oral Tab Take 1 tablet (10 mg total) by mouth nightly. Due for lab work 30 tablet 0    levothyroxine 75 MCG Oral Tab Take 1 tablet (75 mcg total) by mouth before breakfast. 90 tablet 3    predniSONE 2.5 MG Oral Tab Take 3 tablets (7.5 mg total) by mouth daily. (Patient taking differently: Take 2 tablets (5 mg total) by mouth daily.) 90 tablet 3    escitalopram 10 MG Oral Tab Take 1 tablet (10 mg total) by mouth daily. 90 tablet 1    aspirin 81 MG Oral Chew Tab Chew 1 tablet (81 mg total) by mouth daily.      Multiple Vitamin Oral Tab Take 1 tablet by mouth daily.      Acidophilus/Pectin Oral Cap Take 1 capsule by mouth daily.           Vital Signs:      Height: 157.5 cm (5' 2.01\") (01/07 1100)  Weight: 58.7 kg (129 lb 6.4 oz) (01/07 1100)  BSA (Calculated - sq m): 1.59 sq meters (01/07 1100)  Pulse: 74 (01/07 1100)  BP: 195/90 (01/07 1100)  Temp: 97.6 °F (36.4 °C) (01/07 1100)  Do Not Use - Resp Rate: --  SpO2: 97 % (01/07 1100)      Performance Status:  ECOG 0: Fully active, able to carry on all pre-disease performance without restriction     Physical Examination:    Constitutional: Patient  is alert and oriented x 3, not in acute distress.  Respiratory: Clear to auscultation and percussion. No rales.  No wheezes.  Cardiovascular: Regular rate and rhythm. No murmurs.  Gastrointestinal: Soft, non tender with good bowel sounds.  Musculoskeletal: No edema. No calf tenderness.  Psychiatric: The patient's mood is calm and appropriate for this visit.       Labs reviewed at this visit:     Lab Results   Component Value Date    WBC 7.5 01/07/2025    RBC 4.68 01/07/2025    HGB 12.7 01/07/2025    HCT 40.3 01/07/2025    MCV 86.1 01/07/2025    MCH 27.1 01/07/2025    MCHC 31.5 01/07/2025    RDW 15.1 01/07/2025    .0 01/07/2025     Lab Results   Component Value Date     12/03/2024    K 4.1 12/03/2024     12/03/2024    CO2 27.0 12/03/2024    BUN 6 (L) 12/03/2024    CREATSERUM 0.90 12/03/2024     (H) 12/03/2024    CA 9.1 12/03/2024    ALKPHO 49 (L) 12/03/2024    ALT 11 12/03/2024    AST 22 12/03/2024    BILT 0.4 12/03/2024    ALB 3.9 12/03/2024    TP 7.0 12/03/2024     Lab Component   Component Value Date/Time    TSH 0.761 12/03/2024 1049    TSH 2.306 10/23/2024 0923    TSH 2.269 09/11/2024 0901           Component  Ref Range & Units 5/8/24 1107   Rheumatoid Factor  <15 IU/mL <10            Component  Ref Range & Units 5/8/24 0938   Sed Rate  0 - 30 mm/Hr 48 High             Component  Ref Range & Units 5/8/24 0938   C-Reactive Protein  <0.30 mg/dL 1.26 High           Radiologic imaging reviewed at this visit:    Bone Dexa Scan on 5/5/2022:  LUMBAR SPINE ANALYSIS RESULTS:       Bone mineral density (BMD) (g/cm2):  0.487     Lumbar T-Score:  -5.1       % young normals:  46       % age matched controls:  61       Change from prior spine examination:  n/a                TOTAL HIP ANALYSIS RESULTS:         Bone mineral density (BMD) (g/cm2):  0.446       Total Hip T-Score:  -4.1       % young normals:  47       % age matched controls:  61       Change from prior hip examination:  n/a                 FEMORAL NECK ANALYSIS RESULTS:         Bone mineral density (BMD) (g/cm2):  0.388       Femoral neck T-Score:  -4.2       % young normals:  46       % age matched controls:  62       Change from prior hip examination:  n/a           ADDITIONAL FINDINGS:  No significant additional findings.        Impression   CONCLUSION:  Bone mineral density values are compatible with the diagnosis of osteoporosis by WHO definition (T score less than -2.5). If therapy is initiated, a follow-up study in 1 year may aid in evaluation of therapeutic efficacy.        MRI of breast on 12/30/2021:  DESCRIPTION:  Witnessed verbal and written informed consent was obtained.  Time out was performed by the staff.  Discussed benefits and risks of MR guided core needle biopsy including, but not limited to, bleeding, infection, and/or failure to obtain   adequate sample. The patient was placed prone on the MRI Scanner.  The left breast was positioned in a dedicated breast coil and MR guidance grid device.   A fiducial was placed on the grid.       The skin was prepped and after local anesthesia was achieved, an introducer sheath and a localizing obturator were placed using a lateral approach at both sites.  The location of the obturator sheaths was confirmed with imaging.       The patient was then taken out of the bore of the magnet and samples were obtained using an MR compatible vacuum assisted core biopsy device at both sites.  A regular size needle was used for site 1 and a petite sized needle was used for site 2. Six   samples were taken at site 1, and 12 samples were taken at site 2. A tissue marker was placed at both sites.  Post biopsy images were obtained which confirm pot biopsy changes at the targets of the biopsy.         Post procedural mammogram performed on separate digital equipment show the clips in appropriate position.       The patient tolerated the procedure well and left the department in stable condition.       BIOPSY  NEEDLE:            9-gauge Kuli Kuli biopsy device   MEDICATION:               1% lidocaine and 1% lidocaine containing epinephrine     SITE MARKER COORDINATES:     SITE 1:  Non-mass enhancement 7 o'clock  left breast   SITE 2:  0800 hours left breast   CLIP TYPE:                SITE 1:  M  SITE 2:  X   PHYSICIANS PRESENT:       Jaquelin Cole M.D.         PATHOLOGY:   A.  Left breast, 7:00, MRI guided biopsy:   -Breast tissue with nodular adenosis, stromal fibrosis, secretory change, apocrine metaplasia and microcalcifications.   -See comment.       B.  Left breast, 8:00, MRI guided biopsy:   -Ductal carcinoma in situ, nuclear grade 2, solid and cribriform types.   -Largest focus of DCIS measures 5 mm (0.5 cm) in greatest dimension.   -Background fibroadenomatous change, apocrine metaplasia, sclerosing adenosis and microcalcifications.   -See comment.      Impression   CONCLUSION:     MRI-guided biopsy of two areas in the left breast.       Pathology from the 1st site at the 7 o'clock position is reported as benign.  This is likely concordant with the imaging assessment and is marked with an M clip.       Pathology from the 2nd site at the 8 o'clock position shows the presence of DCIS.  This is concordant with the imaging assessment and is marked with an X clip.       Surgical consultation is recommended for the bilateral biopsy proven malignancies.  Per the patient's electronic medical record, she is planning on a bilateral mastectomy.         Assessment/Plan:     Malignant melanoma of the left upper arm:  Nodular melanoma 10 mm thickness  2 of 6 left axillary nodes positive  Microscopic metastases  No extranodal extension  BRAF negative     The patient has a pT4b,pN2a (Stage IIIc) melanoma of the left upper arm and axilla. She has had definitive surgery. She has BRAF negative disease. She has good performance status (PS 0).    She will continue with the pembrolizumab every three weeks. She will get the last dose in  three weeks. She will have her port removed. She has a follow up PET scan scheduled for later today. I will see her in three weeks.    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 She has responded to the prednisone. I will continue the 5 mg daily dose.     Invasive lobular carcinoma of the right breast 12 o'clock (overlapping quadrant):  Biopsy on 11/17/2021  ER 90%  UT <1%  K--67 5%  Her2 1+  Bilateral mastectomy on 1/21/2022:  Right Breast with Invasive lobular carcinoma  Measuring 2.2 cm  SLN 0/2 (one with ITC)  Negative margins  Left breast with no residual DCIS  0.5 cm DCIS biopsy on 12/30/2021  ER/UT positive  Right breast Oncotype Dx RS 26     Continue anastrozole. She does have severe osteoporosis. She continues to follow with endocrine. She has OPAL on the AI. She has no side effects from the AI. She is comfortable with this plan.     Severe Osteoporosis:     She is following with endocrinology. She has had dental work with teeth extractions. She has been cleared by dental. She had Reclast on 6/20/2024. She will get this yearly.     Hypertension secondary to anxiety about treatment:    She has white coat HTN. Her home bp measurements are normal.    Hypothyroid:    TSH is normal. Continue current dosing of levothyroxine.    Ben Garsia MD

## 2025-01-07 NOTE — PROGRESS NOTES
Pt here for C16D1 Drug(s)Keytruda.  Arrives Ambulating independently, accompanied by Spouse     Patient was evaluated today by MD.    Oral medications included in this regimen:  no    Patient confirms comprehension of cancer treatment schedule:  yes    Pregnancy screening:  Denies possibility of pregnancy    Modifications in dose or schedule:  No    Medications appearance and physical integrity checked by RN: yes.    Chemotherapy IV pump settings verified by 2 RNs:  No due to targeted therapy IV administration.  Frequency of blood return and site check throughout administration: Prior to administration and At completion of therapy     Infusion/treatment outcome:  patient tolerated treatment without incident    Education Record    Learner:  Patient and Spouse  Barriers / Limitations:  None  Method:  Brief focused and Reinforcement  Education / instructions given:  Plan of care reviewed.  Outcome:  Shows understanding    Discharged Home, Ambulating independently, accompanied by:Spouse    Patient/family verbalized understanding of future appointments: by printed AVS  Port never gave a blood return even after injecting alteplase and letting it sit for almost 90 minutes. Changed the needle to a power port velasquez needle and still was not able to get blood return. Patient and spouse said that this has happened at least in the last 4 treatments where the nurse will take a while to get blood return from the port. Informed Dr. Garsia thru his RN, Lukas. Dr. Garsia wanted another alteplase injected into the port and leave it in overnight and have the patient come back tomorrow to check for blood return. Plan of care was discussed with patient and spouse. Spouse did not agree with this. Informed Dr. Garsia's RN, Lukas. Patient is scheduled for PET scan today at 2 PM. Patient was advised that it due to safety issue, it is not recommended to use the port if it's not giving blood return. Recommended that we start peripheral IV for her  treatment and PET scan. Patient and spouse agreeable. Keytruda administered without any issues. IV flushed and kept for PET scan. Patient discharged in good condition.

## 2025-01-08 ENCOUNTER — APPOINTMENT (OUTPATIENT)
Age: 75
End: 2025-01-08
Attending: INTERNAL MEDICINE
Payer: MEDICARE

## 2025-01-20 ENCOUNTER — HOSPITAL ENCOUNTER (OUTPATIENT)
Dept: ULTRASOUND IMAGING | Facility: HOSPITAL | Age: 75
Discharge: HOME | End: 2025-01-20
Attending: SURGERY
Payer: MEDICARE

## 2025-01-20 DIAGNOSIS — C76.42: ICD-10-CM

## 2025-01-20 DIAGNOSIS — C43.9 METASTATIC MELANOMA (HCC): ICD-10-CM

## 2025-01-20 PROCEDURE — 76882 US LMTD JT/FCL EVL NVASC XTR: CPT | Performed by: SURGERY

## 2025-01-22 ENCOUNTER — OFFICE VISIT (OUTPATIENT)
Dept: SURGERY | Facility: CLINIC | Age: 75
End: 2025-01-22
Payer: MEDICARE

## 2025-01-22 VITALS
SYSTOLIC BLOOD PRESSURE: 150 MMHG | RESPIRATION RATE: 16 BRPM | DIASTOLIC BLOOD PRESSURE: 75 MMHG | HEART RATE: 82 BPM | TEMPERATURE: 98 F | WEIGHT: 130 LBS | OXYGEN SATURATION: 94 % | BODY MASS INDEX: 24 KG/M2

## 2025-01-22 DIAGNOSIS — C50.911: ICD-10-CM

## 2025-01-22 DIAGNOSIS — C43.62 MALIGNANT MELANOMA OF ARM, LEFT (HCC): Primary | ICD-10-CM

## 2025-01-22 DIAGNOSIS — C76.42: ICD-10-CM

## 2025-01-22 DIAGNOSIS — C77.9 MALIGNANT MELANOMA METASTATIC TO LYMPH NODE (HCC): ICD-10-CM

## 2025-01-22 PROCEDURE — 99213 OFFICE O/P EST LOW 20 MIN: CPT | Performed by: SURGERY

## 2025-01-22 NOTE — PROGRESS NOTES
[facility]      Patient Name:  Dillan Casillas   YOB: 1950   Gender:  Female   Appt Date:  1/22/2025   Provider:  Zurdo Montesinos MD     PATIENT PROVIDERS  Referring Provider: Pierce Uribe DO     Primary Care Provider:Antonio Vargas MD   Address: [address] [place] [id number]   Phone #: [phone]       CHIEF COMPLAINT  Chief Complaint   Patient presents with    Follow - Up     Malignant melanoma of arm, left        PROBLEMS  Reviewed   Patient Active Problem List   Diagnosis    Coronary atherosclerosis    Anxiety state    Pure hypercholesterolemia    Hypertension    Pulmonary fibrosis (HCC)    Abdominal aortic ectasia (HCC)    Reactive depression    Benign essential tremor    Exudative age-related macular degeneration of left eye with active choroidal neovascularization (HCC)    Invasive lobular carcinoma of breast, stage 2, right (HCC)    Osteopenia, senile    Malignant melanoma of arm, left (HCC)    Age-related osteoporosis without current pathological fracture        History of Present Illness:  Malignant Melanoma of the Left Upper Extremity  [id number]    7/11/2017: robotic assisted hysterectomy, left salpingo-oophorectomy and frozen section. ([facility]) --> benign serous cystadenofibroma, 7.5 cm     9/28/2020: needle localized left breast lumpectomy for atypical ductal hyperplasia ([facility])     1/21/2022: s/p bilateral mastectomy with SLNB for invasive lobular carcinoma of right breast and DCIS of left breast. Single lymph node with rare isolated tumor cells (N0). ([facility])       11/13/2023: s/p excision mass of left arm --> invasive nodular melanoma with ulceration (10 mm thickness)  1/9/2024: s/p wide excision melanoma left upper arm with left SLNB and reconstruction --> margins negative, no residual melanoma, 2/6 LN positive for metastatic melanoma;     Interval History:  She is receiving adjuvant pembro with Dr Garsia   The suprapubic lesion noted on last visit  is no longer present.      Vital Signs:  /75 (BP Location: Right arm, Patient Position: Sitting, Cuff Size: adult)   Pulse 82   Temp 97.6 °F (36.4 °C) (Tympanic)   Resp 16   Wt 59 kg (130 lb)   SpO2 94%   BMI 23.77 kg/m²      Medications Reviewed:    Current Outpatient Medications:     ANASTROZOLE 1 MG Oral Tab tab, TAKE 1 TABLET BY MOUTH EVERY DAY, Disp: 90 tablet, Rfl: 3    PRIMIDONE 50 MG Oral Tab, TAKE 1 TABLET (50 MG TOTAL) BY MOUTH IN THE MORNING AND 1 TABLET (50 MG TOTAL) BEFORE BEDTIME, Disp: 180 tablet, Rfl: 1    atorvastatin 10 MG Oral Tab, Take 1 tablet (10 mg total) by mouth nightly. Due for lab work, Disp: 30 tablet, Rfl: 0    levothyroxine 75 MCG Oral Tab, Take 1 tablet (75 mcg total) by mouth before breakfast., Disp: 90 tablet, Rfl: 3    predniSONE 2.5 MG Oral Tab, Take 3 tablets (7.5 mg total) by mouth daily., Disp: 90 tablet, Rfl: 3    escitalopram 10 MG Oral Tab, Take 1 tablet (10 mg total) by mouth daily., Disp: 90 tablet, Rfl: 1    aspirin 81 MG Oral Chew Tab, Chew 1 tablet (81 mg total) by mouth daily., Disp: , Rfl:     Multiple Vitamin Oral Tab, Take 1 tablet by mouth daily., Disp: , Rfl:     Acidophilus/Pectin Oral Cap, Take 1 capsule by mouth daily., Disp: , Rfl:      Allergies Reviewed:  No Known Allergies     History:  Reviewed:  Past Medical History:    Acute appendicitis with localized peritonitis, without perforation, abscess, or gangrene    Anxiety    Cancer (HCC)    breast cancer- right breast    Cancer (HCC)    left arm melanoma    Depression    Ductal hyperplasia, atypical, breast    Flat epithelial atypia (FEA) of left breast    High blood pressure    not on any medication    High cholesterol    Hyperlipidemia    Macular degeneration    Osteoporosis    Personal history of colonic polyps    PONV (postoperative nausea and vomiting)    Status post bilateral mastectomy    Tremors of nervous system    Visual impairment    glasses      Reviewed:  Past Surgical History:    Procedure Laterality Date    Angioplasty (coronary)      Appendectomy  2023    Colonoscopy      Colonoscopy & polypectomy  2003    Hysterectomy  2017    and oophorectomy    Ajit biopsy stereo nodule 1 site left (cpt=19081)  2020    ADH/FEA    Ajit localization wire 1 site left (cpt=19281)  2020    Benign (core was ADH/FEA)    Mastectomy left      Mastectomy right      Needle biopsy left      Benign          x2    Other surgical history  10/01/2008    cardiac cath post-procedure date (mild LAD)                    Tubal ligation        Reviewed Social History:  Social History     Socioeconomic History    Marital status:     Number of children: 2   Tobacco Use    Smoking status: Former     Current packs/day: 0.00     Average packs/day: 1 pack/day for 40.0 years (40.0 ttl pk-yrs)     Types: Cigarettes     Start date: 1973     Quit date: 2013     Years since quittin.7    Smokeless tobacco: Never    Tobacco comments:     PPD   Vaping Use    Vaping status: Never Used   Substance and Sexual Activity    Alcohol use: No    Drug use: No    Sexual activity: Yes     Partners: Male   Other Topics Concern    Caffeine Concern No    Stress Concern Yes    Weight Concern No    Special Diet No    Exercise Yes    Seat Belt Yes     Social Drivers of Health     Food Insecurity: No Food Insecurity (2023)    Food Insecurity     Food Insecurity: Never true   Transportation Needs: No Transportation Needs (2023)    Transportation Needs     Lack of Transportation: No   Housing Stability: Low Risk  (2023)    Housing Stability     Housing Instability: No      Reviewed:  Family History   Problem Relation Age of Onset    Other (Melanoma) Self     Other (Other) Self     Breast Cancer Self     Heart Disease Father         Heart disease    High Blood Pressure Father     Cancer Sister 64        leukemia    Cancer Sister 29        Kidney cancer    Neurological Disorder Sister     Other  (als) Sister     Cancer Paternal Uncle 70        breast    High Cholesterol Other         siblings      Review of Systems:  Patient reports no rapid or irregular heartbeat. She reports no chest pain. She reports no nausea. She reports no vomiting. She reports no diarrhea. She reports no constipation. She reports no bright red blood per rectum. She reports no fatigue. She reports no blood in the urine hematuria, no changes in urinary habits: initiating urination requires straining, no changes in urinary habits: no hesitancy, and no change in urine appearance. She reports no headache. She reports no dizziness. She reports no easy bruising tendency. She reports no diffuse joint pains. She reports no bone pain. She reports no back pain. She reports no swollen glands. She reports no pain. She reports no numbness. She reports no shortness of breath. She reports no change in a mole. She reports no recent change in weight, no fever, no chills, and no sweating heavily at night.        Physical Examination:  Constitutional: NAD.   Eyes: Sclera: non-icteric.   Lymph Nodes: Lymph Nodes no cervical LAD, supraclavicular LAD, axillary LAD, or inguinal LAD.   Abdomen: Soft, no masses.  Musculoskeletal: Extremities: no edema.   Skin: The scalp an remainder of the skin do not show any evidence for new or suspicious lesions. The wide excision site is well healed. There is no evidence for local recurrence. There are no intransit metastases.  There is about 1 cm scaly papule right lower abdomen.     Document Review:  PET 1/7/2025:  CONCLUSION:  No findings of developing metastasis.  Decrease in skin activity left upper extremity.  Benign-appearing musculoskeletal activity likely related to arthropathy and muscle strain/injury.     US Left Axilla 1/20/2025:  There are 2 left axillary lymph nodes identified measuring 1.1 x 0.7 x 0.8 cm and 2.1 x 0.9 x 1.5 cm.  Each of these maintains benign features including subcentimeter short axis  diameter, reniform morphology, and central fatty hilum.        Procedure(s):  None     Assessment / Plan:  Malignant melanoma of arm, left (C43.62)  She is to continue monthly self skin examination.  Continue immunotherapy  imaging with Dr. Garsia.  Return to clinic 4 months with ultrasound left axilla.  She has not had a PET scan for some time which I will order.    Invasive lobular carcinoma of breast, stage 2, right   -Also following up with Dr. Garsia        Follow Up:  4 mo       Electronically Signed by: Zurdo Montesinos MD

## 2025-01-28 ENCOUNTER — OFFICE VISIT (OUTPATIENT)
Age: 75
End: 2025-01-28
Attending: INTERNAL MEDICINE
Payer: MEDICARE

## 2025-01-28 VITALS
OXYGEN SATURATION: 92 % | BODY MASS INDEX: 23.62 KG/M2 | TEMPERATURE: 97 F | DIASTOLIC BLOOD PRESSURE: 96 MMHG | SYSTOLIC BLOOD PRESSURE: 191 MMHG | RESPIRATION RATE: 16 BRPM | HEIGHT: 62.01 IN | WEIGHT: 130 LBS | HEART RATE: 84 BPM

## 2025-01-28 DIAGNOSIS — C43.62 MALIGNANT MELANOMA OF ARM, LEFT (HCC): Primary | ICD-10-CM

## 2025-01-28 DIAGNOSIS — M19.90 INFLAMMATORY ARTHRITIS: ICD-10-CM

## 2025-01-28 DIAGNOSIS — C50.811 MALIGNANT NEOPLASM OF OVERLAPPING SITES OF RIGHT BREAST IN FEMALE, ESTROGEN RECEPTOR POSITIVE (HCC): ICD-10-CM

## 2025-01-28 DIAGNOSIS — Z17.0 MALIGNANT NEOPLASM OF OVERLAPPING SITES OF RIGHT BREAST IN FEMALE, ESTROGEN RECEPTOR POSITIVE (HCC): ICD-10-CM

## 2025-01-28 DIAGNOSIS — E78.00 PURE HYPERCHOLESTEROLEMIA: ICD-10-CM

## 2025-01-28 LAB
ALBUMIN SERPL-MCNC: 4.2 G/DL (ref 3.2–4.8)
ALBUMIN/GLOB SERPL: 1.3 {RATIO} (ref 1–2)
ALP LIVER SERPL-CCNC: 57 U/L
ALT SERPL-CCNC: 8 U/L
ANION GAP SERPL CALC-SCNC: 8 MMOL/L (ref 0–18)
AST SERPL-CCNC: 20 U/L (ref ?–34)
BASOPHILS # BLD AUTO: 0.05 X10(3) UL (ref 0–0.2)
BASOPHILS NFR BLD AUTO: 0.6 %
BILIRUB SERPL-MCNC: 0.3 MG/DL (ref 0.2–1.1)
BUN BLD-MCNC: 9 MG/DL (ref 9–23)
CALCIUM BLD-MCNC: 9.4 MG/DL (ref 8.7–10.6)
CHLORIDE SERPL-SCNC: 105 MMOL/L (ref 98–112)
CO2 SERPL-SCNC: 28 MMOL/L (ref 21–32)
CREAT BLD-MCNC: 0.93 MG/DL
EGFRCR SERPLBLD CKD-EPI 2021: 64 ML/MIN/1.73M2 (ref 60–?)
EOSINOPHIL # BLD AUTO: 0.09 X10(3) UL (ref 0–0.7)
EOSINOPHIL NFR BLD AUTO: 1.2 %
ERYTHROCYTE [DISTWIDTH] IN BLOOD BY AUTOMATED COUNT: 15.2 %
GLOBULIN PLAS-MCNC: 3.3 G/DL (ref 2–3.5)
GLUCOSE BLD-MCNC: 93 MG/DL (ref 70–99)
HCT VFR BLD AUTO: 38.1 %
HGB BLD-MCNC: 12.1 G/DL
IMM GRANULOCYTES # BLD AUTO: 0.08 X10(3) UL (ref 0–1)
IMM GRANULOCYTES NFR BLD: 1 %
LYMPHOCYTES # BLD AUTO: 1.31 X10(3) UL (ref 1–4)
LYMPHOCYTES NFR BLD AUTO: 16.9 %
MCH RBC QN AUTO: 26.8 PG (ref 26–34)
MCHC RBC AUTO-ENTMCNC: 31.8 G/DL (ref 31–37)
MCV RBC AUTO: 84.5 FL
MONOCYTES # BLD AUTO: 0.63 X10(3) UL (ref 0.1–1)
MONOCYTES NFR BLD AUTO: 8.1 %
NEUTROPHILS # BLD AUTO: 5.58 X10 (3) UL (ref 1.5–7.7)
NEUTROPHILS # BLD AUTO: 5.58 X10(3) UL (ref 1.5–7.7)
NEUTROPHILS NFR BLD AUTO: 72.2 %
OSMOLALITY SERPL CALC.SUM OF ELEC: 290 MOSM/KG (ref 275–295)
PLATELET # BLD AUTO: 264 10(3)UL (ref 150–450)
POTASSIUM SERPL-SCNC: 4 MMOL/L (ref 3.5–5.1)
PROT SERPL-MCNC: 7.5 G/DL (ref 5.7–8.2)
RBC # BLD AUTO: 4.51 X10(6)UL
SODIUM SERPL-SCNC: 141 MMOL/L (ref 136–145)
T4 FREE SERPL-MCNC: 1.4 NG/DL (ref 0.8–1.7)
TSI SER-ACNC: 2.31 UIU/ML (ref 0.55–4.78)
WBC # BLD AUTO: 7.7 X10(3) UL (ref 4–11)

## 2025-01-28 NOTE — PROGRESS NOTES
Cancer Center Progress Note    Problem List:      Patient Active Problem List   Diagnosis    Coronary atherosclerosis    Anxiety state    Pure hypercholesterolemia    Hypertension    Pulmonary fibrosis (HCC)    Abdominal aortic ectasia    Reactive depression    Benign essential tremor    Exudative age-related macular degeneration of left eye with active choroidal neovascularization (HCC)    Invasive lobular carcinoma of breast, stage 2, right (HCC)    Osteopenia, senile    Malignant melanoma of arm, left (HCC)    Age-related osteoporosis without current pathological fracture       Interim History:    Dillan Casillas presents today for evaluation and management of a diagnosis of right breast cancer.    She has had a 15th cycle of pembrolizumab. She has no complaint of pain. She is on prednisone 2.5 mg daily. She has not had any recent joint pain. She has not seen rheumatology. I have been managing the inflammatory arthritis.    She has no fever or sweats. She has no dyspnea. She has no rash or pruritus. 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 She is on levothyroxine 75 mcg daily. She is following with endocrine.    She has a prior h/o right breast cancer. She has been on anastrozole. She now presents with a left upper arm lesion. She was evaluated by Dr. Pierce Uribe. He did an excision biopsy on 11/13/2023 that showed invasive nodular melanoma with ulceration (tumor thickness 10 mm). She proceeded to have WLE and SLN procedure on 1/9/2024 by Dr. Zurdo Montesinos. There was no residual disease in the WLE. She had 2 of 6 nodes positive. One SLN had a 3 mm focus of metastatic melanoma with no extracapsular extension. A second SLN had a 1.5 mm of focus of metastatic melanoma with no extracapsular extension.      She had a PET scan on 12/8/2023 that was negative for metastatic disease. She had a MRI of the brain that was negative for brain metastases.     She had an acute appendicitis  on 2023 s/p appendectomy. She has had some fatigue since that surgery and the recent arm surgery.    She had bilateral mastectomy on 2022. The left breast had DCIS on her biopsy on 2021 but there was no residual cancer in the left mastectomy. The left SLN was negative. The right breast had an invasive lobular carcinoma, grade II, measuring 2.2 cm. The margins were negative. The right SLN procedure had one node with isolated tumor cells and another node that was negative. She had Oncotype Dx testing with a RS of 26.     Review of Systems:   Constitutional: Negative for anorexia, fatigue, fevers, chills, night sweats and weight loss.  Psychiatric: The patient's mood was calm and appropriate for this visit.  The pertinent positives and negatives were described. All other systems were negative.    PMH/PSH:  Past Medical History:    Acute appendicitis with localized peritonitis, without perforation, abscess, or gangrene    Anxiety    Cancer (HCC)    breast cancer- right breast    Cancer (HCC)    left arm melanoma    Depression    Ductal hyperplasia, atypical, breast    Flat epithelial atypia (FEA) of left breast    High blood pressure    not on any medication    High cholesterol    Hyperlipidemia    Macular degeneration    Osteoporosis    Personal history of colonic polyps    PONV (postoperative nausea and vomiting)    Status post bilateral mastectomy    Tremors of nervous system    Visual impairment    glasses       Past Surgical History:   Procedure Laterality Date    Angioplasty (coronary)      Appendectomy  2023    Colonoscopy      Colonoscopy & polypectomy  2003    Hysterectomy  2017    and oophorectomy    Ajit biopsy stereo nodule 1 site left (cpt=19081)  2020    ADH/FEA    Ajit localization wire 1 site left (cpt=19281)  2020    Benign (core was ADH/FEA)    Mastectomy left      Mastectomy right      Needle biopsy left      Benign          x2    Other surgical history   10/01/2008    cardiac cath post-procedure date (mild LAD)                    Tubal ligation         Family History Reviewed:  Family History   Problem Relation Age of Onset    Other (Melanoma) Self     Other (Other) Self     Breast Cancer Self     Heart Disease Father         Heart disease    High Blood Pressure Father     Cancer Sister 64        leukemia    Cancer Sister 29        Kidney cancer    Neurological Disorder Sister     Other (als) Sister     Cancer Paternal Uncle 70        breast    High Cholesterol Other         siblings       Allergies:     No Known Allergies    Medications:   ANASTROZOLE 1 MG Oral Tab tab TAKE 1 TABLET BY MOUTH EVERY DAY 90 tablet 3    PRIMIDONE 50 MG Oral Tab TAKE 1 TABLET (50 MG TOTAL) BY MOUTH IN THE MORNING AND 1 TABLET (50 MG TOTAL) BEFORE BEDTIME 180 tablet 1    atorvastatin 10 MG Oral Tab Take 1 tablet (10 mg total) by mouth nightly. Due for lab work 30 tablet 0    levothyroxine 75 MCG Oral Tab Take 1 tablet (75 mcg total) by mouth before breakfast. 90 tablet 3    predniSONE 2.5 MG Oral Tab Take 3 tablets (7.5 mg total) by mouth daily. 90 tablet 3    escitalopram 10 MG Oral Tab Take 1 tablet (10 mg total) by mouth daily. 90 tablet 1    aspirin 81 MG Oral Chew Tab Chew 1 tablet (81 mg total) by mouth daily.      Multiple Vitamin Oral Tab Take 1 tablet by mouth daily.      Acidophilus/Pectin Oral Cap Take 1 capsule by mouth daily.           Vital Signs:      Height: 157.5 cm (5' 2.01\") (01/28 1054)  Weight: 59 kg (130 lb) (01/28 1054)  BSA (Calculated - sq m): 1.59 sq meters (01/28 1054)  Pulse: 84 (01/28 1054)  BP: 191/96 (01/28 1054)  Temp: 97.2 °F (36.2 °C) (01/28 1054)  Do Not Use - Resp Rate: --  SpO2: 92 % (01/28 1054)      Performance Status:  ECOG 0: Fully active, able to carry on all pre-disease performance without restriction     Physical Examination:    Constitutional: Patient is alert and oriented x 3, not in acute distress.  Respiratory: Clear to auscultation and  percussion. No rales.  No wheezes.  Cardiovascular: Regular rate and rhythm. No murmurs.  Gastrointestinal: Soft, non tender with good bowel sounds.  Musculoskeletal: No edema. No calf tenderness.  Psychiatric: The patient's mood is calm and appropriate for this visit.       Labs reviewed at this visit:     Lab Results   Component Value Date    WBC 7.7 01/28/2025    RBC 4.51 01/28/2025    HGB 12.1 01/28/2025    HCT 38.1 01/28/2025    MCV 84.5 01/28/2025    MCH 26.8 01/28/2025    MCHC 31.8 01/28/2025    RDW 15.2 01/28/2025    .0 01/28/2025     Lab Results   Component Value Date     01/07/2025    K 4.4 01/07/2025     01/07/2025    CO2 30.0 01/07/2025    BUN 7 (L) 01/07/2025    CREATSERUM 0.84 01/07/2025    GLU 90 01/07/2025    CA 9.5 01/07/2025    ALKPHO 51 (L) 01/07/2025    ALT 10 01/07/2025    AST 23 01/07/2025    BILT 0.3 01/07/2025    ALB 4.2 01/07/2025    TP 7.7 01/07/2025     Lab Component   Component Value Date/Time    TSH 0.761 12/03/2024 1049    TSH 2.306 10/23/2024 0923    TSH 2.269 09/11/2024 0901           Component  Ref Range & Units 5/8/24 1107   Rheumatoid Factor  <15 IU/mL <10            Component  Ref Range & Units 5/8/24 0938   Sed Rate  0 - 30 mm/Hr 48 High             Component  Ref Range & Units 5/8/24 0938   C-Reactive Protein  <0.30 mg/dL 1.26 High           Radiologic imaging reviewed at this visit:    PET scan on 1/7/2025:  FINDINGS:       No evidence for recurrent metastatic disease process.  No abnormal lymph node uptake. There has been decrease in the skin uptake along the left upper extremity from the prior PET scan.  Currently SUV maximum 1.3, previously 2.5 with decrease in the skin thickening and subcutaneous stranding in this region.     Uptake adjacent to the left ischial tuberosity is likely related to muscle strain/enthesopathy on the left SUV maximum 4.2, no sign of mass in this region or destruction of the adjacent bone.  Degenerative appearing uptake left hip,  with no focal destructive lesion in this area.     Otherwise, physiologic activity patterns are seen. This includes benign-appearing muscular and skeletal uptake present, within multiple locations, without any associated mass or destructive features on the localizer CT images. There is also symmetric benign-appearing head and neck uptake. These uptake patterns are commonly visualized at the FDG uptake sensitivity level of the [employer] PET scan unit.     Hiatal hernia noted.     Impression   CONCLUSION:  No findings of developing metastasis.  Decrease in skin activity left upper extremity.  Benign-appearing musculoskeletal activity likely related to arthropathy and muscle strain/injury.       Bone Dexa Scan on 5/5/2022:  LUMBAR SPINE ANALYSIS RESULTS:       Bone mineral density (BMD) (g/cm2):  0.487     Lumbar T-Score:  -5.1       % young normals:  46       % age matched controls:  61       Change from prior spine examination:  n/a                TOTAL HIP ANALYSIS RESULTS:         Bone mineral density (BMD) (g/cm2):  0.446       Total Hip T-Score:  -4.1       % young normals:  47       % age matched controls:  61       Change from prior hip examination:  n/a                FEMORAL NECK ANALYSIS RESULTS:         Bone mineral density (BMD) (g/cm2):  0.388       Femoral neck T-Score:  -4.2       % young normals:  46       % age matched controls:  62       Change from prior hip examination:  n/a           ADDITIONAL FINDINGS:  No significant additional findings.        Impression   CONCLUSION:  Bone mineral density values are compatible with the diagnosis of osteoporosis by WHO definition (T score less than -2.5). If therapy is initiated, a follow-up study in 1 year may aid in evaluation of therapeutic efficacy.        MRI of breast on 12/30/2021:  DESCRIPTION:  Witnessed verbal and written informed consent was obtained.  Time out was performed by the staff.  Discussed benefits and risks of MR guided core  needle biopsy including, but not limited to, bleeding, infection, and/or failure to obtain   adequate sample. The patient was placed prone on the MRI Scanner.  The left breast was positioned in a dedicated breast coil and MR guidance grid device.   A fiducial was placed on the grid.       The skin was prepped and after local anesthesia was achieved, an introducer sheath and a localizing obturator were placed using a lateral approach at both sites.  The location of the obturator sheaths was confirmed with imaging.       The patient was then taken out of the bore of the magnet and samples were obtained using an MR compatible vacuum assisted core biopsy device at both sites.  A regular size needle was used for site 1 and a petite sized needle was used for site 2. Six   samples were taken at site 1, and 12 samples were taken at site 2. A tissue marker was placed at both sites.  Post biopsy images were obtained which confirm pot biopsy changes at the targets of the biopsy.         Post procedural mammogram performed on separate digital equipment show the clips in appropriate position.       The patient tolerated the procedure well and left the department in stable condition.       BIOPSY NEEDLE:            9-gauge Pinch [employer] biopsy device   MEDICATION:               1% lidocaine and 1% lidocaine containing epinephrine     SITE MARKER COORDINATES:     SITE 1:  Non-mass enhancement 7 o'clock  left breast   SITE 2:  0800 hours left breast   CLIP TYPE:                SITE 1:  M  SITE 2:  X   PHYSICIANS PRESENT:       Jaquelin Cole M.D.         PATHOLOGY:   A.  Left breast, 7:00, MRI guided biopsy:   -Breast tissue with nodular adenosis, stromal fibrosis, secretory change, apocrine metaplasia and microcalcifications.   -See comment.       B.  Left breast, 8:00, MRI guided biopsy:   -Ductal carcinoma in situ, nuclear grade 2, solid and cribriform types.   -Largest focus of DCIS measures 5 mm (0.5 cm) in greatest dimension.    -Background fibroadenomatous change, apocrine metaplasia, sclerosing adenosis and microcalcifications.   -See comment.      Impression   CONCLUSION:     MRI-guided biopsy of two areas in the left breast.       Pathology from the 1st site at the 7 o'clock position is reported as benign.  This is likely concordant with the imaging assessment and is marked with an M clip.       Pathology from the 2nd site at the 8 o'clock position shows the presence of DCIS.  This is concordant with the imaging assessment and is marked with an X clip.       Surgical consultation is recommended for the bilateral biopsy proven malignancies.  Per the patient's electronic medical record, she is planning on a bilateral mastectomy.         Assessment/Plan:     Malignant melanoma of the left upper arm:  Nodular melanoma 10 mm thickness  2 of 6 left axillary nodes positive  Microscopic metastases  No extranodal extension  BRAF negative     The patient has a pT4b,pN2a (Stage IIIc) melanoma of the left upper arm and axilla. She has had definitive surgery. She has BRAF negative disease. She has good performance status (PS 0).    She will the last cycle of pembrolizumab today. She now completes a year of therapy. She had recent PET with OPAL. She has been getting axillary US with Dr. Montesinos.     I recommended that I now follow her at three month intervals with labs (CBC, CMP, LDH). I will plan to get radiologic imaging every six months. She will continue with Dr. Montesinos for axillary US monitoring.     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 She has responded to the prednisone. I will have her taper to 2.5 mg every other day in three weeks and then stop the prednisone if she has no pain after two weeks.     Invasive lobular carcinoma of the right breast 12 o'clock (overlapping quadrant):  Biopsy on 11/17/2021  ER 90%  WI <1%  K--67 5%  Her2 1+  Bilateral mastectomy on 1/21/2022:  Right Breast with Invasive lobular  carcinoma  Measuring 2.2 cm  SLN 0/2 (one with ITC)  Negative margins  Left breast with no residual DCIS  0.5 cm DCIS biopsy on 12/30/2021  ER/AK positive  Right breast Oncotype Dx RS 26     Continue anastrozole. She does have severe osteoporosis. She continues to follow with endocrine. She has OPAL on the AI. She has no side effects from the AI. She is comfortable with this plan.     Severe Osteoporosis:     She is following with endocrinology. She has had dental work with teeth extractions. She has been cleared by dental. She had Reclast on 6/20/2024. She will get this yearly.     Hypertension secondary to anxiety about treatment:    She has white coat HTN. Her home bp measurements are normal.    Hypothyroid:    TSH is normal. Continue current dosing of levothyroxine.    Ben Garsia MD

## 2025-01-28 NOTE — PROGRESS NOTES
Pt here for C17D1 Drug(s)keytruda.  Arrives Ambulating independently, accompanied by Spouse     Patient was evaluated today by MD.    Oral medications included in this regimen:  no    Patient confirms comprehension of cancer treatment schedule:  yes    Pregnancy screening:  Denies possibility of pregnancy    Modifications in dose or schedule:  No    Medications appearance and physical integrity checked by RN: yes.    Chemotherapy IV pump settings verified by 2 RNs:  No due to targeted therapy IV administration.  Frequency of blood return and site check throughout administration: Prior to administration     Infusion/treatment outcome:  patient tolerated treatment without incident    Education Record    Learner:  Patient  Barriers / Limitations:  None  Method:  Discussion  Education / instructions given:  plan of care  Outcome:  Shows understanding    Discharged Home, Ambulating independently, accompanied by:Spouse    Patient/family verbalized understanding of future appointments: by [employer] messaging    Patient here for labs, MD, and treatment. Infusion  given via PIV per patient request. Patient tolerated without issue. Future appointment in place and patient left in stable condition.

## 2025-01-28 NOTE — PROGRESS NOTES
Patient is here for C17D1 Keytruda. Patient is taking anastrozole as directed with no c/o of side effects. Patient has no concerns or complaints at this time.     Education Record    Learner:  Patient and Spouse    Disease / Diagnosis: breast cancer    Barriers / Limitations:  None   Comments:    Method:  Discussion   Comments:    General Topics:  Medication, Pain, Side effects and symptom management, and Plan of care reviewed   Comments:    Outcome:  Shows understanding   Comments:

## 2025-01-29 LAB
CHOLEST SERPL-MCNC: 215 MG/DL (ref ?–200)
HDLC SERPL-MCNC: 78 MG/DL (ref 40–59)
LDLC SERPL CALC-MCNC: 107 MG/DL (ref ?–100)
NONHDLC SERPL-MCNC: 137 MG/DL (ref ?–130)
TRIGL SERPL-MCNC: 175 MG/DL (ref 30–149)
VLDLC SERPL CALC-MCNC: 30 MG/DL (ref 0–30)

## 2025-01-29 RX ORDER — ATORVASTATIN CALCIUM 10 MG/1
TABLET, FILM COATED ORAL
Qty: 30 TABLET | Refills: 0 | Status: SHIPPED | OUTPATIENT
Start: 2025-01-29

## 2025-01-29 NOTE — TELEPHONE ENCOUNTER
LOV: 6/10/24    LAST LAB: 1/28/25    LAST RX:  Medication Quantity Refills Start End   atorvastatin 10 MG Oral Tab 30 tablet 0 1/3/2025 --   Sig:   Take 1 tablet (10 mg total) by mouth nightly. Due for lab work         Next OV:   Future Appointments   Date Time Provider Department Center   4/29/2025 11:00 AM NP [place] RN1 NP [facility]   4/29/2025 11:30 AM [facility]          PROTOCOL  Cholesterol Medication Protocol [id number]/29/2025 12:28 AM   Protocol Details Lipid panel within past 12 months    Medication is active on med list    ALT < 80    ALT resulted within past year    In person appointment or virtual visit in the past 12 mos or appointment in next 3 mos

## 2025-01-31 NOTE — PAT NURSING NOTE
PreOp Instructions     You are scheduled for: an Interventional Radiology Procedure     Date of Procedure: 02/11/25. CHECK IN AT 10:30 AM     Diet Instructions: Do not eat or drink anything after midnight including gum, mints, candy, etc.     Medications: Medications you are allowed to take can be taken with a sip of water the morning of your procedure     Do not take the following Blood Thinner(s): STOP ASPIRIN 5 DAYS BEFORE PROCEDURE     Medications to Stop: Hold herbal supplements and vitamins     Skin Prep : Shower with antibacterial soap using a clean washcloth, prior to procedure. Once dried off, no lotions/powders/creams/ointments, etc.     Driving After Procedure: Sedation will be given so you WILL NOT be able to drive home. You will need a responsible adult  to drive you home. You can NOT take uber or taxi unless approved by your physician in advance.     Discharge Teaching: Your nurse will give you specific instructions before discharge, Most people can resume normal activities in 2-3 days, Any questions, please call the physician's office

## 2025-02-03 DIAGNOSIS — E78.00 PURE HYPERCHOLESTEROLEMIA: Primary | ICD-10-CM

## 2025-02-03 DIAGNOSIS — I25.10 ATHEROSCLEROSIS OF NATIVE CORONARY ARTERY OF NATIVE HEART WITHOUT ANGINA PECTORIS: ICD-10-CM

## 2025-02-03 DIAGNOSIS — E03.2 HYPOTHYROIDISM DUE TO MEDICATION: ICD-10-CM

## 2025-02-11 ENCOUNTER — HOSPITAL ENCOUNTER (OUTPATIENT)
Dept: INTERVENTIONAL RADIOLOGY/VASCULAR | Facility: HOSPITAL | Age: 75
Discharge: HOME | End: 2025-02-11
Attending: INTERNAL MEDICINE | Admitting: INTERNAL MEDICINE
Payer: MEDICARE

## 2025-02-11 VITALS
OXYGEN SATURATION: 95 % | TEMPERATURE: 97 F | HEART RATE: 75 BPM | DIASTOLIC BLOOD PRESSURE: 60 MMHG | RESPIRATION RATE: 21 BRPM | SYSTOLIC BLOOD PRESSURE: 133 MMHG

## 2025-02-11 DIAGNOSIS — C43.62 MALIGNANT MELANOMA OF ARM, LEFT (HCC): ICD-10-CM

## 2025-02-11 DIAGNOSIS — C50.811 MALIGNANT NEOPLASM OF OVERLAPPING SITES OF RIGHT BREAST IN FEMALE, ESTROGEN RECEPTOR POSITIVE (HCC): ICD-10-CM

## 2025-02-11 DIAGNOSIS — Z17.0 MALIGNANT NEOPLASM OF OVERLAPPING SITES OF RIGHT BREAST IN FEMALE, ESTROGEN RECEPTOR POSITIVE (HCC): ICD-10-CM

## 2025-02-11 LAB — INR: 1.1 (ref 0.8–1.3)

## 2025-02-11 PROCEDURE — 85610 PROTHROMBIN TIME: CPT | Performed by: RADIOLOGY

## 2025-02-11 PROCEDURE — 77001 FLUOROGUIDE FOR VEIN DEVICE: CPT | Performed by: RADIOLOGY

## 2025-02-11 PROCEDURE — 05PY03Z REMOVAL OF INFUSION DEVICE FROM UPPER VEIN, OPEN APPROACH: ICD-10-PCS | Performed by: RADIOLOGY

## 2025-02-11 PROCEDURE — 99152 MOD SED SAME PHYS/QHP 5/>YRS: CPT | Performed by: RADIOLOGY

## 2025-02-11 PROCEDURE — 36590 REMOVAL TUNNELED CV CATH: CPT | Performed by: RADIOLOGY

## 2025-02-11 PROCEDURE — 0JPT0WZ REMOVAL OF TOTALLY IMPLANTABLE VASCULAR ACCESS DEVICE FROM TRUNK SUBCUTANEOUS TISSUE AND FASCIA, OPEN APPROACH: ICD-10-PCS | Performed by: RADIOLOGY

## 2025-02-11 RX ORDER — MIDAZOLAM HYDROCHLORIDE 1 MG/ML
INJECTION INTRAMUSCULAR; INTRAVENOUS
Status: COMPLETED
Start: 2025-02-11 | End: 2025-02-11

## 2025-02-11 RX ORDER — ONDANSETRON 2 MG/ML
4 INJECTION INTRAMUSCULAR; INTRAVENOUS ONCE AS NEEDED
Status: DISCONTINUED | OUTPATIENT
Start: 2025-02-11 | End: 2025-02-11

## 2025-02-11 RX ORDER — SODIUM CHLORIDE 9 MG/ML
INJECTION, SOLUTION INTRAVENOUS CONTINUOUS
Status: DISCONTINUED | OUTPATIENT
Start: 2025-02-11 | End: 2025-02-11

## 2025-02-11 RX ORDER — NALOXONE HYDROCHLORIDE 0.4 MG/ML
0.08 INJECTION, SOLUTION INTRAMUSCULAR; INTRAVENOUS; SUBCUTANEOUS AS NEEDED
Status: DISCONTINUED | OUTPATIENT
Start: 2025-02-11 | End: 2025-02-11

## 2025-02-11 RX ORDER — DIPHENHYDRAMINE HYDROCHLORIDE 50 MG/ML
50 INJECTION INTRAMUSCULAR; INTRAVENOUS ONCE AS NEEDED
Status: DISCONTINUED | OUTPATIENT
Start: 2025-02-11 | End: 2025-02-11

## 2025-02-11 RX ORDER — CEFAZOLIN SODIUM 1 G/3ML
INJECTION, POWDER, FOR SOLUTION INTRAMUSCULAR; INTRAVENOUS
Status: COMPLETED
Start: 2025-02-11 | End: 2025-02-11

## 2025-02-11 RX ORDER — LIDOCAINE HYDROCHLORIDE AND EPINEPHRINE BITARTRATE 20; .01 MG/ML; MG/ML
INJECTION, SOLUTION SUBCUTANEOUS
Status: COMPLETED
Start: 2025-02-11 | End: 2025-02-11

## 2025-02-11 NOTE — PROGRESS NOTES
Pt post PAC removal. Pt awake, vss. Left chest site is CDI.     Recovery complete per protocol. Vss. Left chest site remains soft with no signs of bleeding or hematoma. Discharge instructions reviewed, iv dc'd and pt discharged home with  driving.

## 2025-02-11 NOTE — H&P
[facility]   part of [facility]   History & Physical    Dillan Casillas Patient Status:  Outpatient    1950 MRN [id number]   Location [facility] INTERVENTIONAL SUITES Attending Ben Garsia MD   Hosp Day # 0 PCP Antonio Vargas MD     Admitting Diagnosis:   74 year-old female with therapy complete    History of Present Illness:   74 year-old female with therapy complete    History   Past Medical History:  Past Medical History:    Acute appendicitis with localized peritonitis, without perforation, abscess, or gangrene    Anxiety    Cancer (HCC)    breast cancer- right breast    Cancer (HCC)    left arm melanoma    Depression    Ductal hyperplasia, atypical, breast    Flat epithelial atypia (FEA) of left breast    High blood pressure    not on any medication    High cholesterol    Hyperlipidemia    Macular degeneration    Osteoporosis    Personal history of colonic polyps    PONV (postoperative nausea and vomiting)    Status post bilateral mastectomy    Tremors of nervous system    Visual impairment    glasses       Past Surgical History:  Past Surgical History:   Procedure Laterality Date    Angioplasty (coronary)      Appendectomy  2023    Colonoscopy      Colonoscopy & polypectomy  2003    Hysterectomy  2017    and oophorectomy    Ajit biopsy stereo nodule 1 site left (cpt=19081)  2020    ADH/FEA    Ajit localization wire 1 site left (cpt=19281)  2020    Benign (core was ADH/FEA)    Mastectomy left      Mastectomy right      Needle biopsy left      Benign          x2    Other surgical history  10/01/2008    cardiac cath post-procedure date (mild LAD)                    Tubal ligation         Social History:  Social History     Tobacco Use    Smoking status: Former     Current packs/day: 0.00     Average packs/day: 1 pack/day for 40.0 years (40.0 ttl pk-yrs)     Types: Cigarettes     Start date: 1973     Quit date: 2013     Years since quittin.7     Smokeless tobacco: Never    Tobacco comments:     PPD   Substance Use Topics    Alcohol use: No        Family History:  Family History   Problem Relation Age of Onset    Other (Melanoma) Self     Other (Other) Self     Breast Cancer Self     Heart Disease Father         Heart disease    High Blood Pressure Father     Cancer Sister 64        leukemia    Cancer Sister 29        Kidney cancer    Neurological Disorder Sister     Other (als) Sister     Cancer Paternal Uncle 70        breast    High Cholesterol Other         siblings       Allergies/Medications:   Allergies:  Allergies[1]    Medications:  No current outpatient medications on file.    Physical Exam & Review of Systems:   Physical Exam:    BP (!) 176/92 (BP Location: Right arm)   Pulse 74   Temp 97.2 °F (36.2 °C) (Temporal)   Resp 16   SpO2 96%     General: NAD  Neck: No JVD  Cardiac: Normal  Rate  Lungs: Non-labored respirations  Abdomen: Nondistended  Neuro: Alert and oriented    ASA: III  Mallampati: II    Results:   Labs:  No results for input(s): \"RBC\", \"HGB\", \"HCT\", \"MCV\", \"MCH\", \"MCHC\", \"RDW\", \"NEPRELIM\", \"WBC\", \"PLT\" in the last 168 hours.  Recent Labs   Lab 02/11/25  1058   INR 1.1     No results for input(s): \"GLU\", \"BUN\", \"CREATSERUM\", \"GFRAA\", \"GFRNAA\", \"CA\", \"NA\", \"K\", \"CL\", \"CO2\" in the last 168 hours.    Assessment/Plan:   Impression: 74 year-old female with therapy complete    I have discussed with the patient and/or legal representative the potential benefits, risks, and side effects of this procedure, the likelihood of the patient achieving goals; and the potential problems that might occur during recuperation.  I discussed reasonable alternatives to the procedure, including risks, benefits and side effects related to the alternatives, and risks related to not receiving this procedure.    Recommendations: Port removal    Misti Coates MD  2/11/2025  11:07 AM           [1] No Known Allergies

## 2025-02-11 NOTE — PROCEDURES
[facility]   part of [facility]  Procedure Note    Dillan Casillas Patient Status:  Outpatient    1950 MRN [id number]   Location [facility] INTERVENTIONAL SUITES Attending Ben Garsia MD    Day # 0 PCP Antonio Vargas MD     Procedure: Port removal    Pre-Procedure Diagnosis:  Therapy complete    Post-Procedure Diagnosis: Same    Anesthesia:  Sedation    Findings:  No complication    Specimens: None    Blood Loss:  < 5 cc    Tourniquet Time: None  Complications:  None  Drains:  None    Secondary Diagnosis:  N/A    Misti Coates MD  2025

## 2025-02-22 RX ORDER — ATORVASTATIN CALCIUM 10 MG/1
10 TABLET, FILM COATED ORAL NIGHTLY
Qty: 90 TABLET | Refills: 0 | Status: SHIPPED | OUTPATIENT
Start: 2025-02-22

## 2025-02-22 NOTE — TELEPHONE ENCOUNTER
Faxed request for 90 day refill on atorvastatin 10 mg    LOV  6-10-24    LAST LAB  1-28-25 Chem Profile ALT 8/gfr 64                     1-28-25 Lipid Panel     LAST RX  1-29-25  #30    Next OV    Future Appointments   Date Time Provider Department Center   4/29/2025 11:00 AM NP [place] RN1 NP  [facility]   4/29/2025 11:30 AM Ben Garsia MD NP  [facility]     PROTOCOL    Cholesterol Medication Protocol [id number]/22/2025 10:59 AM   Protocol Details Medication is active on med list    ALT < 80    ALT resulted within past year    Lipid panel within past 12 months    In person appointment or virtual visit in the past 12 mos or appointment in next 3 mos

## 2025-04-07 ENCOUNTER — OFFICE VISIT (OUTPATIENT)
Dept: SURGERY | Facility: CLINIC | Age: 75
End: 2025-04-07
Payer: MEDICARE

## 2025-04-07 VITALS
HEART RATE: 76 BPM | TEMPERATURE: 98 F | OXYGEN SATURATION: 94 % | DIASTOLIC BLOOD PRESSURE: 93 MMHG | BODY MASS INDEX: 24 KG/M2 | RESPIRATION RATE: 16 BRPM | SYSTOLIC BLOOD PRESSURE: 190 MMHG | WEIGHT: 130 LBS

## 2025-04-07 DIAGNOSIS — L98.9 SKIN LESION OF RIGHT UPPER EXTREMITY: Primary | ICD-10-CM

## 2025-04-07 NOTE — PROGRESS NOTES
Patient presented to clinic for new lesion on the right upper extremity. Noted to be scabbing that increased in size. Patient denies oozing, drainage or bleeding. She denies trauma to the area. Notes that she bumped against the wall and the lesion has since fallen off. She feels prior to it falling off, it resembled the melanoma on the left arm.     Blood pressure (!) 190/93, pulse 76, temperature 98.4 °F (36.9 °C), temperature source Tympanic, resp. rate 16, weight 59 kg (130 lb), SpO2 94%.      Denies fever or chills  Denies chest pain or SOB  Denies abdominal pain or N/V  Denies dysuria or hematuria  Denies warmth, erythema, or drainage at incision    Constitutional:  NAD.   Eyes: non-icteric.    Abdomen: Soft, non-tender, non-distended. Incision healing well without drainage or erythema.  Musculoskeletal: no edema.    Skin: Right upper extremity along forearm, 1/2 cm erythematous lesion, well healed over. No oozing or drainage.       - No acute surgical oncology issues  - Discussed options with patient: Biopsy today vs continue to monitor  - Patient would like to continue to monitor the area for now. Discussed signs and symptoms to call the office and reschedule biopsy including increasing in size, changing in color, oozing, drainage, bleeding.   - Patient has FU with Dr Montesinos end of May, advised to call sooner with any changes    Ailin WHITNEY Shaikh

## 2025-04-29 ENCOUNTER — NURSE ONLY (OUTPATIENT)
Age: 75
End: 2025-04-29
Attending: INTERNAL MEDICINE
Payer: MEDICARE

## 2025-04-29 ENCOUNTER — OFFICE VISIT (OUTPATIENT)
Age: 75
End: 2025-04-29
Attending: INTERNAL MEDICINE
Payer: MEDICARE

## 2025-04-29 VITALS
DIASTOLIC BLOOD PRESSURE: 98 MMHG | HEART RATE: 86 BPM | RESPIRATION RATE: 18 BRPM | TEMPERATURE: 98 F | WEIGHT: 128.81 LBS | SYSTOLIC BLOOD PRESSURE: 187 MMHG | OXYGEN SATURATION: 97 % | BODY MASS INDEX: 23.41 KG/M2 | HEIGHT: 62.01 IN

## 2025-04-29 DIAGNOSIS — Z17.0 MALIGNANT NEOPLASM OF OVERLAPPING SITES OF RIGHT BREAST IN FEMALE, ESTROGEN RECEPTOR POSITIVE (HCC): ICD-10-CM

## 2025-04-29 DIAGNOSIS — C50.811 MALIGNANT NEOPLASM OF OVERLAPPING SITES OF RIGHT BREAST IN FEMALE, ESTROGEN RECEPTOR POSITIVE (HCC): ICD-10-CM

## 2025-04-29 DIAGNOSIS — M19.90 INFLAMMATORY ARTHRITIS: ICD-10-CM

## 2025-04-29 DIAGNOSIS — E03.2 HYPOTHYROIDISM DUE TO MEDICATION: ICD-10-CM

## 2025-04-29 DIAGNOSIS — C43.62 MALIGNANT MELANOMA OF ARM, LEFT (HCC): ICD-10-CM

## 2025-04-29 DIAGNOSIS — C43.62 MALIGNANT MELANOMA OF ARM, LEFT (HCC): Primary | ICD-10-CM

## 2025-04-29 LAB
ALBUMIN SERPL-MCNC: 4.2 G/DL (ref 3.2–4.8)
ALBUMIN/GLOB SERPL: 1.4 {RATIO} (ref 1–2)
ALP LIVER SERPL-CCNC: 61 U/L (ref 55–142)
ALT SERPL-CCNC: <7 U/L (ref 10–49)
ANION GAP SERPL CALC-SCNC: 9 MMOL/L (ref 0–18)
AST SERPL-CCNC: 17 U/L (ref ?–34)
BASOPHILS # BLD AUTO: 0.04 X10(3) UL (ref 0–0.2)
BASOPHILS NFR BLD AUTO: 0.6 %
BILIRUB SERPL-MCNC: 0.3 MG/DL (ref 0.2–1.1)
BUN BLD-MCNC: 5 MG/DL (ref 9–23)
CALCIUM BLD-MCNC: 9.3 MG/DL (ref 8.7–10.6)
CHLORIDE SERPL-SCNC: 106 MMOL/L (ref 98–112)
CO2 SERPL-SCNC: 26 MMOL/L (ref 21–32)
CREAT BLD-MCNC: 0.87 MG/DL (ref 0.55–1.02)
EGFRCR SERPLBLD CKD-EPI 2021: 69 ML/MIN/1.73M2 (ref 60–?)
EOSINOPHIL # BLD AUTO: 0.1 X10(3) UL (ref 0–0.7)
EOSINOPHIL NFR BLD AUTO: 1.6 %
ERYTHROCYTE [DISTWIDTH] IN BLOOD BY AUTOMATED COUNT: 15.4 %
GLOBULIN PLAS-MCNC: 3.1 G/DL (ref 2–3.5)
GLUCOSE BLD-MCNC: 98 MG/DL (ref 70–99)
HCT VFR BLD AUTO: 37.8 % (ref 35–48)
HGB BLD-MCNC: 12.1 G/DL (ref 12–16)
IMM GRANULOCYTES # BLD AUTO: 0.03 X10(3) UL (ref 0–1)
IMM GRANULOCYTES NFR BLD: 0.5 %
LDH SERPL L TO P-CCNC: 234 U/L (ref 120–246)
LYMPHOCYTES # BLD AUTO: 1.05 X10(3) UL (ref 1–4)
LYMPHOCYTES NFR BLD AUTO: 16.9 %
MCH RBC QN AUTO: 26.8 PG (ref 26–34)
MCHC RBC AUTO-ENTMCNC: 32 G/DL (ref 31–37)
MCV RBC AUTO: 83.8 FL (ref 80–100)
MONOCYTES # BLD AUTO: 0.74 X10(3) UL (ref 0.1–1)
MONOCYTES NFR BLD AUTO: 11.9 %
NEUTROPHILS # BLD AUTO: 4.26 X10 (3) UL (ref 1.5–7.7)
NEUTROPHILS # BLD AUTO: 4.26 X10(3) UL (ref 1.5–7.7)
NEUTROPHILS NFR BLD AUTO: 68.5 %
OSMOLALITY SERPL CALC.SUM OF ELEC: 289 MOSM/KG (ref 275–295)
PLATELET # BLD AUTO: 267 10(3)UL (ref 150–450)
POTASSIUM SERPL-SCNC: 3.5 MMOL/L (ref 3.5–5.1)
PROT SERPL-MCNC: 7.3 G/DL (ref 5.7–8.2)
RBC # BLD AUTO: 4.51 X10(6)UL (ref 3.8–5.3)
SODIUM SERPL-SCNC: 141 MMOL/L (ref 136–145)
TSI SER-ACNC: 2.51 UIU/ML (ref 0.55–4.78)
WBC # BLD AUTO: 6.2 X10(3) UL (ref 4–11)

## 2025-04-29 NOTE — PROGRESS NOTES
Cancer Center Progress Note    Problem List:      Problem List[1]      History of Present Illness  Roxanne Casillas is a 74 year old female with a history of right breast cancer and malignant melanoma who presents for a three-month follow-up after completing adjuvant pembrolizumab therapy.    She completed one year of adjuvant pembrolizumab therapy three months ago for malignant melanoma. During treatment, she experienced inflammatory arthritis symptoms, including stiffness in her knees, wrists, and ankles, particularly in the mornings. These symptoms have persisted but have not worsened since tapering off prednisone. She is currently on a low dose of 2.5 mg every other day, which she has maintained for the past three months without an increase in joint issues.    She is on anastrozole for her history of breast cancer. A PET scan in January showed no evidence of metastatic disease, and a left axillary ultrasound revealed two benign-appearing axillary nodes. She is scheduled for a repeat ultrasound at the end of May.    Regarding her thyroid, she experienced issues during pembrolizumab treatment, leading to her being prescribed levothyroxine. She is currently on a dose of 75 mcg, and her last thyroid levels were checked in January.    In the review of symptoms, she reports no new pains, breathing difficulties, or noticeable lumps and bumps, although she mentions the presence of lumps and bumps due to her mastectomy. Her CBC is normal, and her chemistry results from January were good.      Review of Systems:   Constitutional: Negative for anorexia, fatigue, fevers, chills, night sweats and weight loss.  Eyes: Negative for visual disturbance, irritation and redness.  Respiratory: Negative for cough, hemoptysis, chest pain, or dyspnea.  Cardiovascular: Negative for angina, orthopnea or palpitations.  Gastrointestinal: Negative for nausea, vomiting, change in bowel habits, diarrhea, constipation and abdominal  pain.  Integument/breast: Negative for rash, skin lesions, and pruritus.  Hematologic/lymphatic: Negative for easy bruising, bleeding, and lymphadenopathy.  Genitourinary: Negative for dysuria or hematuria  Psychiatric: The patient's mood was calm and appropriate for this visit.  The pertinent positives and negatives were described. All other systems were negative.    PMH/PSH:  Past Medical History[2]    Past Surgical History[3]    Family History Reviewed:  Family History[4]    Allergies:     Allergies[5]    Medications:  Current Medications[6]       Vital Signs:      Height: 157.5 cm (5' 2.01\") (04/29 1043)  Weight: 58.4 kg (128 lb 12.8 oz) (04/29 1043)  BSA (Calculated - sq m): 1.59 sq meters (04/29 1043)  Pulse: 86 (04/29 1043)  BP: 187/98 (04/29 1043)  Temp: 97.7 °F (36.5 °C) (04/29 1043)  Do Not Use - Resp Rate: --  SpO2: 97 % (04/29 1043)      Performance Status:  ECOG 0: Fully active, able to carry on all pre-disease performance without restriction     Physical Examination:      Constitutional: Patient is alert and oriented x 3, not in acute distress.  Eyes: Anicteric sclera, pink conjunctiva.  HEENT:  Oropharynx is clear. Neck is supple.  Respiratory: Clear to auscultation and percussion. No rales.  No wheezes.  Cardiovascular: Regular rate and rhythm. No murmurs.  Gastrointestinal: Soft, non tender with good bowel sounds.  Musculoskeletal: No edema. No calf tenderness.  Skin: No suspicious skin lesion, no rash, no ulceration.  Lymphatics: There is no palpable lymphadenopathy throughout in the cervical, supraclavicular, or axillary regions.  Psychiatric: The patient's mood is calm and appropriate for this visit.           Results reviewed at this visit:     Lab Results   Component Value Date    WBC 6.2 04/29/2025    RBC 4.51 04/29/2025    HGB 12.1 04/29/2025    HCT 37.8 04/29/2025    MCV 83.8 04/29/2025    MCH 26.8 04/29/2025    MCHC 32.0 04/29/2025    RDW 15.4 04/29/2025    .0 04/29/2025     Lab  Results   Component Value Date     04/29/2025    K 3.5 04/29/2025     04/29/2025    CO2 26.0 04/29/2025    BUN 5 (L) 04/29/2025    CREATSERUM 0.87 04/29/2025    GLU 98 04/29/2025    CA 9.3 04/29/2025    ALKPHO 61 04/29/2025    ALT <7 (L) 04/29/2025    AST 17 04/29/2025    BILT 0.3 04/29/2025    ALB 4.2 04/29/2025    TP 7.3 04/29/2025     Lab Component   Component Value Date/Time    TSH 2.506 04/29/2025 1048    TSH 2.314 01/28/2025 1051    TSH 0.761 12/03/2024 1049        Results  LABS  CBC: Normal (04/29/2025)    RADIOLOGY  PET scan: No evidence of metastatic disease (01/2025)  Ultrasound left axilla: Two benign-appearing axillary nodes (01/2025)         Assessment & Plan  Malignant melanoma of the left upper arm:  Nodular melanoma 10 mm thickness  2 of 6 left axillary nodes positive  Microscopic metastases  No extranodal extension  BRAF negative  The patient has a pT4b,pN2a (Stage IIIc) melanoma of the left upper arm and axilla. She has had definitive surgery. She has BRAF negative disease. She has good performance status (PS 0).     Malignant melanoma of the left arm with significant lymph node involvement. Completed one year of adjuvant pembrolizumab therapy three months ago. Recent PET scan showed no evidence of metastatic disease. Left axillary ultrasound showed two benign-appearing axillary nodes. Higher risk for recurrence necessitates close follow-up. NCCN guidelines recommend imaging every 3-12 months for the first two years due to higher risk.  - Order PET scan prior to next oncology visit in three months along with repeat cbc, cmp and ldh  - Continue follow-up with left axillary ultrasound in May with surgical follow up    Invasive lobular carcinoma of the right breast 12 o'clock (overlapping quadrant):  Biopsy on 11/17/2021  ER 90%  MD <1%  K--67 5%  Her2 1+  Bilateral mastectomy on 1/21/2022:  Right Breast with Invasive lobular carcinoma  Measuring 2.2 cm  SLN 0/2 (one with ITC)  Negative  margins  Left breast with no residual DCIS  0.5 cm DCIS biopsy on 12/30/2021  ER/ID positive  Right breast Oncotype Dx RS 26    Stage 2 invasive lobular carcinoma of the right breast. Bilateral mastectomy performed. No lymph node involvement. Currently on anastrozole. No imaging required for breast cancer follow-up as per current guidelines.  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 Symptoms have not worsened despite tapering prednisone to 2.5 mg every other day. Considering discontinuation of prednisone to assess symptom control without medication.  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 Currently on levothyroxine 75 mcg daily. Thyroid function tests were last checked in January. TSH levels to be checked with current labs to assess need for dose adjustment.  - Add TSH to current lab orders. TSH is normal. Continue with current dosing of replacement hormone.  - Continue levothyroxine 75 MCG Oral daily.           The following individual(s) verbally consented to be recorded using ambient AI listening technology and understand that they can each withdraw their consent to this listening technology at any point by asking the clinician to turn off or pause the recording:    Patient name: Dillan Joshishannangael      Ben Garsia MD          [1]   Patient Active Problem List  Diagnosis    Coronary atherosclerosis    Anxiety state    Pure hypercholesterolemia    Hypertension    Pulmonary fibrosis (HCC)    Abdominal aortic ectasia    Reactive depression    Benign essential tremor    Exudative age-related macular degeneration of left eye with active choroidal neovascularization (HCC)    Invasive lobular carcinoma of breast, stage 2, right (HCC)    Osteopenia,  senile    Malignant melanoma of arm, left (HCC)    Age-related osteoporosis without current pathological fracture   [2]   Past Medical History:   Acute appendicitis with localized peritonitis, without perforation, abscess, or gangrene    Anxiety    Cancer (HCC)    breast cancer- right breast    Cancer (HCC)    left arm melanoma    Depression    Ductal hyperplasia, atypical, breast    Flat epithelial atypia (FEA) of left breast    High blood pressure    not on any medication    High cholesterol    Hyperlipidemia    Macular degeneration    Osteoporosis    Personal history of colonic polyps    PONV (postoperative nausea and vomiting)    Status post bilateral mastectomy    Tremors of nervous system    Visual impairment    glasses   [3]   Past Surgical History:  Procedure Laterality Date    Angioplasty (coronary)      Appendectomy  2023    Colonoscopy      Colonoscopy & polypectomy  2003    Hysterectomy  2017    and oophorectomy    Ajit biopsy stereo nodule 1 site left (cpt=19081)  2020    ADH/FEA    Ajit localization wire 1 site left (cpt=19281)  2020    Benign (core was ADH/FEA)    Mastectomy left      Mastectomy right      Needle biopsy left      Benign          x2    Other surgical history  10/01/2008    cardiac cath post-procedure date (mild LAD)                    Tubal ligation     [4]   Family History  Problem Relation Age of Onset    Other (Melanoma) Self     Other (Other) Self     Breast Cancer Self     Heart Disease Father         Heart disease    High Blood Pressure Father     Cancer Sister 64        leukemia    Cancer Sister 29        Kidney cancer    Neurological Disorder Sister     Other (als) Sister     Cancer Paternal Uncle 70        breast    High Cholesterol Other         siblings   [5] No Known Allergies  [6]    atorvastatin 10 MG Oral Tab Take 1 tablet (10 mg total) by mouth nightly. 90 tablet 0    ANASTROZOLE 1 MG Oral Tab tab TAKE 1 TABLET BY MOUTH EVERY DAY 90 tablet 3     PRIMIDONE 50 MG Oral Tab TAKE 1 TABLET (50 MG TOTAL) BY MOUTH IN THE MORNING AND 1 TABLET (50 MG TOTAL) BEFORE BEDTIME 180 tablet 1    levothyroxine 75 MCG Oral Tab Take 1 tablet (75 mcg total) by mouth before breakfast. 90 tablet 3    predniSONE 2.5 MG Oral Tab Take 3 tablets (7.5 mg total) by mouth daily. 90 tablet 3    escitalopram 10 MG Oral Tab Take 1 tablet (10 mg total) by mouth daily. 90 tablet 1    aspirin 81 MG Oral Chew Tab Chew 1 tablet (81 mg total) by mouth in the morning.      Multiple Vitamin Oral Tab Take 1 tablet by mouth in the morning.      Acidophilus/Pectin Oral Cap Take 1 capsule by mouth in the morning.

## 2025-05-08 ENCOUNTER — TELEPHONE (OUTPATIENT)
Dept: SURGERY | Facility: CLINIC | Age: 75
End: 2025-05-08

## 2025-05-08 NOTE — TELEPHONE ENCOUNTER
LVM reminding patient to schedule US left axilla. Central scheduling number provided. Office number provided for questions or concerns.     WHITNEY Foley

## 2025-05-19 RX ORDER — ATORVASTATIN CALCIUM 10 MG/1
10 TABLET, FILM COATED ORAL NIGHTLY
Qty: 90 TABLET | Refills: 0 | Status: SHIPPED | OUTPATIENT
Start: 2025-05-19

## 2025-05-19 NOTE — TELEPHONE ENCOUNTER
Requested Prescriptions     Pending Prescriptions Disp Refills    ATORVASTATIN 10 MG Oral Tab [Pharmacy Med Name: ATORVASTATIN 10 MG TABLET] 90 tablet 0     Sig: TAKE 1 TABLET BY MOUTH EVERY DAY AT NIGHT     Last refill 2/22/25 #90  LOV 6/10/24  Future Appointments   Date Time Provider Department Center   5/28/2025  2:00 PM [facility]   6/4/2025 11:00 AM Zurdo Montesinos MD [facility]   6/10/2025 10:20 AM Antonio Vargas MD [facility]     Refilled per protocol  
declines

## 2025-05-28 ENCOUNTER — HOSPITAL ENCOUNTER (OUTPATIENT)
Dept: ULTRASOUND IMAGING | Age: 75
Discharge: HOME | End: 2025-05-28
Payer: MEDICARE

## 2025-05-28 DIAGNOSIS — C43.62 MALIGNANT MELANOMA OF ARM, LEFT (HCC): ICD-10-CM

## 2025-05-28 DIAGNOSIS — C77.9 MALIGNANT MELANOMA METASTATIC TO LYMPH NODE (HCC): ICD-10-CM

## 2025-05-28 DIAGNOSIS — C50.911: ICD-10-CM

## 2025-05-28 DIAGNOSIS — C76.42: ICD-10-CM

## 2025-05-28 PROCEDURE — 76882 US LMTD JT/FCL EVL NVASC XTR: CPT

## 2025-05-29 NOTE — PROGRESS NOTES
[facility]      Patient Name:  Dillan Casillas   YOB: 1950   Gender:  Female   Appt Date:  6/4/25   Provider:  Zurdo Montesinos MD     PATIENT PROVIDERS  Referring Provider: Pierce Uribe DO     Primary Care Provider:Antonio Vargas MD   Address: [address] [place] [id number]   Phone #: [phone]       CHIEF COMPLAINT  Follow-up melanoma of left arm     PROBLEMS  Reviewed   Patient Active Problem List   Diagnosis    Coronary atherosclerosis    Anxiety state    Pure hypercholesterolemia    Hypertension    Pulmonary fibrosis (HCC)    Abdominal aortic ectasia    Reactive depression    Benign essential tremor    Exudative age-related macular degeneration of left eye with active choroidal neovascularization (HCC)    History of breast cancer    Osteopenia, senile    History of melanoma    Age-related osteoporosis without current pathological fracture        History of Present Illness:  Malignant Melanoma of the Left Upper Extremity  [id number]    PMH: Invasive lobular carcinoma of R breast and DCIS of L breast on anastrozole, hypothyroidism, HLD, inflammatory arthritis d/t immunotherapy on prednisone    7/11/2017: robotic assisted hysterectomy, left salpingo-oophorectomy and frozen section. ([facility]) --> benign serous cystadenofibroma, 7.5 cm     9/28/2020: needle localized left breast lumpectomy for atypical ductal hyperplasia ([facility])     1/21/2022: s/p bilateral mastectomy with SLNB for invasive lobular carcinoma of right breast and DCIS of left breast. Single lymph node with rare isolated tumor cells (N0). ([facility])       11/13/2023: s/p excision mass of left arm --> invasive nodular melanoma with ulceration (10 mm thickness)  1/9/2024: s/p wide excision melanoma left upper arm with left SLNB and reconstruction --> margins negative, no residual melanoma, 2/6 LN positive for metastatic melanoma;     January 2025: OPAL on PET scan and benign appearing lymph nodes on US  axilla. Completed one year of adjuvant pembrolizumab.    Interval History:    She has no new issues or lesions. Her lesion on the right forearm has resolved. The suprapubic lesion has remained the same and has no symptoms. She does not follow with dermatology.   She is due for next PET at end of July. She is due for colon cancer screening.        Vital Signs:  BP (!) 179/85 (BP Location: Right arm, Patient Position: Sitting, Cuff Size: adult)   Pulse 83   Temp 98.2 °F (36.8 °C) (Tympanic)   Resp 16   Wt 58.5 kg (129 lb)   SpO2 94%   BMI 23.59 kg/m²      Medications Reviewed:    Current Outpatient Medications:     ATORVASTATIN 10 MG Oral Tab, TAKE 1 TABLET BY MOUTH EVERY DAY AT NIGHT, Disp: 90 tablet, Rfl: 0    ANASTROZOLE 1 MG Oral Tab tab, TAKE 1 TABLET BY MOUTH EVERY DAY, Disp: 90 tablet, Rfl: 3    PRIMIDONE 50 MG Oral Tab, TAKE 1 TABLET (50 MG TOTAL) BY MOUTH IN THE MORNING AND 1 TABLET (50 MG TOTAL) BEFORE BEDTIME, Disp: 180 tablet, Rfl: 1    levothyroxine 75 MCG Oral Tab, Take 1 tablet (75 mcg total) by mouth before breakfast., Disp: 90 tablet, Rfl: 3    escitalopram 10 MG Oral Tab, Take 1 tablet (10 mg total) by mouth daily., Disp: 90 tablet, Rfl: 1    aspirin 81 MG Oral Chew Tab, Chew 1 tablet (81 mg total) by mouth in the morning., Disp: , Rfl:     Multiple Vitamin Oral Tab, Take 1 tablet by mouth in the morning., Disp: , Rfl:     Acidophilus/Pectin Oral Cap, Take 1 capsule by mouth in the morning., Disp: , Rfl:      Allergies Reviewed:  No Known Allergies     History:  Reviewed:  Past Medical History:    Acute appendicitis with localized peritonitis, without perforation, abscess, or gangrene    Anxiety    Cancer (HCC)    breast cancer- right breast    Cancer (HCC)    left arm melanoma    Depression    Ductal hyperplasia, atypical, breast    Flat epithelial atypia (FEA) of left breast    High blood pressure    not on any medication    High cholesterol    Hyperlipidemia    Macular degeneration     Osteoporosis    Personal history of colonic polyps    PONV (postoperative nausea and vomiting)    Status post bilateral mastectomy    Tremors of nervous system    Visual impairment    glasses      Reviewed:  Past Surgical History:   Procedure Laterality Date    Angioplasty (coronary)      Appendectomy  2023    Colonoscopy      Colonoscopy & polypectomy  2003    Hysterectomy  2017    and oophorectomy    Ajit biopsy stereo nodule 1 site left (cpt=19081)  2020    ADH/FEA    Ajit localization wire 1 site left (cpt=19281)  2020    Benign (core was ADH/FEA)    Mastectomy left      Mastectomy right      Needle biopsy left      Benign          x2    Other surgical history  10/01/2008    cardiac cath post-procedure date (mild LAD)                    Tubal ligation        Reviewed Social History:  Social History     Socioeconomic History    Marital status:     Number of children: 2   Tobacco Use    Smoking status: Former     Current packs/day: 0.00     Average packs/day: 1 pack/day for 40.0 years (40.0 ttl pk-yrs)     Types: Cigarettes     Start date: 1973     Quit date: 2013     Years since quittin.1    Smokeless tobacco: Never    Tobacco comments:     PPD   Vaping Use    Vaping status: Never Used   Substance and Sexual Activity    Alcohol use: No    Drug use: No    Sexual activity: Yes     Partners: Male   Other Topics Concern    Caffeine Concern No    Stress Concern Yes    Weight Concern No    Special Diet No    Exercise Yes    Seat Belt Yes     Social Drivers of Health     Food Insecurity: No Food Insecurity (2023)    Food Insecurity     Food Insecurity: Never true   Transportation Needs: No Transportation Needs (2023)    Transportation Needs     Lack of Transportation: No   Housing Stability: Low Risk  (2023)    Housing Stability     Housing Instability: No      Reviewed:  Family History   Problem Relation Age of Onset    Other (Melanoma) Self     Other  (Other) Self     Breast Cancer Self     Heart Disease Father         Heart disease    High Blood Pressure Father     Cancer Sister 64        leukemia    Cancer Sister 29        Kidney cancer    Neurological Disorder Sister     Other (als) Sister     Cancer Paternal Uncle 70        breast    High Cholesterol Other         siblings      Review of Systems:  Patient reports no rapid or irregular heartbeat. She reports no chest pain. She reports no nausea. She reports no vomiting. She reports no diarrhea. She reports no constipation. She reports no bright red blood per rectum. She reports no fatigue. She reports no blood in the urine hematuria, no changes in urinary habits: initiating urination requires straining, no changes in urinary habits: no hesitancy, and no change in urine appearance. She reports no headache. She reports no dizziness. She reports no easy bruising tendency. She reports no diffuse joint pains. She reports no bone pain. She reports no back pain. She reports no swollen glands. She reports no pain. She reports no numbness. She reports no shortness of breath. She reports no change in a mole. She reports no recent change in weight, no fever, no chills, and no sweating heavily at night.        Physical Examination:  Constitutional: NAD.   Eyes: Sclera: non-icteric.   Lymph Nodes: Lymph Nodes no cervical LAD, supraclavicular LAD, axillary LAD, or inguinal LAD.   Abdomen: Soft, no masses.  Musculoskeletal: Extremities: no edema.   Skin: The scalp an remainder of the skin do not show any evidence for new or suspicious lesions. The wide excision site is well healed. There is no evidence for local recurrence. There are no intransit metastases.  There is about 1 cm scaly papule right lower abdomen.     Document Review:  US Left Axilla 5/28:  Normal left axillary lymph nodes are noted.    Largest lymph node measures 1.2 x 0.5 x 0.7 cm.  This demonstrates normal fatty hilum with cortex measuring 1 mm in diameter.   Minimal flow visualized 2 lymph node.      There is adjacent medial lymph node measuring 0.3 x 0.2 x 0.4 cm.  Small fatty hilum is noted.  No significant internal flow is noted.       Small superficial hypoechoic nodule measuring 0.2 x 0.2 x 0.2 cm suggestive of a small lymph node.  Small fatty hilum is noted.  No significant internal flow is noted.      Procedure(s):  None     Assessment / Plan:  Malignant melanoma of arm, left (C43.62)  She is to continue monthly self skin examination.  Continue imaging with Dr. Garsia   PET due end of July   Return to clinic 4 months with ultrasound left axilla  Will initiate follow-up with dermatology colleagues.      Invasive lobular carcinoma of breast, stage 2, right   -Also following up with Dr. Garsia        Follow Up:  4 mo       Electronically Signed by: Zurdo Montesinos MD

## 2025-06-02 ENCOUNTER — PATIENT MESSAGE (OUTPATIENT)
Facility: CLINIC | Age: 75
End: 2025-06-02

## 2025-06-02 DIAGNOSIS — M81.0 AGE-RELATED OSTEOPOROSIS WITHOUT CURRENT PATHOLOGICAL FRACTURE: Primary | ICD-10-CM

## 2025-06-02 PROBLEM — Z85.3 HISTORY OF BREAST CANCER: Status: ACTIVE | Noted: 2022-06-01

## 2025-06-02 PROBLEM — Z85.820 HISTORY OF MELANOMA: Status: ACTIVE | Noted: 2023-12-03

## 2025-06-02 NOTE — TELEPHONE ENCOUNTER
Placed order in your inbox.     Signed and faxed over to infusion center.     Telephoned patient left voicemail to call back to update.

## 2025-06-03 NOTE — TELEPHONE ENCOUNTER
Patient phoned office.    For dental clearance- she states all her teeth were pulled, implants placed. She does not follow up regularly with a dentist now. Reviewed risk of ONJ- and if any new oral symptoms, should follow up. She verbalized understanding of this and states no current dental symptoms.     6/20/2024 was last Reclast.    Scheduled follow up with Dr. Cevallos on 6/17/25.     Reviewed with patient will need labs in 2 weeks leading up to Reclast infusion. Patient verbalized understanding of all..

## 2025-06-04 ENCOUNTER — OFFICE VISIT (OUTPATIENT)
Dept: SURGERY | Facility: CLINIC | Age: 75
End: 2025-06-04
Payer: MEDICARE

## 2025-06-04 VITALS
RESPIRATION RATE: 16 BRPM | WEIGHT: 129 LBS | OXYGEN SATURATION: 94 % | SYSTOLIC BLOOD PRESSURE: 179 MMHG | BODY MASS INDEX: 24 KG/M2 | TEMPERATURE: 98 F | HEART RATE: 83 BPM | DIASTOLIC BLOOD PRESSURE: 85 MMHG

## 2025-06-04 DIAGNOSIS — C50.911: Primary | ICD-10-CM

## 2025-06-04 DIAGNOSIS — C49.12 MALIGNANT NEOPLASM OF CONNECTIVE AND SOFT TISSUE OF LEFT UPPER LIMB, INCLUDING SHOULDER (HCC): ICD-10-CM

## 2025-06-04 DIAGNOSIS — C77.9 MALIGNANT MELANOMA METASTATIC TO LYMPH NODE (HCC): ICD-10-CM

## 2025-06-04 DIAGNOSIS — C43.62 MALIGNANT MELANOMA OF ARM, LEFT (HCC): ICD-10-CM

## 2025-06-04 PROCEDURE — 99213 OFFICE O/P EST LOW 20 MIN: CPT | Performed by: SURGERY

## 2025-06-10 ENCOUNTER — OFFICE VISIT (OUTPATIENT)
Dept: FAMILY MEDICINE CLINIC | Facility: CLINIC | Age: 75
End: 2025-06-10
Payer: MEDICARE

## 2025-06-10 ENCOUNTER — LAB ENCOUNTER (OUTPATIENT)
Dept: LAB | Age: 75
End: 2025-06-10
Attending: STUDENT IN AN ORGANIZED HEALTH CARE EDUCATION/TRAINING PROGRAM
Payer: MEDICARE

## 2025-06-10 VITALS
HEIGHT: 61 IN | BODY MASS INDEX: 24.35 KG/M2 | DIASTOLIC BLOOD PRESSURE: 83 MMHG | WEIGHT: 129 LBS | RESPIRATION RATE: 12 BRPM | OXYGEN SATURATION: 98 % | TEMPERATURE: 98 F | SYSTOLIC BLOOD PRESSURE: 158 MMHG | HEART RATE: 86 BPM

## 2025-06-10 DIAGNOSIS — Z17.0 MALIGNANT NEOPLASM OF OVERLAPPING SITES OF RIGHT BREAST IN FEMALE, ESTROGEN RECEPTOR POSITIVE (HCC): ICD-10-CM

## 2025-06-10 DIAGNOSIS — I10 PRIMARY HYPERTENSION: ICD-10-CM

## 2025-06-10 DIAGNOSIS — E78.00 PURE HYPERCHOLESTEROLEMIA: ICD-10-CM

## 2025-06-10 DIAGNOSIS — M81.0 AGE-RELATED OSTEOPOROSIS WITHOUT CURRENT PATHOLOGICAL FRACTURE: ICD-10-CM

## 2025-06-10 DIAGNOSIS — C43.62 MALIGNANT MELANOMA OF ARM, LEFT (HCC): ICD-10-CM

## 2025-06-10 DIAGNOSIS — I25.10 ATHEROSCLEROSIS OF NATIVE CORONARY ARTERY OF NATIVE HEART WITHOUT ANGINA PECTORIS: ICD-10-CM

## 2025-06-10 DIAGNOSIS — Z00.00 ENCOUNTER FOR MEDICARE ANNUAL WELLNESS EXAM: Primary | ICD-10-CM

## 2025-06-10 DIAGNOSIS — C50.811 MALIGNANT NEOPLASM OF OVERLAPPING SITES OF RIGHT BREAST IN FEMALE, ESTROGEN RECEPTOR POSITIVE (HCC): ICD-10-CM

## 2025-06-10 DIAGNOSIS — Z85.3 HISTORY OF RIGHT BREAST CANCER: ICD-10-CM

## 2025-06-10 DIAGNOSIS — Z85.820 HISTORY OF MELANOMA: ICD-10-CM

## 2025-06-10 DIAGNOSIS — J84.10 PULMONARY FIBROSIS (HCC): ICD-10-CM

## 2025-06-10 DIAGNOSIS — F41.1 ANXIETY STATE: ICD-10-CM

## 2025-06-10 DIAGNOSIS — G25.0 BENIGN ESSENTIAL TREMOR: ICD-10-CM

## 2025-06-10 DIAGNOSIS — Z79.899 ENCOUNTER FOR LONG-TERM (CURRENT) USE OF MEDICATIONS: ICD-10-CM

## 2025-06-10 DIAGNOSIS — H35.3221 EXUDATIVE AGE-RELATED MACULAR DEGENERATION OF LEFT EYE WITH ACTIVE CHOROIDAL NEOVASCULARIZATION (HCC): ICD-10-CM

## 2025-06-10 DIAGNOSIS — F32.9 REACTIVE DEPRESSION: ICD-10-CM

## 2025-06-10 LAB
ALBUMIN SERPL-MCNC: 4.3 G/DL (ref 3.2–4.8)
ALBUMIN/GLOB SERPL: 1.3 {RATIO} (ref 1–2)
ALP LIVER SERPL-CCNC: 66 U/L (ref 55–142)
ALT SERPL-CCNC: <7 U/L (ref 10–49)
ANION GAP SERPL CALC-SCNC: 10 MMOL/L (ref 0–18)
AST SERPL-CCNC: 21 U/L (ref ?–34)
BASOPHILS # BLD AUTO: 0.04 X10(3) UL (ref 0–0.2)
BASOPHILS NFR BLD AUTO: 0.7 %
BILIRUB SERPL-MCNC: 0.2 MG/DL (ref 0.2–1.1)
BUN BLD-MCNC: 10 MG/DL (ref 9–23)
CALCIUM BLD-MCNC: 9.6 MG/DL (ref 8.7–10.6)
CHLORIDE SERPL-SCNC: 106 MMOL/L (ref 98–112)
CHOLEST SERPL-MCNC: 191 MG/DL (ref ?–200)
CO2 SERPL-SCNC: 26 MMOL/L (ref 21–32)
CREAT BLD-MCNC: 0.93 MG/DL (ref 0.55–1.02)
EGFRCR SERPLBLD CKD-EPI 2021: 64 ML/MIN/1.73M2 (ref 60–?)
EOSINOPHIL # BLD AUTO: 0.08 X10(3) UL (ref 0–0.7)
EOSINOPHIL NFR BLD AUTO: 1.3 %
ERYTHROCYTE [DISTWIDTH] IN BLOOD BY AUTOMATED COUNT: 15.8 %
FASTING PATIENT LIPID ANSWER: NO
FASTING STATUS PATIENT QL REPORTED: NO
GLOBULIN PLAS-MCNC: 3.4 G/DL (ref 2–3.5)
GLUCOSE BLD-MCNC: 88 MG/DL (ref 70–99)
HCT VFR BLD AUTO: 37.8 % (ref 35–48)
HDLC SERPL-MCNC: 53 MG/DL (ref 40–59)
HGB BLD-MCNC: 11.7 G/DL (ref 12–16)
IMM GRANULOCYTES # BLD AUTO: 0.03 X10(3) UL (ref 0–1)
IMM GRANULOCYTES NFR BLD: 0.5 %
LDH SERPL L TO P-CCNC: 210 U/L (ref 120–246)
LDLC SERPL CALC-MCNC: 97 MG/DL (ref ?–100)
LYMPHOCYTES # BLD AUTO: 0.9 X10(3) UL (ref 1–4)
LYMPHOCYTES NFR BLD AUTO: 14.8 %
MCH RBC QN AUTO: 26.8 PG (ref 26–34)
MCHC RBC AUTO-ENTMCNC: 31 G/DL (ref 31–37)
MCV RBC AUTO: 86.7 FL (ref 80–100)
MONOCYTES # BLD AUTO: 0.64 X10(3) UL (ref 0.1–1)
MONOCYTES NFR BLD AUTO: 10.5 %
NEUTROPHILS # BLD AUTO: 4.38 X10 (3) UL (ref 1.5–7.7)
NEUTROPHILS # BLD AUTO: 4.38 X10(3) UL (ref 1.5–7.7)
NEUTROPHILS NFR BLD AUTO: 72.2 %
NONHDLC SERPL-MCNC: 138 MG/DL (ref ?–130)
OSMOLALITY SERPL CALC.SUM OF ELEC: 292 MOSM/KG (ref 275–295)
PLATELET # BLD AUTO: 270 10(3)UL (ref 150–450)
POTASSIUM SERPL-SCNC: 4.1 MMOL/L (ref 3.5–5.1)
PROT SERPL-MCNC: 7.7 G/DL (ref 5.7–8.2)
RBC # BLD AUTO: 4.36 X10(6)UL (ref 3.8–5.3)
SODIUM SERPL-SCNC: 142 MMOL/L (ref 136–145)
TRIGL SERPL-MCNC: 244 MG/DL (ref 30–149)
VIT D+METAB SERPL-MCNC: 39.6 NG/ML (ref 30–100)
VLDLC SERPL CALC-MCNC: 41 MG/DL (ref 0–30)
WBC # BLD AUTO: 6.1 X10(3) UL (ref 4–11)

## 2025-06-10 PROCEDURE — 85025 COMPLETE CBC W/AUTO DIFF WBC: CPT

## 2025-06-10 PROCEDURE — 36415 COLL VENOUS BLD VENIPUNCTURE: CPT

## 2025-06-10 PROCEDURE — 82306 VITAMIN D 25 HYDROXY: CPT

## 2025-06-10 PROCEDURE — 83615 LACTATE (LD) (LDH) ENZYME: CPT

## 2025-06-10 PROCEDURE — 80061 LIPID PANEL: CPT

## 2025-06-10 PROCEDURE — 80053 COMPREHEN METABOLIC PANEL: CPT

## 2025-06-10 NOTE — PROGRESS NOTES
Subjective:   Dillan Casillas is a 74 year old female who presents for a Subsequent Annual Wellness visit (Pt already had Initial Annual Wellness) and scheduled follow up of multiple significant but stable problems.     History of Present Illness  Roxanne Casillas is a 74 year old female who presents for follow-up and medication management.    She recently underwent a PET scan, which was reported as normal. She has a history of bilateral mastectomy and axillary surgery on the left side, and her port has been removed, indicating completion of treatment.    She is currently taking a statin, primidone, escitalopram, anastrozole, a probiotic, a multivitamin, and aspirin. She will need a refill of primidone soon and is concerned about the dosage of her statin, awaiting cholesterol test results to determine if adjustments are needed.    She experiences knee pain, which was previously managed with a steroid taper that provided relief but was discontinued. She now occasionally takes Aleve or Advil, about twice a week, and sometimes Tylenol for pain management, preferring to avoid frequent use of these medications.    No issues with hearing, chest pain, palpitations, urine leakage, or falls. Sleep is reported as good. Vision is good in one eye but not the other.     History/Other:   Fall Risk Assessment:   She has been screened for Falls and is low risk.      Cognitive Assessment:   She had a completely normal cognitive assessment - see flowsheet entries     Functional Ability/Status:   Dillan Casillas has some abnormal functions as listed below:  She has Vision problems based on screening of functional status.       Depression Screening (PHQ):  PHQ-2 SCORE: 0  , done 6/3/2025             Advanced Directives:   She does have a Living Will but we do NOT have it on file in Epic.    She does have a POA but we do NOT have it on file in Epic.    Discussed Advance Care Planning with patient (and family/surrogate if present).  Standard forms made available to patient in After Visit Summary.      Problem List[1]  Allergies:  She has no known allergies.    Current Medications:  Active Meds, Sig Only[2]    Medical History:  She  has a past medical history of Abdominal aortic ectasia (2017), Acute appendicitis with localized peritonitis, without perforation, abscess, or gangrene (2023), Anxiety, Cancer (HCC) (), Cancer (HCC) (), Depression, Ductal hyperplasia, atypical, breast (2020), Flat epithelial atypia (FEA) of left breast (2020), High blood pressure, High cholesterol, Hyperlipidemia, Macular degeneration, Osteoporosis, Personal history of colonic polyps, PONV (postoperative nausea and vomiting), Status post bilateral mastectomy (2022), Tremors of nervous system, and Visual impairment.  Surgical History:  She  has a past surgical history that includes colonoscopy & polypectomy (2003); other surgical history (10/01/2008); ; soniya localization wire 1 site left (cpt=19281) (2020); soniya biopsy stereo nodule 1 site left (cpt=19081) (2020); needle biopsy left (); hysterectomy (); mastectomy left; mastectomy right; appendectomy (2023); angioplasty (coronary); colonoscopy; and tubal ligation.   Family History:  Her family history includes Breast Cancer in her self; Cancer (age of onset: 29) in her sister; Cancer (age of onset: 64) in her sister; Cancer (age of onset: 70) in her paternal uncle; Heart Disease in her father; High Blood Pressure in her father; High Cholesterol in an other family member; Melanoma in her self; Neurological Disorder in her sister; Other in her self; als in her sister.  Social History:  She  reports that she quit smoking about 12 years ago. Her smoking use included cigarettes. She started smoking about 52 years ago. She has a 40 pack-year smoking history. She has never used smokeless tobacco. She reports that she does not drink alcohol and does not use  drugs.    Tobacco:  She smoked tobacco in the past but quit greater than 12 months ago.  Tobacco Use[3]     CAGE Alcohol Screen:   CAGE screening score of 0 on 6/3/2025, showing low risk of alcohol abuse.      Patient Care Team:  Antonio Vargas MD as PCP - General (Family Medicine)  Zurdo Montesinos MD (Surgical Oncology)  Anila Piedra MD (SURGERY, PLASTIC)  Ameena Cevallos DO (ENDOCRINOLOGY)  Ben Garsia MD (ONCOLOGY)    Results  LABS  Chemistry panel: Normal (04/2025)  TSH: Normal (04/2025)    RADIOLOGY  PET scan: Normal  Axillary examination: Normal (05/2025)    Review of Systems  GENERAL: feels well otherwise  SKIN: denies any unusual skin lesions  EYES: denies blurred vision or double vision  HEENT: denies nasal congestion, sinus pain or ST  LUNGS: denies shortness of breath with exertion  CARDIOVASCULAR: denies chest pain on exertion  GI: denies abdominal pain, denies heartburn  : denies dysuria, vaginal discharge or itching, no complaint of urinary incontinence   MUSCULOSKELETAL: denies back pain  NEURO: denies headaches  PSYCHE: denies depression or anxiety  HEMATOLOGIC: denies hx of anemia  ENDOCRINE: denies thyroid history  ALL/ASTHMA: denies hx of allergy or asthma    Objective:   Physical Exam  General Appearance:  Alert, cooperative, no distress, appears stated age   Head:  Normocephalic, without obvious abnormality, atraumatic   Eyes:  PERRL, conjunctiva/corneas clear, EOM's intact both eyes   Ears:  Normal TM's and external ear canals, both ears   Nose: Nares normal, septum midline,mucosa normal, no drainage or sinus tenderness   Throat: Lips, mucosa, and tongue normal; teeth and gums normal   Neck: Supple, symmetrical, trachea midline, no adenopathy;  thyroid: not enlarged, symmetric, no tenderness/mass/nodules; no carotid bruit or JVD   Back:   Symmetric, no curvature, ROM normal, no CVA tenderness   Lungs:   Clear to auscultation bilaterally, respirations unlabored   Heart:  Regular rate  and rhythm, S1 and S2 normal, no murmur, rub, or gallop   Abdomen:   Soft, non-tender, bowel sounds active all four quadrants,  no masses, no organomegaly   Pelvic: Deferred   Extremities: Extremities normal, atraumatic, no cyanosis or edema   Pulses: 2+ and symmetric   Skin: Skin color, texture, turgor normal, no rashes or lesions   Lymph nodes: Cervical, supraclavicular, and axillary nodes normal   Neurologic: Normal       /83 (BP Location: Left arm, Patient Position: Sitting, Cuff Size: large)   Pulse 86   Temp 97.7 °F (36.5 °C) (Temporal)   Resp 12   Ht 5' 1\" (1.549 m)   Wt 129 lb (58.5 kg)   SpO2 98%   BMI 24.37 kg/m²  Estimated body mass index is 24.37 kg/m² as calculated from the following:    Height as of this encounter: 5' 1\" (1.549 m).    Weight as of this encounter: 129 lb (58.5 kg).    Physical Exam  CHEST: Clear to auscultation bilaterally, no wheezes, rhonchi, or crackles.  CARDIOVASCULAR: Normal heart rate and rhythm, S1 and S2 normal without murmurs.  EXTREMITIES: No edema.       Medicare Hearing Assessment:   Hearing Screening    Time taken: 6/10/2025 10:06 AM  Entry User: Isabella Gilmore MA  Screening Method: Whisper Test  Whisper Test Result: Pass         Visual Acuity:   Right Eye Visual Acuity: Corrected Right Eye Chart Acuity: 20/25   Left Eye Visual Acuity: Corrected Left Eye Chart Acuity: 20/25   Both Eyes Visual Acuity: Corrected Both Eyes Chart Acuity: 20/25   Able To Tolerate Visual Acuity: Yes        Assessment & Plan:   Dillan Casillas is a 74 year old female who presents for a Medicare Assessment.       The patient indicates understanding of these issues and agrees to the plan.  Continue with current treatment plan.  Lab work ordered.  Reinforced healthy diet, lifestyle, and exercise.    Assessment & Plan  Encounter for Medicare annual wellness exam         Exudative age-related macular degeneration of left eye with active choroidal neovascularization (HCC)  Slow  progressionPoor vision and follows with ophtho       Pulmonary fibrosis (HCC)   Stable    [x] chronic stable condition, noted historically, continues present status  From radiation   stable at present       Atherosclerosis of native coronary artery of native heart without angina pectoris   Stable    [x] chronic stable condition, noted historically, continues present status            Pure hypercholesterolemia  Cholesterol shows Good control and Good compliance. Long term heart-healthy diet and lifestyle discussed and encouraged to reduce risk of cardiovascular disease.  Cholesterol: 191, done on 6/10/2025.  HDL Cholesterol: 53, done on 6/10/2025.  TriGlycerides 244, done on 6/10/2025.  LDL Cholesterol: 97, done on 6/10/2025.   Cholesterol medications include ATORVASTATIN 10 MG Oral Tab [[id number]].      Orders:    Lipid Panel; Future; Expected date: 06/10/2025     Primary hypertension  BP shows poor control with last BP of 158/83. Lifestyle changes, diet, exercise and weight loss were counseled. Medication changes as listed.   Last K was 4.1 done on 6/10/2025.  Last Cr was 0.93 done on 6/10/2025.  Last eGFR was 64 on 6/10/2025.          Age-related osteoporosis without current pathological fracture         Benign essential tremor   Stable    [x] chronic stable condition, noted historically, continues present status            Anxiety state   Stable    [x] chronic stable condition, noted historically, continues present status            Reactive depression   Stable    [x] chronic stable condition, noted historically, continues present status            History of melanoma  As of 2025 now considered OPAL        History of right breast cancer   Stable    [x] chronic stable condition, noted historically, continues present status  Sees Dr Garsia oncalvino  On anastrazole prophylactic and status post bilateral mastectomy         Encounter for long-term (current) use of medications           Assessment & Plan  Invasive lobular  carcinoma of breast, stage 2, right  Bilateral mastectomy and left axillary surgery completed. No recurrence on recent PET scan and axillary examination. Treatment completed with port removal. Anastrozole ongoing. Caution advised for left arm blood draws due to lymphatic drainage issues.  - Continue anastrozole 1 MG oral daily.  - Advise caution with blood draws on the left arm.    Pure hypercholesterolemia  On atorvastatin 10 MG daily. Previous cholesterol levels slightly elevated. Plan to check current levels for potential dosage adjustment.  - Order cholesterol blood test.  - Adjust atorvastatin dosage based on results.    Osteoarthritis of knees  Knee pain managed with Aleve or Advil twice a week, and Tylenol as needed. Previous steroid taper effective but discontinued due to side effects. Consider alternative treatments if pain increases or functional limitations occur.  - Continue Aleve or Advil as needed, not exceeding twice a week.  - Use Tylenol as needed.  - Monitor for increased pain or functional limitations and consider alternative treatments if necessary.    Benign essential tremor  Managed with primidone, nearing last fill.  - Refill primidone 50 MG oral in the morning and before bedtime.    General Health Maintenance  Medications up to date including levothyroxine, escitalopram, atorvastatin, aspirin, anastrozole, multivitamin, and acidophilus/pectin. TSH levels normal in April. No issues with hearing, vision (except one eye), sleep, or urinary incontinence.  - Continue current medications as prescribed.  - Monitor vision in the affected eye.    Follow-up  Follow-up appointment for blood work to check cholesterol levels. Blood work scheduled for today.  - Complete blood work for cholesterol today.    No follow-ups on file.     Antonio Vargas MD, 6/10/2025     Supplementary Documentation:   General Health:  In the past six months, have you lost more than 10 pounds without trying?: (Patient-Rptd) 2 -  No  Has your appetite been poor?: (Patient-Rptd) No  Type of Diet: (Patient-Rptd) Balanced  How does the patient maintain a good energy level?: (Patient-Rptd) Appropriate Exercise, Other  How would you describe your daily physical activity?: (Patient-Rptd) Moderate  How would you describe your current health state?: (Patient-Rptd) Fair  How do you maintain positive mental well-being?: (Patient-Rptd) Social Interaction, Puzzles, Games, Visiting Friends, Visiting Family  On a scale of 0 to 10, with 0 being no pain and 10 being severe pain, what is your pain level?: (Patient-Rptd) 5 - (Moderate)  In the past six months, have you experienced urine leakage?: (Patient-Rptd) 0-No  At any time do you feel concerned for the safety/well-being of yourself and/or your children, in your home or elsewhere?: (Patient-Rptd) No  Have you had any immunizations at another office such as Influenza, Hepatitis B, Tetanus, or Pneumococcal?: (Patient-Rptd) No    Health Maintenance   Topic Date Due    Zoster Vaccines (1 of 2) Never done    Lung Cancer Screening  Never done    COVID-19 Vaccine (8 - 2024-25 season) 05/24/2025    Annual Physical  06/10/2025    HTN: BP Follow-Up  07/04/2025    Influenza Vaccine  Completed    DEXA Scan  Completed    Annual Depression Screening  Completed    Fall Risk Screening (Annual)  Completed    Pneumococcal Vaccine: 50+ Years  Completed    Meningococcal B Vaccine  Aged Out    Colorectal Cancer Screening  Discontinued    Mammogram  Discontinued          [1]   Patient Active Problem List  Diagnosis    Coronary atherosclerosis    Anxiety state    Pure hypercholesterolemia    Hypertension    Pulmonary fibrosis (HCC)    Reactive depression    Benign essential tremor    Exudative age-related macular degeneration of left eye with active choroidal neovascularization (HCC)    History of right breast cancer    History of melanoma    Age-related osteoporosis without current pathological fracture   [2]   Outpatient  Medications Marked as Taking for the 6/10/25 encounter (Office Visit) with Antonio Vargas MD   Medication Sig    ATORVASTATIN 10 MG Oral Tab TAKE 1 TABLET BY MOUTH EVERY DAY AT NIGHT    ANASTROZOLE 1 MG Oral Tab tab TAKE 1 TABLET BY MOUTH EVERY DAY    PRIMIDONE 50 MG Oral Tab TAKE 1 TABLET (50 MG TOTAL) BY MOUTH IN THE MORNING AND 1 TABLET (50 MG TOTAL) BEFORE BEDTIME    levothyroxine 75 MCG Oral Tab Take 1 tablet (75 mcg total) by mouth before breakfast.    escitalopram 10 MG Oral Tab Take 1 tablet (10 mg total) by mouth daily.    aspirin 81 MG Oral Chew Tab Chew 1 tablet (81 mg total) by mouth in the morning.    Multiple Vitamin Oral Tab Take 1 tablet by mouth in the morning.    Acidophilus/Pectin Oral Cap Take 1 capsule by mouth in the morning.   [3]   Social History  Tobacco Use   Smoking Status Former    Current packs/day: 0.00    Average packs/day: 1 pack/day for 40.0 years (40.0 ttl pk-yrs)    Types: Cigarettes    Start date: 1973    Quit date: 2013    Years since quittin.1   Smokeless Tobacco Never   Tobacco Comments    PPD

## 2025-06-10 NOTE — PROGRESS NOTES
The following individual(s) verbally consented to be recorded using ambient AI listening technology and understand that they can each withdraw their consent to this listening technology at any point by asking the clinician to turn off or pause the recording:    Patient name: Dillan Casillas  Additional names:

## 2025-06-11 DIAGNOSIS — I10 PRIMARY HYPERTENSION: ICD-10-CM

## 2025-06-11 DIAGNOSIS — E78.00 PURE HYPERCHOLESTEROLEMIA: Primary | ICD-10-CM

## 2025-06-11 PROBLEM — C50.911: Status: ACTIVE | Noted: 2022-06-01

## 2025-06-11 PROBLEM — I77.811 ABDOMINAL AORTIC ECTASIA: Status: RESOLVED | Noted: 2017-05-12 | Resolved: 2025-06-11

## 2025-06-11 PROBLEM — M85.80 OSTEOPENIA, SENILE: Status: RESOLVED | Noted: 2023-06-07 | Resolved: 2025-06-11

## 2025-06-11 NOTE — ASSESSMENT & PLAN NOTE
Stable    [x] chronic stable condition, noted historically, continues present status  From radiation   stable at present

## 2025-06-11 NOTE — ASSESSMENT & PLAN NOTE
BP shows poor control with last BP of 158/83. Lifestyle changes, diet, exercise and weight loss were counseled. Medication changes as listed.   Last K was 4.1 done on 6/10/2025.  Last Cr was 0.93 done on 6/10/2025.  Last eGFR was 64 on 6/10/2025.

## 2025-06-11 NOTE — ASSESSMENT & PLAN NOTE
Cholesterol shows Good control and Good compliance. Long term heart-healthy diet and lifestyle discussed and encouraged to reduce risk of cardiovascular disease.  Cholesterol: 191, done on 6/10/2025.  HDL Cholesterol: 53, done on 6/10/2025.  TriGlycerides 244, done on 6/10/2025.  LDL Cholesterol: 97, done on 6/10/2025.   Cholesterol medications include ATORVASTATIN 10 MG Oral Tab [[id number]].      Orders:    Lipid Panel; Future; Expected date: 06/10/2025

## 2025-06-12 PROBLEM — Z85.3 HISTORY OF RIGHT BREAST CANCER: Status: ACTIVE | Noted: 2022-06-01

## 2025-06-12 NOTE — ASSESSMENT & PLAN NOTE
Stable    [x] chronic stable condition, noted historically, continues present status  Sees Dr Garsia onco  On anastrazole prophylactic and status post bilateral mastectomy

## 2025-06-17 ENCOUNTER — TELEMEDICINE (OUTPATIENT)
Facility: CLINIC | Age: 75
End: 2025-06-17
Payer: MEDICARE

## 2025-06-17 DIAGNOSIS — M81.0 AGE-RELATED OSTEOPOROSIS WITHOUT CURRENT PATHOLOGICAL FRACTURE: Primary | ICD-10-CM

## 2025-06-17 DIAGNOSIS — E03.9 ACQUIRED HYPOTHYROIDISM: ICD-10-CM

## 2025-06-17 PROCEDURE — 99214 OFFICE O/P EST MOD 30 MIN: CPT | Performed by: STUDENT IN AN ORGANIZED HEALTH CARE EDUCATION/TRAINING PROGRAM

## 2025-06-17 RX ORDER — LEVOTHYROXINE SODIUM 75 UG/1
75 TABLET ORAL
Qty: 90 TABLET | Refills: 2 | Status: SHIPPED | OUTPATIENT
Start: 2025-06-17

## 2025-06-17 NOTE — PROGRESS NOTES
Return Office TELEMEDICINE Visit     CHIEF COMPLAINT:    No chief complaint on file.     Date: 6/17/2025    Patient verbally consents to a Video/Telephone service for this visit.  Patient understands and accepts financial responsibility for any deductible, co-insurance and/or co-pays associated with this service.  HISTORY OF PRESENT ILLNESS:  Dillan Casillas is a 74 year old female with PMHx significant for hyperlipidemia, CAD, breast CA s/p mastectomy who presents for follow up for severe osteoporosis.    Initial consult HPI: May 2022  Referred by oncologist Dr. Garsia after recent baseline screening DXA (starting on AI for adjuvant therapy for breast CA) showed severe osteoporosis. May 2022 DXA showed lowest T score -5.1    Pt denied Hx of fragility fx, Fhx of osteoporosis, Hx of kidney stones, Hx of chronic steroids >3 months, Hx of treatment for osteoporosis, Hx of hyperTH, Hx of hyperPTH, Hx of vitamin D deficiency, Heavy EtOH use and Hx of smoking/Current smoker    Severe osteoporosis at baseline, now on AI as well for adjuvant therapy for breast CA. Will work up secondary causes and concomittantly start tx for severe osteoporosis. Discussed starting anabolic agent for short-term management to build up bone density and then switching to anti-resorptive tx for long-term management (ZA has good long-term data for bone loss due to AI tx for breast CA)    Encouraged continued vit D, Ca, falls precaution and weight-bearing exercises    Given degree of BMD loss, recommended anabolic agent followed by anti-resorptive agent.    Interim hx:   Nov 2022  Secondary workup for osteoporosis showed normal vit D, bone specific alk phos, PTH, TSH. Has elevated CTX of 1010 (upper end of normal range)  Has had follow up with Dr Garsia.  Due to ongoing dental work, pt has had all teeth extracted, will need about 3 months of healing before permanent implant (Feb 2023) can be placed.  Pt states her dentist doesn't want her on any  osteoporotic tx until everything has healed.    March 2023  I had spoke to her  Dr. Aparna Calloway, would confirms that she wants pt to avoid ALL osteoporotic medications (not even fosamax) until she gives her the clearance after Feb 2023 that the implants are healing, to prevent ONJ.   3/2023 - pt called to inform that she's been given dental clearance  She and  are debating between Forteo (her  would need inj training) and Evenity (what she's leaning towards)  Continues to take Ca 600mg and vit D 2000IU     May 2023  Pt and  reviewed Evenity and Forteo. Not comfortable with SE.  Leaning towards Reclast  She has the dental clearance. Continues to take Ca and vit D. No falls.    May 2024  First dose of Reclast 6/2023  Continued on anastrazole   Since last visit, started on immunotherapy (keytruda) and has had 5 infusions each 3 weeks apart, last infusion was 5/8  Started on LT4 50 mcg by Dr. Garsia   Is currently taking calcium and vitamin D  Denies falls or fractures since last visit     June 2025  6/10/2025 vitamin D 39 4/29/2025 TSH 2.5.  1/28/2025 TSH 2.3  6/25/2024 bone density with osteoporosis, lowest T-score -4.7 in lumbar spine; all areas with improvement compared to 5/5/2022 DEXA.  Both done at [place] location  Increase levothyroxine from 50 to 75 mcg 5/29/2024  Since last office visit, received Reclast No. 2 June 2024; scheduled this Friday for No. 3  Continues to take vitamin D and calcium supplement   Denies falls/fractures since LOV      CURRENT MEDICATION:    Current Outpatient Medications   Medication Sig Dispense Refill    levothyroxine 75 MCG Oral Tab Take 1 tablet (75 mcg total) by mouth before breakfast. 90 tablet 2    ATORVASTATIN 10 MG Oral Tab TAKE 1 TABLET BY MOUTH EVERY DAY AT NIGHT 90 tablet 0    ANASTROZOLE 1 MG Oral Tab tab TAKE 1 TABLET BY MOUTH EVERY DAY 90 tablet 3    PRIMIDONE 50 MG Oral Tab TAKE 1 TABLET (50 MG TOTAL) BY MOUTH IN THE MORNING AND 1  TABLET (50 MG TOTAL) BEFORE BEDTIME 180 tablet 1    escitalopram 10 MG Oral Tab Take 1 tablet (10 mg total) by mouth daily. 90 tablet 1    aspirin 81 MG Oral Chew Tab Chew 1 tablet (81 mg total) by mouth in the morning.      Multiple Vitamin Oral Tab Take 1 tablet by mouth in the morning.      Acidophilus/Pectin Oral Cap Take 1 capsule by mouth in the morning.         Endocrine Medications            levothyroxine 75 MCG Oral Tab            ALLERGY:  No Known Allergies      PAST MEDICAL, SOCIAL AND FAMILY HISTORY:  See past medical history marked as reviewed.  See past surgical history marked as reviewed.  See past family history marked as reviewed.  See past social history marked as reviewed.      REVIEW OF SYSTEMS:   Ten point review of systems has been performed and is otherwise negative and/or non-contributory, except as described above.      PHYSICAL EXAM:   There were no vitals filed for this visit.    BMI: There is no height or weight on file to calculate BMI.   PHYSICAL EXAM- limited due to telemedicine encounter    Constitutional Not in acute distress  Pulmonary: no wheezing heard, no coughing on the phone. Speaking in full sentences.  Neurological:  Alert and oriented to person, place and time.   Psychiatric: Normal affect, mood and behavior appropriate        DATA:   Pertinent data reviewed      ASSESSMENT AND PLAN:    Age-related osteoporosis without current pathological fracture    Plan:   Severe osteoporosis at baseline (May 2022 DXA L spine T -5.1, hip T -4.1, fem neck -4.2), now on AI as well for adjuvant therapy for breast CA. Secondary workup negative. CTX at the upper end of normal range. Given the degree of BMD loss, would recommend anabolic agent for short-term management to build up bone density, then transition to anti-resorptive tx for long-term management (ZA has good long-term data for bone loss due to AI tx for breast CA).    Due to ongoing and extensive dental issues (teeth extraction),  pt's dentist only cleared her for osteoporotic treatment after March 2023.  Pt offered anabolic agents but ultimately not comfortable with side effects. She would like to proceed with Reclast.  She received her first dose on 6/16/2023, second dose 6/20/2024    - annual Reclast due June 2025 (scheduled), plan for at least 3 years  - DXA in 1 yr; if not enough BMD improvement, will re-open discussion with anabolics  - continue vit D and Ca  - continue weight bearing exercises and falls prevention    Hypothyroidism  -Reviewed thyroid labs and would recommend continuing levothyroxine 75 mcg daily    Return in about 13 months (around 7/6/2026).      The above plan was discussed in detail with the patient who verbalized understanding and agreement.        Ameena Cevallos,   [facility] End[facility]in[facility]  6/17/2025     In reviewing this note, please be advised that Dragon Voice Recognition software used to dictate the note may have made errors in recognizing some of the words or phrases.     Note to patient: The 21 Century Cures Act makes medical notes like these available to patients in the interest of transparency. However, be advised this is a medical document. It is intended as peer to peer communication. It is written in medical language and may contain abbreviations or verbiage that are unfamiliar. It may appear blunt or direct. Medical documents are intended to carry relevant information, facts as evident, and the clinical opinion of the practitioner.

## 2025-06-17 NOTE — PATIENT INSTRUCTIONS
Continue calcium and vitamin D  Complete Reclast #3 on Friday  You are due for a bone density 6/26/2026 or after  I will see you 7/2026 to review your bone density and discuss next steps    Continue Levothyroxine 75 mcg daily  Repeat your labs with your primary physician

## 2025-06-20 ENCOUNTER — OFFICE VISIT (OUTPATIENT)
Age: 75
End: 2025-06-20
Attending: INTERNAL MEDICINE
Payer: MEDICARE

## 2025-06-20 VITALS
SYSTOLIC BLOOD PRESSURE: 170 MMHG | OXYGEN SATURATION: 98 % | TEMPERATURE: 98 F | HEART RATE: 84 BPM | RESPIRATION RATE: 16 BRPM | DIASTOLIC BLOOD PRESSURE: 91 MMHG

## 2025-06-20 DIAGNOSIS — Z85.3 HISTORY OF RIGHT BREAST CANCER: ICD-10-CM

## 2025-06-20 DIAGNOSIS — Z85.820 HISTORY OF MELANOMA: ICD-10-CM

## 2025-06-20 DIAGNOSIS — M81.0 AGE-RELATED OSTEOPOROSIS WITHOUT CURRENT PATHOLOGICAL FRACTURE: Primary | ICD-10-CM

## 2025-06-20 RX ORDER — ZOLEDRONIC ACID 0.05 MG/ML
5 INJECTION, SOLUTION INTRAVENOUS ONCE
Status: COMPLETED | OUTPATIENT
Start: 2025-06-20 | End: 2025-06-20

## 2025-06-20 RX ORDER — ZOLEDRONIC ACID 0.05 MG/ML
5 INJECTION, SOLUTION INTRAVENOUS ONCE
Status: CANCELLED | OUTPATIENT
Start: 2025-06-20

## 2025-06-20 RX ADMIN — ZOLEDRONIC ACID 5 MG: 0.05 INJECTION, SOLUTION INTRAVENOUS at 13:23:00

## 2025-06-20 NOTE — PROGRESS NOTES
Education Record    Learner:  Patient    Pt here for: reclast    Barriers / Limitations:  None    Method:  Brief focused, printed material and  reinforcement    General Topics:  Plan of care reviewed    Outcome: Pt tolerated infusion with no c/o.

## 2025-07-02 DIAGNOSIS — G25.0 ESSENTIAL TREMOR: ICD-10-CM

## 2025-07-02 RX ORDER — PRIMIDONE 50 MG/1
50 TABLET ORAL 2 TIMES DAILY
Qty: 180 TABLET | Refills: 1 | Status: SHIPPED | OUTPATIENT
Start: 2025-07-02 | End: 2025-12-29

## 2025-07-02 NOTE — TELEPHONE ENCOUNTER
LOV: 6-    LAST LAB: 6-    LAST RX:  Medication Quantity Refills Start End   PRIMIDONE 50 MG Oral Tab 180 tablet 1 1/6/2025 7/5/2025   Sig:   TAKE 1 TABLET (50 MG TOTAL) BY MOUTH IN THE MORNING AND 1 TABLET (50 MG TOTAL)         Next OV: No future appointments.       PROTOCOL    Neurology Medications Ofqhne7907/02/2025 12:26 AM   Protocol Details In person appointment or virtual visit in the past 6 mos or appointment in next 3 mos    Medication is active on med list

## 2025-07-30 ENCOUNTER — HOSPITAL ENCOUNTER (OUTPATIENT)
Dept: NUCLEAR MEDICINE | Facility: HOSPITAL | Age: 75
Discharge: HOME | End: 2025-07-30
Attending: INTERNAL MEDICINE

## 2025-07-30 DIAGNOSIS — Z17.0 MALIGNANT NEOPLASM OF OVERLAPPING SITES OF RIGHT BREAST IN FEMALE, ESTROGEN RECEPTOR POSITIVE (HCC): ICD-10-CM

## 2025-07-30 DIAGNOSIS — C50.811 MALIGNANT NEOPLASM OF OVERLAPPING SITES OF RIGHT BREAST IN FEMALE, ESTROGEN RECEPTOR POSITIVE (HCC): ICD-10-CM

## 2025-07-30 DIAGNOSIS — C43.62 MALIGNANT MELANOMA OF ARM, LEFT (HCC): ICD-10-CM

## 2025-07-30 LAB — GLUCOSE BLD-MCNC: 95 MG/DL (ref 70–99)

## 2025-07-30 PROCEDURE — 78816 PET IMAGE W/CT FULL BODY: CPT | Performed by: INTERNAL MEDICINE

## 2025-07-30 PROCEDURE — 82962 GLUCOSE BLOOD TEST: CPT

## 2025-07-31 ENCOUNTER — LAB ENCOUNTER (OUTPATIENT)
Dept: LAB | Age: 75
End: 2025-07-31
Attending: FAMILY MEDICINE

## 2025-07-31 DIAGNOSIS — Z85.3 HISTORY OF RIGHT BREAST CANCER: ICD-10-CM

## 2025-07-31 DIAGNOSIS — Z85.820 HISTORY OF MELANOMA: ICD-10-CM

## 2025-07-31 DIAGNOSIS — M81.0 AGE-RELATED OSTEOPOROSIS WITHOUT CURRENT PATHOLOGICAL FRACTURE: ICD-10-CM

## 2025-07-31 DIAGNOSIS — E03.2 HYPOTHYROIDISM DUE TO MEDICATION: ICD-10-CM

## 2025-07-31 LAB
ALBUMIN SERPL-MCNC: 4.1 G/DL (ref 3.2–4.8)
CALCIUM BLD-MCNC: 9.2 MG/DL (ref 8.7–10.6)
CREAT BLD-MCNC: 0.97 MG/DL (ref 0.55–1.02)
EGFRCR SERPLBLD CKD-EPI 2021: 61 ML/MIN/1.73M2 (ref 60–?)
T4 FREE SERPL-MCNC: 1.3 NG/DL (ref 0.8–1.7)
TSI SER-ACNC: 2.61 UIU/ML (ref 0.55–4.78)

## 2025-07-31 PROCEDURE — 84439 ASSAY OF FREE THYROXINE: CPT

## 2025-07-31 PROCEDURE — 82565 ASSAY OF CREATININE: CPT

## 2025-07-31 PROCEDURE — 82310 ASSAY OF CALCIUM: CPT

## 2025-07-31 PROCEDURE — 36415 COLL VENOUS BLD VENIPUNCTURE: CPT

## 2025-07-31 PROCEDURE — 82040 ASSAY OF SERUM ALBUMIN: CPT

## 2025-07-31 PROCEDURE — 84443 ASSAY THYROID STIM HORMONE: CPT

## 2025-08-02 ENCOUNTER — RESULTS FOLLOW-UP (OUTPATIENT)
Facility: LOCATION | Age: 75
End: 2025-08-02

## 2025-08-02 DIAGNOSIS — E03.2 HYPOTHYROIDISM DUE TO MEDICATION: Primary | ICD-10-CM

## 2025-08-13 ENCOUNTER — OFFICE VISIT (OUTPATIENT)
Facility: LOCATION | Age: 75
End: 2025-08-13
Attending: INTERNAL MEDICINE

## 2025-08-13 VITALS
DIASTOLIC BLOOD PRESSURE: 97 MMHG | SYSTOLIC BLOOD PRESSURE: 187 MMHG | HEART RATE: 82 BPM | OXYGEN SATURATION: 96 % | WEIGHT: 122.38 LBS | HEIGHT: 62.01 IN | TEMPERATURE: 97 F | BODY MASS INDEX: 22.24 KG/M2 | RESPIRATION RATE: 16 BRPM

## 2025-08-13 DIAGNOSIS — R94.8 ABNORMAL POSITRON EMISSION TOMOGRAPHY (PET) SCAN: ICD-10-CM

## 2025-08-13 DIAGNOSIS — C43.62 MALIGNANT MELANOMA OF ARM, LEFT (HCC): Primary | ICD-10-CM

## 2025-08-13 DIAGNOSIS — Z17.0 MALIGNANT NEOPLASM OF OVERLAPPING SITES OF RIGHT BREAST IN FEMALE, ESTROGEN RECEPTOR POSITIVE (HCC): ICD-10-CM

## 2025-08-13 DIAGNOSIS — C50.811 MALIGNANT NEOPLASM OF OVERLAPPING SITES OF RIGHT BREAST IN FEMALE, ESTROGEN RECEPTOR POSITIVE (HCC): ICD-10-CM

## 2025-08-13 LAB
ALBUMIN SERPL-MCNC: 4.3 G/DL (ref 3.2–4.8)
ALBUMIN/GLOB SERPL: 1.2 (ref 1–2)
ALP LIVER SERPL-CCNC: 58 U/L (ref 55–142)
ALT SERPL-CCNC: <7 U/L (ref 10–49)
ANION GAP SERPL CALC-SCNC: 11 MMOL/L (ref 0–18)
AST SERPL-CCNC: 22 U/L (ref ?–34)
BASOPHILS # BLD AUTO: 0.05 X10(3) UL (ref 0–0.2)
BASOPHILS NFR BLD AUTO: 0.8 %
BILIRUB SERPL-MCNC: 0.2 MG/DL (ref 0.2–1.1)
BUN BLD-MCNC: 8 MG/DL (ref 9–23)
CALCIUM BLD-MCNC: 9.3 MG/DL (ref 8.7–10.6)
CHLORIDE SERPL-SCNC: 108 MMOL/L (ref 98–112)
CO2 SERPL-SCNC: 26 MMOL/L (ref 21–32)
CREAT BLD-MCNC: 0.94 MG/DL (ref 0.55–1.02)
CRP SERPL-MCNC: 2.5 MG/DL (ref ?–0.5)
EGFRCR SERPLBLD CKD-EPI 2021: 63 ML/MIN/1.73M2 (ref 60–?)
EOSINOPHIL # BLD AUTO: 0.13 X10(3) UL (ref 0–0.7)
EOSINOPHIL NFR BLD AUTO: 2.2 %
ERYTHROCYTE [DISTWIDTH] IN BLOOD BY AUTOMATED COUNT: 16 %
ERYTHROCYTE [SEDIMENTATION RATE] IN BLOOD: 69 MM/HR (ref 0–30)
GLOBULIN PLAS-MCNC: 3.7 G/DL (ref 2–3.5)
GLUCOSE BLD-MCNC: 95 MG/DL (ref 70–99)
HCT VFR BLD AUTO: 38 % (ref 35–48)
HGB BLD-MCNC: 11.8 G/DL (ref 12–16)
IMM GRANULOCYTES # BLD AUTO: 0.02 X10(3) UL (ref 0–1)
IMM GRANULOCYTES NFR BLD: 0.3 %
LDH SERPL L TO P-CCNC: 215 U/L (ref 120–246)
LYMPHOCYTES # BLD AUTO: 1.06 X10(3) UL (ref 1–4)
LYMPHOCYTES NFR BLD AUTO: 17.8 %
MCH RBC QN AUTO: 26.6 PG (ref 26–34)
MCHC RBC AUTO-ENTMCNC: 31.1 G/DL (ref 31–37)
MCV RBC AUTO: 85.6 FL (ref 80–100)
MONOCYTES # BLD AUTO: 0.57 X10(3) UL (ref 0.1–1)
MONOCYTES NFR BLD AUTO: 9.6 %
NEUTROPHILS # BLD AUTO: 4.11 X10 (3) UL (ref 1.5–7.7)
NEUTROPHILS # BLD AUTO: 4.11 X10(3) UL (ref 1.5–7.7)
NEUTROPHILS NFR BLD AUTO: 69.3 %
OSMOLALITY SERPL CALC.SUM OF ELEC: 298 MOSM/KG (ref 275–295)
PLATELET # BLD AUTO: 330 10(3)UL (ref 150–450)
POTASSIUM SERPL-SCNC: 4.4 MMOL/L (ref 3.5–5.1)
PROT SERPL-MCNC: 8 G/DL (ref 5.7–8.2)
RBC # BLD AUTO: 4.44 X10(6)UL (ref 3.8–5.3)
SODIUM SERPL-SCNC: 145 MMOL/L (ref 136–145)
WBC # BLD AUTO: 5.9 X10(3) UL (ref 4–11)

## 2025-08-13 RX ORDER — LOVASTATIN 40 MG/1
40 TABLET ORAL NIGHTLY
Qty: 90 TABLET | Refills: 0 | Status: SHIPPED | OUTPATIENT
Start: 2025-08-13

## 2025-08-19 ENCOUNTER — OFFICE VISIT (OUTPATIENT)
Dept: FAMILY MEDICINE CLINIC | Facility: CLINIC | Age: 75
End: 2025-08-19

## 2025-08-19 VITALS
HEIGHT: 62 IN | OXYGEN SATURATION: 99 % | RESPIRATION RATE: 12 BRPM | HEART RATE: 79 BPM | SYSTOLIC BLOOD PRESSURE: 160 MMHG | DIASTOLIC BLOOD PRESSURE: 80 MMHG | TEMPERATURE: 98 F | WEIGHT: 123 LBS | BODY MASS INDEX: 22.63 KG/M2

## 2025-08-19 DIAGNOSIS — E78.00 PURE HYPERCHOLESTEROLEMIA: ICD-10-CM

## 2025-08-19 DIAGNOSIS — M25.50 POLYARTHRALGIA: Primary | ICD-10-CM

## 2025-08-19 DIAGNOSIS — I10 PRIMARY HYPERTENSION: ICD-10-CM

## 2025-08-19 PROCEDURE — 99214 OFFICE O/P EST MOD 30 MIN: CPT | Performed by: FAMILY MEDICINE

## 2025-08-19 RX ORDER — ATORVASTATIN CALCIUM 10 MG/1
10 TABLET, FILM COATED ORAL NIGHTLY
Qty: 90 TABLET | Refills: 1 | Status: SHIPPED | OUTPATIENT
Start: 2025-08-19

## 2025-08-19 RX ORDER — ATORVASTATIN CALCIUM 10 MG/1
10 TABLET, FILM COATED ORAL NIGHTLY
COMMUNITY
Start: 2025-08-19

## (undated) DIAGNOSIS — F41.1 GENERALIZED ANXIETY DISORDER: ICD-10-CM

## (undated) DIAGNOSIS — G25.0 ESSENTIAL TREMOR: ICD-10-CM

## (undated) DEVICE — SUTURE ETHIBOND 0 CT-1

## (undated) DEVICE — SPECIMEN TRAP LUKI

## (undated) DEVICE — SYRINGE 5ML LL TIP

## (undated) DEVICE — CABLE BPLR L12FT FLYING LD DISP

## (undated) DEVICE — ELECTRODE ES L2.75IN XLN STD BLDE MOD E-Z CLN

## (undated) DEVICE — BREAST-HERNIA-PORT CDS-LF: Brand: MEDLINE INDUSTRIES, INC.

## (undated) DEVICE — STERILE POLYISOPRENE POWDER-FREE SURGICAL GLOVES: Brand: PROTEXIS

## (undated) DEVICE — PROGRASP FORCEPS: Brand: ENDOWRIST;DAVINCI SI

## (undated) DEVICE — SUTURE VICRYL 2-0

## (undated) DEVICE — #10 STERILE BLADE: Brand: POLYMER COATED BLADES

## (undated) DEVICE — PROVE COVER: Brand: UNBRANDED

## (undated) DEVICE — SHEET, DRAPE, SPLIT, STERILE: Brand: MEDLINE

## (undated) DEVICE — POUCH SPECIMEN WIRE 6X3 250ML

## (undated) DEVICE — LAPAROTOMY SPONGE - RF AND X-RAY DETECTABLE PRE-WASHED: Brand: SITUATE

## (undated) DEVICE — ELECTRO LUBE IS A SINGLE PATIENT USE DEVICE THAT IS INTENDED TO BE USED ON ELECTROSURGICAL ELECTRODES TO REDUCE STICKING.: Brand: KEY SURGICAL ELECTRO LUBE

## (undated) DEVICE — SUTURE SILK 0 FSL

## (undated) DEVICE — SUTURE MCRYL SZ 4-0 L18IN ABSRB UD L19MM PS-2

## (undated) DEVICE — 40580 - THE PINK PAD - ADVANCED TRENDELENBURG POSITIONING KIT: Brand: 40580 - THE PINK PAD - ADVANCED TRENDELENBURG POSITIONING KIT

## (undated) DEVICE — SUTURE MONOCRYL 4-0 PS-2

## (undated) DEVICE — LIGHT HANDLE

## (undated) DEVICE — FENESTRATED BIPOLAR FORCEPS: Brand: ENDOWRIST;DAVINCI SI

## (undated) DEVICE — DV KIT ACCESSORY 4-ARM

## (undated) DEVICE — TROCAR: Brand: KII FIOS FIRST ENTRY

## (undated) DEVICE — STERILE SYNTHETIC POLYISOPRENE POWDER-FREE SURGICAL GLOVES WITH HYDROGEL COATING, SMOOTH FINISH, STRAIGHT FINGER: Brand: PROTEXIS

## (undated) DEVICE — PAD,NON-ADHERENT,3X8,STERILE,LF,1/PK: Brand: MEDLINE

## (undated) DEVICE — SOL H2O 1000ML BTL

## (undated) DEVICE — SOLUTION IRRIG 1000ML 0.9% NACL USP BTL

## (undated) DEVICE — KENDALL SCD EXPRESS SLEEVES, KNEE LENGTH, MEDIUM: Brand: KENDALL SCD

## (undated) DEVICE — EVACUATOR SUR 100CC SIL BLB WND

## (undated) DEVICE — BLADE SURG NO11 SS POLYMER COAT STR MICROCOAT

## (undated) DEVICE — LIGACLIP MCA MULTIPLE CLIP APPLIERS, 30 MEDIUM CLIPS: Brand: LIGACLIP

## (undated) DEVICE — PACK CDS BREAST-HERNIA-PORT

## (undated) DEVICE — DRAIN SURG 15FR TRCR L3/16IN 100% SIL RND

## (undated) DEVICE — 3M™ STERI-STRIP™ REINFORCED ADHESIVE SKIN CLOSURES, R1547, 1/2 IN X 4 IN (12 MM X 100 MM), 6 STRIPS/ENVELOPE: Brand: 3M™ STERI-STRIP™

## (undated) DEVICE — SUTURE PDS II 1 CT-1

## (undated) DEVICE — VIOLET BRAIDED (POLYGLACTIN 910), SYNTHETIC ABSORBABLE SUTURE: Brand: COATED VICRYL

## (undated) DEVICE — ADHESIVE SKIN TOP FOR WND CLSR DERMBND ADV

## (undated) DEVICE — SOL  .9 1000ML BTL

## (undated) DEVICE — BANDAGE ELASTIC ACE 6\" X-LONG

## (undated) DEVICE — HUNTER GASPER TIP, DISPOSABLE: Brand: RENEW

## (undated) DEVICE — SUTURE VICRYL 3-0 SH

## (undated) DEVICE — SPONGE GZ W4XL4IN 100% COT 12 PLY TYP VII WVN

## (undated) DEVICE — TIP COVER ACCESSORY

## (undated) DEVICE — SLEEVE COMPR M KNEE LEN SGL USE KENDALL SCD

## (undated) DEVICE — MEGA NEEDLE DRIVER: Brand: ENDOWRIST;DAVINCI SI

## (undated) DEVICE — GYN LAP/ROBOTIC: Brand: MEDLINE INDUSTRIES, INC.

## (undated) DEVICE — MARKER SKIN PREP RESIST STRL

## (undated) DEVICE — SRG GLOVE PROTEXIS TXTRD 7.5

## (undated) DEVICE — ANCHOR TISSUE RETRIEVAL SYSTEM, BAG SIZE 1200 ML, PORT SIZE 15 MM: Brand: ANCHOR TISSUE RETRIEVAL SYSTEM

## (undated) DEVICE — ENSEAL X1 TISSUE SEALER, CURVED JAW, 37 CM SHAFT LENGTH: Brand: ENSEAL

## (undated) DEVICE — SUTURE PERMA- SZ 2-0 L30IN NONABSORBABLE

## (undated) DEVICE — BRA SURGICAL ELIZABETH PINK L

## (undated) DEVICE — SUTURE VCRL SZ 3-0 L27IN ABSRB UD L26MM SH

## (undated) DEVICE — TROCAR BLADELESS 12MM B12LT

## (undated) DEVICE — DRAIN RELIAVAC 100CC

## (undated) DEVICE — BANDAGE,GAUZE,BULKEE II,4.5"X4.1YD,STRL: Brand: MEDLINE

## (undated) DEVICE — SUTURE PROL SZ 2-0 L30IN NONABSORB BLU

## (undated) DEVICE — DERMABOND LIQUID ADHESIVE

## (undated) DEVICE — ENDOPATH XCEL WITH OPTIVIEW TECHNOLOGY BLADELESS TROCARS WITH STABILITY SLEEVES: Brand: ENDOPATH XCEL OPTIVIEW

## (undated) DEVICE — SCD SLEEVE KNEE HI BLEND

## (undated) DEVICE — Device: Brand: SUTURE PASSOR PRO

## (undated) DEVICE — SYRINGE MED 10ML LL TIP W/O SFTY DISP

## (undated) DEVICE — ENDOPATH ULTRA VERESS INSUFFLATION NEEDLES WITH LUER LOCK CONNECTORS: Brand: ENDOPATH

## (undated) DEVICE — STANDARD HYPODERMIC NEEDLE,POLYPROPYLENE HUB: Brand: MONOJECT

## (undated) DEVICE — GLOVE BIOGEL M SURG SZ 71/2

## (undated) DEVICE — SYRINGE MED 10ML LL CTRL W/ FNGR GRP CLR BRL

## (undated) DEVICE — LAP CHOLE/APPY CDS-LF: Brand: MEDLINE INDUSTRIES, INC.

## (undated) DEVICE — CONT SPEC SURG FAXITRON WEDGE

## (undated) DEVICE — YANKAUER,FLEXIBLE HANDLE,REGLR CAPACITY: Brand: MEDLINE INDUSTRIES, INC.

## (undated) DEVICE — #15 STERILE STAINLESS BLADE: Brand: STERILE STAINLESS BLADES

## (undated) DEVICE — SUTURE SILK 2-0 FSL

## (undated) DEVICE — NEEDLE SPINAL 20X3-1/2 YELLOW

## (undated) DEVICE — STERILE POLYISOPRENE POWDER-FREE SURGICAL GLOVES WITH EMOLLIENT COATING: Brand: PROTEXIS

## (undated) DEVICE — BLADE 24 SS SRG STRL

## (undated) DEVICE — APPLICATOR SKIN PREP 26ML HI LT ORNG 2% CHG

## (undated) DEVICE — UNDYED BRAIDED (POLYGLACTIN 910), SYNTHETIC ABSORBABLE SUTURE: Brand: COATED VICRYL

## (undated) DEVICE — ANCHOR TISSUE RETRIEVAL SYSTEM, BAG SIZE 175 ML, PORT SIZE 10 MM: Brand: ANCHOR TISSUE RETRIEVAL SYSTEM

## (undated) DEVICE — SPONGE GZ 4XL4IN 100% COT 12 PLY TYP VII WVN

## (undated) DEVICE — STAPLER MED SHFT L340MM JAW L45MM STD ECHELON

## (undated) DEVICE — TRAY CATH 16FR F INCL BARDX IC COMPLT CARE

## (undated) DEVICE — STOCKINETTE SUR W9XL48IN IMPERV FOR HANDLING

## (undated) DEVICE — SUTURE PROLENE 2-0 SH

## (undated) DEVICE — GLOVE SURG TRIUMPH SZ 6-1/2

## (undated) DEVICE — PROXIMATE SKIN STAPLERS (35 WIDE) CONTAINS 35 STAINLESS STEEL STAPLES (FIXED HEAD): Brand: PROXIMATE

## (undated) DEVICE — MONOPOLAR CURVED SCISSORS: Brand: ENDOWRIST;DAVINCI SI

## (undated) DEVICE — SHEET,DRAPE,40X58,STERILE: Brand: MEDLINE

## (undated) DEVICE — TROCAR: Brand: KII SLEEVE

## (undated) DEVICE — 2, DISPOSABLE SUCTION/IRRIGATOR WITHOUT DISPOSABLE TIP: Brand: STRYKEFLOW

## (undated) DEVICE — THE ECHELON, ECHELON ENDOPATH™ AND ECHELON FLEX™ FAMILIES OF ENDOSCOPIC LINEAR CUTTERS AND RELOADS ARE STERILE, SINGLE PATIENT USE INSTRUMENTS THAT SIMULTANEOUSLY CUT AND STAPLE TISSUE. THERE ARE SIX STAGGERED ROWS OF STAPLES, THREE ON EITHER SIDE OF THE CUT LINE. THE 45 MM INSTRUMENTS HAVE A STAPLE LINE THATIS APPROXIMATELY 45 MM LONG AND A CUT LINE THAT IS APPROXIMATELY 42 MM LONG. THE SHAFT CAN ROTATE FREELY IN BOTH DIRECTIONS AND AN ARTICULATION MECHANISM ON ARTICULATING INSTRUMENTS ENABLES BENDING THE DISTAL PORTIONOF THE SHAFT TO FACILITATE LATERAL ACCESS OF THE OPERATIVE SITE.THE INSTRUMENTS ARE SHIPPED WITHOUT A RELOAD AND MUST BE LOADED PRIOR TO USE. A STAPLE RETAINING CAP ON THE RELOAD PROTECTS THE STAPLE LEG POINTS DURING SHIPPING AND TRANSPORTATION. THE INSTRUMENTS’ LOCK-OUT FEATURE IS DESIGNED TO PREVENT A USED RELOAD FROM BEING REFIRED.: Brand: ECHELON ENDOPATH

## (undated) DEVICE — SUT ETHBND 2-0 30IN SH GRN

## (undated) DEVICE — DRAIN JACKSON PRATT RND7FR END: Brand: CARDINAL HEALTH

## (undated) DEVICE — DRAIN CHANNEL 19FR BLAKE

## (undated) DEVICE — 3M™ TEGADERM™ TRANSPARENT FILM DRESSING, 1626W, 4 IN X 4-3/4 IN (10 CM X 12 CM), 50 EACH/CARTON, 4 CARTON/CASE: Brand: 3M™ TEGADERM™

## (undated) DEVICE — MINI LAP PACK-LF: Brand: MEDLINE INDUSTRIES, INC.

## (undated) DEVICE — STERILE SYNTHETIC POLYISOPRENE POWDER-FREE SURGICAL GLOVES WITH HYDROGEL COATING: Brand: PROTEXIS

## (undated) DEVICE — SUTURE VLOC 180 0 12\" 0316

## (undated) DEVICE — ENDOPATH XCEL UNIVERSAL TROCAR STABLILITY SLEEVES: Brand: ENDOPATH XCEL

## (undated) DEVICE — HEAD AND NECK CDS-LF: Brand: MEDLINE INDUSTRIES, INC.

## (undated) DEVICE — SUT ETH 18IN BLK ET663H

## (undated) DEVICE — BIPOLAR FORCEPS CORD,BANANA LEADS: Brand: VALLEYLAB

## (undated) DEVICE — DV OBTURATOR BLADELESS 8MM

## (undated) DEVICE — SUTURE ETHLN SZ 4-0 L18IN NONABSORBABLE BLK .

## (undated) DEVICE — SUTURE ETHILON 3-0 PS2 1669H

## (undated) DEVICE — DRAPE EQP 72X42IN CARM POLY

## (undated) DEVICE — GEL US 20GM NONIRRITATING TRNSMIT AQSNC 100

## (undated) DEVICE — TRADITIONAL MARYLAND DISSECTOR TIP, DISPOSABLE: Brand: RENEW

## (undated) DEVICE — DISPOSABLE GRASPER: Brand: EPIX LAPAROSCOPIC GRASPER

## (undated) DEVICE — PREMIUM WET SKIN PREP TRAY: Brand: MEDLINE INDUSTRIES, INC.

## (undated) NOTE — LETTER
23    Patient: Abi Maloney  : 1950 Visit date: 10/18/2023    Dear  Mindy Gong MD    Thank you for referring Abi Maloney to my practice. Please find my assessment and plan below. I saw Sonia Sears in my office, she presents with a friable enlarging mass of her left upper arm which I believe is consistent with a pyogenic granuloma. I am planning excision under IV sedation.   Thank you Robel    Sincerely,       Briseida Cordova DO   CC:   No Recipients

## (undated) NOTE — Clinical Note
Mark Croft saw Idris Acuna in the office today. She presents with a recent biopsy of the left breast demonstrating atypical ductal hyperplasia. I am scheduling her for needle localized lumpectomy.   Thank you Elio Barker

## (undated) NOTE — LETTER
12/26/19        [address] [place] [address] [address]      Dear Iva Arellano,    [address] [facility] records indicate that you have outstanding lab work and or testing that was ordered for you and has not yet been completed:  Orders Placed This Encounter

## (undated) NOTE — LETTER
21    Patient: Nuha Bernard  : 1950 Visit date: 2021    Dear  Tristan Clifton MD    Thank you for referring Nuha Marco Antonio to my practice. Please find my assessment and plan below.         Imp: Infiltrating lobular carcinoma of t

## (undated) NOTE — MR AVS SNAPSHOT
[place]  [facility] [address]  [address] [facility] [address] [phone]               Thank you for choosing us for your health care visit with Dk Castrejon MD.  We are glad to serve you and happy to provide you with this sum [facility] [phone], [phone]  [address] [place] [address], [address] [place] [address] Drive     Phone:  [phone]    - escitalopram 10 MG Tabs  - primidone 50 MG Tabs            MyChart     Visit MyChart  You can access your MyChart to more actively manage your health

## (undated) NOTE — LETTER
04/06/18        Dillan REECE [address] [facility]      Dear Jefry Lopez,    [address] [facility] records indicate that you have outstanding lab work and or testing that was ordered for you and has not yet been completed:          Lipid Panel      HCV AB for D

## (undated) NOTE — Clinical Note
Moving forward with Reclast, please check if PA needed and contact pt with infusion clinic info, thanks!

## (undated) NOTE — Clinical Note
Kiya Orlando, You have previously done a mastectomy for this patient . Curently has an exophytic lesion of the left upper arm that is either pyogenic granuloma or BCE or SCCA --needs excision. I have referred to you.  Thanks--Antonio

## (undated) NOTE — ED AVS SNAPSHOT
[facility] Emergency Department    [place]  [address] [address]    Phone:  [phone]    Fax:  [address]   MRN: [id number]    Department:  [facility] Emergency Department   Date of Visit:  5/8/ IF THERE IS ANY CHANGE OR WORSENING OF YOUR CONDITION, CALL YOUR PRIMARY CARE PHYSICIAN AT ONCE OR RETURN IMMEDIATELY TO THE EMERGENCY DEPARTMENT.     If you have been prescribed any medication(s), please fill your prescription right away and begin taking t

## (undated) NOTE — ED AVS SNAPSHOT
[facility] Emergency Department    [place]  [address] [address]    Phone:  [phone]    Fax:  [address]   MRN: [id number]    Department:  [facility] Emergency Department   Date of Visit:  5/8/ Bring a paper prescription for each of these medications    - HYDROcodone-acetaminophen 5-325 MG Tabs              Discharge Instructions       Follow-up for further evaluation with OB/GYN for further evaluation of ovarian abnormality, call for an appoint primary care or specialist physician will see patients referred from the [facility] Emergency Department. Follow-up care is at the discretion of that Physician.     IF THERE IS ANY CHANGE OR WORSENING OF YOUR CONDITION, CALL YOUR PRIMARY CARE PHYSICIAN harming yourself, contact [facility] at [phone]. - If you don’t have insurance, [employer] has partnered with Patient [address] to help you get signed up for insurance coverage.   Patient [place] BILIARY:  Normal.  No visible dilatation or calcification. PANCREAS:  Normal.  No lesion, fluid collection, ductal dilatation, or atrophy. SPLEEN:  Normal.  No enlargement or focal lesion. AORTA/VASCULAR:  Atherosclerotic disease.    RETROPERITONEU For medical emergencies, dial 911.

## (undated) NOTE — Clinical Note
Hi Shira Garsia and Sam,  I met Mrs. Lelia Iraheta and her  today to discuss her newly diagnosed severe osteoporosis. Ideally I can get her on an anabolic agent to build up some bone 6-12 months, then switch over to IV BP for maintenance. She's going to let me know as soon as she's ready to start. Labs are ordered. Please let me know if any additional questions.  Thanks! -Ailyn Cui

## (undated) NOTE — LETTER
Patient Name: Briseida Joya        : 1950       Medical Record #: [id number]    CONSENT FOR PROCEDURES/SEDATION    Date: 2021       Time: 0730 AM        1.  I authorize the performance upon Briseida Joya the following:    ___________

## (undated) NOTE — Clinical Note
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  Ultimately her lymph nodes on the right and left were negative for metastatic disease. Her wounds are healing well. She opted for bilateral mastectomy. She will be following up with Dr. Tonie Bence in the next few months. I will see her back in 1 year.   Thank you Shade Santoro

## (undated) NOTE — LETTER
07/08/20      Jill Evangelista  [address] [place] [id number]        Our records indicate that you are due for a Mammogram. Your order is active and ready to be fulfilled.  Please call our central scheduling number [phone] If you would like this